# Patient Record
Sex: MALE | Race: WHITE | NOT HISPANIC OR LATINO
[De-identification: names, ages, dates, MRNs, and addresses within clinical notes are randomized per-mention and may not be internally consistent; named-entity substitution may affect disease eponyms.]

---

## 2019-11-19 PROBLEM — Z00.00 ENCOUNTER FOR PREVENTIVE HEALTH EXAMINATION: Status: ACTIVE | Noted: 2019-11-19

## 2019-11-25 ENCOUNTER — APPOINTMENT (OUTPATIENT)
Dept: CT IMAGING | Facility: CLINIC | Age: 69
End: 2019-11-25
Payer: MEDICARE

## 2019-11-25 ENCOUNTER — OUTPATIENT (OUTPATIENT)
Dept: OUTPATIENT SERVICES | Facility: HOSPITAL | Age: 69
LOS: 1 days | End: 2019-11-25
Payer: MEDICARE

## 2019-11-25 DIAGNOSIS — Z00.8 ENCOUNTER FOR OTHER GENERAL EXAMINATION: ICD-10-CM

## 2019-11-25 PROCEDURE — 74177 CT ABD & PELVIS W/CONTRAST: CPT | Mod: 26

## 2019-11-25 PROCEDURE — 71260 CT THORAX DX C+: CPT | Mod: 26

## 2019-11-25 PROCEDURE — 74177 CT ABD & PELVIS W/CONTRAST: CPT

## 2019-11-25 PROCEDURE — 82565 ASSAY OF CREATININE: CPT

## 2019-11-25 PROCEDURE — 71260 CT THORAX DX C+: CPT

## 2019-11-27 ENCOUNTER — APPOINTMENT (OUTPATIENT)
Dept: SURGICAL ONCOLOGY | Facility: CLINIC | Age: 69
End: 2019-11-27
Payer: MEDICARE

## 2019-11-27 VITALS
RESPIRATION RATE: 16 BRPM | HEART RATE: 90 BPM | HEIGHT: 69.5 IN | OXYGEN SATURATION: 93 % | SYSTOLIC BLOOD PRESSURE: 124 MMHG | BODY MASS INDEX: 28.09 KG/M2 | WEIGHT: 194 LBS | DIASTOLIC BLOOD PRESSURE: 73 MMHG

## 2019-11-27 DIAGNOSIS — K31.9 DISEASE OF STOMACH AND DUODENUM, UNSPECIFIED: ICD-10-CM

## 2019-11-27 DIAGNOSIS — Z87.19 PERSONAL HISTORY OF OTHER DISEASES OF THE DIGESTIVE SYSTEM: ICD-10-CM

## 2019-11-27 DIAGNOSIS — Z86.69 PERSONAL HISTORY OF OTHER DISEASES OF THE NERVOUS SYSTEM AND SENSE ORGANS: ICD-10-CM

## 2019-11-27 DIAGNOSIS — Z78.9 OTHER SPECIFIED HEALTH STATUS: ICD-10-CM

## 2019-11-27 DIAGNOSIS — Z86.39 PERSONAL HISTORY OF OTHER ENDOCRINE, NUTRITIONAL AND METABOLIC DISEASE: ICD-10-CM

## 2019-11-27 PROCEDURE — 99205 OFFICE O/P NEW HI 60 MIN: CPT

## 2019-11-27 RX ORDER — ENALAPRIL MALEATE 5 MG/1
5 TABLET ORAL
Refills: 0 | Status: ACTIVE | COMMUNITY

## 2019-11-27 RX ORDER — SEMAGLUTIDE 1.34 MG/ML
2 INJECTION, SOLUTION SUBCUTANEOUS
Refills: 0 | Status: ACTIVE | COMMUNITY

## 2019-11-27 RX ORDER — PANTOPRAZOLE 40 MG/1
TABLET, DELAYED RELEASE ORAL
Refills: 0 | Status: ACTIVE | COMMUNITY

## 2019-11-27 RX ORDER — ROSUVASTATIN CALCIUM 5 MG/1
TABLET, FILM COATED ORAL
Refills: 0 | Status: ACTIVE | COMMUNITY

## 2019-11-27 RX ORDER — EZETIMIBE AND SIMVASTATIN 10; 10 MG/1; MG/1
10-10 TABLET ORAL
Refills: 0 | Status: ACTIVE | COMMUNITY

## 2019-11-27 RX ORDER — METFORMIN HYDROCHLORIDE 1000 MG/1
1000 TABLET, COATED ORAL
Refills: 0 | Status: ACTIVE | COMMUNITY

## 2019-11-27 NOTE — CONSULT LETTER
[Dear  ___] : Dear  [unfilled], [Consult Letter:] : I had the pleasure of evaluating your patient, [unfilled]. [Please see my note below.] : Please see my note below. [Consult Closing:] : Thank you very much for allowing me to participate in the care of this patient.  If you have any questions, please do not hesitate to contact me. [Sincerely,] : Sincerely, [FreeTextEntry1] : I will notify you of the date of the surgery so that you can see him for pre-operative evaluation. [FreeTextEntry3] : Shadi Ferreira MD FACS\par Chief of Surgical Oncology\par \par  [DrArnulfo  ___] : Dr. RUSSO [DrArnulfo ___] : Dr. RSUSO

## 2019-11-27 NOTE — PHYSICAL EXAM
[Normal] : supple, no neck mass and thyroid not enlarged [Normal Supraclavicular Lymph Nodes] : normal supraclavicular lymph nodes [Normal] : oriented to person, place and time, with appropriate affect [Normal Groin Lymph Nodes] : normal groin lymph nodes [de-identified] : Distended,  no tenderness to palpation

## 2019-11-27 NOTE — ASSESSMENT
[FreeTextEntry1] : Impression:\par 33.7 cm cystic abdominal mass extending deep into the perirectal space \par \par \par Plan:\par Exploratory laparotomy\par \par

## 2019-11-27 NOTE — HISTORY OF PRESENT ILLNESS
[de-identified] : Mr. Lujan is a 69 year old male who presents today for an initial consultation.  He  was referred by Dr. Judah Hagan.\par \par Had a 4 months history of bloating and abdominal pain.  Underwent a CT C/A/P on 11/25/19 and was noted with a 33.7cm multiloculated cystic mass involving the abdomen and pelvis likely peritoneal in origin. The tumor extends deep into the perirectal space.  Patient states that he experienced a bad fall in August on his right side and has noted that his abdomen has slowly increased in size since the accident.  Endorses decreased appetite however denies abdominal discomfort or difficulty with BMs.  Denies loss of weight.\par \par PMH otherwise significant for DM (controlled), CVD s/p stent placement (off plavix), GERD and HLD.\par \par Internist: Dr. Judah Hagan\par GI: Dr. Chance\par Cards: Dr. Jama Baron

## 2019-12-05 ENCOUNTER — OUTPATIENT (OUTPATIENT)
Dept: OUTPATIENT SERVICES | Facility: HOSPITAL | Age: 69
LOS: 1 days | End: 2019-12-05

## 2019-12-05 VITALS
SYSTOLIC BLOOD PRESSURE: 100 MMHG | OXYGEN SATURATION: 98 % | TEMPERATURE: 98 F | HEART RATE: 87 BPM | HEIGHT: 70 IN | RESPIRATION RATE: 14 BRPM | DIASTOLIC BLOOD PRESSURE: 60 MMHG | WEIGHT: 201.94 LBS

## 2019-12-05 DIAGNOSIS — Z86.69 PERSONAL HISTORY OF OTHER DISEASES OF THE NERVOUS SYSTEM AND SENSE ORGANS: Chronic | ICD-10-CM

## 2019-12-05 DIAGNOSIS — G47.30 SLEEP APNEA, UNSPECIFIED: ICD-10-CM

## 2019-12-05 DIAGNOSIS — R19.00 INTRA-ABDOMINAL AND PELVIC SWELLING, MASS AND LUMP, UNSPECIFIED SITE: ICD-10-CM

## 2019-12-05 DIAGNOSIS — E11.9 TYPE 2 DIABETES MELLITUS WITHOUT COMPLICATIONS: ICD-10-CM

## 2019-12-05 DIAGNOSIS — Z01.818 ENCOUNTER FOR OTHER PREPROCEDURAL EXAMINATION: ICD-10-CM

## 2019-12-05 DIAGNOSIS — Z98.890 OTHER SPECIFIED POSTPROCEDURAL STATES: Chronic | ICD-10-CM

## 2019-12-05 LAB
ALBUMIN SERPL ELPH-MCNC: 3.6 G/DL — SIGNIFICANT CHANGE UP (ref 3.3–5)
ALP SERPL-CCNC: 81 U/L — SIGNIFICANT CHANGE UP (ref 40–120)
ALT FLD-CCNC: 37 U/L — SIGNIFICANT CHANGE UP (ref 4–41)
ANION GAP SERPL CALC-SCNC: 16 MMO/L — HIGH (ref 7–14)
AST SERPL-CCNC: 41 U/L — HIGH (ref 4–40)
BILIRUB SERPL-MCNC: 0.4 MG/DL — SIGNIFICANT CHANGE UP (ref 0.2–1.2)
BUN SERPL-MCNC: 22 MG/DL — SIGNIFICANT CHANGE UP (ref 7–23)
CALCIUM SERPL-MCNC: 9.3 MG/DL — SIGNIFICANT CHANGE UP (ref 8.4–10.5)
CHLORIDE SERPL-SCNC: 99 MMOL/L — SIGNIFICANT CHANGE UP (ref 98–107)
CO2 SERPL-SCNC: 22 MMOL/L — SIGNIFICANT CHANGE UP (ref 22–31)
CREAT SERPL-MCNC: 1.29 MG/DL — SIGNIFICANT CHANGE UP (ref 0.5–1.3)
GLUCOSE SERPL-MCNC: 116 MG/DL — HIGH (ref 70–99)
HBA1C BLD-MCNC: 6.2 % — HIGH (ref 4–5.6)
HCT VFR BLD CALC: 40.1 % — SIGNIFICANT CHANGE UP (ref 39–50)
HGB BLD-MCNC: 12.6 G/DL — LOW (ref 13–17)
MCHC RBC-ENTMCNC: 26.4 PG — LOW (ref 27–34)
MCHC RBC-ENTMCNC: 31.4 % — LOW (ref 32–36)
MCV RBC AUTO: 84.1 FL — SIGNIFICANT CHANGE UP (ref 80–100)
NRBC # FLD: 0 K/UL — SIGNIFICANT CHANGE UP (ref 0–0)
PLATELET # BLD AUTO: 443 K/UL — HIGH (ref 150–400)
PMV BLD: 9.8 FL — SIGNIFICANT CHANGE UP (ref 7–13)
POTASSIUM SERPL-MCNC: 4.4 MMOL/L — SIGNIFICANT CHANGE UP (ref 3.5–5.3)
POTASSIUM SERPL-SCNC: 4.4 MMOL/L — SIGNIFICANT CHANGE UP (ref 3.5–5.3)
PROT SERPL-MCNC: 6.8 G/DL — SIGNIFICANT CHANGE UP (ref 6–8.3)
RBC # BLD: 4.77 M/UL — SIGNIFICANT CHANGE UP (ref 4.2–5.8)
RBC # FLD: 13.2 % — SIGNIFICANT CHANGE UP (ref 10.3–14.5)
SODIUM SERPL-SCNC: 137 MMOL/L — SIGNIFICANT CHANGE UP (ref 135–145)
WBC # BLD: 7.64 K/UL — SIGNIFICANT CHANGE UP (ref 3.8–10.5)
WBC # FLD AUTO: 7.64 K/UL — SIGNIFICANT CHANGE UP (ref 3.8–10.5)

## 2019-12-05 RX ORDER — NEOMYCIN SULFATE 500 MG/1
500 TABLET ORAL
Qty: 6 | Refills: 0 | Status: ACTIVE | COMMUNITY
Start: 2019-12-05 | End: 1900-01-01

## 2019-12-05 RX ORDER — METRONIDAZOLE 250 MG/1
250 TABLET ORAL
Qty: 3 | Refills: 0 | Status: ACTIVE | COMMUNITY
Start: 2019-12-05 | End: 1900-01-01

## 2019-12-05 RX ORDER — POTASSIUM CHLORIDE 1500 MG/1
20 TABLET, FILM COATED, EXTENDED RELEASE ORAL
Qty: 4 | Refills: 0 | Status: ACTIVE | COMMUNITY
Start: 2019-12-05 | End: 1900-01-01

## 2019-12-05 NOTE — H&P PST ADULT - REASON FOR ADMISSION
" I am going to have abdominal surgery "   Pre op diagnosis: Intra-abdominal and pelvic swelling, mass and lump, unspecified site

## 2019-12-05 NOTE — H&P PST ADULT - ASSESSMENT
Pre op diagnosis: Intra-abdominal and pelvic swelling, mass and lump, unspecified site. Patient is scheduled for Exploratory laparotomy, resection abdominal mass, possible bowel resection scheduled on 12/13/2019.

## 2019-12-05 NOTE — H&P PST ADULT - NEGATIVE NEUROLOGICAL SYMPTOMS
no headache/no vertigo/no loss of sensation/no weakness/no syncope/no tremors/no difficulty walking/no confusion/no transient paralysis/no generalized seizures/no focal seizures/no paresthesias

## 2019-12-05 NOTE — H&P PST ADULT - NSCAFFEINEWITH_GEN_ALL_CORE_SD
Render Post-Care Instructions In Note?: no Post-Care Instructions: I reviewed with the patient in detail post-care instructions. Patient is to wear sunprotection, and avoid picking at any of the treated lesions. Pt may apply Vaseline to crusted or scabbing areas. Detail Level: Detailed Consent: The patient's consent was obtained including but not limited to risks of crusting, scabbing, blistering, scarring, darker or lighter pigmentary change, recurrence, incomplete removal and infection. The patient understands that the procedure is cosmetic in nature and is not covered by insurance. Price (Use Numbers Only, No Special Characters Or $): 0 Denies

## 2019-12-05 NOTE — H&P PST ADULT - HISTORY OF PRESENT ILLNESS
Patient is a 69 year old male with a history of 4 months history of bloating and abdominal pain. Underwent a CT C/A/P on 11/25/19 and was noted with a 33.7cm multiloculated cystic mass involving the abdomen and pelvis likely peritoneal in origin. Patient was referred to Dr. Ferreira for an evaluation.  Pre op diagnosis: Intra-abdominal and pelvic swelling, mass and lump, unspecified site. Patient is scheduled for Exploratory laparotomy, resection abdominal mass, possible bowel resection scheduled on 12/13/2019.

## 2019-12-05 NOTE — H&P PST ADULT - NSICDXPASTMEDICALHX_GEN_ALL_CORE_FT
PAST MEDICAL HISTORY:  CAD (coronary artery disease)     Diabetes mellitus Fasting FS range from - per patient    Hypercholesteremia     Hypertension     Intra-abdominal and pelvic swelling, mass and lump, unspecified site     Sleep apnea

## 2019-12-05 NOTE — H&P PST ADULT - NEGATIVE OPHTHALMOLOGIC SYMPTOMS
no blurred vision L/no blurred vision R/no irritation L/no irritation R/no pain R/no diplopia/no photophobia/no discharge L/no discharge R/no pain L

## 2019-12-05 NOTE — H&P PST ADULT - MUSCULOSKELETAL
details… detailed exam normal strength/ROM intact/no joint erythema/no joint warmth/no calf tenderness/no joint swelling

## 2019-12-05 NOTE — H&P PST ADULT - NEGATIVE GENERAL GENITOURINARY SYMPTOMS
no incontinence/no hematuria/no urinary hesitancy/normal urinary frequency/no flank pain L/no nocturia/no flank pain R/no urine discoloration/no bladder infections/no dysuria

## 2019-12-05 NOTE — H&P PST ADULT - NEGATIVE ENMT SYMPTOMS
no recurrent cold sores/no sinus symptoms/no gum bleeding/no hearing difficulty/no throat pain/no nasal discharge/no nasal congestion/no dysphagia/no nasal obstruction/no post-nasal discharge/no nose bleeds/no ear pain/no tinnitus/no dry mouth/no vertigo

## 2019-12-05 NOTE — H&P PST ADULT - RS GEN PE MLT RESP DETAILS PC
no rhonchi/no wheezes/respirations non-labored/good air movement/no rales/clear to auscultation bilaterally/breath sounds equal/airway patent

## 2019-12-05 NOTE — H&P PST ADULT - NEGATIVE BREAST SYMPTOMS
no breast tenderness L/no nipple discharge L/no breast tenderness R/no nipple discharge R/no breast lump L/no breast lump R

## 2019-12-05 NOTE — H&P PST ADULT - NSICDXPASTSURGICALHX_GEN_ALL_CORE_FT
PAST SURGICAL HISTORY:  H/O sleep apnea Per patient had Sleep Apnea surgery  in 1996- at [facility]- Was not retested for Sleep Apnea post surgery    History of cardiac cath Pt reports 1 cardiac stent placed 1999

## 2019-12-06 LAB
BLD GP AB SCN SERPL QL: NEGATIVE — SIGNIFICANT CHANGE UP
RH IG SCN BLD-IMP: POSITIVE — SIGNIFICANT CHANGE UP

## 2019-12-12 ENCOUNTER — TRANSCRIPTION ENCOUNTER (OUTPATIENT)
Age: 69
End: 2019-12-12

## 2019-12-12 ENCOUNTER — INPATIENT (INPATIENT)
Facility: HOSPITAL | Age: 69
LOS: 7 days | Discharge: HOME | End: 2019-12-20
Attending: SPECIALIST | Admitting: SPECIALIST
Payer: MEDICARE

## 2019-12-12 VITALS
HEIGHT: 70 IN | HEART RATE: 109 BPM | OXYGEN SATURATION: 99 % | SYSTOLIC BLOOD PRESSURE: 106 MMHG | DIASTOLIC BLOOD PRESSURE: 68 MMHG | TEMPERATURE: 98 F | RESPIRATION RATE: 16 BRPM

## 2019-12-12 DIAGNOSIS — Z98.890 OTHER SPECIFIED POSTPROCEDURAL STATES: Chronic | ICD-10-CM

## 2019-12-12 DIAGNOSIS — R19.00 INTRA-ABDOMINAL AND PELVIC SWELLING, MASS AND LUMP, UNSPECIFIED SITE: ICD-10-CM

## 2019-12-12 DIAGNOSIS — Z86.69 PERSONAL HISTORY OF OTHER DISEASES OF THE NERVOUS SYSTEM AND SENSE ORGANS: Chronic | ICD-10-CM

## 2019-12-12 PROBLEM — G47.30 SLEEP APNEA, UNSPECIFIED: Chronic | Status: ACTIVE | Noted: 2019-12-05

## 2019-12-12 LAB
ALBUMIN SERPL ELPH-MCNC: 3.2 G/DL — LOW (ref 3.3–5)
ALP SERPL-CCNC: 79 U/L — SIGNIFICANT CHANGE UP (ref 40–120)
ALT FLD-CCNC: 28 U/L — SIGNIFICANT CHANGE UP (ref 4–41)
ANION GAP SERPL CALC-SCNC: 18 MMO/L — HIGH (ref 7–14)
APPEARANCE UR: SIGNIFICANT CHANGE UP
APTT BLD: 31.5 SEC — SIGNIFICANT CHANGE UP (ref 27.5–36.3)
AST SERPL-CCNC: 32 U/L — SIGNIFICANT CHANGE UP (ref 4–40)
BACTERIA # UR AUTO: NEGATIVE — SIGNIFICANT CHANGE UP
BASOPHILS # BLD AUTO: 0.05 K/UL — SIGNIFICANT CHANGE UP (ref 0–0.2)
BASOPHILS NFR BLD AUTO: 0.6 % — SIGNIFICANT CHANGE UP (ref 0–2)
BILIRUB SERPL-MCNC: 0.5 MG/DL — SIGNIFICANT CHANGE UP (ref 0.2–1.2)
BILIRUB UR-MCNC: SIGNIFICANT CHANGE UP
BLOOD UR QL VISUAL: NEGATIVE — SIGNIFICANT CHANGE UP
BUN SERPL-MCNC: 26 MG/DL — HIGH (ref 7–23)
CALCIUM SERPL-MCNC: 9.4 MG/DL — SIGNIFICANT CHANGE UP (ref 8.4–10.5)
CHLORIDE SERPL-SCNC: 97 MMOL/L — LOW (ref 98–107)
CO2 SERPL-SCNC: 18 MMOL/L — LOW (ref 22–31)
COLOR SPEC: YELLOW — SIGNIFICANT CHANGE UP
CREAT SERPL-MCNC: 1.61 MG/DL — HIGH (ref 0.5–1.3)
EOSINOPHIL # BLD AUTO: 0.07 K/UL — SIGNIFICANT CHANGE UP (ref 0–0.5)
EOSINOPHIL NFR BLD AUTO: 0.9 % — SIGNIFICANT CHANGE UP (ref 0–6)
GLUCOSE SERPL-MCNC: 124 MG/DL — HIGH (ref 70–99)
GLUCOSE UR-MCNC: NEGATIVE — SIGNIFICANT CHANGE UP
GRAN CASTS # UR COMP ASSIST: SIGNIFICANT CHANGE UP
HCT VFR BLD CALC: 41.3 % — SIGNIFICANT CHANGE UP (ref 39–50)
HGB BLD-MCNC: 12.8 G/DL — LOW (ref 13–17)
HYALINE CASTS # UR AUTO: SIGNIFICANT CHANGE UP
IMM GRANULOCYTES NFR BLD AUTO: 0.1 % — SIGNIFICANT CHANGE UP (ref 0–1.5)
INR BLD: 1.09 — SIGNIFICANT CHANGE UP (ref 0.88–1.17)
KETONES UR-MCNC: SIGNIFICANT CHANGE UP
LEUKOCYTE ESTERASE UR-ACNC: NEGATIVE — SIGNIFICANT CHANGE UP
LIDOCAIN IGE QN: 75.4 U/L — HIGH (ref 7–60)
LYMPHOCYTES # BLD AUTO: 1.38 K/UL — SIGNIFICANT CHANGE UP (ref 1–3.3)
LYMPHOCYTES # BLD AUTO: 17.4 % — SIGNIFICANT CHANGE UP (ref 13–44)
MCHC RBC-ENTMCNC: 25.9 PG — LOW (ref 27–34)
MCHC RBC-ENTMCNC: 31 % — LOW (ref 32–36)
MCV RBC AUTO: 83.6 FL — SIGNIFICANT CHANGE UP (ref 80–100)
MONOCYTES # BLD AUTO: 0.4 K/UL — SIGNIFICANT CHANGE UP (ref 0–0.9)
MONOCYTES NFR BLD AUTO: 5.1 % — SIGNIFICANT CHANGE UP (ref 2–14)
NEUTROPHILS # BLD AUTO: 6.01 K/UL — SIGNIFICANT CHANGE UP (ref 1.8–7.4)
NEUTROPHILS NFR BLD AUTO: 75.9 % — SIGNIFICANT CHANGE UP (ref 43–77)
NITRITE UR-MCNC: NEGATIVE — SIGNIFICANT CHANGE UP
NRBC # FLD: 0 K/UL — SIGNIFICANT CHANGE UP (ref 0–0)
PH UR: 5.5 — SIGNIFICANT CHANGE UP (ref 5–8)
PLATELET # BLD AUTO: 490 K/UL — HIGH (ref 150–400)
PMV BLD: 10.4 FL — SIGNIFICANT CHANGE UP (ref 7–13)
POTASSIUM SERPL-MCNC: 4.4 MMOL/L — SIGNIFICANT CHANGE UP (ref 3.5–5.3)
POTASSIUM SERPL-SCNC: 4.4 MMOL/L — SIGNIFICANT CHANGE UP (ref 3.5–5.3)
PROT SERPL-MCNC: 6.8 G/DL — SIGNIFICANT CHANGE UP (ref 6–8.3)
PROT UR-MCNC: 100 — HIGH
PROTHROM AB SERPL-ACNC: 12.5 SEC — SIGNIFICANT CHANGE UP (ref 9.8–13.1)
RBC # BLD: 4.94 M/UL — SIGNIFICANT CHANGE UP (ref 4.2–5.8)
RBC # FLD: 13.6 % — SIGNIFICANT CHANGE UP (ref 10.3–14.5)
RBC CASTS # UR COMP ASSIST: SIGNIFICANT CHANGE UP (ref 0–?)
SODIUM SERPL-SCNC: 133 MMOL/L — LOW (ref 135–145)
SP GR SPEC: 1.02 — SIGNIFICANT CHANGE UP (ref 1–1.04)
SQUAMOUS # UR AUTO: SIGNIFICANT CHANGE UP
UROBILINOGEN FLD QL: SIGNIFICANT CHANGE UP
WBC # BLD: 7.92 K/UL — SIGNIFICANT CHANGE UP (ref 3.8–10.5)
WBC # FLD AUTO: 7.92 K/UL — SIGNIFICANT CHANGE UP (ref 3.8–10.5)
WBC UR QL: HIGH (ref 0–?)

## 2019-12-12 PROCEDURE — 99232 SBSQ HOSP IP/OBS MODERATE 35: CPT | Mod: 24

## 2019-12-12 RX ORDER — DEXTROSE 50 % IN WATER 50 %
12.5 SYRINGE (ML) INTRAVENOUS ONCE
Refills: 0 | Status: DISCONTINUED | OUTPATIENT
Start: 2019-12-12 | End: 2019-12-20

## 2019-12-12 RX ORDER — ASPIRIN/CALCIUM CARB/MAGNESIUM 324 MG
81 TABLET ORAL DAILY
Refills: 0 | Status: DISCONTINUED | OUTPATIENT
Start: 2019-12-12 | End: 2019-12-13

## 2019-12-12 RX ORDER — SODIUM CHLORIDE 9 MG/ML
1000 INJECTION INTRAMUSCULAR; INTRAVENOUS; SUBCUTANEOUS
Refills: 0 | Status: DISCONTINUED | OUTPATIENT
Start: 2019-12-12 | End: 2019-12-13

## 2019-12-12 RX ORDER — GLUCAGON INJECTION, SOLUTION 0.5 MG/.1ML
1 INJECTION, SOLUTION SUBCUTANEOUS ONCE
Refills: 0 | Status: DISCONTINUED | OUTPATIENT
Start: 2019-12-12 | End: 2019-12-20

## 2019-12-12 RX ORDER — ENOXAPARIN SODIUM 100 MG/ML
40 INJECTION SUBCUTANEOUS DAILY
Refills: 0 | Status: DISCONTINUED | OUTPATIENT
Start: 2019-12-12 | End: 2019-12-13

## 2019-12-12 RX ORDER — METRONIDAZOLE 500 MG
250 TABLET ORAL
Refills: 0 | Status: DISCONTINUED | OUTPATIENT
Start: 2019-12-12 | End: 2019-12-12

## 2019-12-12 RX ORDER — SODIUM CHLORIDE 9 MG/ML
1000 INJECTION, SOLUTION INTRAVENOUS
Refills: 0 | Status: DISCONTINUED | OUTPATIENT
Start: 2019-12-12 | End: 2019-12-19

## 2019-12-12 RX ORDER — DEXTROSE 50 % IN WATER 50 %
25 SYRINGE (ML) INTRAVENOUS ONCE
Refills: 0 | Status: DISCONTINUED | OUTPATIENT
Start: 2019-12-12 | End: 2019-12-20

## 2019-12-12 RX ORDER — DEXTROSE 50 % IN WATER 50 %
15 SYRINGE (ML) INTRAVENOUS ONCE
Refills: 0 | Status: DISCONTINUED | OUTPATIENT
Start: 2019-12-12 | End: 2019-12-20

## 2019-12-12 RX ORDER — INSULIN LISPRO 100/ML
VIAL (ML) SUBCUTANEOUS
Refills: 0 | Status: DISCONTINUED | OUTPATIENT
Start: 2019-12-12 | End: 2019-12-13

## 2019-12-12 RX ORDER — SODIUM CHLORIDE 9 MG/ML
1000 INJECTION, SOLUTION INTRAVENOUS ONCE
Refills: 0 | Status: COMPLETED | OUTPATIENT
Start: 2019-12-12 | End: 2019-12-12

## 2019-12-12 RX ORDER — NEOMYCIN SULFATE 500 MG/1
1000 TABLET ORAL
Refills: 0 | Status: COMPLETED | OUTPATIENT
Start: 2019-12-12 | End: 2019-12-12

## 2019-12-12 RX ORDER — POTASSIUM CHLORIDE 20 MEQ
40 PACKET (EA) ORAL
Refills: 0 | Status: COMPLETED | OUTPATIENT
Start: 2019-12-12 | End: 2019-12-13

## 2019-12-12 RX ORDER — METRONIDAZOLE 500 MG
1000 TABLET ORAL
Refills: 0 | Status: COMPLETED | OUTPATIENT
Start: 2019-12-12 | End: 2019-12-12

## 2019-12-12 RX ORDER — FAMOTIDINE 10 MG/ML
20 INJECTION INTRAVENOUS DAILY
Refills: 0 | Status: DISCONTINUED | OUTPATIENT
Start: 2019-12-12 | End: 2019-12-13

## 2019-12-12 RX ORDER — MORPHINE SULFATE 50 MG/1
4 CAPSULE, EXTENDED RELEASE ORAL ONCE
Refills: 0 | Status: DISCONTINUED | OUTPATIENT
Start: 2019-12-12 | End: 2019-12-12

## 2019-12-12 RX ORDER — ONDANSETRON 8 MG/1
4 TABLET, FILM COATED ORAL ONCE
Refills: 0 | Status: COMPLETED | OUTPATIENT
Start: 2019-12-12 | End: 2019-12-12

## 2019-12-12 RX ADMIN — MORPHINE SULFATE 4 MILLIGRAM(S): 50 CAPSULE, EXTENDED RELEASE ORAL at 10:38

## 2019-12-12 RX ADMIN — NEOMYCIN SULFATE 1000 MILLIGRAM(S): 500 TABLET ORAL at 22:55

## 2019-12-12 RX ADMIN — NEOMYCIN SULFATE 1000 MILLIGRAM(S): 500 TABLET ORAL at 15:34

## 2019-12-12 RX ADMIN — Medication 5 MILLIGRAM(S): at 22:54

## 2019-12-12 RX ADMIN — SODIUM CHLORIDE 1000 MILLILITER(S): 9 INJECTION, SOLUTION INTRAVENOUS at 10:38

## 2019-12-12 RX ADMIN — FAMOTIDINE 20 MILLIGRAM(S): 10 INJECTION INTRAVENOUS at 22:59

## 2019-12-12 RX ADMIN — Medication 1000 MILLIGRAM(S): at 15:34

## 2019-12-12 RX ADMIN — ENOXAPARIN SODIUM 40 MILLIGRAM(S): 100 INJECTION SUBCUTANEOUS at 14:29

## 2019-12-12 RX ADMIN — Medication 1000 MILLIGRAM(S): at 14:29

## 2019-12-12 RX ADMIN — Medication 40 MILLIEQUIVALENT(S): at 22:54

## 2019-12-12 RX ADMIN — ONDANSETRON 4 MILLIGRAM(S): 8 TABLET, FILM COATED ORAL at 10:38

## 2019-12-12 RX ADMIN — MORPHINE SULFATE 4 MILLIGRAM(S): 50 CAPSULE, EXTENDED RELEASE ORAL at 11:30

## 2019-12-12 RX ADMIN — Medication 1000 MILLIGRAM(S): at 22:54

## 2019-12-12 RX ADMIN — NEOMYCIN SULFATE 1000 MILLIGRAM(S): 500 TABLET ORAL at 14:42

## 2019-12-12 RX ADMIN — SODIUM CHLORIDE 125 MILLILITER(S): 9 INJECTION INTRAMUSCULAR; INTRAVENOUS; SUBCUTANEOUS at 17:36

## 2019-12-12 NOTE — ED ADULT NURSE NOTE - PSH
H/O sleep apnea  Per patient had Sleep Apnea surgery  in 1996- at [facility]- Was not retested for Sleep Apnea post surgery  History of cardiac cath  Pt reports 1 cardiac stent placed 1999

## 2019-12-12 NOTE — ED PROVIDER NOTE - PSH
H/O sleep apnea  Per patient had Sleep Apnea surgery  in 1996- at [facility]- Was not retested for Sleep Apnea post surgery  History of cardiac cath  Pt reports 1 cardiac stent placed 1999

## 2019-12-12 NOTE — H&P ADULT - NSICDXPASTSURGICALHX_GEN_ALL_CORE_FT
PAST SURGICAL HISTORY:  H/O sleep apnea Per patient had Sleep Apnea surgery  in 1996- at [facility]- Was not retested for Sleep Apnea post surgery    History of cardiac cath Pt reports 1 cardiac stent placed 1999

## 2019-12-12 NOTE — ED PROVIDER NOTE - PHYSICAL EXAMINATION
*GEN:   uncomfortable, in no acute distress, AOx3  *EYES:   pupils equally round and reactive to light, extra-occular movements intact  *HEENT:   airway patent, moist mucosal membranes, full ROM neck  *CV:   regular rate and rhythm  *RESP:   clear to auscultation bilaterally, non-labored  *ABD:   severely distended abdomen, mildly tender lower abdomen  *:   no cva/flank tenderness  *EXTREM:   no MSK tenderness, full ROM throughout, no leg swelling  *SKIN:   dry, intact  *NEURO:   AOx3, no focal weakness or loss of sensation

## 2019-12-12 NOTE — ED ADULT TRIAGE NOTE - CHIEF COMPLAINT QUOTE
A&OX3 sent in by MD Li for admission for surgery tomorrow, pt scheduled for abdominal mass removal surgery tomorrow, abdomen round distended reports nausea, denies fever chills sob PMH CAD, DM

## 2019-12-12 NOTE — H&P ADULT - NSHPLABSRESULTS_GEN_ALL_CORE
EXAM: CT CHEST IC     EXAM: CT ABDOMEN AND PELVIS OC IC     *** ADDENDUM 11/26/2019 ***     Correction for communication. The initial conversation of the patient's   finding was with Dr. Judah Hagan with subsequent conversation with Dr. Chance     *** END OF ADDENDUM 11/26/2019 ***     PROCEDURE DATE: 11/25/2019     INTERPRETATION: CLINICAL INFORMATION: 4 month history of abdominal pain and   bloating     COMPARISON: None.     PROCEDURE:   CT of the Chest, Abdomen and Pelvis was performed with intravenous contrast.   Intravenous contrast: 90 ml Omnipaque 350. 10 ml discarded.   Oral contrast: Positive contrast was administered.   Sagittal and coronal reformats were performed.     FINDINGS:     CHEST:     LUNGS AND LARGE AIRWAYS: Patent central airways. Bilateral linear   atelectasis. Moderate size hiatal hernia   PLEURA: No pleural effusion.   VESSELS: Within normal limits.   HEART: Heart size is normal. No pericardial effusion.   MEDIASTINUM AND GARRY: No lymphadenopathy.   CHEST WALL AND LOWER NECK: Within normal limits.     ABDOMEN AND PELVIS:     LIVER: Within normal limits.   BILE DUCTS: Normal caliber.   GALLBLADDER: Within normal limits.   SPLEEN: Within normal limits.   PANCREAS: Within normal limits.   ADRENALS: Within normal limits.   KIDNEYS/URETERS: Within normal limits.     BLADDER: Within normal limits.   REPRODUCTIVE ORGANS: Unremarkable     BOWEL: No bowel obstruction.   PERITONEUM: There appears be a large multiloculated cystic mass involving   the abdomen and pelvis measuring 33.7 x 20.6 x 28.5 cm in size. This is of   unclear origin and may be primary peritoneal tumor. The tumor extends deep   into the perirectal space   VESSELS: Within normal limits.   RETROPERITONEUM/LYMPH NODES: No lymphadenopathy.   ABDOMINAL WALL: Within normal limits.   BONES: Within normal limits.     IMPRESSION:     33.7 x 20.6 x 20.5 multiloculated cystic mass involving the abdomen pelvis   likely peritoneal in origin such as a peritoneal mesothelioma.     Findings were discussed with Dr. DESHAWN CHANCE 11/25/2019 3:40 PM by Dr. Bundy with read back confirmation.     ***Please see the addendum at the top of this report. It may contain   additional important information or changes.****

## 2019-12-12 NOTE — ED PROVIDER NOTE - ATTENDING CONTRIBUTION TO CARE
Attending Statement: I have personally seen and examined this patient. I have fully participated in the care of this patient. I have reviewed all pertinent clinical information, including history physical exam, plan and the Resident's note and agree except as noted  68yo M hx of HTN, HLD, DM, CAD, sleep apnea comes to be admitted for surgery tomorrow for resection of mass on abdomen. Pt states he has had 4 month hx of progressive abdominal mass, with pain, nausea and NB vomit, lose BM. no fever/chills. no weight loss. Chronic nonproductive cough. no cp. CT (11-24-19) noted to have a large mass/cyst on abdomen.   Vital signs noted.  Ambulatory in ED. Ao3. normal S1-S2 No resp distress. able to speak in full and clear sentences. no wheeze, rales or stridor. large nontender abdomen, no guarding or rebound, tense abdomen. no pedal edema. no calf tenderness. normal pulses bilateral feet.  plan reviewed prior imaging, labs, ekg, IVF and admit.

## 2019-12-12 NOTE — ED PROVIDER NOTE - PMH
CAD (coronary artery disease)    Diabetes mellitus  Fasting FS range from - per patient  Hypercholesteremia    Hypertension    Intra-abdominal and pelvic swelling, mass and lump, unspecified site    Sleep apnea

## 2019-12-12 NOTE — ED PROVIDER NOTE - OBJECTIVE STATEMENT
69M w/ pmh HTN, HLD, DM CAD s/p stent - sent by Dr. Ferreira for surgical admission for exploratory laparotomy. Pt has had abd distension, pain, vomiting worsening x 4 months worse the past 1 month, 11/24 underwent CTAP demonstrating large 33.7cm multiloculated cystic mass involving the abdomen and pelvis. +cough, diarrhea. Last vomited yesterday, unable to eat. No fever, n/v/d/c, chest pain, sob, dizziness, dysuria/hematuria.

## 2019-12-12 NOTE — ED ADULT NURSE REASSESSMENT NOTE - NS ED NURSE REASSESS COMMENT FT1
Received report from day shift NITHIN Angelo. Pt A&Ox3, laying in stretcher comfortably. Pt receiving NS at 125mL/hr. Pt c/o abdominal pain. Surgery team paged, awaiting call back and further plan. Pt denies any other medical complaints. denies chest pain, sob, n & v, diarrhea, fever. Vitals stable as noted. respirations are equal and nonlabored, no respiratory distress noted. Pt stable, awaiting further plan Received report from day shift NITHIN Angelo. Pt A&Ox3, laying in stretcher comfortably. Pt receiving NS at 125mL/hr. Pt c/o abdominal pain. MD Witt made aware. Awaiting medication orders. Pt denies any other medical complaints. denies chest pain, sob, n & v, diarrhea, fever. Vitals stable as noted. respirations are equal and nonlabored, no respiratory distress noted. Pt stable, awaiting further plan

## 2019-12-12 NOTE — H&P ADULT - HISTORY OF PRESENT ILLNESS
70yo M with hx of DM2, CADA s/p stent (1999), HLD, and sleep apnea s/p uvulectomy and tonsillectomy (~1996) presented 12/12 with abdominal discomfort and intolerance of bowel prep for planned resection of abdominal mass planned for 12/13 with Dr. Ferreira. Patient reports that in 11/2019 he noticed abd bloating that increased, causing him to present to GI doc (Dr. Chance) who sent him for CT scan that showed a 33.7x20.6x20.5cm multiloculated cystic mass involving the pelvis. He subsequently presented to his PCP who referred him to Dr. Ferreira and was subsequently scheduled for resection of the mass on 12/13/19. Patient has experienced increasing burping, hiccups, coughing, and anorexia. In the past 2wk patient has had increasing abd girth, diarrhea, constipation, urinary frequency, SOB, as well as N/V (NBNB) and increased abd discomfort with PO intake. He has therefore had reduced PO intake to alleviate symptoms. Denies CP or dysuria.

## 2019-12-12 NOTE — H&P ADULT - ASSESSMENT
70yo M with hx of DM2, CADA s/p stent (1999), HLD, and sleep apnea s/p uvulectomy and tonsillectomy (~1996) presented 12/12 with abdominal discomfort and intolerance of bowel prep for planned resection of abdominal mass (seen on outpt CT on 11/25) planned for 12/13 with Dr. Ferreira.    Plan  - Admit to Dr. Ferreira  - Consistent carb clear liquid diet with IV fluid      - NPO after midnight  - Metronidazole 250mg and Neomycin 1000mg at 13:00, 14:00, and 23:00 on 12/12  - Dulcolax suppositories x2 on 12/12  - DVT ppx: Lovenox  - Discussed with Dr. Ferreira 70yo M with hx of DM2, CADA s/p stent (1999), HLD, and sleep apnea s/p uvulectomy and tonsillectomy (~1996) presented 12/12 with abdominal discomfort and intolerance of bowel prep for planned resection of abdominal mass (seen on outpt CT on 11/25) planned for 12/13 with Dr. Ferreira.    Plan  - Admit to Dr. Ferreira  - Consistent carb clear liquid diet with IV fluid      - NPO after midnight  - Metronidazole 250mg and Neomycin 1000mg at 13:00, 14:00, and 23:00 on 12/12  - Bowel prep      - Dulcolax suppositories x2 on 12/12      - potassium Cl ER 40mg BID  - DVT ppx: Lovenox  - Discussed with Dr. Ferreira

## 2019-12-12 NOTE — ED ADULT NURSE NOTE - OBJECTIVE STATEMENT
pt is a 69 yr old male c/o abd pain 7/10 with grossly distended abd. pt to have surgery tomorrow, advised  by md to come to ed today. + nausea and pain coplaints, has been have loose stool x 2 weeks, last bm this am. pt unable to tolerate large amounts of liquid intake at this time. #20 g pic placed in left ac, ekg done, vss. will ctm

## 2019-12-13 ENCOUNTER — APPOINTMENT (OUTPATIENT)
Dept: SURGICAL ONCOLOGY | Facility: HOSPITAL | Age: 69
End: 2019-12-13

## 2019-12-13 ENCOUNTER — RESULT REVIEW (OUTPATIENT)
Age: 69
End: 2019-12-13

## 2019-12-13 LAB
ANION GAP SERPL CALC-SCNC: 16 MMO/L — HIGH (ref 7–14)
APTT BLD: 30.9 SEC — SIGNIFICANT CHANGE UP (ref 27.5–36.3)
BASE EXCESS BLDA CALC-SCNC: -7.1 MMOL/L — SIGNIFICANT CHANGE UP
BASE EXCESS BLDA CALC-SCNC: -9 MMOL/L — SIGNIFICANT CHANGE UP
BLD GP AB SCN SERPL QL: NEGATIVE — SIGNIFICANT CHANGE UP
BLOOD GAS ARTERIAL - FIO2: 40 — SIGNIFICANT CHANGE UP
BUN SERPL-MCNC: 15 MG/DL — SIGNIFICANT CHANGE UP (ref 7–23)
BUN SERPL-MCNC: 17 MG/DL — SIGNIFICANT CHANGE UP (ref 7–23)
BUN SERPL-MCNC: 22 MG/DL — SIGNIFICANT CHANGE UP (ref 7–23)
CA-I BLDA-SCNC: 1.15 MMOL/L — SIGNIFICANT CHANGE UP (ref 1.15–1.29)
CA-I BLDA-SCNC: 1.19 MMOL/L — SIGNIFICANT CHANGE UP (ref 1.15–1.29)
CALCIUM SERPL-MCNC: 7.7 MG/DL — LOW (ref 8.4–10.5)
CALCIUM SERPL-MCNC: 8.1 MG/DL — LOW (ref 8.4–10.5)
CALCIUM SERPL-MCNC: 9.3 MG/DL — SIGNIFICANT CHANGE UP (ref 8.4–10.5)
CHLORIDE SERPL-SCNC: 100 MMOL/L — SIGNIFICANT CHANGE UP (ref 98–107)
CHLORIDE SERPL-SCNC: 103 MMOL/L — SIGNIFICANT CHANGE UP (ref 98–107)
CHLORIDE SERPL-SCNC: 99 MMOL/L — SIGNIFICANT CHANGE UP (ref 98–107)
CO2 SERPL-SCNC: 15 MMOL/L — LOW (ref 22–31)
CO2 SERPL-SCNC: 19 MMOL/L — LOW (ref 22–31)
CO2 SERPL-SCNC: 19 MMOL/L — LOW (ref 22–31)
CREAT SERPL-MCNC: 1.11 MG/DL — SIGNIFICANT CHANGE UP (ref 0.5–1.3)
CREAT SERPL-MCNC: 1.15 MG/DL — SIGNIFICANT CHANGE UP (ref 0.5–1.3)
CREAT SERPL-MCNC: 1.36 MG/DL — HIGH (ref 0.5–1.3)
GLUCOSE BLDA-MCNC: 121 MG/DL — HIGH (ref 70–99)
GLUCOSE BLDA-MCNC: 154 MG/DL — HIGH (ref 70–99)
GLUCOSE BLDC GLUCOMTR-MCNC: 102 MG/DL — HIGH (ref 70–99)
GLUCOSE BLDC GLUCOMTR-MCNC: 150 MG/DL — HIGH (ref 70–99)
GLUCOSE BLDC GLUCOMTR-MCNC: 152 MG/DL — HIGH (ref 70–99)
GLUCOSE BLDC GLUCOMTR-MCNC: 79 MG/DL — SIGNIFICANT CHANGE UP (ref 70–99)
GLUCOSE BLDC GLUCOMTR-MCNC: 92 MG/DL — SIGNIFICANT CHANGE UP (ref 70–99)
GLUCOSE SERPL-MCNC: 156 MG/DL — HIGH (ref 70–99)
GLUCOSE SERPL-MCNC: 174 MG/DL — HIGH (ref 70–99)
GLUCOSE SERPL-MCNC: 97 MG/DL — SIGNIFICANT CHANGE UP (ref 70–99)
HCO3 BLDA-SCNC: 17 MMOL/L — LOW (ref 22–26)
HCO3 BLDA-SCNC: 19 MMOL/L — LOW (ref 22–26)
HCT VFR BLD CALC: 28.7 % — LOW (ref 39–50)
HCT VFR BLD CALC: 31.2 % — LOW (ref 39–50)
HCT VFR BLD CALC: 42.4 % — SIGNIFICANT CHANGE UP (ref 39–50)
HCT VFR BLDA CALC: 27.8 % — LOW (ref 39–51)
HCT VFR BLDA CALC: 35.5 % — LOW (ref 39–51)
HCV AB S/CO SERPL IA: 0.11 S/CO — SIGNIFICANT CHANGE UP (ref 0–0.99)
HCV AB SERPL-IMP: SIGNIFICANT CHANGE UP
HGB BLD-MCNC: 13 G/DL — SIGNIFICANT CHANGE UP (ref 13–17)
HGB BLD-MCNC: 9 G/DL — LOW (ref 13–17)
HGB BLD-MCNC: 9.8 G/DL — LOW (ref 13–17)
HGB BLDA-MCNC: 11.5 G/DL — LOW (ref 13–17)
HGB BLDA-MCNC: 9 G/DL — LOW (ref 13–17)
INR BLD: 1.05 — SIGNIFICANT CHANGE UP (ref 0.88–1.17)
LACTATE BLDA-SCNC: 0.9 MMOL/L — SIGNIFICANT CHANGE UP (ref 0.5–2)
MAGNESIUM SERPL-MCNC: 0.9 MG/DL — CRITICAL LOW (ref 1.6–2.6)
MAGNESIUM SERPL-MCNC: 1.2 MG/DL — LOW (ref 1.6–2.6)
MAGNESIUM SERPL-MCNC: 1.7 MG/DL — SIGNIFICANT CHANGE UP (ref 1.6–2.6)
MCHC RBC-ENTMCNC: 26.1 PG — LOW (ref 27–34)
MCHC RBC-ENTMCNC: 26.3 PG — LOW (ref 27–34)
MCHC RBC-ENTMCNC: 26.8 PG — LOW (ref 27–34)
MCHC RBC-ENTMCNC: 30.7 % — LOW (ref 32–36)
MCHC RBC-ENTMCNC: 31.4 % — LOW (ref 32–36)
MCHC RBC-ENTMCNC: 31.4 % — LOW (ref 32–36)
MCV RBC AUTO: 83.9 FL — SIGNIFICANT CHANGE UP (ref 80–100)
MCV RBC AUTO: 85 FL — SIGNIFICANT CHANGE UP (ref 80–100)
MCV RBC AUTO: 85.4 FL — SIGNIFICANT CHANGE UP (ref 80–100)
NRBC # FLD: 0 K/UL — SIGNIFICANT CHANGE UP (ref 0–0)
PCO2 BLDA: 31 MMHG — LOW (ref 35–48)
PCO2 BLDA: 35 MMHG — SIGNIFICANT CHANGE UP (ref 35–48)
PH BLDA: 7.33 PH — LOW (ref 7.35–7.45)
PH BLDA: 7.33 PH — LOW (ref 7.35–7.45)
PHOSPHATE SERPL-MCNC: 2.4 MG/DL — LOW (ref 2.5–4.5)
PHOSPHATE SERPL-MCNC: 3 MG/DL — SIGNIFICANT CHANGE UP (ref 2.5–4.5)
PHOSPHATE SERPL-MCNC: 3.9 MG/DL — SIGNIFICANT CHANGE UP (ref 2.5–4.5)
PLATELET # BLD AUTO: 337 K/UL — SIGNIFICANT CHANGE UP (ref 150–400)
PLATELET # BLD AUTO: 354 K/UL — SIGNIFICANT CHANGE UP (ref 150–400)
PLATELET # BLD AUTO: 435 K/UL — HIGH (ref 150–400)
PMV BLD: 10.3 FL — SIGNIFICANT CHANGE UP (ref 7–13)
PMV BLD: 9.9 FL — SIGNIFICANT CHANGE UP (ref 7–13)
PMV BLD: 9.9 FL — SIGNIFICANT CHANGE UP (ref 7–13)
PO2 BLDA: 156 MMHG — HIGH (ref 83–108)
PO2 BLDA: 403 MMHG — HIGH (ref 83–108)
POTASSIUM BLDA-SCNC: 4.1 MMOL/L — SIGNIFICANT CHANGE UP (ref 3.4–4.5)
POTASSIUM BLDA-SCNC: 4.6 MMOL/L — HIGH (ref 3.4–4.5)
POTASSIUM SERPL-MCNC: 4.6 MMOL/L — SIGNIFICANT CHANGE UP (ref 3.5–5.3)
POTASSIUM SERPL-MCNC: 4.7 MMOL/L — SIGNIFICANT CHANGE UP (ref 3.5–5.3)
POTASSIUM SERPL-MCNC: 4.8 MMOL/L — SIGNIFICANT CHANGE UP (ref 3.5–5.3)
POTASSIUM SERPL-SCNC: 4.6 MMOL/L — SIGNIFICANT CHANGE UP (ref 3.5–5.3)
POTASSIUM SERPL-SCNC: 4.7 MMOL/L — SIGNIFICANT CHANGE UP (ref 3.5–5.3)
POTASSIUM SERPL-SCNC: 4.8 MMOL/L — SIGNIFICANT CHANGE UP (ref 3.5–5.3)
PROTHROM AB SERPL-ACNC: 12 SEC — SIGNIFICANT CHANGE UP (ref 9.8–13.1)
RBC # BLD: 3.36 M/UL — LOW (ref 4.2–5.8)
RBC # BLD: 3.72 M/UL — LOW (ref 4.2–5.8)
RBC # BLD: 4.99 M/UL — SIGNIFICANT CHANGE UP (ref 4.2–5.8)
RBC # FLD: 13.4 % — SIGNIFICANT CHANGE UP (ref 10.3–14.5)
RBC # FLD: 13.5 % — SIGNIFICANT CHANGE UP (ref 10.3–14.5)
RBC # FLD: 13.7 % — SIGNIFICANT CHANGE UP (ref 10.3–14.5)
RH IG SCN BLD-IMP: POSITIVE — SIGNIFICANT CHANGE UP
SAO2 % BLDA: 97.6 % — SIGNIFICANT CHANGE UP (ref 95–99)
SAO2 % BLDA: 98.6 % — SIGNIFICANT CHANGE UP (ref 95–99)
SODIUM BLDA-SCNC: 131 MMOL/L — LOW (ref 136–146)
SODIUM BLDA-SCNC: 132 MMOL/L — LOW (ref 136–146)
SODIUM SERPL-SCNC: 134 MMOL/L — LOW (ref 135–145)
SODIUM SERPL-SCNC: 134 MMOL/L — LOW (ref 135–145)
SODIUM SERPL-SCNC: 135 MMOL/L — SIGNIFICANT CHANGE UP (ref 135–145)
WBC # BLD: 12.38 K/UL — HIGH (ref 3.8–10.5)
WBC # BLD: 6.04 K/UL — SIGNIFICANT CHANGE UP (ref 3.8–10.5)
WBC # BLD: 9.13 K/UL — SIGNIFICANT CHANGE UP (ref 3.8–10.5)
WBC # FLD AUTO: 12.38 K/UL — HIGH (ref 3.8–10.5)
WBC # FLD AUTO: 6.04 K/UL — SIGNIFICANT CHANGE UP (ref 3.8–10.5)
WBC # FLD AUTO: 9.13 K/UL — SIGNIFICANT CHANGE UP (ref 3.8–10.5)

## 2019-12-13 PROCEDURE — 88342 IMHCHEM/IMCYTCHM 1ST ANTB: CPT | Mod: 26,59

## 2019-12-13 PROCEDURE — 99232 SBSQ HOSP IP/OBS MODERATE 35: CPT

## 2019-12-13 PROCEDURE — 88360 TUMOR IMMUNOHISTOCHEM/MANUAL: CPT | Mod: 26

## 2019-12-13 PROCEDURE — 88341 IMHCHEM/IMCYTCHM EA ADD ANTB: CPT | Mod: 26,59

## 2019-12-13 PROCEDURE — 88331 PATH CONSLTJ SURG 1 BLK 1SPC: CPT | Mod: 26

## 2019-12-13 PROCEDURE — 88305 TISSUE EXAM BY PATHOLOGIST: CPT | Mod: 26

## 2019-12-13 RX ORDER — HYDROMORPHONE HYDROCHLORIDE 2 MG/ML
0.5 INJECTION INTRAMUSCULAR; INTRAVENOUS; SUBCUTANEOUS
Refills: 0 | Status: DISCONTINUED | OUTPATIENT
Start: 2019-12-13 | End: 2019-12-13

## 2019-12-13 RX ORDER — NALOXONE HYDROCHLORIDE 4 MG/.1ML
0.1 SPRAY NASAL
Refills: 0 | Status: DISCONTINUED | OUTPATIENT
Start: 2019-12-13 | End: 2019-12-16

## 2019-12-13 RX ORDER — ONDANSETRON 8 MG/1
4 TABLET, FILM COATED ORAL EVERY 6 HOURS
Refills: 0 | Status: DISCONTINUED | OUTPATIENT
Start: 2019-12-13 | End: 2019-12-16

## 2019-12-13 RX ORDER — HEPARIN SODIUM 5000 [USP'U]/ML
5000 INJECTION INTRAVENOUS; SUBCUTANEOUS EVERY 8 HOURS
Refills: 0 | Status: DISCONTINUED | OUTPATIENT
Start: 2019-12-13 | End: 2019-12-16

## 2019-12-13 RX ORDER — MAGNESIUM SULFATE 500 MG/ML
4 VIAL (ML) INJECTION ONCE
Refills: 0 | Status: COMPLETED | OUTPATIENT
Start: 2019-12-13 | End: 2019-12-13

## 2019-12-13 RX ORDER — MAGNESIUM SULFATE 500 MG/ML
2 VIAL (ML) INJECTION ONCE
Refills: 0 | Status: COMPLETED | OUTPATIENT
Start: 2019-12-13 | End: 2019-12-13

## 2019-12-13 RX ORDER — INSULIN LISPRO 100/ML
VIAL (ML) SUBCUTANEOUS EVERY 6 HOURS
Refills: 0 | Status: DISCONTINUED | OUTPATIENT
Start: 2019-12-13 | End: 2019-12-15

## 2019-12-13 RX ORDER — HYDROMORPHONE HYDROCHLORIDE 2 MG/ML
0.5 INJECTION INTRAMUSCULAR; INTRAVENOUS; SUBCUTANEOUS
Refills: 0 | Status: DISCONTINUED | OUTPATIENT
Start: 2019-12-13 | End: 2019-12-16

## 2019-12-13 RX ORDER — SODIUM CHLORIDE 9 MG/ML
1000 INJECTION, SOLUTION INTRAVENOUS
Refills: 0 | Status: DISCONTINUED | OUTPATIENT
Start: 2019-12-13 | End: 2019-12-14

## 2019-12-13 RX ADMIN — HYDROMORPHONE HYDROCHLORIDE 0.5 MILLIGRAM(S): 2 INJECTION INTRAMUSCULAR; INTRAVENOUS; SUBCUTANEOUS at 12:45

## 2019-12-13 RX ADMIN — HYDROMORPHONE HYDROCHLORIDE 0.5 MILLIGRAM(S): 2 INJECTION INTRAMUSCULAR; INTRAVENOUS; SUBCUTANEOUS at 11:50

## 2019-12-13 RX ADMIN — Medication 40 MILLIEQUIVALENT(S): at 05:59

## 2019-12-13 RX ADMIN — HEPARIN SODIUM 5000 UNIT(S): 5000 INJECTION INTRAVENOUS; SUBCUTANEOUS at 17:06

## 2019-12-13 RX ADMIN — HYDROMORPHONE HYDROCHLORIDE 0.5 MILLIGRAM(S): 2 INJECTION INTRAMUSCULAR; INTRAVENOUS; SUBCUTANEOUS at 11:05

## 2019-12-13 RX ADMIN — HYDROMORPHONE HYDROCHLORIDE 0.5 MILLIGRAM(S): 2 INJECTION INTRAMUSCULAR; INTRAVENOUS; SUBCUTANEOUS at 11:35

## 2019-12-13 RX ADMIN — Medication 100 GRAM(S): at 12:46

## 2019-12-13 RX ADMIN — Medication 50 GRAM(S): at 20:59

## 2019-12-13 RX ADMIN — Medication 1: at 13:25

## 2019-12-13 RX ADMIN — HYDROMORPHONE HYDROCHLORIDE 0.5 MILLIGRAM(S): 2 INJECTION INTRAMUSCULAR; INTRAVENOUS; SUBCUTANEOUS at 11:20

## 2019-12-13 RX ADMIN — HYDROMORPHONE HYDROCHLORIDE 0.5 MILLIGRAM(S): 2 INJECTION INTRAMUSCULAR; INTRAVENOUS; SUBCUTANEOUS at 12:30

## 2019-12-13 RX ADMIN — Medication 5 MILLIGRAM(S): at 05:59

## 2019-12-13 RX ADMIN — FAMOTIDINE 20 MILLIGRAM(S): 10 INJECTION INTRAVENOUS at 05:59

## 2019-12-13 RX ADMIN — Medication 63.75 MILLIMOLE(S): at 13:06

## 2019-12-13 NOTE — BRIEF OPERATIVE NOTE - OPERATION/FINDINGS
Midline laparotomy. Large loculated cystic mass dissected out laterally and from the rectum. Some spillage of clear cystic fluid from smaller loculations. Mass completely excised. Hemostasis achieved. Abdomen closed.

## 2019-12-13 NOTE — PROGRESS NOTE ADULT - SUBJECTIVE AND OBJECTIVE BOX
vss  ncat  s1s2  cat  soft nt nd  pt seen earlire  case d/w dr. khan  pt known to dr. nicole  appreicate surgical eval

## 2019-12-13 NOTE — PROGRESS NOTE ADULT - SUBJECTIVE AND OBJECTIVE BOX
D TEAM SURGERY PROGRESS NOTE    SUBJECTIVE: Patient seen and examined at bedside on AM rounds, completed bowel prep yesterday. Had continued hydration yesterday and overnight with IV fluids. Plan for OR this morning.     Vital Signs Last 24 Hrs  T(C): 36.6 (13 Dec 2019 05:25), Max: 36.6 (12 Dec 2019 22:23)  T(F): 97.8 (13 Dec 2019 05:25), Max: 97.9 (12 Dec 2019 22:23)  HR: 69 (13 Dec 2019 05:25) (69 - 88)  BP: 116/64 (13 Dec 2019 05:25) (101/61 - 116/64)  BP(mean): 70 (12 Dec 2019 22:23) (70 - 70)  RR: 18 (13 Dec 2019 05:25) (16 - 18)  SpO2: 95% (13 Dec 2019 05:25) (95% - 100%)  I&O's Detail    12 Dec 2019 07:01  -  13 Dec 2019 07:00  --------------------------------------------------------  IN:    sodium chloride 0.9%.: 1250 mL  Total IN: 1250 mL    OUT:  Total OUT: 0 mL    Total NET: 1250 mL        MEDICATIONS  (STANDING):  aspirin enteric coated 81 milliGRAM(s) Oral daily  dextrose 5%. 1000 milliLiter(s) (50 mL/Hr) IV Continuous <Continuous>  dextrose 50% Injectable 12.5 Gram(s) IV Push once  dextrose 50% Injectable 25 Gram(s) IV Push once  dextrose 50% Injectable 25 Gram(s) IV Push once  enoxaparin Injectable 40 milliGRAM(s) SubCutaneous daily  insulin lispro (HumaLOG) corrective regimen sliding scale   SubCutaneous every 6 hours  sodium chloride 0.9%. 1000 milliLiter(s) (125 mL/Hr) IV Continuous <Continuous>    MEDICATIONS  (PRN):  dextrose 40% Gel 15 Gram(s) Oral once PRN Blood Glucose LESS THAN 70 milliGRAM(s)/deciliter  famotidine    Tablet 20 milliGRAM(s) Oral daily PRN abdominal discomfort  glucagon  Injectable 1 milliGRAM(s) IntraMuscular once PRN Glucose LESS THAN 70 milligrams/deciliter      Physical Exam  General: A&Ox3, NAD  Respiratory: Clear bilaterally, equal bilateral expansion  Cardiovascular: Regular rate & rhythm  Abdominal: Soft, NT/ND    LABS:                        13.0   6.04  )-----------( 435      ( 13 Dec 2019 06:00 )             42.4         134<L>  |  99  |  22  ----------------------------<  97  4.8   |  19<L>  |  1.36<H>    Ca    9.3      13 Dec 2019 06:00  Phos  3.0       Mg     1.2         TPro  6.8  /  Alb  3.2<L>  /  TBili  0.5  /  DBili  x   /  AST  32  /  ALT  28  /  AlkPhos  79  12-12    PT/INR - ( 13 Dec 2019 06:00 )   PT: 12.0 SEC;   INR: 1.05          PTT - ( 13 Dec 2019 06:00 )  PTT:30.9 SEC  Urinalysis Basic - ( 12 Dec 2019 10:15 )    Color: YELLOW / Appearance: Lt TURBID / S.025 / pH: 5.5  Gluc: NEGATIVE / Ketone: SMALL  / Bili: TRACE / Urobili: TRACE   Blood: NEGATIVE / Protein: 100 / Nitrite: NEGATIVE   Leuk Esterase: NEGATIVE / RBC: 3-5 / WBC 11-25   Sq Epi: MANY / Non Sq Epi: x / Bacteria: NEGATIVE      ABO Interpretation: B (19 @ 06:00)

## 2019-12-13 NOTE — PRE-OP CHECKLIST - SELECT TESTS ORDERED
BMP/Type and Screen/CBC/INR/PT/PTT/Hepatic Function INR/POCT Blood Glucose/Type and Screen/BMP/CBC/PT/PTT 79/INR/BMP/CBC/PT/PTT/Type and Screen/POCT Blood Glucose

## 2019-12-13 NOTE — PROGRESS NOTE ADULT - ASSESSMENT
70yo M with hx of DM2, CAD s/p PCI (1999), HLD, and sleep apnea s/p uvulectomy and tonsillectomy (~1996) presented 12/12 with abdominal discomfort and intolerance of bowel prep for planned resection of abdominal mass (seen on outpt CT on 11/25)  - NPO for OR today  - NS @ 125ml/hr  - Metronidazole 250mg and Neomycin 1000mg at 13:00, 14:00, and 23:00 completed  - DVT ppx: Lovenox    D Team Surgery #[id number]

## 2019-12-13 NOTE — CHART NOTE - NSCHARTNOTEFT_GEN_A_CORE
Surgery Post-Op Note    Pre-Op Dx: Abdominal mass  Procedure: Exploratory laparotomy, abdominal mass excision  Surgeon: Dr. Ferreira    SUBJECTIVE:  Pt seen and examined at the bedside. Patient c/o intermittent, 7/10 abdominal pain at midline incision. Patient has PCEA in place which patient used once and had pain relief. Denies fever, chills, CP, SOB, nausea or vomiting. NGT in place.     OBJECTIVE:  Vital Signs Last 24 Hrs  T(C): 36.4 (13 Dec 2019 10:50), Max: 36.6 (12 Dec 2019 22:23)  T(F): 97.5 (13 Dec 2019 10:50), Max: 97.9 (12 Dec 2019 22:23)  HR: 77 (13 Dec 2019 14:00) (68 - 79)  BP: 106/60 (13 Dec 2019 14:00) (98/51 - 118/56)  BP(mean): 74 (13 Dec 2019 14:00) (55 - 77)  RR: 16 (13 Dec 2019 14:00) (12 - 19)  SpO2: 98% (13 Dec 2019 14:00) (92% - 100%)    Physical Exam:  General: NAD, resting comfortably in bed  Neuro: A/O x 3, no focal deficits  EENT: NGT in place with bilious output  Pulmonary: Nonlabored breathing, no respiratory distress  Cardiovascular: RRR, +S1, S2, no murmurs, rubs or gallops  Abdominal: soft, nondistended, TTP at midline, no rebound or guarding. Midline incision C/D/I with minimal SS strikethrough  Extremities: WWP     LABS:                        9.0    9.13  )-----------( 337      ( 13 Dec 2019 11:30 )             28.7     12-13    134<L>  |  103  |  17  ----------------------------<  174<H>  4.7   |  15<L>  |  1.11    Ca    7.7<L>      13 Dec 2019 11:30  Phos  2.4     12-13  Mg     0.9     12-13    TPro  6.8  /  Alb  3.2<L>  /  TBili  0.5  /  DBili  x   /  AST  32  /  ALT  28  /  AlkPhos  79  12-12    PT/INR - ( 13 Dec 2019 06:00 )   PT: 12.0 SEC;   INR: 1.05          PTT - ( 13 Dec 2019 06:00 )  PTT:30.9 SEC  CAPILLARY BLOOD GLUCOSE      POCT Blood Glucose.: 152 mg/dL (13 Dec 2019 13:00)  POCT Blood Glucose.: 79 mg/dL (13 Dec 2019 05:30)  POCT Blood Glucose.: 92 mg/dL (13 Dec 2019 01:12)  POCT Blood Glucose.: 107 mg/dL (12 Dec 2019 17:31)    Urinalysis Basic - ( 12 Dec 2019 10:15 )    Color: YELLOW / Appearance: Lt TURBID / S.025 / pH: 5.5  Gluc: NEGATIVE / Ketone: SMALL  / Bili: TRACE / Urobili: TRACE   Blood: NEGATIVE / Protein: 100 / Nitrite: NEGATIVE   Leuk Esterase: NEGATIVE / RBC: 3-5 / WBC -   Sq Epi: MANY / Non Sq Epi: x / Bacteria: NEGATIVE      LIVER FUNCTIONS - ( 12 Dec 2019 09:30 )  Alb: 3.2 g/dL / Pro: 6.8 g/dL / ALK PHOS: 79 u/L / ALT: 28 u/L / AST: 32 u/L / GGT: x           ABO Interpretation: B ( @ 06:00)    ASSESSMENT:  70yo M with hx of DM2, CAD s/p PCI (), HLD, and sleep apnea s/p uvulectomy and tonsillectomy (~) presented  with abdominal discomfort and intolerance of bowel prep for planned resection of abdominal mass (seen on outpt CT on ), now 4h s/p ex lap, abdominal mass resection. Recovering well.    PLAN:  - Pain control via PCEA  - Encourage IS, OOB  - Nausea control PRN  - Monitor vitals  - Diet: NPO, IVF  - NGT to LCWS  - Monitor I+Os via Clark   - Hypomagnesemia, repleted, f/u AM labs  - DM on ISS, FS q6h  - DVT ppx: SQH    D Team Surgery  [id number] Surgery Post-Op Note    Pre-Op Dx: Abdominal mass  Procedure: Exploratory laparotomy, abdominal mass excision  Surgeon: Dr. Ferreira    SUBJECTIVE:  Pt seen and examined at the bedside. Patient c/o intermittent, 7/10 abdominal pain at midline incision. Patient has PCEA in place which patient used once and had pain relief. Denies fever, chills, CP, SOB, nausea or vomiting. NGT in place.     OBJECTIVE:  Vital Signs Last 24 Hrs  T(C): 36.4 (13 Dec 2019 10:50), Max: 36.6 (12 Dec 2019 22:23)  T(F): 97.5 (13 Dec 2019 10:50), Max: 97.9 (12 Dec 2019 22:23)  HR: 77 (13 Dec 2019 14:00) (68 - 79)  BP: 106/60 (13 Dec 2019 14:00) (98/51 - 118/56)  BP(mean): 74 (13 Dec 2019 14:00) (55 - 77)  RR: 16 (13 Dec 2019 14:00) (12 - 19)  SpO2: 98% (13 Dec 2019 14:00) (92% - 100%)    Physical Exam:  General: NAD, resting comfortably in bed  Neuro: A/O x 3, no focal deficits  EENT: NGT in place with bilious output  Pulmonary: Nonlabored breathing, no respiratory distress  Cardiovascular: RRR, +S1, S2, no murmurs, rubs or gallops  Abdominal: soft, nondistended, TTP at midline, no rebound or guarding. Midline incision C/D/I with minimal SS strikethrough  Extremities: WWP     LABS:                        9.0    9.13  )-----------( 337      ( 13 Dec 2019 11:30 )             28.7     12-13    134<L>  |  103  |  17  ----------------------------<  174<H>  4.7   |  15<L>  |  1.11    Ca    7.7<L>      13 Dec 2019 11:30  Phos  2.4     12-13  Mg     0.9     12-13    TPro  6.8  /  Alb  3.2<L>  /  TBili  0.5  /  DBili  x   /  AST  32  /  ALT  28  /  AlkPhos  79  12-12    PT/INR - ( 13 Dec 2019 06:00 )   PT: 12.0 SEC;   INR: 1.05          PTT - ( 13 Dec 2019 06:00 )  PTT:30.9 SEC  CAPILLARY BLOOD GLUCOSE      POCT Blood Glucose.: 152 mg/dL (13 Dec 2019 13:00)  POCT Blood Glucose.: 79 mg/dL (13 Dec 2019 05:30)  POCT Blood Glucose.: 92 mg/dL (13 Dec 2019 01:12)  POCT Blood Glucose.: 107 mg/dL (12 Dec 2019 17:31)    Urinalysis Basic - ( 12 Dec 2019 10:15 )    Color: YELLOW / Appearance: Lt TURBID / S.025 / pH: 5.5  Gluc: NEGATIVE / Ketone: SMALL  / Bili: TRACE / Urobili: TRACE   Blood: NEGATIVE / Protein: 100 / Nitrite: NEGATIVE   Leuk Esterase: NEGATIVE / RBC: 3-5 / WBC -   Sq Epi: MANY / Non Sq Epi: x / Bacteria: NEGATIVE      LIVER FUNCTIONS - ( 12 Dec 2019 09:30 )  Alb: 3.2 g/dL / Pro: 6.8 g/dL / ALK PHOS: 79 u/L / ALT: 28 u/L / AST: 32 u/L / GGT: x           ABO Interpretation: B ( @ 06:00)    ASSESSMENT:  70yo M with hx of DM2, CAD s/p PCI (), HLD, and sleep apnea s/p uvulectomy and tonsillectomy (~) presented  with abdominal discomfort and intolerance of bowel prep for planned resection of abdominal mass (seen on outpt CT on ), now 4h s/p ex lap, abdominal mass resection. Recovering well.    PLAN:  - Pain control via PCEA  - Encourage IS, OOB  - Nausea control PRN  - Monitor vitals  - Diet: NPO, IVF  - NGT to LCWS  - Monitor I+Os via Clark   - Hypomagnesemia, repleted, f/u repeat labs  - DM on ISS, FS q6h  - DVT ppx: SQH    D Team Surgery  [id number]

## 2019-12-14 LAB
ANION GAP SERPL CALC-SCNC: 16 MMO/L — HIGH (ref 7–14)
APTT BLD: 31 SEC — SIGNIFICANT CHANGE UP (ref 27.5–36.3)
BUN SERPL-MCNC: 9 MG/DL — SIGNIFICANT CHANGE UP (ref 7–23)
CALCIUM SERPL-MCNC: 7.6 MG/DL — LOW (ref 8.4–10.5)
CHLORIDE SERPL-SCNC: 102 MMOL/L — SIGNIFICANT CHANGE UP (ref 98–107)
CO2 SERPL-SCNC: 18 MMOL/L — LOW (ref 22–31)
CREAT SERPL-MCNC: 1.08 MG/DL — SIGNIFICANT CHANGE UP (ref 0.5–1.3)
GLUCOSE BLDC GLUCOMTR-MCNC: 117 MG/DL — HIGH (ref 70–99)
GLUCOSE BLDC GLUCOMTR-MCNC: 146 MG/DL — HIGH (ref 70–99)
GLUCOSE BLDC GLUCOMTR-MCNC: 154 MG/DL — HIGH (ref 70–99)
GLUCOSE BLDC GLUCOMTR-MCNC: 159 MG/DL — HIGH (ref 70–99)
GLUCOSE SERPL-MCNC: 116 MG/DL — HIGH (ref 70–99)
HCT VFR BLD CALC: 30.7 % — LOW (ref 39–50)
HGB BLD-MCNC: 9.6 G/DL — LOW (ref 13–17)
INR BLD: 1.3 — HIGH (ref 0.88–1.17)
MAGNESIUM SERPL-MCNC: 1.7 MG/DL — SIGNIFICANT CHANGE UP (ref 1.6–2.6)
MCHC RBC-ENTMCNC: 26.6 PG — LOW (ref 27–34)
MCHC RBC-ENTMCNC: 31.3 % — LOW (ref 32–36)
MCV RBC AUTO: 85 FL — SIGNIFICANT CHANGE UP (ref 80–100)
NRBC # FLD: 0 K/UL — SIGNIFICANT CHANGE UP (ref 0–0)
PHOSPHATE SERPL-MCNC: 3 MG/DL — SIGNIFICANT CHANGE UP (ref 2.5–4.5)
PLATELET # BLD AUTO: 344 K/UL — SIGNIFICANT CHANGE UP (ref 150–400)
PMV BLD: 10.5 FL — SIGNIFICANT CHANGE UP (ref 7–13)
POTASSIUM SERPL-MCNC: 4 MMOL/L — SIGNIFICANT CHANGE UP (ref 3.5–5.3)
POTASSIUM SERPL-SCNC: 4 MMOL/L — SIGNIFICANT CHANGE UP (ref 3.5–5.3)
PROTHROM AB SERPL-ACNC: 14.6 SEC — HIGH (ref 9.8–13.1)
RBC # BLD: 3.61 M/UL — LOW (ref 4.2–5.8)
RBC # FLD: 13.5 % — SIGNIFICANT CHANGE UP (ref 10.3–14.5)
SODIUM SERPL-SCNC: 136 MMOL/L — SIGNIFICANT CHANGE UP (ref 135–145)
WBC # BLD: 7.76 K/UL — SIGNIFICANT CHANGE UP (ref 3.8–10.5)
WBC # FLD AUTO: 7.76 K/UL — SIGNIFICANT CHANGE UP (ref 3.8–10.5)

## 2019-12-14 RX ORDER — ACETAMINOPHEN 500 MG
1000 TABLET ORAL EVERY 8 HOURS
Refills: 0 | Status: COMPLETED | OUTPATIENT
Start: 2019-12-14 | End: 2019-12-14

## 2019-12-14 RX ORDER — MAGNESIUM SULFATE 500 MG/ML
2 VIAL (ML) INJECTION ONCE
Refills: 0 | Status: COMPLETED | OUTPATIENT
Start: 2019-12-14 | End: 2019-12-14

## 2019-12-14 RX ORDER — PANTOPRAZOLE SODIUM 20 MG/1
40 TABLET, DELAYED RELEASE ORAL DAILY
Refills: 0 | Status: DISCONTINUED | OUTPATIENT
Start: 2019-12-14 | End: 2019-12-15

## 2019-12-14 RX ORDER — SODIUM CHLORIDE 9 MG/ML
1000 INJECTION, SOLUTION INTRAVENOUS ONCE
Refills: 0 | Status: COMPLETED | OUTPATIENT
Start: 2019-12-14 | End: 2019-12-14

## 2019-12-14 RX ORDER — DEXTROSE MONOHYDRATE, SODIUM CHLORIDE, AND POTASSIUM CHLORIDE 50; .745; 4.5 G/1000ML; G/1000ML; G/1000ML
1000 INJECTION, SOLUTION INTRAVENOUS
Refills: 0 | Status: DISCONTINUED | OUTPATIENT
Start: 2019-12-14 | End: 2019-12-16

## 2019-12-14 RX ADMIN — DEXTROSE MONOHYDRATE, SODIUM CHLORIDE, AND POTASSIUM CHLORIDE 125 MILLILITER(S): 50; .745; 4.5 INJECTION, SOLUTION INTRAVENOUS at 09:21

## 2019-12-14 RX ADMIN — HEPARIN SODIUM 5000 UNIT(S): 5000 INJECTION INTRAVENOUS; SUBCUTANEOUS at 09:22

## 2019-12-14 RX ADMIN — SODIUM CHLORIDE 1000 MILLILITER(S): 9 INJECTION, SOLUTION INTRAVENOUS at 16:48

## 2019-12-14 RX ADMIN — Medication 400 MILLIGRAM(S): at 12:15

## 2019-12-14 RX ADMIN — Medication 1000 MILLIGRAM(S): at 12:45

## 2019-12-14 RX ADMIN — HEPARIN SODIUM 5000 UNIT(S): 5000 INJECTION INTRAVENOUS; SUBCUTANEOUS at 00:20

## 2019-12-14 RX ADMIN — HEPARIN SODIUM 5000 UNIT(S): 5000 INJECTION INTRAVENOUS; SUBCUTANEOUS at 17:44

## 2019-12-14 RX ADMIN — PANTOPRAZOLE SODIUM 40 MILLIGRAM(S): 20 TABLET, DELAYED RELEASE ORAL at 17:40

## 2019-12-14 RX ADMIN — Medication 1: at 13:06

## 2019-12-14 RX ADMIN — Medication 50 GRAM(S): at 12:36

## 2019-12-14 RX ADMIN — Medication 1: at 17:43

## 2019-12-14 NOTE — PROGRESS NOTE ADULT - ASSESSMENT
70yo M with hx of DM2, CAD s/p PCI (1999), HLD, and sleep apnea s/p uvulectomy and tonsillectomy (~1996) presented 12/12 with abdominal discomfort and intolerance of bowel prep for planned resection of abdominal mass (seen on outpt CT on 11/25), now 4h s/p ex lap, abdominal mass resection. Recovering well.    PLAN:  - Pain control via PCEA  - Encourage IS, OOB  - Nausea control PRN  - Monitor vitals  - Diet: NPO, IVF  - NGT to LCWS  - Monitor I+Os via Clark   - Hypomagnesemia, repleted, f/u repeat labs  - DM on ISS, FS q6h  - DVT ppx: SQH  - clamp trial today    D Team Surgery  [id number].

## 2019-12-14 NOTE — PROGRESS NOTE ADULT - SUBJECTIVE AND OBJECTIVE BOX
Interval Events:    Tachycardic and hypotensive to 100's and 90's systolic in PACU, with scant bloody output from NGT, now normalized    S: Patient doing well, denies fevers, chills, nausea, emesis, chest pain, SOB.  Pain is well controlled. -/- F/BM.    O: Vital Signs Last 24 Hrs  T(C): 36.2 (14 Dec 2019 07:47), Max: 37.2 (13 Dec 2019 23:34)  T(F): 97.2 (14 Dec 2019 07:47), Max: 98.9 (13 Dec 2019 23:34)  HR: 85 (14 Dec 2019 07:47) (68 - 108)  BP: 99/46 (14 Dec 2019 07:47) (99/46 - 118/56)  BP(mean): 74 (13 Dec 2019 14:00) (71 - 77)  RR: 16 (14 Dec 2019 07:47) (12 - 18)  SpO2: 97% (14 Dec 2019 07:47) (93% - 100%)      13 Dec 2019 07:01  -  14 Dec 2019 07:00  --------------------------------------------------------  IN:    IV PiggyBack: 400 mL    lactated ringers.: 2000 mL  Total IN: 2400 mL    OUT:    Indwelling Catheter - Urethral: 2085 mL    Nasoenteral Tube: 250 mL  Total OUT: 2335 mL    Total NET: 65 mL          Physical Exam:    Gen: Well-developed, well-nourished in no acute distress  Resp: No additional work of breathing   GI:appropriately tender, midline incision with scant drainage on overlying dressing  MSK: Moves all extremities equally  Skin: No rashes    Labs:                        9.6    7.76  )-----------( 344      ( 14 Dec 2019 06:45 )             30.7     14 Dec 2019 06:45    136    |  102    |  9      ----------------------------<  116    4.0     |  18     |  1.08     Ca    7.6        14 Dec 2019 06:45  Phos  3.0       14 Dec 2019 06:45  Mg     1.7       14 Dec 2019 06:45      PT/INR - ( 14 Dec 2019 06:45 )   PT: 14.6 SEC;   INR: 1.30          PTT - ( 14 Dec 2019 06:45 )  PTT:31.0 SEC  CAPILLARY BLOOD GLUCOSE      POCT Blood Glucose.: 117 mg/dL (14 Dec 2019 05:16)  POCT Blood Glucose.: 102 mg/dL (13 Dec 2019 23:42)  POCT Blood Glucose.: 150 mg/dL (13 Dec 2019 18:39)  POCT Blood Glucose.: 152 mg/dL (13 Dec 2019 13:00)                MEDICATIONS  (STANDING):  acetaminophen  IVPB .. 1000 milliGRAM(s) IV Intermittent every 8 hours  dextrose 5% + sodium chloride 0.45% with potassium chloride 20 mEq/L 1000 milliLiter(s) (125 mL/Hr) IV Continuous <Continuous>  dextrose 5%. 1000 milliLiter(s) (50 mL/Hr) IV Continuous <Continuous>  dextrose 50% Injectable 12.5 Gram(s) IV Push once  dextrose 50% Injectable 25 Gram(s) IV Push once  dextrose 50% Injectable 25 Gram(s) IV Push once  heparin  Injectable 5000 Unit(s) SubCutaneous every 8 hours  hydromorphone (10 MICROgram(s)/mL) + bupivacaine 0.0625% in 0.9% Sodium Chloride PCEA 250 milliLiter(s) Epidural PCA Continuous  insulin lispro (HumaLOG) corrective regimen sliding scale   SubCutaneous every 6 hours    MEDICATIONS  (PRN):  dextrose 40% Gel 15 Gram(s) Oral once PRN Blood Glucose LESS THAN 70 milliGRAM(s)/deciliter  glucagon  Injectable 1 milliGRAM(s) IntraMuscular once PRN Glucose LESS THAN 70 milligrams/deciliter  HYDROmorphone  Injectable 0.5 milliGRAM(s) IV Push every 3 hours PRN Severe breakthrough Pain (7 - 10)  hydromorphone (10 MICROgram(s)/mL) + bupivacaine 0.0625% in 0.9% Sodium Chloride PCEA Rescue Clinician  Bolus 5 milliLiter(s) Epidural every 15 minutes PRN for Pain Score greater than 6  naloxone Injectable 0.1 milliGRAM(s) IV Push every 3 minutes PRN For ANY of the following changes in patient status:  A. RR LESS THAN 10 breaths per minute, B. Oxygen saturation LESS THAN 90%, C. Sedation score of 6  ondansetron Injectable 4 milliGRAM(s) IV Push every 6 hours PRN Nausea

## 2019-12-14 NOTE — PROGRESS NOTE ADULT - SUBJECTIVE AND OBJECTIVE BOX
SUBJECTIVE / OVERNIGHT EVENTS: pt c/o abd pain , - flatus       MEDICATIONS  (STANDING):  dextrose 5% + sodium chloride 0.45% with potassium chloride 20 mEq/L 1000 milliLiter(s) (125 mL/Hr) IV Continuous <Continuous>  dextrose 5%. 1000 milliLiter(s) (50 mL/Hr) IV Continuous <Continuous>  dextrose 50% Injectable 12.5 Gram(s) IV Push once  dextrose 50% Injectable 25 Gram(s) IV Push once  dextrose 50% Injectable 25 Gram(s) IV Push once  heparin  Injectable 5000 Unit(s) SubCutaneous every 8 hours  hydromorphone (10 MICROgram(s)/mL) + bupivacaine 0.0625% in 0.9% Sodium Chloride PCEA 250 milliLiter(s) Epidural PCA Continuous  insulin lispro (HumaLOG) corrective regimen sliding scale   SubCutaneous every 6 hours  pantoprazole  Injectable 40 milliGRAM(s) IV Push daily    MEDICATIONS  (PRN):  dextrose 40% Gel 15 Gram(s) Oral once PRN Blood Glucose LESS THAN 70 milliGRAM(s)/deciliter  glucagon  Injectable 1 milliGRAM(s) IntraMuscular once PRN Glucose LESS THAN 70 milligrams/deciliter  HYDROmorphone  Injectable 0.5 milliGRAM(s) IV Push every 3 hours PRN Severe breakthrough Pain (7 - 10)  hydromorphone (10 MICROgram(s)/mL) + bupivacaine 0.0625% in 0.9% Sodium Chloride PCEA Rescue Clinician  Bolus 5 milliLiter(s) Epidural every 15 minutes PRN for Pain Score greater than 6  naloxone Injectable 0.1 milliGRAM(s) IV Push every 3 minutes PRN For ANY of the following changes in patient status:  A. RR LESS THAN 10 breaths per minute, B. Oxygen saturation LESS THAN 90%, C. Sedation score of 6  ondansetron Injectable 4 milliGRAM(s) IV Push every 6 hours PRN Nausea    Vital Signs Last 24 Hrs  T(C): 36.9 (14 Dec 2019 19:27), Max: 37.2 (13 Dec 2019 23:34)  T(F): 98.5 (14 Dec 2019 19:27), Max: 98.9 (13 Dec 2019 23:34)  HR: 86 (14 Dec 2019 19:27) (74 - 86)  BP: 107/50 (14 Dec 2019 19:27) (92/48 - 107/50)  BP(mean): --  RR: 16 (14 Dec 2019 19:27) (16 - 18)  SpO2: 97% (14 Dec 2019 19:27) (97% - 100%)    CAPILLARY BLOOD GLUCOSE      POCT Blood Glucose.: 154 mg/dL (14 Dec 2019 17:40)  POCT Blood Glucose.: 159 mg/dL (14 Dec 2019 12:45)  POCT Blood Glucose.: 117 mg/dL (14 Dec 2019 05:16)  POCT Blood Glucose.: 102 mg/dL (13 Dec 2019 23:42)    I&O's Summary    13 Dec 2019 07:01  -  14 Dec 2019 07:00  --------------------------------------------------------  IN: 2400 mL / OUT: 2335 mL / NET: 65 mL    14 Dec 2019 07:01  -  14 Dec 2019 22:03  --------------------------------------------------------  IN: 375 mL / OUT: 425 mL / NET: -50 mL        Constitutional: No fever, fatigue  Skin: No rash.  Eyes: No recent vision problems or eye pain.  ENT: No congestion, ear pain, or sore throat.  Cardiovascular: No chest pain or palpation.  Respiratory: No cough, shortness of breath, congestion, or wheezing.  Gastrointestinal: No abdominal pain, nausea, vomiting, or diarrhea.  Genitourinary: No dysuria.  Musculoskeletal: No joint swelling.  Neurologic: No headache.    PHYSICAL EXAM:  GENERAL: NAD  EYES: EOMI, PERRLA  NECK: Supple, No JVD  CHEST/LUNG: dec breath sounds rt base   HEART:  S1 , S2 +  ABDOMEN: soft , bs+, mild distension +  EXTREMITIES:  no edema   NEUROLOGY:alert awake oriented   ceballos +      LABS:                        9.6    7.76  )-----------( 344      ( 14 Dec 2019 06:45 )             30.7     12-14    136  |  102  |  9   ----------------------------<  116<H>  4.0   |  18<L>  |  1.08    Ca    7.6<L>      14 Dec 2019 06:45  Phos  3.0     12-14  Mg     1.7     12-14      PT/INR - ( 14 Dec 2019 06:45 )   PT: 14.6 SEC;   INR: 1.30          PTT - ( 14 Dec 2019 06:45 )  PTT:31.0 SEC          RADIOLOGY & ADDITIONAL TESTS:    Imaging Personally Reviewed:    Consultant(s) Notes Reviewed:      Care Discussed with Consultants/Other Providers:

## 2019-12-14 NOTE — PROGRESS NOTE ADULT - SUBJECTIVE AND OBJECTIVE BOX
ANESTHESIA POSTOP CHECK    69y Male POSTOP DAY 1 S/P     [ X] NO APPARENT ANESTHESIA COMPLICATIONS      Comments:

## 2019-12-14 NOTE — PROGRESS NOTE ADULT - SUBJECTIVE AND OBJECTIVE BOX
ADRIAN FNUES:[id number],   69yMale followed for:  No Known Allergies    PAST MEDICAL & SURGICAL HISTORY:  Sleep apnea  CAD (coronary artery disease)  Hypercholesteremia  Diabetes mellitus: Fasting FS range from - per patient  Hypertension  Intra-abdominal and pelvic swelling, mass and lump, unspecified site  H/O sleep apnea: Per patient had Sleep Apnea surgery  in 1996- at [facility]- Was not retested for Sleep Apnea post surgery  History of cardiac cath: Pt reports 1 cardiac stent placed 1999    FAMILY HISTORY:  FH: heart disease: Mother    MEDICATIONS  (STANDING):  acetaminophen  IVPB .. 1000 milliGRAM(s) IV Intermittent every 8 hours  dextrose 5% + sodium chloride 0.45% with potassium chloride 20 mEq/L 1000 milliLiter(s) (125 mL/Hr) IV Continuous <Continuous>  dextrose 5%. 1000 milliLiter(s) (50 mL/Hr) IV Continuous <Continuous>  dextrose 50% Injectable 12.5 Gram(s) IV Push once  dextrose 50% Injectable 25 Gram(s) IV Push once  dextrose 50% Injectable 25 Gram(s) IV Push once  heparin  Injectable 5000 Unit(s) SubCutaneous every 8 hours  hydromorphone (10 MICROgram(s)/mL) + bupivacaine 0.0625% in 0.9% Sodium Chloride PCEA 250 milliLiter(s) Epidural PCA Continuous  insulin lispro (HumaLOG) corrective regimen sliding scale   SubCutaneous every 6 hours    MEDICATIONS  (PRN):  dextrose 40% Gel 15 Gram(s) Oral once PRN Blood Glucose LESS THAN 70 milliGRAM(s)/deciliter  glucagon  Injectable 1 milliGRAM(s) IntraMuscular once PRN Glucose LESS THAN 70 milligrams/deciliter  HYDROmorphone  Injectable 0.5 milliGRAM(s) IV Push every 3 hours PRN Severe breakthrough Pain (7 - 10)  hydromorphone (10 MICROgram(s)/mL) + bupivacaine 0.0625% in 0.9% Sodium Chloride PCEA Rescue Clinician  Bolus 5 milliLiter(s) Epidural every 15 minutes PRN for Pain Score greater than 6  naloxone Injectable 0.1 milliGRAM(s) IV Push every 3 minutes PRN For ANY of the following changes in patient status:  A. RR LESS THAN 10 breaths per minute, B. Oxygen saturation LESS THAN 90%, C. Sedation score of 6  ondansetron Injectable 4 milliGRAM(s) IV Push every 6 hours PRN Nausea      Vital Signs Last 24 Hrs  T(C): 36.2 (14 Dec 2019 07:47), Max: 37.2 (13 Dec 2019 23:34)  T(F): 97.2 (14 Dec 2019 07:47), Max: 98.9 (13 Dec 2019 23:34)  HR: 85 (14 Dec 2019 07:47) (68 - 108)  BP: 99/46 (14 Dec 2019 07:47) (99/46 - 118/56)  BP(mean): 74 (13 Dec 2019 14:00) (68 - 77)  RR: 16 (14 Dec 2019 07:47) (12 - 19)  SpO2: 97% (14 Dec 2019 07:47) (92% - 100%)  nc/at  s1s2  cta  soft, nt, nd no guarding or rebound  no c/c/e    CBC Full  -  ( 14 Dec 2019 06:45 )  WBC Count : 7.76 K/uL  RBC Count : 3.61 M/uL  Hemoglobin : 9.6 g/dL  Hematocrit : 30.7 %  Platelet Count - Automated : 344 K/uL  Mean Cell Volume : 85.0 fL  Mean Cell Hemoglobin : 26.6 pg  Mean Cell Hemoglobin Concentration : 31.3 %  Auto Neutrophil # : x  Auto Lymphocyte # : x  Auto Monocyte # : x  Auto Eosinophil # : x  Auto Basophil # : x  Auto Neutrophil % : x  Auto Lymphocyte % : x  Auto Monocyte % : x  Auto Eosinophil % : x  Auto Basophil % : x    12-14    136  |  102  |  9   ----------------------------<  116<H>  4.0   |  18<L>  |  1.08    Ca    7.6<L>      14 Dec 2019 06:45  Phos  3.0     12-14  Mg     1.7     12-14      PT/INR - ( 14 Dec 2019 06:45 )   PT: 14.6 SEC;   INR: 1.30          PTT - ( 14 Dec 2019 06:45 )  PTT:31.0 SEC

## 2019-12-14 NOTE — PROGRESS NOTE ADULT - SUBJECTIVE AND OBJECTIVE BOX
Anesthesia Pain Management Service: Day 1__ of Epidural    SUBJECTIVE: Patient doing well with PCEA and no problems.  Pain Scale Score:   Refer to charted pain scores    THERAPY:  [x ] Epidural Bupivacaine 0.0625% and Hydromorphone  		[ ] 10 micrograms/mL	[ ] 5 micrograms/mL  [ ] Epidural Bupivacaine 0.0625% and Fentanyl - 2 micrograms/mL  [ ] Epidural Ropivacaine 0.1% plain – 1 mg/mL  [ ] Patient Controlled Regional Anesthesia (PCRA) Ropivacaine  		[ ] 0.2%			[ ] 0.1%    Demand dose __3_ lockout __15_ (minutes) Continuous Rate __6_ Total: __125cc_ Daily      MEDICATIONS  (STANDING):  dextrose 5% + sodium chloride 0.45% with potassium chloride 20 mEq/L 1000 milliLiter(s) (125 mL/Hr) IV Continuous <Continuous>  dextrose 5%. 1000 milliLiter(s) (50 mL/Hr) IV Continuous <Continuous>  dextrose 50% Injectable 12.5 Gram(s) IV Push once  dextrose 50% Injectable 25 Gram(s) IV Push once  dextrose 50% Injectable 25 Gram(s) IV Push once  heparin  Injectable 5000 Unit(s) SubCutaneous every 8 hours  hydromorphone (10 MICROgram(s)/mL) + bupivacaine 0.0625% in 0.9% Sodium Chloride PCEA 250 milliLiter(s) Epidural PCA Continuous  insulin lispro (HumaLOG) corrective regimen sliding scale   SubCutaneous every 6 hours    MEDICATIONS  (PRN):  dextrose 40% Gel 15 Gram(s) Oral once PRN Blood Glucose LESS THAN 70 milliGRAM(s)/deciliter  glucagon  Injectable 1 milliGRAM(s) IntraMuscular once PRN Glucose LESS THAN 70 milligrams/deciliter  HYDROmorphone  Injectable 0.5 milliGRAM(s) IV Push every 3 hours PRN Severe breakthrough Pain (7 - 10)  hydromorphone (10 MICROgram(s)/mL) + bupivacaine 0.0625% in 0.9% Sodium Chloride PCEA Rescue Clinician  Bolus 5 milliLiter(s) Epidural every 15 minutes PRN for Pain Score greater than 6  naloxone Injectable 0.1 milliGRAM(s) IV Push every 3 minutes PRN For ANY of the following changes in patient status:  A. RR LESS THAN 10 breaths per minute, B. Oxygen saturation LESS THAN 90%, C. Sedation score of 6  ondansetron Injectable 4 milliGRAM(s) IV Push every 6 hours PRN Nausea      OBJECTIVE:    Assessment of Catheter Site:	[ ] Left	[ ] Right  [x ] Epidural 	[ ] Femoral	      [ ] Saphenous   [ ] Supraclavicular   [ ] Other:    [x ] Dressing intact	[x ] Site non-tender	[ x] Site without erythema, discharge, edema  [x ] Epidural tubing and connection checked	[x] Gross neurological exam within normal limits  [ ] Catheter removed – tip intact		[x ] Afebrile	[ ] Febrile: ___    PT/INR - ( 14 Dec 2019 06:45 )   PT: 14.6 SEC;   INR: 1.30          PTT - ( 14 Dec 2019 06:45 )  PTT:31.0 SEC                      9.6    7.76  )-----------( 344      ( 14 Dec 2019 06:45 )             30.7     Vital Signs Last 24 Hrs  T(C): 36.2 (12-14-19 @ 07:47), Max: 37.2 (12-13-19 @ 23:34)  T(F): 97.2 (12-14-19 @ 07:47), Max: 98.9 (12-13-19 @ 23:34)  HR: 85 (12-14-19 @ 07:47) (68 - 108)  BP: 99/46 (12-14-19 @ 07:47) (98/51 - 118/56)  BP(mean): 74 (12-13-19 @ 14:00) (55 - 77)  RR: 16 (12-14-19 @ 07:47) (12 - 19)  SpO2: 97% (12-14-19 @ 07:47) (92% - 100%)      Sedation Score:	[x ] Alert	[ ] Drowsy	[ ] Arousable	[ ] Asleep	[ ] Unresponsive    Side Effects:	[x ] None	[ ] Nausea	[ ] Vomiting	[ ] Pruritus  		[ ] Weakness		[ ] Numbness	[ ] Other:    ASSESSMENT/ PLAN:    Therapy to  be:	[x ] Continue   [ ] Discontinued   [ ] Change to prn Analgesics    Documentation and Verification of current medications:  [ X ] Done	[ ] Not done, not eligible, reason:    Comments: Doing OK with epidural and may continue.

## 2019-12-14 NOTE — PROGRESS NOTE ADULT - ASSESSMENT
70yo M with hx of DM2, CAD s/p stent (1999), HLD, and sleep apnea s/p uvulectomy and tonsillectomy (~1996) presented 12/12 with abd discomfort found to have abd mass + s/p surgery      - pain control, monitor gi fx closely     -    DM2 -iss    Cad - pt chest pain free , restart aspirin after surgery clearance

## 2019-12-15 LAB
ANION GAP SERPL CALC-SCNC: 8 MMO/L — SIGNIFICANT CHANGE UP (ref 7–14)
APTT BLD: 34.1 SEC — SIGNIFICANT CHANGE UP (ref 27.5–36.3)
BUN SERPL-MCNC: 6 MG/DL — LOW (ref 7–23)
CALCIUM SERPL-MCNC: 8 MG/DL — LOW (ref 8.4–10.5)
CHLORIDE SERPL-SCNC: 100 MMOL/L — SIGNIFICANT CHANGE UP (ref 98–107)
CO2 SERPL-SCNC: 24 MMOL/L — SIGNIFICANT CHANGE UP (ref 22–31)
CREAT SERPL-MCNC: 0.95 MG/DL — SIGNIFICANT CHANGE UP (ref 0.5–1.3)
GLUCOSE BLDC GLUCOMTR-MCNC: 120 MG/DL — HIGH (ref 70–99)
GLUCOSE BLDC GLUCOMTR-MCNC: 137 MG/DL — HIGH (ref 70–99)
GLUCOSE BLDC GLUCOMTR-MCNC: 148 MG/DL — HIGH (ref 70–99)
GLUCOSE BLDC GLUCOMTR-MCNC: 157 MG/DL — HIGH (ref 70–99)
GLUCOSE SERPL-MCNC: 148 MG/DL — HIGH (ref 70–99)
HCT VFR BLD CALC: 29 % — LOW (ref 39–50)
HGB BLD-MCNC: 9.3 G/DL — LOW (ref 13–17)
INR BLD: 1.35 — HIGH (ref 0.88–1.17)
MAGNESIUM SERPL-MCNC: 1.3 MG/DL — LOW (ref 1.6–2.6)
MCHC RBC-ENTMCNC: 26.5 PG — LOW (ref 27–34)
MCHC RBC-ENTMCNC: 32.1 % — SIGNIFICANT CHANGE UP (ref 32–36)
MCV RBC AUTO: 82.6 FL — SIGNIFICANT CHANGE UP (ref 80–100)
NRBC # FLD: 0 K/UL — SIGNIFICANT CHANGE UP (ref 0–0)
PHOSPHATE SERPL-MCNC: 1.9 MG/DL — LOW (ref 2.5–4.5)
PLATELET # BLD AUTO: 368 K/UL — SIGNIFICANT CHANGE UP (ref 150–400)
PMV BLD: SIGNIFICANT CHANGE UP FL (ref 7–13)
POTASSIUM SERPL-MCNC: 3.9 MMOL/L — SIGNIFICANT CHANGE UP (ref 3.5–5.3)
POTASSIUM SERPL-SCNC: 3.9 MMOL/L — SIGNIFICANT CHANGE UP (ref 3.5–5.3)
PROTHROM AB SERPL-ACNC: 15.5 SEC — HIGH (ref 9.8–13.1)
RBC # BLD: 3.51 M/UL — LOW (ref 4.2–5.8)
RBC # FLD: 13.8 % — SIGNIFICANT CHANGE UP (ref 10.3–14.5)
SODIUM SERPL-SCNC: 132 MMOL/L — LOW (ref 135–145)
WBC # BLD: 7.55 K/UL — SIGNIFICANT CHANGE UP (ref 3.8–10.5)
WBC # FLD AUTO: 7.55 K/UL — SIGNIFICANT CHANGE UP (ref 3.8–10.5)

## 2019-12-15 RX ORDER — ACETAMINOPHEN 500 MG
1000 TABLET ORAL ONCE
Refills: 0 | Status: COMPLETED | OUTPATIENT
Start: 2019-12-15 | End: 2019-12-15

## 2019-12-15 RX ORDER — MAGNESIUM SULFATE 500 MG/ML
2 VIAL (ML) INJECTION ONCE
Refills: 0 | Status: COMPLETED | OUTPATIENT
Start: 2019-12-15 | End: 2019-12-15

## 2019-12-15 RX ORDER — SODIUM CHLORIDE 9 MG/ML
500 INJECTION, SOLUTION INTRAVENOUS ONCE
Refills: 0 | Status: COMPLETED | OUTPATIENT
Start: 2019-12-15 | End: 2019-12-15

## 2019-12-15 RX ORDER — ACETAMINOPHEN 500 MG
1000 TABLET ORAL EVERY 6 HOURS
Refills: 0 | Status: COMPLETED | OUTPATIENT
Start: 2019-12-15 | End: 2019-12-17

## 2019-12-15 RX ORDER — INSULIN LISPRO 100/ML
VIAL (ML) SUBCUTANEOUS
Refills: 0 | Status: DISCONTINUED | OUTPATIENT
Start: 2019-12-15 | End: 2019-12-20

## 2019-12-15 RX ADMIN — HEPARIN SODIUM 5000 UNIT(S): 5000 INJECTION INTRAVENOUS; SUBCUTANEOUS at 21:05

## 2019-12-15 RX ADMIN — Medication 400 MILLIGRAM(S): at 11:15

## 2019-12-15 RX ADMIN — Medication 1: at 18:51

## 2019-12-15 RX ADMIN — Medication 50 GRAM(S): at 14:55

## 2019-12-15 RX ADMIN — DEXTROSE MONOHYDRATE, SODIUM CHLORIDE, AND POTASSIUM CHLORIDE 125 MILLILITER(S): 50; .745; 4.5 INJECTION, SOLUTION INTRAVENOUS at 11:15

## 2019-12-15 RX ADMIN — Medication 1000 MILLIGRAM(S): at 11:35

## 2019-12-15 RX ADMIN — Medication 63.75 MILLIMOLE(S): at 14:55

## 2019-12-15 RX ADMIN — DEXTROSE MONOHYDRATE, SODIUM CHLORIDE, AND POTASSIUM CHLORIDE 125 MILLILITER(S): 50; .745; 4.5 INJECTION, SOLUTION INTRAVENOUS at 00:17

## 2019-12-15 RX ADMIN — HEPARIN SODIUM 5000 UNIT(S): 5000 INJECTION INTRAVENOUS; SUBCUTANEOUS at 14:15

## 2019-12-15 RX ADMIN — HEPARIN SODIUM 5000 UNIT(S): 5000 INJECTION INTRAVENOUS; SUBCUTANEOUS at 00:17

## 2019-12-15 RX ADMIN — SODIUM CHLORIDE 500 MILLILITER(S): 9 INJECTION, SOLUTION INTRAVENOUS at 04:56

## 2019-12-15 RX ADMIN — DEXTROSE MONOHYDRATE, SODIUM CHLORIDE, AND POTASSIUM CHLORIDE 75 MILLILITER(S): 50; .745; 4.5 INJECTION, SOLUTION INTRAVENOUS at 19:13

## 2019-12-15 NOTE — PROGRESS NOTE ADULT - SUBJECTIVE AND OBJECTIVE BOX
SUBJECTIVE / OVERNIGHT EVENTS: pt c/o abd pain ,     MEDICATIONS  (STANDING):  acetaminophen  IVPB .. 1000 milliGRAM(s) IV Intermittent every 6 hours  dextrose 5% + sodium chloride 0.45% with potassium chloride 20 mEq/L 1000 milliLiter(s) (75 mL/Hr) IV Continuous <Continuous>  dextrose 5%. 1000 milliLiter(s) (50 mL/Hr) IV Continuous <Continuous>  dextrose 50% Injectable 12.5 Gram(s) IV Push once  dextrose 50% Injectable 25 Gram(s) IV Push once  dextrose 50% Injectable 25 Gram(s) IV Push once  heparin  Injectable 5000 Unit(s) SubCutaneous every 8 hours  hydromorphone (10 MICROgram(s)/mL) + bupivacaine 0.0625% in 0.9% Sodium Chloride PCEA 250 milliLiter(s) Epidural PCA Continuous  insulin lispro (HumaLOG) corrective regimen sliding scale   SubCutaneous Before meals and at bedtime    MEDICATIONS  (PRN):  dextrose 40% Gel 15 Gram(s) Oral once PRN Blood Glucose LESS THAN 70 milliGRAM(s)/deciliter  glucagon  Injectable 1 milliGRAM(s) IntraMuscular once PRN Glucose LESS THAN 70 milligrams/deciliter  HYDROmorphone  Injectable 0.5 milliGRAM(s) IV Push every 3 hours PRN Severe breakthrough Pain (7 - 10)  hydromorphone (10 MICROgram(s)/mL) + bupivacaine 0.0625% in 0.9% Sodium Chloride PCEA Rescue Clinician  Bolus 5 milliLiter(s) Epidural every 15 minutes PRN for Pain Score greater than 6  naloxone Injectable 0.1 milliGRAM(s) IV Push every 3 minutes PRN For ANY of the following changes in patient status:  A. RR LESS THAN 10 breaths per minute, B. Oxygen saturation LESS THAN 90%, C. Sedation score of 6  ondansetron Injectable 4 milliGRAM(s) IV Push every 6 hours PRN Nausea    Vital Signs Last 24 Hrs  T(C): 36.8 (15 Dec 2019 20:02), Max: 37.5 (15 Dec 2019 04:40)  T(F): 98.2 (15 Dec 2019 20:02), Max: 99.5 (15 Dec 2019 04:40)  HR: 79 (15 Dec 2019 20:02) (79 - 89)  BP: 108/61 (15 Dec 2019 20:02) (100/49 - 120/69)  BP(mean): --  RR: 18 (15 Dec 2019 20:02) (16 - 18)  SpO2: 99% (15 Dec 2019 20:02) (94% - 100%)      Constitutional: No fever, fatigue  Skin: No rash.  Eyes: No recent vision problems or eye pain.  ENT: No congestion, ear pain, or sore throat.  Cardiovascular: No chest pain or palpation.  Respiratory: No cough, shortness of breath, congestion, or wheezing.  Gastrointestinal: No abdominal pain, nausea, vomiting, or diarrhea.  Genitourinary: No dysuria.  Musculoskeletal: No joint swelling.  Neurologic: No headache.    PHYSICAL EXAM:  GENERAL: NAD  EYES: EOMI, PERRLA  NECK: Supple, No JVD  CHEST/LUNG: dec breath sounds rt base   HEART:  S1 , S2 +  ABDOMEN: soft , bs+, mild distension +  EXTREMITIES:  no edema   NEUROLOGY:alert awake oriented   ceballos +      LABS:  12-15    132<L>  |  100  |  6<L>  ----------------------------<  148<H>  3.9   |  24  |  0.95    Ca    8.0<L>      15 Dec 2019 12:37  Phos  1.9     12-15  Mg     1.3     12-15      Creatinine Trend: 0.95 <--, 1.08 <--, 1.15 <--, 1.11 <--, 1.36 <--, 1.61 <--                        9.3    7.55  )-----------( 368      ( 15 Dec 2019 12:37 )             29.0     Urine Studies:  Urinalysis Basic - ( 12 Dec 2019 10:15 )    Color: YELLOW / Appearance: Lt TURBID / S.025 / pH: 5.5  Gluc: NEGATIVE / Ketone: SMALL  / Bili: TRACE / Urobili: TRACE   Blood: NEGATIVE / Protein: 100 / Nitrite: NEGATIVE   Leuk Esterase: NEGATIVE / RBC: 3-5 / WBC 11-25   Sq Epi: MANY / Non Sq Epi:  / Bacteria: NEGATIVE                PT/INR - ( 15 Dec 2019 04:54 )   PT: 15.5 SEC;   INR: 1.35          PTT - ( 15 Dec 2019 04:54 )  PTT:34.1 SEC    RADIOLOGY & ADDITIONAL TESTS:    Imaging Personally Reviewed:    Consultant(s) Notes Reviewed:      Care Discussed with Consultants/Other Providers:

## 2019-12-15 NOTE — PROGRESS NOTE ADULT - SUBJECTIVE AND OBJECTIVE BOX
Interval Events:    No acute events overnight    S: Patient doing well, denies fevers, chills, nausea, emesis, chest pain, SOB.  Pain is well controlled.   -/- F/BM.    O: Vital Signs Last 24 Hrs  T(C): 36.9 (15 Dec 2019 08:22), Max: 37.5 (15 Dec 2019 04:40)  T(F): 98.4 (15 Dec 2019 08:22), Max: 99.5 (15 Dec 2019 04:40)  HR: 86 (15 Dec 2019 08:22) (74 - 89)  BP: 120/61 (15 Dec 2019 08:22) (92/48 - 120/61)  BP(mean): --  RR: 18 (15 Dec 2019 08:22) (16 - 18)  SpO2: 96% (15 Dec 2019 08:22) (94% - 97%)      14 Dec 2019 07:01  -  15 Dec 2019 07:00  --------------------------------------------------------  IN:    dextrose 5% + sodium chloride 0.45% with potassium chloride 20 mEq/L: 1375 mL  Total IN: 1375 mL    OUT:    Indwelling Catheter - Urethral: 1175 mL    Nasoenteral Tube: 50 mL  Total OUT: 1225 mL    Total NET: 150 mL      15 Dec 2019 07:01  -  15 Dec 2019 10:28  --------------------------------------------------------  IN:    dextrose 5% + sodium chloride 0.45% with potassium chloride 20 mEq/L: 250 mL  Total IN: 250 mL    OUT:    Indwelling Catheter - Urethral: 425 mL  Total OUT: 425 mL    Total NET: -175 mL          Physical Exam:    Gen: Well-developed, well-nourished in no acute distress  Resp: No additional work of breathing   GI:appropriately tender, midline incision with scant drainage on overlying dressing  MSK: Moves all extremities equally  Skin: No rashes    Labs:                        9.6    7.76  )-----------( 344      ( 14 Dec 2019 06:45 )             30.7     14 Dec 2019 06:45    136    |  102    |  9      ----------------------------<  116    4.0     |  18     |  1.08     Ca    7.6        14 Dec 2019 06:45  Phos  3.0       14 Dec 2019 06:45  Mg     1.7       14 Dec 2019 06:45      PT/INR - ( 15 Dec 2019 04:54 )   PT: 15.5 SEC;   INR: 1.35          PTT - ( 15 Dec 2019 04:54 )  PTT:34.1 SEC  CAPILLARY BLOOD GLUCOSE      POCT Blood Glucose.: 148 mg/dL (15 Dec 2019 04:52)  POCT Blood Glucose.: 146 mg/dL (14 Dec 2019 23:30)  POCT Blood Glucose.: 154 mg/dL (14 Dec 2019 17:40)  POCT Blood Glucose.: 159 mg/dL (14 Dec 2019 12:45)                MEDICATIONS  (STANDING):  acetaminophen  IVPB .. 1000 milliGRAM(s) IV Intermittent once  dextrose 5% + sodium chloride 0.45% with potassium chloride 20 mEq/L 1000 milliLiter(s) (125 mL/Hr) IV Continuous <Continuous>  dextrose 5%. 1000 milliLiter(s) (50 mL/Hr) IV Continuous <Continuous>  dextrose 50% Injectable 12.5 Gram(s) IV Push once  dextrose 50% Injectable 25 Gram(s) IV Push once  dextrose 50% Injectable 25 Gram(s) IV Push once  heparin  Injectable 5000 Unit(s) SubCutaneous every 8 hours  hydromorphone (10 MICROgram(s)/mL) + bupivacaine 0.0625% in 0.9% Sodium Chloride PCEA 250 milliLiter(s) Epidural PCA Continuous  insulin lispro (HumaLOG) corrective regimen sliding scale   SubCutaneous every 6 hours    MEDICATIONS  (PRN):  dextrose 40% Gel 15 Gram(s) Oral once PRN Blood Glucose LESS THAN 70 milliGRAM(s)/deciliter  glucagon  Injectable 1 milliGRAM(s) IntraMuscular once PRN Glucose LESS THAN 70 milligrams/deciliter  HYDROmorphone  Injectable 0.5 milliGRAM(s) IV Push every 3 hours PRN Severe breakthrough Pain (7 - 10)  hydromorphone (10 MICROgram(s)/mL) + bupivacaine 0.0625% in 0.9% Sodium Chloride PCEA Rescue Clinician  Bolus 5 milliLiter(s) Epidural every 15 minutes PRN for Pain Score greater than 6  naloxone Injectable 0.1 milliGRAM(s) IV Push every 3 minutes PRN For ANY of the following changes in patient status:  A. RR LESS THAN 10 breaths per minute, B. Oxygen saturation LESS THAN 90%, C. Sedation score of 6  ondansetron Injectable 4 milliGRAM(s) IV Push every 6 hours PRN Nausea

## 2019-12-15 NOTE — PROGRESS NOTE ADULT - ASSESSMENT
68yo M with hx of DM2, CAD s/p PCI (1999), HLD, and sleep apnea s/p uvulectomy and tonsillectomy (~1996) presented 12/12 with abdominal discomfort and intolerance of bowel prep for planned resection of abdominal mass (seen on outpt CT on 11/25), now 4h s/p ex lap, abdominal mass resection. Recovering well.    PLAN:  - Pain control via PCEA  - Encourage IS, OOB  - Nausea control PRN  - Monitor vitals  - Diet: NPO w/ sips  - NGT out  - d/c ceballos  - Hypomagnesemia, repleted, f/u repeat labs  - DM on ISS, FS q6h  - DVT ppx: SQH      D Team Surgery  [id number].

## 2019-12-15 NOTE — PROGRESS NOTE ADULT - SUBJECTIVE AND OBJECTIVE BOX
Anesthesia Pain Management Service: Day __ of Epidural    SUBJECTIVE: Patient doing well with PCEA and no problems.  Pain Scale Score:   Refer to charted pain scores    THERAPY:  [x ] Epidural Bupivacaine 0.0625% and Hydromorphone  		[ ] 10 micrograms/mL	[ ] 5 micrograms/mL  [ ] Epidural Bupivacaine 0.0625% and Fentanyl - 2 micrograms/mL  [ ] Epidural Ropivacaine 0.1% plain – 1 mg/mL  [ ] Patient Controlled Regional Anesthesia (PCRA) Ropivacaine  		[ ] 0.2%			[ ] 0.1%    Demand dose __3_ lockout __15_ (minutes) Continuous Rate ___ Total:180 ___ Daily      MEDICATIONS  (STANDING):  acetaminophen  IVPB .. 1000 milliGRAM(s) IV Intermittent once  dextrose 5% + sodium chloride 0.45% with potassium chloride 20 mEq/L 1000 milliLiter(s) (125 mL/Hr) IV Continuous <Continuous>  dextrose 5%. 1000 milliLiter(s) (50 mL/Hr) IV Continuous <Continuous>  dextrose 50% Injectable 12.5 Gram(s) IV Push once  dextrose 50% Injectable 25 Gram(s) IV Push once  dextrose 50% Injectable 25 Gram(s) IV Push once  heparin  Injectable 5000 Unit(s) SubCutaneous every 8 hours  hydromorphone (10 MICROgram(s)/mL) + bupivacaine 0.0625% in 0.9% Sodium Chloride PCEA 250 milliLiter(s) Epidural PCA Continuous  insulin lispro (HumaLOG) corrective regimen sliding scale   SubCutaneous every 6 hours    MEDICATIONS  (PRN):  dextrose 40% Gel 15 Gram(s) Oral once PRN Blood Glucose LESS THAN 70 milliGRAM(s)/deciliter  glucagon  Injectable 1 milliGRAM(s) IntraMuscular once PRN Glucose LESS THAN 70 milligrams/deciliter  HYDROmorphone  Injectable 0.5 milliGRAM(s) IV Push every 3 hours PRN Severe breakthrough Pain (7 - 10)  hydromorphone (10 MICROgram(s)/mL) + bupivacaine 0.0625% in 0.9% Sodium Chloride PCEA Rescue Clinician  Bolus 5 milliLiter(s) Epidural every 15 minutes PRN for Pain Score greater than 6  naloxone Injectable 0.1 milliGRAM(s) IV Push every 3 minutes PRN For ANY of the following changes in patient status:  A. RR LESS THAN 10 breaths per minute, B. Oxygen saturation LESS THAN 90%, C. Sedation score of 6  ondansetron Injectable 4 milliGRAM(s) IV Push every 6 hours PRN Nausea      OBJECTIVE:    Assessment of Catheter Site:	[ ] Left	[ ] Right  [x ] Epidural 	[ ] Femoral	      [ ] Saphenous   [ ] Supraclavicular   [ ] Other:    [x ] Dressing intact	[x ] Site non-tender	[ x] Site without erythema, discharge, edema  [x ] Epidural tubing and connection checked	[x] Gross neurological exam within normal limits  [ ] Catheter removed – tip intact		[x ] Afebrile	[ ] Febrile: ___    PT/INR - ( 15 Dec 2019 04:54 )   PT: 15.5 SEC;   INR: 1.35          PTT - ( 15 Dec 2019 04:54 )  PTT:34.1 SEC                      9.6    7.76  )-----------( 344      ( 14 Dec 2019 06:45 )             30.7     Vital Signs Last 24 Hrs  T(C): 36.9 (12-15-19 @ 08:22), Max: 37.5 (12-15-19 @ 04:40)  T(F): 98.4 (12-15-19 @ 08:22), Max: 99.5 (12-15-19 @ 04:40)  HR: 86 (12-15-19 @ 08:22) (74 - 89)  BP: 120/61 (12-15-19 @ 08:22) (92/48 - 120/61)  BP(mean): --  RR: 18 (12-15-19 @ 08:22) (16 - 18)  SpO2: 96% (12-15-19 @ 08:22) (94% - 97%)      Sedation Score:	[x ] Alert	[ ] Drowsy	[ ] Arousable	[ ] Asleep	[ ] Unresponsive    Side Effects:	[x ] None	[ ] Nausea	[ ] Vomiting	[ ] Pruritus  		[ ] Weakness		[ ] Numbness	[ ] Other:    ASSESSMENT/ PLAN:    Therapy to  be:	[x ] Continue   [ ] Discontinued   [ ] Change to prn Analgesics    Documentation and Verification of current medications:  [ X ] Done	[ ] Not done, not eligible, reason:    Comments: Doing OK with epidural and may continue.

## 2019-12-15 NOTE — PROGRESS NOTE ADULT - SUBJECTIVE AND OBJECTIVE BOX
ADRIAN FUNES:[id number],   69yMale followed for:  No Known Allergies    PAST MEDICAL & SURGICAL HISTORY:  Sleep apnea  CAD (coronary artery disease)  Hypercholesteremia  Diabetes mellitus: Fasting FS range from - per patient  Hypertension  Intra-abdominal and pelvic swelling, mass and lump, unspecified site  H/O sleep apnea: Per patient had Sleep Apnea surgery  in 1996- at [facility]- Was not retested for Sleep Apnea post surgery  History of cardiac cath: Pt reports 1 cardiac stent placed 1999    FAMILY HISTORY:  FH: heart disease: Mother    MEDICATIONS  (STANDING):  acetaminophen  IVPB .. 1000 milliGRAM(s) IV Intermittent once  dextrose 5% + sodium chloride 0.45% with potassium chloride 20 mEq/L 1000 milliLiter(s) (125 mL/Hr) IV Continuous <Continuous>  dextrose 5%. 1000 milliLiter(s) (50 mL/Hr) IV Continuous <Continuous>  dextrose 50% Injectable 12.5 Gram(s) IV Push once  dextrose 50% Injectable 25 Gram(s) IV Push once  dextrose 50% Injectable 25 Gram(s) IV Push once  heparin  Injectable 5000 Unit(s) SubCutaneous every 8 hours  hydromorphone (10 MICROgram(s)/mL) + bupivacaine 0.0625% in 0.9% Sodium Chloride PCEA 250 milliLiter(s) Epidural PCA Continuous  insulin lispro (HumaLOG) corrective regimen sliding scale   SubCutaneous every 6 hours    MEDICATIONS  (PRN):  dextrose 40% Gel 15 Gram(s) Oral once PRN Blood Glucose LESS THAN 70 milliGRAM(s)/deciliter  glucagon  Injectable 1 milliGRAM(s) IntraMuscular once PRN Glucose LESS THAN 70 milligrams/deciliter  HYDROmorphone  Injectable 0.5 milliGRAM(s) IV Push every 3 hours PRN Severe breakthrough Pain (7 - 10)  hydromorphone (10 MICROgram(s)/mL) + bupivacaine 0.0625% in 0.9% Sodium Chloride PCEA Rescue Clinician  Bolus 5 milliLiter(s) Epidural every 15 minutes PRN for Pain Score greater than 6  naloxone Injectable 0.1 milliGRAM(s) IV Push every 3 minutes PRN For ANY of the following changes in patient status:  A. RR LESS THAN 10 breaths per minute, B. Oxygen saturation LESS THAN 90%, C. Sedation score of 6  ondansetron Injectable 4 milliGRAM(s) IV Push every 6 hours PRN Nausea      Vital Signs Last 24 Hrs  T(C): 36.9 (15 Dec 2019 08:22), Max: 37.5 (15 Dec 2019 04:40)  T(F): 98.4 (15 Dec 2019 08:22), Max: 99.5 (15 Dec 2019 04:40)  HR: 86 (15 Dec 2019 08:22) (74 - 89)  BP: 120/61 (15 Dec 2019 08:22) (92/48 - 120/61)  BP(mean): --  RR: 18 (15 Dec 2019 08:22) (16 - 18)  SpO2: 96% (15 Dec 2019 08:22) (94% - 97%)  nc/at  s1s2  cta  soft, nt, nd no guarding or rebound  no c/c/e    CBC Full  -  ( 14 Dec 2019 06:45 )  WBC Count : 7.76 K/uL  RBC Count : 3.61 M/uL  Hemoglobin : 9.6 g/dL  Hematocrit : 30.7 %  Platelet Count - Automated : 344 K/uL  Mean Cell Volume : 85.0 fL  Mean Cell Hemoglobin : 26.6 pg  Mean Cell Hemoglobin Concentration : 31.3 %  Auto Neutrophil # : x  Auto Lymphocyte # : x  Auto Monocyte # : x  Auto Eosinophil # : x  Auto Basophil # : x  Auto Neutrophil % : x  Auto Lymphocyte % : x  Auto Monocyte % : x  Auto Eosinophil % : x  Auto Basophil % : x    12-14    136  |  102  |  9   ----------------------------<  116<H>  4.0   |  18<L>  |  1.08    Ca    7.6<L>      14 Dec 2019 06:45  Phos  3.0     12-14  Mg     1.7     12-14      PT/INR - ( 15 Dec 2019 04:54 )   PT: 15.5 SEC;   INR: 1.35          PTT - ( 15 Dec 2019 04:54 )  PTT:34.1 SEC

## 2019-12-16 LAB
ANION GAP SERPL CALC-SCNC: 10 MMO/L — SIGNIFICANT CHANGE UP (ref 7–14)
APTT BLD: 31.5 SEC — SIGNIFICANT CHANGE UP (ref 27.5–36.3)
BUN SERPL-MCNC: 5 MG/DL — LOW (ref 7–23)
CALCIUM SERPL-MCNC: 7.9 MG/DL — LOW (ref 8.4–10.5)
CHLORIDE SERPL-SCNC: 101 MMOL/L — SIGNIFICANT CHANGE UP (ref 98–107)
CO2 SERPL-SCNC: 23 MMOL/L — SIGNIFICANT CHANGE UP (ref 22–31)
CREAT SERPL-MCNC: 0.9 MG/DL — SIGNIFICANT CHANGE UP (ref 0.5–1.3)
GLUCOSE BLDC GLUCOMTR-MCNC: 121 MG/DL — HIGH (ref 70–99)
GLUCOSE BLDC GLUCOMTR-MCNC: 127 MG/DL — HIGH (ref 70–99)
GLUCOSE BLDC GLUCOMTR-MCNC: 128 MG/DL — HIGH (ref 70–99)
GLUCOSE BLDC GLUCOMTR-MCNC: 133 MG/DL — HIGH (ref 70–99)
GLUCOSE SERPL-MCNC: 123 MG/DL — HIGH (ref 70–99)
HCT VFR BLD CALC: 27.5 % — LOW (ref 39–50)
HGB BLD-MCNC: 8.7 G/DL — LOW (ref 13–17)
INR BLD: 1.18 — HIGH (ref 0.88–1.17)
MAGNESIUM SERPL-MCNC: 1.5 MG/DL — LOW (ref 1.6–2.6)
MCHC RBC-ENTMCNC: 25.8 PG — LOW (ref 27–34)
MCHC RBC-ENTMCNC: 31.6 % — LOW (ref 32–36)
MCV RBC AUTO: 81.6 FL — SIGNIFICANT CHANGE UP (ref 80–100)
NRBC # FLD: 0 K/UL — SIGNIFICANT CHANGE UP (ref 0–0)
PHOSPHATE SERPL-MCNC: 2.7 MG/DL — SIGNIFICANT CHANGE UP (ref 2.5–4.5)
PLATELET # BLD AUTO: 343 K/UL — SIGNIFICANT CHANGE UP (ref 150–400)
PMV BLD: 10.7 FL — SIGNIFICANT CHANGE UP (ref 7–13)
POTASSIUM SERPL-MCNC: 3.7 MMOL/L — SIGNIFICANT CHANGE UP (ref 3.5–5.3)
POTASSIUM SERPL-SCNC: 3.7 MMOL/L — SIGNIFICANT CHANGE UP (ref 3.5–5.3)
PROTHROM AB SERPL-ACNC: 13.2 SEC — HIGH (ref 9.8–13.1)
RBC # BLD: 3.37 M/UL — LOW (ref 4.2–5.8)
RBC # FLD: 13.7 % — SIGNIFICANT CHANGE UP (ref 10.3–14.5)
SODIUM SERPL-SCNC: 134 MMOL/L — LOW (ref 135–145)
WBC # BLD: 7.42 K/UL — SIGNIFICANT CHANGE UP (ref 3.8–10.5)
WBC # FLD AUTO: 7.42 K/UL — SIGNIFICANT CHANGE UP (ref 3.8–10.5)

## 2019-12-16 PROCEDURE — 99024 POSTOP FOLLOW-UP VISIT: CPT

## 2019-12-16 RX ORDER — POTASSIUM CHLORIDE 20 MEQ
10 PACKET (EA) ORAL ONCE
Refills: 0 | Status: COMPLETED | OUTPATIENT
Start: 2019-12-16 | End: 2019-12-16

## 2019-12-16 RX ORDER — SODIUM,POTASSIUM PHOSPHATES 278-250MG
1 POWDER IN PACKET (EA) ORAL
Refills: 0 | Status: DISCONTINUED | OUTPATIENT
Start: 2019-12-16 | End: 2019-12-16

## 2019-12-16 RX ORDER — ENOXAPARIN SODIUM 100 MG/ML
40 INJECTION SUBCUTANEOUS DAILY
Refills: 0 | Status: DISCONTINUED | OUTPATIENT
Start: 2019-12-16 | End: 2019-12-20

## 2019-12-16 RX ORDER — MAGNESIUM SULFATE 500 MG/ML
2 VIAL (ML) INJECTION
Refills: 0 | Status: COMPLETED | OUTPATIENT
Start: 2019-12-16 | End: 2019-12-16

## 2019-12-16 RX ORDER — SODIUM CHLORIDE 9 MG/ML
1000 INJECTION, SOLUTION INTRAVENOUS
Refills: 0 | Status: DISCONTINUED | OUTPATIENT
Start: 2019-12-16 | End: 2019-12-19

## 2019-12-16 RX ORDER — OXYCODONE HYDROCHLORIDE 5 MG/1
5 TABLET ORAL
Refills: 0 | Status: DISCONTINUED | OUTPATIENT
Start: 2019-12-16 | End: 2019-12-19

## 2019-12-16 RX ORDER — MAGNESIUM SULFATE 500 MG/ML
2 VIAL (ML) INJECTION ONCE
Refills: 0 | Status: DISCONTINUED | OUTPATIENT
Start: 2019-12-16 | End: 2019-12-16

## 2019-12-16 RX ORDER — OXYCODONE HYDROCHLORIDE 5 MG/1
10 TABLET ORAL
Refills: 0 | Status: DISCONTINUED | OUTPATIENT
Start: 2019-12-16 | End: 2019-12-19

## 2019-12-16 RX ORDER — POTASSIUM PHOSPHATE, MONOBASIC POTASSIUM PHOSPHATE, DIBASIC 236; 224 MG/ML; MG/ML
15 INJECTION, SOLUTION INTRAVENOUS ONCE
Refills: 0 | Status: COMPLETED | OUTPATIENT
Start: 2019-12-16 | End: 2019-12-16

## 2019-12-16 RX ADMIN — HEPARIN SODIUM 5000 UNIT(S): 5000 INJECTION INTRAVENOUS; SUBCUTANEOUS at 04:45

## 2019-12-16 RX ADMIN — Medication 1000 MILLIGRAM(S): at 18:23

## 2019-12-16 RX ADMIN — Medication 1000 MILLIGRAM(S): at 12:15

## 2019-12-16 RX ADMIN — HEPARIN SODIUM 5000 UNIT(S): 5000 INJECTION INTRAVENOUS; SUBCUTANEOUS at 15:02

## 2019-12-16 RX ADMIN — Medication 400 MILLIGRAM(S): at 17:48

## 2019-12-16 RX ADMIN — SODIUM CHLORIDE 75 MILLILITER(S): 9 INJECTION, SOLUTION INTRAVENOUS at 11:29

## 2019-12-16 RX ADMIN — Medication 400 MILLIGRAM(S): at 11:41

## 2019-12-16 RX ADMIN — Medication 50 GRAM(S): at 09:01

## 2019-12-16 RX ADMIN — Medication 50 GRAM(S): at 10:26

## 2019-12-16 RX ADMIN — Medication 400 MILLIGRAM(S): at 04:46

## 2019-12-16 RX ADMIN — Medication 100 MILLIEQUIVALENT(S): at 09:01

## 2019-12-16 RX ADMIN — POTASSIUM PHOSPHATE, MONOBASIC POTASSIUM PHOSPHATE, DIBASIC 62.5 MILLIMOLE(S): 236; 224 INJECTION, SOLUTION INTRAVENOUS at 10:26

## 2019-12-16 NOTE — PROGRESS NOTE ADULT - SUBJECTIVE AND OBJECTIVE BOX
ADRIAN FUNES:[id number],   69yMale followed for:  No Known Allergies    PAST MEDICAL & SURGICAL HISTORY:  Sleep apnea  CAD (coronary artery disease)  Hypercholesteremia  Diabetes mellitus: Fasting FS range from - per patient  Hypertension  Intra-abdominal and pelvic swelling, mass and lump, unspecified site  H/O sleep apnea: Per patient had Sleep Apnea surgery  in 1996- at [facility]- Was not retested for Sleep Apnea post surgery  History of cardiac cath: Pt reports 1 cardiac stent placed 1999    FAMILY HISTORY:  FH: heart disease: Mother    MEDICATIONS  (STANDING):  acetaminophen  IVPB .. 1000 milliGRAM(s) IV Intermittent every 6 hours  dextrose 5% + sodium chloride 0.9% 1000 milliLiter(s) (75 mL/Hr) IV Continuous <Continuous>  dextrose 5%. 1000 milliLiter(s) (50 mL/Hr) IV Continuous <Continuous>  dextrose 50% Injectable 12.5 Gram(s) IV Push once  dextrose 50% Injectable 25 Gram(s) IV Push once  dextrose 50% Injectable 25 Gram(s) IV Push once  heparin  Injectable 5000 Unit(s) SubCutaneous every 8 hours  hydromorphone (10 MICROgram(s)/mL) + bupivacaine 0.0625% in 0.9% Sodium Chloride PCEA 250 milliLiter(s) Epidural PCA Continuous  insulin lispro (HumaLOG) corrective regimen sliding scale   SubCutaneous Before meals and at bedtime  magnesium sulfate  IVPB 2 Gram(s) IV Intermittent every 1 hour  potassium chloride  10 mEq/100 mL IVPB 10 milliEquivalent(s) IV Intermittent once  potassium phosphate IVPB 15 milliMole(s) IV Intermittent once    MEDICATIONS  (PRN):  dextrose 40% Gel 15 Gram(s) Oral once PRN Blood Glucose LESS THAN 70 milliGRAM(s)/deciliter  glucagon  Injectable 1 milliGRAM(s) IntraMuscular once PRN Glucose LESS THAN 70 milligrams/deciliter  HYDROmorphone  Injectable 0.5 milliGRAM(s) IV Push every 3 hours PRN Severe breakthrough Pain (7 - 10)  hydromorphone (10 MICROgram(s)/mL) + bupivacaine 0.0625% in 0.9% Sodium Chloride PCEA Rescue Clinician  Bolus 5 milliLiter(s) Epidural every 15 minutes PRN for Pain Score greater than 6  naloxone Injectable 0.1 milliGRAM(s) IV Push every 3 minutes PRN For ANY of the following changes in patient status:  A. RR LESS THAN 10 breaths per minute, B. Oxygen saturation LESS THAN 90%, C. Sedation score of 6  ondansetron Injectable 4 milliGRAM(s) IV Push every 6 hours PRN Nausea      Vital Signs Last 24 Hrs  T(C): 36.8 (16 Dec 2019 03:54), Max: 36.9 (15 Dec 2019 12:39)  T(F): 98.2 (16 Dec 2019 03:54), Max: 98.5 (15 Dec 2019 12:39)  HR: 69 (16 Dec 2019 03:54) (69 - 82)  BP: 106/59 (16 Dec 2019 03:54) (103/57 - 120/69)  BP(mean): --  RR: 18 (16 Dec 2019 03:54) (17 - 18)  SpO2: 97% (16 Dec 2019 03:54) (97% - 100%)  nc/at  s1s2  cta  soft, nt, nd no guarding or rebound  no c/c/e    CBC Full  -  ( 16 Dec 2019 05:00 )  WBC Count : 7.42 K/uL  RBC Count : 3.37 M/uL  Hemoglobin : 8.7 g/dL  Hematocrit : 27.5 %  Platelet Count - Automated : 343 K/uL  Mean Cell Volume : 81.6 fL  Mean Cell Hemoglobin : 25.8 pg  Mean Cell Hemoglobin Concentration : 31.6 %  Auto Neutrophil # : x  Auto Lymphocyte # : x  Auto Monocyte # : x  Auto Eosinophil # : x  Auto Basophil # : x  Auto Neutrophil % : x  Auto Lymphocyte % : x  Auto Monocyte % : x  Auto Eosinophil % : x  Auto Basophil % : x    12-16    134<L>  |  101  |  5<L>  ----------------------------<  123<H>  3.7   |  23  |  0.90    Ca    7.9<L>      16 Dec 2019 05:00  Phos  2.7     12-16  Mg     1.5     12-16      PT/INR - ( 16 Dec 2019 05:00 )   PT: 13.2 SEC;   INR: 1.18          PTT - ( 16 Dec 2019 05:00 )  PTT:31.5 SEC

## 2019-12-16 NOTE — PROGRESS NOTE ADULT - ASSESSMENT
70yo M with hx of DM2, CAD s/p PCI (1999), HLD, and sleep apnea s/p uvulectomy and tonsillectomy (~1996) presented 12/12 with abdominal discomfort and intolerance of bowel prep for planned resection of abdominal mass (seen on outpt CT on 11/25), now 4h s/p ex lap, abdominal mass resection. Recovering well.    PLAN:  - Pain control via PCEA  - Encourage IS, OOB  - Nausea control PRN  - Monitor vitals  - Diet: clears, will advance as tolerated  - Hypomagnesemia, repleted, f/u repeat labs  - DM on ISS, FS q6h  - DVT ppx: SQH      D Team Surgery  [id number].

## 2019-12-16 NOTE — PROGRESS NOTE ADULT - SUBJECTIVE AND OBJECTIVE BOX
Anesthesia Pain Management Service: Day _4_ of Epidural    SUBJECTIVE: Patient doing well with PCEA and no problems.  Pain Scale Score:   Refer to charted pain scores    THERAPY:  [x ] Epidural Bupivacaine 0.0625% and Hydromorphone  		[X ] 10 micrograms/mL	[ ] 5 micrograms/mL  [ ] Epidural Bupivacaine 0.0625% and Fentanyl - 2 micrograms/mL  [ ] Epidural Ropivacaine 0.1% plain – 1 mg/mL  [ ] Patient Controlled Regional Anesthesia (PCRA) Ropivacaine  		[ ] 0.2%			[ ] 0.1%    Demand dose __3_ lockout __15_ (minutes) Continuous Rate _6__ Total: _149.5ml__ Daily      MEDICATIONS  (STANDING):  acetaminophen  IVPB .. 1000 milliGRAM(s) IV Intermittent every 6 hours  dextrose 5% + sodium chloride 0.9% 1000 milliLiter(s) (75 mL/Hr) IV Continuous <Continuous>  dextrose 5%. 1000 milliLiter(s) (50 mL/Hr) IV Continuous <Continuous>  dextrose 50% Injectable 12.5 Gram(s) IV Push once  dextrose 50% Injectable 25 Gram(s) IV Push once  dextrose 50% Injectable 25 Gram(s) IV Push once  heparin  Injectable 5000 Unit(s) SubCutaneous every 8 hours  insulin lispro (HumaLOG) corrective regimen sliding scale   SubCutaneous Before meals and at bedtime  magnesium sulfate  IVPB 2 Gram(s) IV Intermittent every 1 hour  potassium phosphate IVPB 15 milliMole(s) IV Intermittent once    MEDICATIONS  (PRN):  dextrose 40% Gel 15 Gram(s) Oral once PRN Blood Glucose LESS THAN 70 milliGRAM(s)/deciliter  glucagon  Injectable 1 milliGRAM(s) IntraMuscular once PRN Glucose LESS THAN 70 milligrams/deciliter  oxyCODONE    IR 5 milliGRAM(s) Oral every 3 hours PRN Moderate Pain (4 - 6)  oxyCODONE    IR 10 milliGRAM(s) Oral every 3 hours PRN Severe Pain (7 - 10)      OBJECTIVE: laying in bed     Assessment of Catheter Site:	[ ] Left	[ ] Right  [x ] Epidural 	[ ] Femoral	      [ ] Saphenous   [ ] Supraclavicular   [ ] Other:    [x ] Dressing intact	[x ] Site non-tender	[ x] Site without erythema, discharge, edema  [x ] Epidural tubing and connection checked	[x] Gross neurological exam within normal limits  [X ] Catheter removed – tip intact		[ ] Afebrile	  [ ] Febrile: ___       [ X] see Temp under VS below)    PT/INR - ( 16 Dec 2019 05:00 )   PT: 13.2 SEC;   INR: 1.18          PTT - ( 16 Dec 2019 05:00 )  PTT:31.5 SEC                      8.7    7.42  )-----------( 343      ( 16 Dec 2019 05:00 )             27.5     Vital Signs Last 24 Hrs  T(C): 36.6 (12-16-19 @ 08:58), Max: 36.9 (12-15-19 @ 12:39)  T(F): 97.8 (12-16-19 @ 08:58), Max: 98.5 (12-15-19 @ 12:39)  HR: 71 (12-16-19 @ 08:58) (69 - 82)  BP: 106/67 (12-16-19 @ 08:58) (103/57 - 120/69)  BP(mean): --  RR: 17 (12-16-19 @ 08:58) (17 - 18)  SpO2: 97% (12-16-19 @ 08:58) (97% - 100%)      Sedation Score:	[x ] Alert	[ ] Drowsy	[ ] Arousable	[ ] Asleep	[ ] Unresponsive    Side Effects:	[x ] None	[ ] Nausea	[ ] Vomiting	[ ] Pruritus  		[ ] Weakness		[ ] Numbness	[ ] Other:    ASSESSMENT/ PLAN:    Therapy to  be:	[ ] Continue   [ X] Discontinued   [ X] Change to prn Analgesics    Documentation and Verification of current medications:  [ X ] Done	[ ] Not done, not eligible, reason:    Comments: Changed to IV or oral opioid medication at this point.    Progress Note written now but Patient was seen earlier.

## 2019-12-16 NOTE — PROGRESS NOTE ADULT - SUBJECTIVE AND OBJECTIVE BOX
Interval Events:    No acute events overnight    S: Patient doing well, denies fevers, chills, nausea, emesis, chest pain, SOB.  Pain is well controlled. Tolerating PO w/o N/V.  +/- F/BM.    O: Vital Signs Last 24 Hrs  T(C): 36.8 (16 Dec 2019 03:54), Max: 36.9 (15 Dec 2019 08:22)  T(F): 98.2 (16 Dec 2019 03:54), Max: 98.5 (15 Dec 2019 12:39)  HR: 69 (16 Dec 2019 03:54) (69 - 86)  BP: 106/59 (16 Dec 2019 03:54) (103/57 - 120/69)  BP(mean): --  RR: 18 (16 Dec 2019 03:54) (17 - 18)  SpO2: 97% (16 Dec 2019 03:54) (96% - 100%)      14 Dec 2019 07:01  -  15 Dec 2019 07:00  --------------------------------------------------------  IN:    dextrose 5% + sodium chloride 0.45% with potassium chloride 20 mEq/L: 1375 mL  Total IN: 1375 mL    OUT:    Indwelling Catheter - Urethral: 1175 mL    Nasoenteral Tube: 50 mL  Total OUT: 1225 mL    Total NET: 150 mL      15 Dec 2019 07:01  -  16 Dec 2019 04:57  --------------------------------------------------------  IN:    dextrose 5% + sodium chloride 0.45% with potassium chloride 20 mEq/L: 1300 mL  Total IN: 1300 mL    OUT:    Indwelling Catheter - Urethral: 425 mL    Voided: 1700 mL  Total OUT: 2125 mL    Total NET: -825 mL          Physical Exam:    en: Well-developed, well-nourished in no acute distress  Resp: No additional work of breathing   GI:appropriately tender, midline incision with scant drainage on overlying dressing  MSK: Moves all extremities equally  Skin: No rashes    Labs:                        9.3    7.55  )-----------( 368      ( 15 Dec 2019 12:37 )             29.0     15 Dec 2019 12:37    132    |  100    |  6      ----------------------------<  148    3.9     |  24     |  0.95     Ca    8.0        15 Dec 2019 12:37  Phos  1.9       15 Dec 2019 12:37  Mg     1.3       15 Dec 2019 12:37      PT/INR - ( 15 Dec 2019 04:54 )   PT: 15.5 SEC;   INR: 1.35          PTT - ( 15 Dec 2019 04:54 )  PTT:34.1 SEC  CAPILLARY BLOOD GLUCOSE      POCT Blood Glucose.: 120 mg/dL (15 Dec 2019 22:12)  POCT Blood Glucose.: 157 mg/dL (15 Dec 2019 18:08)  POCT Blood Glucose.: 137 mg/dL (15 Dec 2019 12:36)                MEDICATIONS  (STANDING):  acetaminophen  IVPB .. 1000 milliGRAM(s) IV Intermittent every 6 hours  dextrose 5% + sodium chloride 0.45% with potassium chloride 20 mEq/L 1000 milliLiter(s) (75 mL/Hr) IV Continuous <Continuous>  dextrose 5%. 1000 milliLiter(s) (50 mL/Hr) IV Continuous <Continuous>  dextrose 50% Injectable 12.5 Gram(s) IV Push once  dextrose 50% Injectable 25 Gram(s) IV Push once  dextrose 50% Injectable 25 Gram(s) IV Push once  heparin  Injectable 5000 Unit(s) SubCutaneous every 8 hours  hydromorphone (10 MICROgram(s)/mL) + bupivacaine 0.0625% in 0.9% Sodium Chloride PCEA 250 milliLiter(s) Epidural PCA Continuous  insulin lispro (HumaLOG) corrective regimen sliding scale   SubCutaneous Before meals and at bedtime    MEDICATIONS  (PRN):  dextrose 40% Gel 15 Gram(s) Oral once PRN Blood Glucose LESS THAN 70 milliGRAM(s)/deciliter  glucagon  Injectable 1 milliGRAM(s) IntraMuscular once PRN Glucose LESS THAN 70 milligrams/deciliter  HYDROmorphone  Injectable 0.5 milliGRAM(s) IV Push every 3 hours PRN Severe breakthrough Pain (7 - 10)  hydromorphone (10 MICROgram(s)/mL) + bupivacaine 0.0625% in 0.9% Sodium Chloride PCEA Rescue Clinician  Bolus 5 milliLiter(s) Epidural every 15 minutes PRN for Pain Score greater than 6  naloxone Injectable 0.1 milliGRAM(s) IV Push every 3 minutes PRN For ANY of the following changes in patient status:  A. RR LESS THAN 10 breaths per minute, B. Oxygen saturation LESS THAN 90%, C. Sedation score of 6  ondansetron Injectable 4 milliGRAM(s) IV Push every 6 hours PRN Nausea

## 2019-12-16 NOTE — PROGRESS NOTE ADULT - SUBJECTIVE AND OBJECTIVE BOX
Vital Signs Last 24 Hrs  T(C): 36.6 (16 Dec 2019 08:58), Max: 36.9 (15 Dec 2019 12:39)  T(F): 97.8 (16 Dec 2019 08:58), Max: 98.5 (15 Dec 2019 12:39)  HR: 71 (16 Dec 2019 08:58) (69 - 82)  BP: 106/67 (16 Dec 2019 08:58) (103/57 - 120/69)  BP(mean): --  RR: 17 (16 Dec 2019 08:58) (17 - 18)  SpO2: 97% (16 Dec 2019 08:58) (97% - 100%)    I&O's Detail    15 Dec 2019 07:01  -  16 Dec 2019 07:00  --------------------------------------------------------  IN:    dextrose 5% + sodium chloride 0.45% with potassium chloride 20 mEq/L: 1525 mL  Total IN: 1525 mL    OUT:    Indwelling Catheter - Urethral: 425 mL    Voided: 2000 mL  Total OUT: 2425 mL    Total NET: -900 mL      16 Dec 2019 07:01  -  16 Dec 2019 11:17  --------------------------------------------------------  IN:    IV PiggyBack: 450 mL  Total IN: 450 mL    OUT:    Voided: 450 mL  Total OUT: 450 mL    Total NET: 0 mL                                8.7    7.42  )-----------( 343      ( 16 Dec 2019 05:00 )             27.5       12-16    134<L>  |  101  |  5<L>  ----------------------------<  123<H>  3.7   |  23  |  0.90    Ca    7.9<L>      16 Dec 2019 05:00  Phos  2.7     12-16  Mg     1.5     12-16        PT/INR - ( 16 Dec 2019 05:00 )   PT: 13.2 SEC;   INR: 1.18          PTT - ( 16 Dec 2019 05:00 )  PTT:31.5 SEC    Incision clear  Tolerating clear liquids but no flatus yet    PLAN:  Ambulate  Full liquid diet

## 2019-12-16 NOTE — PROGRESS NOTE ADULT - SUBJECTIVE AND OBJECTIVE BOX
SUBJECTIVE / OVERNIGHT EVENTS: pt c/o abd pain , - flatus     MEDICATIONS  (STANDING):  acetaminophen  IVPB .. 1000 milliGRAM(s) IV Intermittent every 6 hours  dextrose 5% + sodium chloride 0.9% 1000 milliLiter(s) (75 mL/Hr) IV Continuous <Continuous>  dextrose 5%. 1000 milliLiter(s) (50 mL/Hr) IV Continuous <Continuous>  dextrose 50% Injectable 12.5 Gram(s) IV Push once  dextrose 50% Injectable 25 Gram(s) IV Push once  dextrose 50% Injectable 25 Gram(s) IV Push once  enoxaparin Injectable 40 milliGRAM(s) SubCutaneous daily  insulin lispro (HumaLOG) corrective regimen sliding scale   SubCutaneous Before meals and at bedtime    MEDICATIONS  (PRN):  dextrose 40% Gel 15 Gram(s) Oral once PRN Blood Glucose LESS THAN 70 milliGRAM(s)/deciliter  glucagon  Injectable 1 milliGRAM(s) IntraMuscular once PRN Glucose LESS THAN 70 milligrams/deciliter  oxyCODONE    IR 5 milliGRAM(s) Oral every 3 hours PRN Moderate Pain (4 - 6)  oxyCODONE    IR 10 milliGRAM(s) Oral every 3 hours PRN Severe Pain (7 - 10)    Vital Signs Last 24 Hrs  T(C): 36.6 (16 Dec 2019 21:08), Max: 36.8 (16 Dec 2019 03:54)  T(F): 97.8 (16 Dec 2019 21:08), Max: 98.2 (16 Dec 2019 03:54)  HR: 69 (16 Dec 2019 21:08) (69 - 79)  BP: 115/65 (16 Dec 2019 21:08) (105/62 - 118/69)  BP(mean): --  RR: 18 (16 Dec 2019 21:08) (16 - 18)  SpO2: 97% (16 Dec 2019 21:08) (97% - 100%)  Constitutional: No fever, fatigue  Skin: No rash.  Eyes: No recent vision problems or eye pain.  ENT: No congestion, ear pain, or sore throat.  Cardiovascular: No chest pain or palpation.  Respiratory: No cough, shortness of breath, congestion, or wheezing.  Gastrointestinal: No abdominal pain, nausea, vomiting, or diarrhea.  Genitourinary: No dysuria.  Musculoskeletal: No joint swelling.  Neurologic: No headache.    PHYSICAL EXAM:  GENERAL: NAD  EYES: EOMI, PERRLA  NECK: Supple, No JVD  CHEST/LUNG: dec breath sounds rt base   HEART:  S1 , S2 +  ABDOMEN: soft , bs+, mild distension +  EXTREMITIES:  no edema   NEUROLOGY:alert awake oriented     LABS:      134<L>  |  101  |  5<L>  ----------------------------<  123<H>  3.7   |  23  |  0.90    Ca    7.9<L>      16 Dec 2019 05:00  Phos  2.7       Mg     1.5           Creatinine Trend: 0.90 <--, 0.95 <--, 1.08 <--, 1.15 <--, 1.11 <--, 1.36 <--, 1.61 <--                        8.7    7.42  )-----------( 343      ( 16 Dec 2019 05:00 )             27.5     Urine Studies:  Urinalysis Basic - ( 12 Dec 2019 10:15 )    Color: YELLOW / Appearance: Lt TURBID / S.025 / pH: 5.5  Gluc: NEGATIVE / Ketone: SMALL  / Bili: TRACE / Urobili: TRACE   Blood: NEGATIVE / Protein: 100 / Nitrite: NEGATIVE   Leuk Esterase: NEGATIVE / RBC: 3-5 / WBC 11-25   Sq Epi: MANY / Non Sq Epi:  / Bacteria: NEGATIVE                PT/INR - ( 16 Dec 2019 05:00 )   PT: 13.2 SEC;   INR: 1.18          PTT - ( 16 Dec 2019 05:00 )  PTT:31.5 SEC            PT/INR - ( 15 Dec 2019 04:54 )   PT: 15.5 SEC;   INR: 1.35          PTT - ( 15 Dec 2019 04:54 )  PTT:34.1 SEC    RADIOLOGY & ADDITIONAL TESTS:    Imaging Personally Reviewed:    Consultant(s) Notes Reviewed:      Care Discussed with Consultants/Other Providers:

## 2019-12-16 NOTE — PROGRESS NOTE ADULT - SUBJECTIVE AND OBJECTIVE BOX
Day _4__ of Anesthesia Pain Management Service    SUBJECTIVE:    Therapy:	  [ ] IV PCA	   [x ] Epidural           [ ] s/p Spinal Opoid              [ ] Postpartum infusion	  [ ] Patient controlled regional anesthesia (PCRA)    [ ] prn Analgesics    OBJECTIVE:   [ x] No new signs     [ ] Other:    Side Effects:  [x ] None			[ ] Other:    Assessment of Catheter Site:		[x ] Intact		[ ] Other:    ASSESSMENT/PLAN  [ ] Continue current therapy    [ x] Therapy changed to:    [ ] IV PCA       [ ] Epidural     [ x] prn Analgesics     Comments:

## 2019-12-17 LAB
ANION GAP SERPL CALC-SCNC: 10 MMO/L — SIGNIFICANT CHANGE UP (ref 7–14)
BUN SERPL-MCNC: 5 MG/DL — LOW (ref 7–23)
CALCIUM SERPL-MCNC: 7.9 MG/DL — LOW (ref 8.4–10.5)
CHLORIDE SERPL-SCNC: 104 MMOL/L — SIGNIFICANT CHANGE UP (ref 98–107)
CO2 SERPL-SCNC: 23 MMOL/L — SIGNIFICANT CHANGE UP (ref 22–31)
CREAT SERPL-MCNC: 0.93 MG/DL — SIGNIFICANT CHANGE UP (ref 0.5–1.3)
GLUCOSE BLDC GLUCOMTR-MCNC: 123 MG/DL — HIGH (ref 70–99)
GLUCOSE BLDC GLUCOMTR-MCNC: 125 MG/DL — HIGH (ref 70–99)
GLUCOSE BLDC GLUCOMTR-MCNC: 137 MG/DL — HIGH (ref 70–99)
GLUCOSE BLDC GLUCOMTR-MCNC: 141 MG/DL — HIGH (ref 70–99)
GLUCOSE SERPL-MCNC: 124 MG/DL — HIGH (ref 70–99)
HCT VFR BLD CALC: 26.4 % — LOW (ref 39–50)
HGB BLD-MCNC: 8.4 G/DL — LOW (ref 13–17)
MAGNESIUM SERPL-MCNC: 1.6 MG/DL — SIGNIFICANT CHANGE UP (ref 1.6–2.6)
MCHC RBC-ENTMCNC: 26.2 PG — LOW (ref 27–34)
MCHC RBC-ENTMCNC: 31.8 % — LOW (ref 32–36)
MCV RBC AUTO: 82.2 FL — SIGNIFICANT CHANGE UP (ref 80–100)
NRBC # FLD: 0 K/UL — SIGNIFICANT CHANGE UP (ref 0–0)
PHOSPHATE SERPL-MCNC: 3.2 MG/DL — SIGNIFICANT CHANGE UP (ref 2.5–4.5)
PLATELET # BLD AUTO: 385 K/UL — SIGNIFICANT CHANGE UP (ref 150–400)
PMV BLD: 10.3 FL — SIGNIFICANT CHANGE UP (ref 7–13)
POTASSIUM SERPL-MCNC: 4.1 MMOL/L — SIGNIFICANT CHANGE UP (ref 3.5–5.3)
POTASSIUM SERPL-SCNC: 4.1 MMOL/L — SIGNIFICANT CHANGE UP (ref 3.5–5.3)
RBC # BLD: 3.21 M/UL — LOW (ref 4.2–5.8)
RBC # FLD: 13.9 % — SIGNIFICANT CHANGE UP (ref 10.3–14.5)
SODIUM SERPL-SCNC: 137 MMOL/L — SIGNIFICANT CHANGE UP (ref 135–145)
WBC # BLD: 5.15 K/UL — SIGNIFICANT CHANGE UP (ref 3.8–10.5)
WBC # FLD AUTO: 5.15 K/UL — SIGNIFICANT CHANGE UP (ref 3.8–10.5)

## 2019-12-17 RX ORDER — CALCIUM CARBONATE 500(1250)
2 TABLET ORAL
Refills: 0 | Status: DISCONTINUED | OUTPATIENT
Start: 2019-12-17 | End: 2019-12-20

## 2019-12-17 RX ORDER — MAGNESIUM SULFATE 500 MG/ML
2 VIAL (ML) INJECTION ONCE
Refills: 0 | Status: COMPLETED | OUTPATIENT
Start: 2019-12-17 | End: 2019-12-17

## 2019-12-17 RX ORDER — PANTOPRAZOLE SODIUM 20 MG/1
40 TABLET, DELAYED RELEASE ORAL ONCE
Refills: 0 | Status: COMPLETED | OUTPATIENT
Start: 2019-12-17 | End: 2019-12-17

## 2019-12-17 RX ADMIN — OXYCODONE HYDROCHLORIDE 10 MILLIGRAM(S): 5 TABLET ORAL at 03:30

## 2019-12-17 RX ADMIN — OXYCODONE HYDROCHLORIDE 5 MILLIGRAM(S): 5 TABLET ORAL at 23:05

## 2019-12-17 RX ADMIN — Medication 50 GRAM(S): at 11:33

## 2019-12-17 RX ADMIN — ENOXAPARIN SODIUM 40 MILLIGRAM(S): 100 INJECTION SUBCUTANEOUS at 12:11

## 2019-12-17 RX ADMIN — Medication 2 TABLET(S): at 20:30

## 2019-12-17 RX ADMIN — OXYCODONE HYDROCHLORIDE 10 MILLIGRAM(S): 5 TABLET ORAL at 18:42

## 2019-12-17 RX ADMIN — OXYCODONE HYDROCHLORIDE 10 MILLIGRAM(S): 5 TABLET ORAL at 10:12

## 2019-12-17 RX ADMIN — OXYCODONE HYDROCHLORIDE 10 MILLIGRAM(S): 5 TABLET ORAL at 02:51

## 2019-12-17 RX ADMIN — OXYCODONE HYDROCHLORIDE 5 MILLIGRAM(S): 5 TABLET ORAL at 23:35

## 2019-12-17 RX ADMIN — PANTOPRAZOLE SODIUM 40 MILLIGRAM(S): 20 TABLET, DELAYED RELEASE ORAL at 18:50

## 2019-12-17 NOTE — PROGRESS NOTE ADULT - ASSESSMENT
70yo M with hx of DM2, CAD s/p stent (1999), HLD, and sleep apnea s/p uvulectomy and tonsillectomy (~1996) presented 12/12 with abd discomfort found to have abd mass + s/p surgery      - pain control, monitor gi fx closely     -    DM2 -iss    Cad - pt chest pain free ,

## 2019-12-17 NOTE — PROGRESS NOTE ADULT - SUBJECTIVE AND OBJECTIVE BOX
Vital Signs Last 24 Hrs  T(C): 36.7 (17 Dec 2019 05:18), Max: 36.7 (17 Dec 2019 00:17)  T(F): 98 (17 Dec 2019 05:18), Max: 98 (17 Dec 2019 00:17)  HR: 70 (17 Dec 2019 05:18) (69 - 79)  BP: 106/60 (17 Dec 2019 05:18) (105/62 - 118/69)  BP(mean): --  RR: 18 (17 Dec 2019 05:18) (16 - 18)  SpO2: 95% (17 Dec 2019 05:18) (95% - 100%)    I&O's Detail    16 Dec 2019 07:01  -  17 Dec 2019 07:00  --------------------------------------------------------  IN:    dextrose 5% + sodium chloride 0.9%: 675 mL    IV PiggyBack: 650 mL  Total IN: 1325 mL    OUT:    Voided: 1400 mL  Total OUT: 1400 mL    Total NET: -75 mL                                8.4    5.15  )-----------( 385      ( 17 Dec 2019 06:15 )             26.4       12-17    137  |  104  |  5<L>  ----------------------------<  124<H>  4.1   |  23  |  0.93    Ca    7.9<L>      17 Dec 2019 06:15  Phos  3.2     12-17  Mg     1.6     12-17        PT/INR - ( 16 Dec 2019 05:00 )   PT: 13.2 SEC;   INR: 1.18          PTT - ( 16 Dec 2019 05:00 )  PTT:31.5 SEC    Incision clear  Abdomen distended  No flatus yet    PLAN:  Ambulate  continue full liquid diet until pt. passes gas

## 2019-12-17 NOTE — PROGRESS NOTE ADULT - SUBJECTIVE AND OBJECTIVE BOX
SUBJECTIVE / OVERNIGHT EVENTS: pt c/o abd pain , - flatus     MEDICATIONS  (STANDING):  dextrose 5% + sodium chloride 0.9% 1000 milliLiter(s) (75 mL/Hr) IV Continuous <Continuous>  dextrose 5%. 1000 milliLiter(s) (50 mL/Hr) IV Continuous <Continuous>  dextrose 50% Injectable 12.5 Gram(s) IV Push once  dextrose 50% Injectable 25 Gram(s) IV Push once  dextrose 50% Injectable 25 Gram(s) IV Push once  enoxaparin Injectable 40 milliGRAM(s) SubCutaneous daily  insulin lispro (HumaLOG) corrective regimen sliding scale   SubCutaneous Before meals and at bedtime    MEDICATIONS  (PRN):  dextrose 40% Gel 15 Gram(s) Oral once PRN Blood Glucose LESS THAN 70 milliGRAM(s)/deciliter  glucagon  Injectable 1 milliGRAM(s) IntraMuscular once PRN Glucose LESS THAN 70 milligrams/deciliter  oxyCODONE    IR 5 milliGRAM(s) Oral every 3 hours PRN Moderate Pain (4 - 6)  oxyCODONE    IR 10 milliGRAM(s) Oral every 3 hours PRN Severe Pain (7 - 10)    Vital Signs Last 24 Hrs  T(C): 36.6 (17 Dec 2019 19:13), Max: 37.2 (17 Dec 2019 08:54)  T(F): 97.9 (17 Dec 2019 19:13), Max: 99 (17 Dec 2019 08:54)  HR: 68 (17 Dec 2019 19:13) (68 - 91)  BP: 116/66 (17 Dec 2019 19:13) (106/60 - 123/72)  BP(mean): --  RR: 18 (17 Dec 2019 19:13) (17 - 18)  SpO2: 100% (17 Dec 2019 19:13) (95% - 100%)    Constitutional: No fever, fatigue  Skin: No rash.  Eyes: No recent vision problems or eye pain.  ENT: No congestion, ear pain, or sore throat.  Cardiovascular: No chest pain or palpation.  Respiratory: No cough, shortness of breath, congestion, or wheezing.  Gastrointestinal: No abdominal pain, nausea, vomiting, or diarrhea.  Genitourinary: No dysuria.  Musculoskeletal: No joint swelling.  Neurologic: No headache.    PHYSICAL EXAM:  GENERAL: NAD  EYES: EOMI, PERRLA  NECK: Supple, No JVD  CHEST/LUNG: dec breath sounds rt base   HEART:  S1 , S2 +  ABDOMEN: soft , bs+, mild distension +  EXTREMITIES:  no edema   NEUROLOGY:alert awake oriented     LABS:      137  |  104  |  5<L>  ----------------------------<  124<H>  4.1   |  23  |  0.93    Ca    7.9<L>      17 Dec 2019 06:15  Phos  3.2       Mg     1.6           Creatinine Trend: 0.93 <--, 0.90 <--, 0.95 <--, 1.08 <--, 1.15 <--, 1.11 <--, 1.36 <--, 1.61 <--                        8.4    5.15  )-----------( 385      ( 17 Dec 2019 06:15 )             26.4     Urine Studies:  Urinalysis Basic - ( 12 Dec 2019 10:15 )    Color: YELLOW / Appearance: Lt TURBID / S.025 / pH: 5.5  Gluc: NEGATIVE / Ketone: SMALL  / Bili: TRACE / Urobili: TRACE   Blood: NEGATIVE / Protein: 100 / Nitrite: NEGATIVE   Leuk Esterase: NEGATIVE / RBC: 3-5 / WBC 11-25   Sq Epi: MANY / Non Sq Epi:  / Bacteria: NEGATIVE                PT/INR - ( 16 Dec 2019 05:00 )   PT: 13.2 SEC;   INR: 1.18          PTT - ( 16 Dec 2019 05:00 )  PTT:31.5 SEC  Sq Epi: MANY / Non Sq Epi:  / Bacteria: NEGATIVE                PT/INR - ( 16 Dec 2019 05:00 )   PT: 13.2 SEC;   INR: 1.18          PTT - ( 16 Dec 2019 05:00 )  PTT:31.5 SEC            PT/INR - ( 15 Dec 2019 04:54 )   PT: 15.5 SEC;   INR: 1.35          PTT - ( 15 Dec 2019 04:54 )  PTT:34.1 SEC    RADIOLOGY & ADDITIONAL TESTS:    Imaging Personally Reviewed:    Consultant(s) Notes Reviewed:      Care Discussed with Consultants/Other Providers:

## 2019-12-18 LAB
ANION GAP SERPL CALC-SCNC: 12 MMO/L — SIGNIFICANT CHANGE UP (ref 7–14)
BUN SERPL-MCNC: 6 MG/DL — LOW (ref 7–23)
CALCIUM SERPL-MCNC: 8.1 MG/DL — LOW (ref 8.4–10.5)
CHLORIDE SERPL-SCNC: 105 MMOL/L — SIGNIFICANT CHANGE UP (ref 98–107)
CO2 SERPL-SCNC: 21 MMOL/L — LOW (ref 22–31)
CREAT SERPL-MCNC: 0.9 MG/DL — SIGNIFICANT CHANGE UP (ref 0.5–1.3)
GLUCOSE BLDC GLUCOMTR-MCNC: 115 MG/DL — HIGH (ref 70–99)
GLUCOSE BLDC GLUCOMTR-MCNC: 121 MG/DL — HIGH (ref 70–99)
GLUCOSE BLDC GLUCOMTR-MCNC: 136 MG/DL — HIGH (ref 70–99)
GLUCOSE BLDC GLUCOMTR-MCNC: 141 MG/DL — HIGH (ref 70–99)
GLUCOSE SERPL-MCNC: 146 MG/DL — HIGH (ref 70–99)
HCT VFR BLD CALC: 29.2 % — LOW (ref 39–50)
HGB BLD-MCNC: 9.3 G/DL — LOW (ref 13–17)
MAGNESIUM SERPL-MCNC: 1.6 MG/DL — SIGNIFICANT CHANGE UP (ref 1.6–2.6)
MCHC RBC-ENTMCNC: 26.6 PG — LOW (ref 27–34)
MCHC RBC-ENTMCNC: 31.8 % — LOW (ref 32–36)
MCV RBC AUTO: 83.7 FL — SIGNIFICANT CHANGE UP (ref 80–100)
NRBC # FLD: 0 K/UL — SIGNIFICANT CHANGE UP (ref 0–0)
PHOSPHATE SERPL-MCNC: 3.3 MG/DL — SIGNIFICANT CHANGE UP (ref 2.5–4.5)
PLATELET # BLD AUTO: 482 K/UL — HIGH (ref 150–400)
PMV BLD: 10.1 FL — SIGNIFICANT CHANGE UP (ref 7–13)
POTASSIUM SERPL-MCNC: 4.3 MMOL/L — SIGNIFICANT CHANGE UP (ref 3.5–5.3)
POTASSIUM SERPL-SCNC: 4.3 MMOL/L — SIGNIFICANT CHANGE UP (ref 3.5–5.3)
RBC # BLD: 3.49 M/UL — LOW (ref 4.2–5.8)
RBC # FLD: 14.3 % — SIGNIFICANT CHANGE UP (ref 10.3–14.5)
SODIUM SERPL-SCNC: 138 MMOL/L — SIGNIFICANT CHANGE UP (ref 135–145)
WBC # BLD: 6.13 K/UL — SIGNIFICANT CHANGE UP (ref 3.8–10.5)
WBC # FLD AUTO: 6.13 K/UL — SIGNIFICANT CHANGE UP (ref 3.8–10.5)

## 2019-12-18 PROCEDURE — 74018 RADEX ABDOMEN 1 VIEW: CPT | Mod: 26

## 2019-12-18 RX ORDER — ACETAMINOPHEN 500 MG
1000 TABLET ORAL EVERY 6 HOURS
Refills: 0 | Status: DISCONTINUED | OUTPATIENT
Start: 2019-12-18 | End: 2019-12-19

## 2019-12-18 RX ORDER — CALCIUM CARBONATE 500(1250)
2 TABLET ORAL ONCE
Refills: 0 | Status: COMPLETED | OUTPATIENT
Start: 2019-12-18 | End: 2019-12-18

## 2019-12-18 RX ORDER — MAGNESIUM SULFATE 500 MG/ML
2 VIAL (ML) INJECTION ONCE
Refills: 0 | Status: COMPLETED | OUTPATIENT
Start: 2019-12-18 | End: 2019-12-18

## 2019-12-18 RX ORDER — ONDANSETRON 8 MG/1
4 TABLET, FILM COATED ORAL ONCE
Refills: 0 | Status: COMPLETED | OUTPATIENT
Start: 2019-12-18 | End: 2019-12-18

## 2019-12-18 RX ADMIN — Medication 1000 MILLIGRAM(S): at 18:03

## 2019-12-18 RX ADMIN — SODIUM CHLORIDE 75 MILLILITER(S): 9 INJECTION, SOLUTION INTRAVENOUS at 20:40

## 2019-12-18 RX ADMIN — Medication 2 TABLET(S): at 17:34

## 2019-12-18 RX ADMIN — ONDANSETRON 4 MILLIGRAM(S): 8 TABLET, FILM COATED ORAL at 01:25

## 2019-12-18 RX ADMIN — Medication 400 MILLIGRAM(S): at 17:33

## 2019-12-18 RX ADMIN — Medication 400 MILLIGRAM(S): at 23:42

## 2019-12-18 RX ADMIN — ENOXAPARIN SODIUM 40 MILLIGRAM(S): 100 INJECTION SUBCUTANEOUS at 12:50

## 2019-12-18 RX ADMIN — Medication 50 GRAM(S): at 12:49

## 2019-12-18 NOTE — PROVIDER CONTACT NOTE (OTHER) - ASSESSMENT
pt called and stated that he passed gas and then felt a "wave of pain on the right side" and also stated " I think im going to throw up" - pt vomited and reports feeling better after.

## 2019-12-18 NOTE — PROGRESS NOTE ADULT - SUBJECTIVE AND OBJECTIVE BOX
ADRIAN FUNES:[id number],   69yMale followed for:  No Known Allergies    PAST MEDICAL & SURGICAL HISTORY:  Sleep apnea  CAD (coronary artery disease)  Hypercholesteremia  Diabetes mellitus: Fasting FS range from - per patient  Hypertension  Intra-abdominal and pelvic swelling, mass and lump, unspecified site  H/O sleep apnea: Per patient had Sleep Apnea surgery  in 1996- at [facility]- Was not retested for Sleep Apnea post surgery  History of cardiac cath: Pt reports 1 cardiac stent placed 1999    FAMILY HISTORY:  FH: heart disease: Mother    MEDICATIONS  (STANDING):  calcium carbonate    500 mG (Tums) Chewable 2 Tablet(s) Chew two times a day  calcium carbonate    500 mG (Tums) Chewable 2 Tablet(s) Chew once  dextrose 5% + sodium chloride 0.9% 1000 milliLiter(s) (75 mL/Hr) IV Continuous <Continuous>  dextrose 5%. 1000 milliLiter(s) (50 mL/Hr) IV Continuous <Continuous>  dextrose 50% Injectable 12.5 Gram(s) IV Push once  dextrose 50% Injectable 25 Gram(s) IV Push once  dextrose 50% Injectable 25 Gram(s) IV Push once  enoxaparin Injectable 40 milliGRAM(s) SubCutaneous daily  insulin lispro (HumaLOG) corrective regimen sliding scale   SubCutaneous Before meals and at bedtime  magnesium sulfate  IVPB 2 Gram(s) IV Intermittent once    MEDICATIONS  (PRN):  dextrose 40% Gel 15 Gram(s) Oral once PRN Blood Glucose LESS THAN 70 milliGRAM(s)/deciliter  glucagon  Injectable 1 milliGRAM(s) IntraMuscular once PRN Glucose LESS THAN 70 milligrams/deciliter  oxyCODONE    IR 5 milliGRAM(s) Oral every 3 hours PRN Moderate Pain (4 - 6)  oxyCODONE    IR 10 milliGRAM(s) Oral every 3 hours PRN Severe Pain (7 - 10)      Vital Signs Last 24 Hrs  T(C): 36.3 (18 Dec 2019 05:02), Max: 36.6 (17 Dec 2019 19:13)  T(F): 97.4 (18 Dec 2019 05:02), Max: 97.9 (17 Dec 2019 19:13)  HR: 84 (18 Dec 2019 05:02) (68 - 91)  BP: 122/72 (18 Dec 2019 05:02) (107/66 - 122/72)  BP(mean): --  RR: 17 (18 Dec 2019 05:02) (17 - 18)  SpO2: 96% (18 Dec 2019 05:02) (95% - 100%)  nc/at  s1s2  cta  soft, nt, nd no guarding or rebound  no c/c/e    CBC Full  -  ( 18 Dec 2019 05:45 )  WBC Count : 6.13 K/uL  RBC Count : 3.49 M/uL  Hemoglobin : 9.3 g/dL  Hematocrit : 29.2 %  Platelet Count - Automated : 482 K/uL  Mean Cell Volume : 83.7 fL  Mean Cell Hemoglobin : 26.6 pg  Mean Cell Hemoglobin Concentration : 31.8 %  Auto Neutrophil # : x  Auto Lymphocyte # : x  Auto Monocyte # : x  Auto Eosinophil # : x  Auto Basophil # : x  Auto Neutrophil % : x  Auto Lymphocyte % : x  Auto Monocyte % : x  Auto Eosinophil % : x  Auto Basophil % : x    12-18    138  |  105  |  6<L>  ----------------------------<  146<H>  4.3   |  21<L>  |  0.90    Ca    8.1<L>      18 Dec 2019 05:45  Phos  3.3     12-18  Mg     1.6     12-18

## 2019-12-18 NOTE — PROGRESS NOTE ADULT - ASSESSMENT
68yo M with hx of DM2, CAD s/p PCI (1999), HLD, and sleep apnea s/p uvulectomy and tonsillectomy (~1996) presented 12/12 with abdominal discomfort and intolerance of bowel prep for planned resection of abdominal mass (seen on outpt CT on 11/25), now 4h s/p ex lap, abdominal mass resection. Recovering well.    PLAN:  - Advance diet tomorrow if patient continues to tolerate fulls   - IS  - OOB  - DM on ISS   - DVT ppx: SQH

## 2019-12-18 NOTE — PROVIDER CONTACT NOTE (OTHER) - SITUATION
pt called me in room and stated that he passed gas and then felt a "wave of pain on the right side" and also stated " I think im going to throw up" - pt vomited and reports feeling better after.

## 2019-12-18 NOTE — CHART NOTE - NSCHARTNOTEFT_GEN_A_CORE
Patient with medium volume bilious vomiting. Patient feeling GERD like symptoms all evening, unrelieved by Tums and protonix. He felt some R sided abdominal pain and nausea immediately preceding emesis. Currently he feels much better but is burping during the examination. He denies nausea at this time. of note, patient did begin to pass gas for the first time just before emesis. Abdominal exam is significant for distention, tympanicity, and mild tenderness with distention. Likely this is nausea induced by GERD like symptoms coupled with a full stomach from lack of bowel function. We will continue to monitor at this time and give tums and zofran. If nausea persists or patient vomits again, will get xray to evaluate for ileius vs obstruction. Patient vitals within normal limits all day, no concern for instability at this time.

## 2019-12-18 NOTE — PROVIDER CONTACT NOTE (OTHER) - ACTION/TREATMENT ORDERED:
MD will come to speak w/ pt.
continue to monitor
continue to monitor
administered zofran, will continue to monitor

## 2019-12-18 NOTE — PROGRESS NOTE ADULT - SUBJECTIVE AND OBJECTIVE BOX
Surgery Progress Note     Subjective/24hour Events:   Patient seen and examined.   Had GERD followed by emesis last night   Pain controlled.     Vital Signs:  Vital Signs Last 24 Hrs  T(C): 36.3 (18 Dec 2019 05:02), Max: 36.6 (17 Dec 2019 19:13)  T(F): 97.4 (18 Dec 2019 05:02), Max: 97.9 (17 Dec 2019 19:13)  HR: 84 (18 Dec 2019 05:02) (68 - 91)  BP: 122/72 (18 Dec 2019 05:02) (107/66 - 122/72)  BP(mean): --  RR: 17 (18 Dec 2019 05:02) (17 - 18)  SpO2: 96% (18 Dec 2019 05:02) (95% - 100%)    CAPILLARY BLOOD GLUCOSE      POCT Blood Glucose.: 136 mg/dL (18 Dec 2019 09:58)  POCT Blood Glucose.: 141 mg/dL (17 Dec 2019 21:54)  POCT Blood Glucose.: 125 mg/dL (17 Dec 2019 17:38)  POCT Blood Glucose.: 137 mg/dL (17 Dec 2019 12:19)      I&O's Detail    17 Dec 2019 07:01  -  18 Dec 2019 07:00  --------------------------------------------------------  IN:  Total IN: 0 mL    OUT:    Emesis: 300 mL    Voided: 1700 mL  Total OUT: 2000 mL    Total NET: -2000 mL          MEDICATIONS  (STANDING):  calcium carbonate    500 mG (Tums) Chewable 2 Tablet(s) Chew two times a day  calcium carbonate    500 mG (Tums) Chewable 2 Tablet(s) Chew once  dextrose 5% + sodium chloride 0.9% 1000 milliLiter(s) (75 mL/Hr) IV Continuous <Continuous>  dextrose 5%. 1000 milliLiter(s) (50 mL/Hr) IV Continuous <Continuous>  dextrose 50% Injectable 12.5 Gram(s) IV Push once  dextrose 50% Injectable 25 Gram(s) IV Push once  dextrose 50% Injectable 25 Gram(s) IV Push once  enoxaparin Injectable 40 milliGRAM(s) SubCutaneous daily  insulin lispro (HumaLOG) corrective regimen sliding scale   SubCutaneous Before meals and at bedtime  magnesium sulfate  IVPB 2 Gram(s) IV Intermittent once    MEDICATIONS  (PRN):  dextrose 40% Gel 15 Gram(s) Oral once PRN Blood Glucose LESS THAN 70 milliGRAM(s)/deciliter  glucagon  Injectable 1 milliGRAM(s) IntraMuscular once PRN Glucose LESS THAN 70 milligrams/deciliter  oxyCODONE    IR 5 milliGRAM(s) Oral every 3 hours PRN Moderate Pain (4 - 6)  oxyCODONE    IR 10 milliGRAM(s) Oral every 3 hours PRN Severe Pain (7 - 10)      Physical Exam:  Gen: NAD.  Lungs: Non labored breathing.   Ab: Soft, mildly distended   Ext: Moves all 4 spontaneously.     Labs:    12-18    138  |  105  |  6<L>  ----------------------------<  146<H>  4.3   |  21<L>  |  0.90    Ca    8.1<L>      18 Dec 2019 05:45  Phos  3.3     12-18  Mg     1.6     12-18                              9.3    6.13  )-----------( 482      ( 18 Dec 2019 05:45 )             29.2         Imaging:

## 2019-12-18 NOTE — PROGRESS NOTE ADULT - SUBJECTIVE AND OBJECTIVE BOX
SUBJECTIVE / OVERNIGHT EVENTS: pt c/o abd pain , - flatus     MEDICATIONS  (STANDING):  acetaminophen  IVPB .. 1000 milliGRAM(s) IV Intermittent every 6 hours  calcium carbonate    500 mG (Tums) Chewable 2 Tablet(s) Chew two times a day  dextrose 5% + sodium chloride 0.9% 1000 milliLiter(s) (75 mL/Hr) IV Continuous <Continuous>  dextrose 5%. 1000 milliLiter(s) (50 mL/Hr) IV Continuous <Continuous>  dextrose 50% Injectable 12.5 Gram(s) IV Push once  dextrose 50% Injectable 25 Gram(s) IV Push once  dextrose 50% Injectable 25 Gram(s) IV Push once  enoxaparin Injectable 40 milliGRAM(s) SubCutaneous daily  insulin lispro (HumaLOG) corrective regimen sliding scale   SubCutaneous Before meals and at bedtime    MEDICATIONS  (PRN):  dextrose 40% Gel 15 Gram(s) Oral once PRN Blood Glucose LESS THAN 70 milliGRAM(s)/deciliter  glucagon  Injectable 1 milliGRAM(s) IntraMuscular once PRN Glucose LESS THAN 70 milligrams/deciliter  oxyCODONE    IR 5 milliGRAM(s) Oral every 3 hours PRN Moderate Pain (4 - 6)  oxyCODONE    IR 10 milliGRAM(s) Oral every 3 hours PRN Severe Pain (7 - 10)    Vital Signs Last 24 Hrs  T(C): 36.5 (18 Dec 2019 19:34), Max: 36.8 (18 Dec 2019 08:20)  T(F): 97.7 (18 Dec 2019 19:34), Max: 98.3 (18 Dec 2019 08:20)  HR: 79 (18 Dec 2019 19:34) (68 - 89)  BP: 113/59 (18 Dec 2019 19:34) (107/66 - 122/72)  BP(mean): --  RR: 17 (18 Dec 2019 19:34) (16 - 18)  SpO2: 98% (18 Dec 2019 19:34) (95% - 98%)    Constitutional: No fever, fatigue  Skin: No rash.  Eyes: No recent vision problems or eye pain.  ENT: No congestion, ear pain, or sore throat.  Cardiovascular: No chest pain or palpation.  Respiratory: No cough, shortness of breath, congestion, or wheezing.  Gastrointestinal: No abdominal pain, nausea, vomiting, or diarrhea.  Genitourinary: No dysuria.  Musculoskeletal: No joint swelling.  Neurologic: No headache.    PHYSICAL EXAM:  GENERAL: NAD  EYES: EOMI, PERRLA  NECK: Supple, No JVD  CHEST/LUNG: dec breath sounds rt base   HEART:  S1 , S2 +  ABDOMEN: soft , bs+, mild distension +  EXTREMITIES:  no edema   NEUROLOGY:alert awake oriented     LLABS:      138  |  105  |  6<L>  ----------------------------<  146<H>  4.3   |  21<L>  |  0.90    Ca    8.1<L>      18 Dec 2019 05:45  Phos  3.3       Mg     1.6           Creatinine Trend: 0.90 <--, 0.93 <--, 0.90 <--, 0.95 <--, 1.08 <--, 1.15 <--, 1.11 <--, 1.36 <--, 1.61 <--                        9.3    6.13  )-----------( 482      ( 18 Dec 2019 05:45 )             29.2     Urine Studies:  Urinalysis Basic - ( 12 Dec 2019 10:15 )    Color: YELLOW / Appearance: Lt TURBID / S.025 / pH: 5.5  Gluc: NEGATIVE / Ketone: SMALL  / Bili: TRACE / Urobili: TRACE   Blood: NEGATIVE / Protein: 100 / Nitrite: NEGATIVE   Leuk Esterase: NEGATIVE / RBC: 3-5 / WBC 11-25   Sq Epi: MANY / Non Sq Epi:  / Bacteria: NEGATIVE                          PT/INR - ( 16 Dec 2019 05:00 )   PT: 13.2 SEC;   INR: 1.18          PTT - ( 16 Dec 2019 05:00 )  PTT:31.5 SEC  Sq Epi: MANY / Non Sq Epi:  / Bacteria: NEGATIVE                PT/INR - ( 16 Dec 2019 05:00 )   PT: 13.2 SEC;   INR: 1.18          PTT - ( 16 Dec 2019 05:00 )  PTT:31.5 SEC            PT/INR - ( 15 Dec 2019 04:54 )   PT: 15.5 SEC;   INR: 1.35          PTT - ( 15 Dec 2019 04:54 )  PTT:34.1 SEC    RADIOLOGY & ADDITIONAL TESTS:    Imaging Personally Reviewed:    Consultant(s) Notes Reviewed:      Care Discussed with Consultants/Other Providers:

## 2019-12-19 ENCOUNTER — TRANSCRIPTION ENCOUNTER (OUTPATIENT)
Age: 69
End: 2019-12-19

## 2019-12-19 LAB
ANION GAP SERPL CALC-SCNC: 9 MMO/L — SIGNIFICANT CHANGE UP (ref 7–14)
BUN SERPL-MCNC: 7 MG/DL — SIGNIFICANT CHANGE UP (ref 7–23)
CALCIUM SERPL-MCNC: 8.5 MG/DL — SIGNIFICANT CHANGE UP (ref 8.4–10.5)
CHLORIDE SERPL-SCNC: 104 MMOL/L — SIGNIFICANT CHANGE UP (ref 98–107)
CO2 SERPL-SCNC: 24 MMOL/L — SIGNIFICANT CHANGE UP (ref 22–31)
CREAT SERPL-MCNC: 0.96 MG/DL — SIGNIFICANT CHANGE UP (ref 0.5–1.3)
GLUCOSE BLDC GLUCOMTR-MCNC: 113 MG/DL — HIGH (ref 70–99)
GLUCOSE BLDC GLUCOMTR-MCNC: 121 MG/DL — HIGH (ref 70–99)
GLUCOSE BLDC GLUCOMTR-MCNC: 125 MG/DL — HIGH (ref 70–99)
GLUCOSE BLDC GLUCOMTR-MCNC: 133 MG/DL — HIGH (ref 70–99)
GLUCOSE SERPL-MCNC: 122 MG/DL — HIGH (ref 70–99)
HCT VFR BLD CALC: 30.9 % — LOW (ref 39–50)
HGB BLD-MCNC: 9.4 G/DL — LOW (ref 13–17)
MAGNESIUM SERPL-MCNC: 1.6 MG/DL — SIGNIFICANT CHANGE UP (ref 1.6–2.6)
MCHC RBC-ENTMCNC: 25.7 PG — LOW (ref 27–34)
MCHC RBC-ENTMCNC: 30.4 % — LOW (ref 32–36)
MCV RBC AUTO: 84.4 FL — SIGNIFICANT CHANGE UP (ref 80–100)
NRBC # FLD: 0 K/UL — SIGNIFICANT CHANGE UP (ref 0–0)
PHOSPHATE SERPL-MCNC: 2.7 MG/DL — SIGNIFICANT CHANGE UP (ref 2.5–4.5)
PLATELET # BLD AUTO: 529 K/UL — HIGH (ref 150–400)
PMV BLD: 10 FL — SIGNIFICANT CHANGE UP (ref 7–13)
POTASSIUM SERPL-MCNC: 4.3 MMOL/L — SIGNIFICANT CHANGE UP (ref 3.5–5.3)
POTASSIUM SERPL-SCNC: 4.3 MMOL/L — SIGNIFICANT CHANGE UP (ref 3.5–5.3)
RBC # BLD: 3.66 M/UL — LOW (ref 4.2–5.8)
RBC # FLD: 14.2 % — SIGNIFICANT CHANGE UP (ref 10.3–14.5)
SODIUM SERPL-SCNC: 137 MMOL/L — SIGNIFICANT CHANGE UP (ref 135–145)
WBC # BLD: 5.51 K/UL — SIGNIFICANT CHANGE UP (ref 3.8–10.5)
WBC # FLD AUTO: 5.51 K/UL — SIGNIFICANT CHANGE UP (ref 3.8–10.5)

## 2019-12-19 RX ORDER — ACETAMINOPHEN 500 MG
3 TABLET ORAL
Qty: 0 | Refills: 0 | DISCHARGE
Start: 2019-12-19

## 2019-12-19 RX ORDER — ASPIRIN/CALCIUM CARB/MAGNESIUM 324 MG
81 TABLET ORAL DAILY
Refills: 0 | Status: DISCONTINUED | OUTPATIENT
Start: 2019-12-19 | End: 2019-12-20

## 2019-12-19 RX ORDER — CALCIUM CARBONATE 500(1250)
2 TABLET ORAL
Qty: 0 | Refills: 0 | DISCHARGE
Start: 2019-12-19

## 2019-12-19 RX ORDER — ATORVASTATIN CALCIUM 80 MG/1
80 TABLET, FILM COATED ORAL AT BEDTIME
Refills: 0 | Status: DISCONTINUED | OUTPATIENT
Start: 2019-12-19 | End: 2019-12-20

## 2019-12-19 RX ORDER — ACETAMINOPHEN 500 MG
975 TABLET ORAL EVERY 6 HOURS
Refills: 0 | Status: DISCONTINUED | OUTPATIENT
Start: 2019-12-19 | End: 2019-12-20

## 2019-12-19 RX ADMIN — Medication 2 TABLET(S): at 05:19

## 2019-12-19 RX ADMIN — ATORVASTATIN CALCIUM 80 MILLIGRAM(S): 80 TABLET, FILM COATED ORAL at 21:02

## 2019-12-19 RX ADMIN — Medication 975 MILLIGRAM(S): at 11:37

## 2019-12-19 RX ADMIN — Medication 81 MILLIGRAM(S): at 11:37

## 2019-12-19 RX ADMIN — Medication 975 MILLIGRAM(S): at 20:36

## 2019-12-19 RX ADMIN — Medication 975 MILLIGRAM(S): at 11:55

## 2019-12-19 RX ADMIN — Medication 400 MILLIGRAM(S): at 05:19

## 2019-12-19 RX ADMIN — Medication 975 MILLIGRAM(S): at 21:06

## 2019-12-19 RX ADMIN — ENOXAPARIN SODIUM 40 MILLIGRAM(S): 100 INJECTION SUBCUTANEOUS at 11:37

## 2019-12-19 NOTE — PROGRESS NOTE ADULT - ASSESSMENT
70yo M with hx of DM2, CAD s/p stent (1999), HLD, and sleep apnea s/p uvulectomy and tonsillectomy (~1996) presented 12/12 with abd discomfort found to have abd mass + s/p surgery      - pain control, monitor gi fx closely - clear for dc by surgery and gi    -    DM2 -iss    Cad - pt chest pain free ,

## 2019-12-19 NOTE — DISCHARGE NOTE PROVIDER - NSDCCPTREATMENT_GEN_ALL_CORE_FT
PRINCIPAL PROCEDURE  Procedure: Exploratory laparotomy  Findings and Treatment: PRINCIPAL PROCEDURE  Procedure: Exploratory laparotomy  Findings and Treatment: WOUND CARE: You may leave wound open to air, keep clean and dry.  BATHING: Please do not submerge wound underwater. You may shower and/or sponge bathe.  ACTIVITY: No heavy lifting or straining. Resume activities of daily living. Do not lift heavy weights (greater than 15 pounds) or engage in strenuous exercise until you see your doctor in the office in 1-2 weeks. You may shower today, just let the water run over the wound, no scrubbing. No immersion baths or swimming in pools or ocean until you see us in the office again. Please leave the steri-strips (small white bandaids) on. These will remain on and fall off by themselves in the next few weeks. If you are taking narcotic pain medication (such as Percocet), do NOT drive a car, operate machinery or make important decisions.  DIET: Return to your usual diet.  NOTIFY YOUR SURGEON IF: You have any bleeding that does not stop, any pus draining from your wound, any fever (over 100.4 F) or chills, persistent nausea/vomiting, persistent diarrhea, or if your pain is not controlled on your discharge pain medications.  FOLLOW-UP:  1. Please call to make a follow-up appointment within one week of discharge with Dr. Ferreira  2. Please follow up with your primary care physician in one week regarding your hospitalization.

## 2019-12-19 NOTE — DISCHARGE NOTE PROVIDER - CARE PROVIDER_API CALL
Shadi Ferreira)  Surgery  [address] [place] [address]  Phone: [phone]  Fax: [phone]  Follow Up Time: 1 week    Corona Chance (DO)  Gastroenterology; Internal Medicine  [address] [place] [address]  Phone: [phone]  Fax: [phone]  Follow Up Time: 1 week

## 2019-12-19 NOTE — DISCHARGE NOTE PROVIDER - HOSPITAL COURSE
70yo M with hx of DM2, CADA s/p stent (1999), HLD, and sleep apnea s/p uvulectomy and tonsillectomy (~1996) presented 12/12 with abdominal discomfort and intolerance of bowel prep for planned resection of abdominal mass planned for 12/13 with Dr. Ferreira. Patient reports that in 11/2019 he noticed abd bloating that increased, causing him to present to GI doc (Dr. Chance) who sent him for CT scan that showed a 33.7x20.6x20.5cm multiloculated cystic mass involving the pelvis. He subsequently presented to his PCP who referred him to Dr. Ferreira and was subsequently scheduled for resection of the mass on 12/13/19. Patient has experienced increasing burping, hiccups, coughing, and anorexia. In the past 2wk patient has had increasing abd girth, diarrhea, constipation, urinary frequency, SOB, as well as N/V (NBNB) and increased abd discomfort with PO intake. He has therefore had reduced PO intake to alleviate symptoms. Denies CP or dysuria.        Patient was admitted to the surgical oncology team and brought to the OR on 12/13 by Dr. Ferreira where he underwent midline laparotomy. Large loculated cystic mass dissected out laterally and from the rectum. Patient tolerated procedure well without complication.  Patient was transferred from the PACU to the surgical floor. Diet advanced as tolerated and PCEA transitioned to PO pain medications. Currently patient is ambulating well, voiding, GI function at baseline, pain well controlled with oral medication and tolerating a regular diet.        Per team and attending patient stable for discharge home to follow up with Dr. Ferreira in 1 week. 70yo M with hx of DM2, CADA s/p stent (1999), HLD, and sleep apnea s/p uvulectomy and tonsillectomy (~1996) presented 12/12 with abdominal discomfort and intolerance of bowel prep for planned resection of abdominal mass planned for 12/13 with Dr. Ferreira. Patient reports that in 11/2019 he noticed abd bloating that increased, causing him to present to GI doc (Dr. Chance) who sent him for CT scan that showed a 33.7x20.6x20.5cm multiloculated cystic mass involving the pelvis. He subsequently presented to his PCP who referred him to Dr. Ferreira and was subsequently scheduled for resection of the mass on 12/13/19. Patient has experienced increasing burping, hiccups, coughing, and anorexia. In the past 2wk patient has had increasing abd girth, diarrhea, constipation, urinary frequency, SOB, as well as N/V (NBNB) and increased abd discomfort with PO intake. He has therefore had reduced PO intake to alleviate symptoms. Denies CP or dysuria.        Patient was admitted to the surgical oncology team and brought to the OR on 12/13 by Dr. Ferreira where he underwent midline laparotomy. Large loculated cystic mass dissected out laterally and from the rectum. Patient tolerated procedure well without complication.  Patient was transferred from the PACU to the surgical floor. Patient had an episode of emesis after advancement of diet, abdominal xray significant for ileus. Diet advanced as tolerated and PCEA transitioned to PO pain medications. Currently patient is ambulating well, voiding, GI function at baseline, pain well controlled with oral medication and tolerating a regular diet.        Per team and attending patient stable for discharge home to follow up with Dr. Ferreira in 1 week.

## 2019-12-19 NOTE — DISCHARGE NOTE PROVIDER - PROVIDER TOKENS
PROVIDER:[TOKEN:[[id number]],FOLLOWUP:[1 week]],PROVIDER:[TOKEN:[[id number]],FOLLOWUP:[1 week]]

## 2019-12-19 NOTE — PROGRESS NOTE ADULT - SUBJECTIVE AND OBJECTIVE BOX
D TEAM SURGERY PROGRESS NOTE    POST OPERATIVE DAY #: 6 s/p ex-lap, abdominal mass resection    SUBJECTIVE: Patient seen and examined at bedside on AM rounds, patient without complaints. Tolerated diet well overnight, pain controlled off oxycodone. Feels the oxycodone contributed to his emesis yesterday. Passing increased flatus overnight with two loose bowel movements. Has been OOB and ambulating. Denies chest pain/SOB. Denies nausea/vomiting.     Vital Signs Last 24 Hrs  T(C): 36.4 (19 Dec 2019 05:16), Max: 36.6 (18 Dec 2019 11:57)  T(F): 97.5 (19 Dec 2019 05:16), Max: 97.8 (18 Dec 2019 11:57)  HR: 65 (19 Dec 2019 05:16) (65 - 79)  BP: 108/60 (19 Dec 2019 05:16) (108/59 - 118/66)  BP(mean): --  RR: 19 (19 Dec 2019 05:16) (16 - 19)  SpO2: 93% (19 Dec 2019 05:16) (93% - 98%)  I&O's Detail    18 Dec 2019 07:01  -  19 Dec 2019 07:00  --------------------------------------------------------  IN:    dextrose 5% + sodium chloride 0.9%: 1725 mL  Total IN: 1725 mL    OUT:    Voided: 950 mL  Total OUT: 950 mL    Total NET: 775 mL      MEDICATIONS  (STANDING):  acetaminophen   Tablet .. 975 milliGRAM(s) Oral every 6 hours  aspirin enteric coated 81 milliGRAM(s) Oral daily  calcium carbonate    500 mG (Tums) Chewable 2 Tablet(s) Chew two times a day  dextrose 50% Injectable 12.5 Gram(s) IV Push once  dextrose 50% Injectable 25 Gram(s) IV Push once  dextrose 50% Injectable 25 Gram(s) IV Push once  enoxaparin Injectable 40 milliGRAM(s) SubCutaneous daily  insulin lispro (HumaLOG) corrective regimen sliding scale   SubCutaneous Before meals and at bedtime    MEDICATIONS  (PRN):  dextrose 40% Gel 15 Gram(s) Oral once PRN Blood Glucose LESS THAN 70 milliGRAM(s)/deciliter  glucagon  Injectable 1 milliGRAM(s) IntraMuscular once PRN Glucose LESS THAN 70 milligrams/deciliter      Physical Exam  General: A&Ox3, NAD  Respiratory: Clear bilaterally, equal bilateral expansion  Cardiovascular: Regular rate & rhythm  Abdominal: Soft, NT/ND    LABS:                        9.4    5.51  )-----------( 529      ( 19 Dec 2019 06:25 )             30.9     12-19    137  |  104  |  7   ----------------------------<  122<H>  4.3   |  24  |  0.96    Ca    8.5      19 Dec 2019 06:25  Phos  2.7     12-19  Mg     1.6     12-19

## 2019-12-19 NOTE — PROGRESS NOTE ADULT - ASSESSMENT
70yo M with hx of DM2, CAD s/p PCI (1999), HLD, and sleep apnea s/p uvulectomy and tonsillectomy (~1996) presented 12/12 with abdominal discomfort and intolerance of bowel prep for planned resection of abdominal mass (seen on outpt CT on 11/25), now 4h s/p ex lap, abdominal mass resection. Recovering well.  - Regular diet  - Pain control with tylenol  - Restart home ASA/enalapril  - OOB/IS  - DM on ISS, to resume home medications on discharge  - DVT ppx: Lovenox  - Discharge home today if tolerating diet    D Team Surgery #[id number]

## 2019-12-19 NOTE — DISCHARGE NOTE PROVIDER - CARE PROVIDERS DIRECT ADDRESSES
[email or web address]

## 2019-12-19 NOTE — DISCHARGE NOTE PROVIDER - NSDCCPCAREPLAN_GEN_ALL_CORE_FT
PRINCIPAL DISCHARGE DIAGNOSIS  Diagnosis: Abdominal mass  Assessment and Plan of Treatment: PRINCIPAL DISCHARGE DIAGNOSIS  Diagnosis: Abdominal mass  Assessment and Plan of Treatment: You had removal of your abdominal mass. Recover from surgery.

## 2019-12-19 NOTE — PROGRESS NOTE ADULT - SUBJECTIVE AND OBJECTIVE BOX
SUBJECTIVE / OVERNIGHT EVENTS: pt says abd pain is better than before  , + flatus     MEDICATIONS  (STANDING):  acetaminophen   Tablet .. 975 milliGRAM(s) Oral every 6 hours  aspirin enteric coated 81 milliGRAM(s) Oral daily  atorvastatin 80 milliGRAM(s) Oral at bedtime  calcium carbonate    500 mG (Tums) Chewable 2 Tablet(s) Chew two times a day  dextrose 50% Injectable 12.5 Gram(s) IV Push once  dextrose 50% Injectable 25 Gram(s) IV Push once  dextrose 50% Injectable 25 Gram(s) IV Push once  enalapril 5 milliGRAM(s) Oral daily  enoxaparin Injectable 40 milliGRAM(s) SubCutaneous daily  insulin lispro (HumaLOG) corrective regimen sliding scale   SubCutaneous Before meals and at bedtime    MEDICATIONS  (PRN):  dextrose 40% Gel 15 Gram(s) Oral once PRN Blood Glucose LESS THAN 70 milliGRAM(s)/deciliter  glucagon  Injectable 1 milliGRAM(s) IntraMuscular once PRN Glucose LESS THAN 70 milligrams/deciliter    Vital Signs Last 24 Hrs  T(C): 36.8 (19 Dec 2019 20:35), Max: 36.8 (19 Dec 2019 20:35)  T(F): 98.2 (19 Dec 2019 20:35), Max: 98.2 (19 Dec 2019 20:35)  HR: 80 (19 Dec 2019 20:35) (65 - 94)  BP: 122/64 (19 Dec 2019 20:35) (108/60 - 122/64)  BP(mean): --  RR: 17 (19 Dec 2019 20:35) (16 - 19)  SpO2: 100% (19 Dec 2019 20:35) (93% - 100%)    Constitutional: No fever, fatigue  Skin: No rash.  Eyes: No recent vision problems or eye pain.  ENT: No congestion, ear pain, or sore throat.  Cardiovascular: No chest pain or palpation.  Respiratory: No cough, shortness of breath, congestion, or wheezing.  Gastrointestinal: No abdominal pain, nausea, vomiting, or diarrhea.  Genitourinary: No dysuria.  Musculoskeletal: No joint swelling.  Neurologic: No headache.    PHYSICAL EXAM:  GENERAL: NAD  EYES: EOMI, PERRLA  NECK: Supple, No JVD  CHEST/LUNG: dec breath sounds rt base   HEART:  S1 , S2 +  ABDOMEN: soft , bs+, mild distension +  EXTREMITIES:  no edema   NEUROLOGY:alert awake oriented     LABS:      137  |  104  |  7   ----------------------------<  122<H>  4.3   |  24  |  0.96    Ca    8.5      19 Dec 2019 06:25  Phos  2.7       Mg     1.6           Creatinine Trend: 0.96 <--, 0.90 <--, 0.93 <--, 0.90 <--, 0.95 <--, 1.08 <--, 1.15 <--, 1.11 <--, 1.36 <--                        9.4    5.51  )-----------( 529      ( 19 Dec 2019 06:25 )             30.9     Urine Studies:                Urine Studies:  Urinalysis Basic - ( 12 Dec 2019 10:15 )    Color: YELLOW / Appearance: Lt TURBID / S.025 / pH: 5.5  Gluc: NEGATIVE / Ketone: SMALL  / Bili: TRACE / Urobili: TRACE   Blood: NEGATIVE / Protein: 100 / Nitrite: NEGATIVE   Leuk Esterase: NEGATIVE / RBC: 3-5 / WBC 11-25   Sq Epi: MANY / Non Sq Epi:  / Bacteria: NEGATIVE                          PT/INR - ( 16 Dec 2019 05:00 )   PT: 13.2 SEC;   INR: 1.18          PTT - ( 16 Dec 2019 05:00 )  PTT:31.5 SEC  Sq Epi: MANY / Non Sq Epi:  / Bacteria: NEGATIVE                PT/INR - ( 16 Dec 2019 05:00 )   PT: 13.2 SEC;   INR: 1.18          PTT - ( 16 Dec 2019 05:00 )  PTT:31.5 SEC            PT/INR - ( 15 Dec 2019 04:54 )   PT: 15.5 SEC;   INR: 1.35          PTT - ( 15 Dec 2019 04:54 )  PTT:34.1 SEC    RADIOLOGY & ADDITIONAL TESTS:    Imaging Personally Reviewed:    Consultant(s) Notes Reviewed:      Care Discussed with Consultants/Other Providers:

## 2019-12-19 NOTE — PROGRESS NOTE ADULT - SUBJECTIVE AND OBJECTIVE BOX
ADRIAN FUNES:[id number],   69yMale followed for:  No Known Allergies    PAST MEDICAL & SURGICAL HISTORY:  Sleep apnea  CAD (coronary artery disease)  Hypercholesteremia  Diabetes mellitus: Fasting FS range from - per patient  Hypertension  Intra-abdominal and pelvic swelling, mass and lump, unspecified site  H/O sleep apnea: Per patient had Sleep Apnea surgery  in 1996- at [facility]- Was not retested for Sleep Apnea post surgery  History of cardiac cath: Pt reports 1 cardiac stent placed 1999    FAMILY HISTORY:  FH: heart disease: Mother    MEDICATIONS  (STANDING):  acetaminophen   Tablet .. 975 milliGRAM(s) Oral every 6 hours  aspirin enteric coated 81 milliGRAM(s) Oral daily  atorvastatin 80 milliGRAM(s) Oral at bedtime  calcium carbonate    500 mG (Tums) Chewable 2 Tablet(s) Chew two times a day  dextrose 50% Injectable 12.5 Gram(s) IV Push once  dextrose 50% Injectable 25 Gram(s) IV Push once  dextrose 50% Injectable 25 Gram(s) IV Push once  enalapril 5 milliGRAM(s) Oral daily  enoxaparin Injectable 40 milliGRAM(s) SubCutaneous daily  insulin lispro (HumaLOG) corrective regimen sliding scale   SubCutaneous Before meals and at bedtime    MEDICATIONS  (PRN):  dextrose 40% Gel 15 Gram(s) Oral once PRN Blood Glucose LESS THAN 70 milliGRAM(s)/deciliter  glucagon  Injectable 1 milliGRAM(s) IntraMuscular once PRN Glucose LESS THAN 70 milligrams/deciliter      Vital Signs Last 24 Hrs  T(C): 36.4 (19 Dec 2019 05:16), Max: 36.6 (18 Dec 2019 11:57)  T(F): 97.5 (19 Dec 2019 05:16), Max: 97.8 (18 Dec 2019 11:57)  HR: 65 (19 Dec 2019 05:16) (65 - 79)  BP: 108/60 (19 Dec 2019 05:16) (108/59 - 118/66)  BP(mean): --  RR: 19 (19 Dec 2019 05:16) (16 - 19)  SpO2: 93% (19 Dec 2019 05:16) (93% - 98%)  nc/at  s1s2  cta  soft, nt, nd no guarding or rebound  no c/c/e    CBC Full  -  ( 19 Dec 2019 06:25 )  WBC Count : 5.51 K/uL  RBC Count : 3.66 M/uL  Hemoglobin : 9.4 g/dL  Hematocrit : 30.9 %  Platelet Count - Automated : 529 K/uL  Mean Cell Volume : 84.4 fL  Mean Cell Hemoglobin : 25.7 pg  Mean Cell Hemoglobin Concentration : 30.4 %  Auto Neutrophil # : x  Auto Lymphocyte # : x  Auto Monocyte # : x  Auto Eosinophil # : x  Auto Basophil # : x  Auto Neutrophil % : x  Auto Lymphocyte % : x  Auto Monocyte % : x  Auto Eosinophil % : x  Auto Basophil % : x    12-19    137  |  104  |  7   ----------------------------<  122<H>  4.3   |  24  |  0.96    Ca    8.5      19 Dec 2019 06:25  Phos  2.7     12-19  Mg     1.6     12-19

## 2019-12-19 NOTE — DISCHARGE NOTE PROVIDER - NSDCMRMEDTOKEN_GEN_ALL_CORE_FT
acetaminophen 325 mg oral tablet: 3 tab(s) orally every 6 hours  aspirin 81 mg oral tablet: 1 tab(s) orally once a day  calcium carbonate 500 mg (200 mg elemental calcium) oral tablet, chewable: 2 tab(s) orally 2 times a day  enalapril 5 mg oral tablet: 1 tab(s) orally once a day  ezetimibe 10 mg oral tablet: 1 tab(s) orally once a day  metFORMIN 1000 mg oral tablet: 1 tab(s) orally 2 times a day  Ozempic (1 mg dose) 2 mg/1.5 mL subcutaneous solution: 1 milligram(s) subcutaneous once a week-friday  pantoprazole 40 mg oral delayed release tablet: 1 tab(s) orally 2 times a day  rosuvastatin 20 mg oral tablet: 1 tab(s) orally once a day  Vitamin D3 2000 intl units oral tablet: 1 tab(s) orally once a day

## 2019-12-20 ENCOUNTER — TRANSCRIPTION ENCOUNTER (OUTPATIENT)
Age: 69
End: 2019-12-20

## 2019-12-20 VITALS
DIASTOLIC BLOOD PRESSURE: 54 MMHG | OXYGEN SATURATION: 96 % | RESPIRATION RATE: 18 BRPM | HEART RATE: 72 BPM | SYSTOLIC BLOOD PRESSURE: 109 MMHG | TEMPERATURE: 98 F

## 2019-12-20 DIAGNOSIS — I25.10 ATHEROSCLEROTIC HEART DISEASE OF NATIVE CORONARY ARTERY WITHOUT ANGINA PECTORIS: ICD-10-CM

## 2019-12-20 DIAGNOSIS — E11.9 TYPE 2 DIABETES MELLITUS WITHOUT COMPLICATIONS: ICD-10-CM

## 2019-12-20 DIAGNOSIS — I10 ESSENTIAL (PRIMARY) HYPERTENSION: ICD-10-CM

## 2019-12-20 LAB
GLUCOSE BLDC GLUCOMTR-MCNC: 108 MG/DL — HIGH (ref 70–99)
GLUCOSE BLDC GLUCOMTR-MCNC: 98 MG/DL — SIGNIFICANT CHANGE UP (ref 70–99)

## 2019-12-20 PROCEDURE — 99024 POSTOP FOLLOW-UP VISIT: CPT

## 2019-12-20 RX ADMIN — ENOXAPARIN SODIUM 40 MILLIGRAM(S): 100 INJECTION SUBCUTANEOUS at 12:06

## 2019-12-20 RX ADMIN — Medication 975 MILLIGRAM(S): at 13:06

## 2019-12-20 RX ADMIN — Medication 81 MILLIGRAM(S): at 12:06

## 2019-12-20 RX ADMIN — Medication 975 MILLIGRAM(S): at 12:05

## 2019-12-20 NOTE — PROGRESS NOTE ADULT - SUBJECTIVE AND OBJECTIVE BOX
ADRIAN FUNES:[id number],   69yMale followed for:  No Known Allergies    PAST MEDICAL & SURGICAL HISTORY:  Sleep apnea  CAD (coronary artery disease)  Hypercholesteremia  Diabetes mellitus: Fasting FS range from - per patient  Hypertension  Intra-abdominal and pelvic swelling, mass and lump, unspecified site  H/O sleep apnea: Per patient had Sleep Apnea surgery  in 1996- at [facility]- Was not retested for Sleep Apnea post surgery  History of cardiac cath: Pt reports 1 cardiac stent placed 1999    FAMILY HISTORY:  FH: heart disease: Mother    MEDICATIONS  (STANDING):  acetaminophen   Tablet .. 975 milliGRAM(s) Oral every 6 hours  aspirin enteric coated 81 milliGRAM(s) Oral daily  atorvastatin 80 milliGRAM(s) Oral at bedtime  calcium carbonate    500 mG (Tums) Chewable 2 Tablet(s) Chew two times a day  dextrose 50% Injectable 12.5 Gram(s) IV Push once  dextrose 50% Injectable 25 Gram(s) IV Push once  dextrose 50% Injectable 25 Gram(s) IV Push once  enalapril 5 milliGRAM(s) Oral daily  enoxaparin Injectable 40 milliGRAM(s) SubCutaneous daily  insulin lispro (HumaLOG) corrective regimen sliding scale   SubCutaneous Before meals and at bedtime    MEDICATIONS  (PRN):  dextrose 40% Gel 15 Gram(s) Oral once PRN Blood Glucose LESS THAN 70 milliGRAM(s)/deciliter  glucagon  Injectable 1 milliGRAM(s) IntraMuscular once PRN Glucose LESS THAN 70 milligrams/deciliter      Vital Signs Last 24 Hrs  T(C): 36.3 (20 Dec 2019 05:33), Max: 36.8 (19 Dec 2019 20:35)  T(F): 97.4 (20 Dec 2019 05:33), Max: 98.2 (19 Dec 2019 20:35)  HR: 68 (20 Dec 2019 05:33) (68 - 94)  BP: 115/62 (20 Dec 2019 05:33) (104/51 - 122/64)  BP(mean): --  RR: 17 (20 Dec 2019 05:33) (16 - 18)  SpO2: 98% (20 Dec 2019 05:33) (98% - 100%)  nc/at  s1s2  cta  soft, nt, nd no guarding or rebound  no c/c/e    CBC Full  -  ( 19 Dec 2019 06:25 )  WBC Count : 5.51 K/uL  RBC Count : 3.66 M/uL  Hemoglobin : 9.4 g/dL  Hematocrit : 30.9 %  Platelet Count - Automated : 529 K/uL  Mean Cell Volume : 84.4 fL  Mean Cell Hemoglobin : 25.7 pg  Mean Cell Hemoglobin Concentration : 30.4 %  Auto Neutrophil # : x  Auto Lymphocyte # : x  Auto Monocyte # : x  Auto Eosinophil # : x  Auto Basophil # : x  Auto Neutrophil % : x  Auto Lymphocyte % : x  Auto Monocyte % : x  Auto Eosinophil % : x  Auto Basophil % : x    12-19    137  |  104  |  7   ----------------------------<  122<H>  4.3   |  24  |  0.96    Ca    8.5      19 Dec 2019 06:25  Phos  2.7     12-19  Mg     1.6     12-19

## 2019-12-20 NOTE — PROGRESS NOTE ADULT - ASSESSMENT
68yo M with hx of DM2, CAD s/p PCI (1999), HLD, and sleep apnea s/p uvulectomy and tonsillectomy (~1996) presented 12/12 with abdominal discomfort and intolerance of bowel prep for planned resection of abdominal mass (seen on outpt CT on 11/25), now 7d s/p ex lap, abdominal mass resection. Recovering well.    - Regular diet  - Pain control with tylenol  - Continue home ASA/enalapril  - OOB/IS  - DM on ISS, to resume home medications on discharge  - DVT ppx: Lovenox  - Discharge home today    D Team Surgery #[id number]

## 2019-12-20 NOTE — PROGRESS NOTE ADULT - ASSESSMENT
68yo M with hx of DM2, CAD s/p stent (1999), HLD, and sleep apnea s/p uvulectomy and tonsillectomy (~1996) presented 12/12 with abd discomfort found to have abd mass + s/p surgery      - pain control, monitor gi fx closely - clear for dc by surgery and gi    -    DM2 -iss    Cad - pt chest pain free ,

## 2019-12-20 NOTE — PROGRESS NOTE ADULT - REASON FOR ADMISSION
Resection of Abdominal Mass

## 2019-12-20 NOTE — DIETITIAN INITIAL EVALUATION ADULT. - OTHER INFO
Pt states his appetite has been good and he has been eating well. He has been consuming about 75% of his trays. His usual weight is 193 lbs and his admission weight was the same. PTA Pt was bloated and caused him to decrease his po intake. He also experienced nausea, vomiting, diarrhea constipation and increased abdominal discomfort. Since the surgery, he no longer is experiencing them. Reviewed consistent carb diet  with Pt who verbalized good understanding. Pt has no difficulty chewing or swallowing.

## 2019-12-20 NOTE — PROGRESS NOTE ADULT - SUBJECTIVE AND OBJECTIVE BOX
POD #:  7 s/p ex-lap, abdominal mass resection    No acute events overnight    SUBJECTIVE:  Reports no pain  Denies nausea, vomiting, diarrhea  Is passing gas and having bowel movements  Eating and drinking without issue  Ambulating independently    OBJECTIVE:  Vital Signs Last 24 Hrs  T(C): 36.3 (20 Dec 2019 05:33), Max: 36.8 (19 Dec 2019 20:35)  T(F): 97.4 (20 Dec 2019 05:33), Max: 98.2 (19 Dec 2019 20:35)  HR: 68 (20 Dec 2019 05:33) (68 - 94)  BP: 115/62 (20 Dec 2019 05:33) (104/51 - 122/64)  BP(mean): --  RR: 17 (20 Dec 2019 05:33) (16 - 18)  SpO2: 98% (20 Dec 2019 05:33) (98% - 100%)      Physical Exam  General: A&Ox3, NAD  Respiratory: Clear bilaterally, equal bilateral expansion  Cardiovascular: Regular rate & rhythm  Abdominal: Soft, NT/ND    LABS:                        9.4    5.51  )-----------( 529      ( 19 Dec 2019 06:25 )             30.9       12-19    137  |  104  |  7   ----------------------------<  122<H>  4.3   |  24  |  0.96    Ca    8.5      19 Dec 2019 06:25  Phos  2.7     12-19  Mg     1.6     12-19

## 2019-12-20 NOTE — PROGRESS NOTE ADULT - SUBJECTIVE AND OBJECTIVE BOX
SUBJECTIVE / OVERNIGHT EVENTS: pt says abd pain is better than before  , + flatus     MEDICATIONS  (STANDING):  acetaminophen   Tablet .. 975 milliGRAM(s) Oral every 6 hours  aspirin enteric coated 81 milliGRAM(s) Oral daily  atorvastatin 80 milliGRAM(s) Oral at bedtime  calcium carbonate    500 mG (Tums) Chewable 2 Tablet(s) Chew two times a day  dextrose 50% Injectable 12.5 Gram(s) IV Push once  dextrose 50% Injectable 25 Gram(s) IV Push once  dextrose 50% Injectable 25 Gram(s) IV Push once  enalapril 5 milliGRAM(s) Oral daily  enoxaparin Injectable 40 milliGRAM(s) SubCutaneous daily  insulin lispro (HumaLOG) corrective regimen sliding scale   SubCutaneous Before meals and at bedtime    MEDICATIONS  (PRN):  dextrose 40% Gel 15 Gram(s) Oral once PRN Blood Glucose LESS THAN 70 milliGRAM(s)/deciliter  glucagon  Injectable 1 milliGRAM(s) IntraMuscular once PRN Glucose LESS THAN 70 milligrams/deciliter    Vital Signs Last 24 Hrs  T(C): 36.7 (20 Dec 2019 11:43), Max: 36.7 (20 Dec 2019 00:19)  T(F): 98.1 (20 Dec 2019 11:43), Max: 98.1 (20 Dec 2019 00:19)  HR: 72 (20 Dec 2019 11:43) (68 - 72)  BP: 109/54 (20 Dec 2019 11:43) (104/51 - 115/62)  BP(mean): --  RR: 18 (20 Dec 2019 11:43) (17 - 18)  SpO2: 96% (20 Dec 2019 11:43) (96% - 98%)    Constitutional: No fever, fatigue  Skin: No rash.  Eyes: No recent vision problems or eye pain.  ENT: No congestion, ear pain, or sore throat.  Cardiovascular: No chest pain or palpation.  Respiratory: No cough, shortness of breath, congestion, or wheezing.  Gastrointestinal: No abdominal pain, nausea, vomiting, or diarrhea.  Genitourinary: No dysuria.  Musculoskeletal: No joint swelling.  Neurologic: No headache.    PHYSICAL EXAM:  GENERAL: NAD  EYES: EOMI, PERRLA  NECK: Supple, No JVD  CHEST/LUNG: dec breath sounds rt base   HEART:  S1 , S2 +  ABDOMEN: soft , bs+, mild distension +  EXTREMITIES:  no edema   NEUROLOGY:alert awake oriented   LABS:      137  |  104  |  7   ----------------------------<  122<H>  4.3   |  24  |  0.96    Ca    8.5      19 Dec 2019 06:25  Phos  2.7       Mg     1.6           Creatinine Trend: 0.96 <--, 0.90 <--, 0.93 <--, 0.90 <--, 0.95 <--, 1.08 <--                        9.4    5.51  )-----------( 529      ( 19 Dec 2019 06:25 )             30.9     Urine Studies:                    Urine Studies:  Urinalysis Basic - ( 12 Dec 2019 10:15 )    Color: YELLOW / Appearance: Lt TURBID / S.025 / pH: 5.5  Gluc: NEGATIVE / Ketone: SMALL  / Bili: TRACE / Urobili: TRACE   Blood: NEGATIVE / Protein: 100 / Nitrite: NEGATIVE   Leuk Esterase: NEGATIVE / RBC: 3-5 / WBC 11-25   Sq Epi: MANY / Non Sq Epi:  / Bacteria: NEGATIVE                          PT/INR - ( 16 Dec 2019 05:00 )   PT: 13.2 SEC;   INR: 1.18          PTT - ( 16 Dec 2019 05:00 )  PTT:31.5 SEC  Sq Epi: MANY / Non Sq Epi:  / Bacteria: NEGATIVE                PT/INR - ( 16 Dec 2019 05:00 )   PT: 13.2 SEC;   INR: 1.18          PTT - ( 16 Dec 2019 05:00 )  PTT:31.5 SEC            PT/INR - ( 15 Dec 2019 04:54 )   PT: 15.5 SEC;   INR: 1.35          PTT - ( 15 Dec 2019 04:54 )  PTT:34.1 SEC    RADIOLOGY & ADDITIONAL TESTS:    Imaging Personally Reviewed:    Consultant(s) Notes Reviewed:      Care Discussed with Consultants/Other Providers:

## 2019-12-20 NOTE — DISCHARGE NOTE NURSING/CASE MANAGEMENT/SOCIAL WORK - PATIENT PORTAL LINK FT
You can access the FollowMyHealth Patient Portal offered by [facility] by registering at the following website: [email or web address]. By joining [facility]’s FollowMyHealth portal, you will also be able to view your health information using other applications (apps) compatible with our system.

## 2019-12-23 ENCOUNTER — APPOINTMENT (OUTPATIENT)
Dept: PULMONOLOGY | Facility: CLINIC | Age: 69
End: 2019-12-23

## 2019-12-26 PROBLEM — R19.00 ABDOMINAL MASS: Status: ACTIVE | Noted: 2019-11-27

## 2019-12-30 ENCOUNTER — APPOINTMENT (OUTPATIENT)
Dept: SURGICAL ONCOLOGY | Facility: CLINIC | Age: 69
End: 2019-12-30
Payer: MEDICARE

## 2019-12-30 VITALS
BODY MASS INDEX: 22.76 KG/M2 | HEIGHT: 70 IN | WEIGHT: 159 LBS | RESPIRATION RATE: 16 BRPM | OXYGEN SATURATION: 99 % | DIASTOLIC BLOOD PRESSURE: 63 MMHG | HEART RATE: 88 BPM | SYSTOLIC BLOOD PRESSURE: 99 MMHG

## 2019-12-30 DIAGNOSIS — R19.00 INTRA-ABDOMINAL AND PELVIC SWELLING, MASS AND LUMP, UNSPECIFIED SITE: ICD-10-CM

## 2019-12-30 PROCEDURE — 99214 OFFICE O/P EST MOD 30 MIN: CPT | Mod: 24

## 2019-12-30 NOTE — REASON FOR VISIT
[Post-Op] : a post-op for [FreeTextEntry2] : status post resections of abdominal and retroperitoneal tumors on 12/13/19

## 2019-12-30 NOTE — ASSESSMENT
[FreeTextEntry1] : IMP:\par Status post resections of abdominal and retroperitoneal sarcoma on 12/13/19\par Final pathology is pending\par \par \par PLAN:\par RTO 3 months

## 2019-12-30 NOTE — PHYSICAL EXAM
[Normal] : well developed, well nourished, in no acute distress [de-identified] : healing midline incision ; staples removed;  blanchable erythema around suture sites

## 2019-12-30 NOTE — HISTORY OF PRESENT ILLNESS
[de-identified] : Mr. Lujan is a 69 year old male who presents today for an initial postop visit, status post resections of abdominal and retroperitoneal tumors on 12/13/19.  Final pathology is pending.  Reports incisional discomfort which is relieved with Tylenol or Aleve.   Tolerating PO intake without nausea or vomiting. Having daily BM's and passing flatus. Denies fever or chills. \par \par Previous pertinent history is as follows:\par \par He was initially see in consultation on 11/27/19.  Had a 4 month history of bloating and abdominal pain.  Underwent a CT C/A/P on 11/25/19 and was noted with a 33.7cm multiloculated cystic mass involving the abdomen and pelvis likely peritoneal in origin. The tumor extends deep into the perirectal space.  Patient states that he experienced a bad fall in August on his right side and has noted that his abdomen has slowly increased in size since the accident.  Endorses decreased appetite however denies abdominal discomfort or difficulty with BMs.  Denies loss of weight.\par \par PMH otherwise significant for DM (controlled), CVD s/p stent placement (off plavix), GERD and HLD.\par \par Internist: Dr. Judah Hagan\par GI: Dr. Chance\par Cards: Dr. Jama Baron

## 2020-01-03 LAB — SURGICAL PATHOLOGY STUDY: SIGNIFICANT CHANGE UP

## 2020-01-06 ENCOUNTER — CHART COPY (OUTPATIENT)
Age: 70
End: 2020-01-06

## 2020-02-11 ENCOUNTER — FORM ENCOUNTER (OUTPATIENT)
Age: 70
End: 2020-02-11

## 2020-02-12 ENCOUNTER — OUTPATIENT (OUTPATIENT)
Dept: OUTPATIENT SERVICES | Facility: HOSPITAL | Age: 70
LOS: 1 days | End: 2020-02-12
Payer: MEDICARE

## 2020-02-12 ENCOUNTER — APPOINTMENT (OUTPATIENT)
Dept: CT IMAGING | Facility: CLINIC | Age: 70
End: 2020-02-12
Payer: MEDICARE

## 2020-02-12 DIAGNOSIS — Z98.890 OTHER SPECIFIED POSTPROCEDURAL STATES: Chronic | ICD-10-CM

## 2020-02-12 DIAGNOSIS — Z00.8 ENCOUNTER FOR OTHER GENERAL EXAMINATION: ICD-10-CM

## 2020-02-12 DIAGNOSIS — Z86.69 PERSONAL HISTORY OF OTHER DISEASES OF THE NERVOUS SYSTEM AND SENSE ORGANS: Chronic | ICD-10-CM

## 2020-02-12 PROCEDURE — 82565 ASSAY OF CREATININE: CPT

## 2020-02-12 PROCEDURE — 74177 CT ABD & PELVIS W/CONTRAST: CPT

## 2020-02-12 PROCEDURE — 74177 CT ABD & PELVIS W/CONTRAST: CPT | Mod: 26

## 2020-03-25 ENCOUNTER — APPOINTMENT (OUTPATIENT)
Dept: SURGICAL ONCOLOGY | Facility: CLINIC | Age: 70
End: 2020-03-25

## 2020-07-06 ENCOUNTER — APPOINTMENT (OUTPATIENT)
Dept: SURGICAL ONCOLOGY | Facility: CLINIC | Age: 70
End: 2020-07-06
Payer: MEDICARE

## 2020-07-06 VITALS
HEART RATE: 80 BPM | OXYGEN SATURATION: 97 % | DIASTOLIC BLOOD PRESSURE: 70 MMHG | HEIGHT: 70 IN | BODY MASS INDEX: 25.34 KG/M2 | SYSTOLIC BLOOD PRESSURE: 107 MMHG | WEIGHT: 177 LBS

## 2020-07-06 VITALS — TEMPERATURE: 97.9 F

## 2020-07-06 PROCEDURE — 99214 OFFICE O/P EST MOD 30 MIN: CPT

## 2020-07-06 NOTE — PHYSICAL EXAM
[Normal] : supple, no neck mass and thyroid not enlarged [Normal Supraclavicular Lymph Nodes] : normal supraclavicular lymph nodes [Normal] : oriented to person, place and time, with appropriate affect [Normal Groin Lymph Nodes] : normal groin lymph nodes [de-identified] : Incision well healed.  Soft, ND, NT.

## 2020-07-06 NOTE — HISTORY OF PRESENT ILLNESS
[de-identified] : Mr. Lujan is a 69 year old male who presents status post resections of abdominal tumor (40cm) and retroperitoneal tumor (13cm) on 12/13/19.  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  Today he is feeling generally well without complaint.\par \par Previous pertinent history is as follows:\par \par He was initially see in consultation on 11/27/19.  Had a 4 month history of bloating and abdominal pain.  Underwent a CT C/A/P on 11/25/19 and was noted with a 33.7cm multiloculated cystic mass involving the abdomen and pelvis likely peritoneal in origin. The tumor extends deep into the perirectal space.  Patient states that he experienced a bad fall in August on his right side and has noted that his abdomen has slowly increased in size since the accident.  Endorses decreased appetite however denies abdominal discomfort or difficulty with BMs.  Denies loss of weight.\par \par PMH otherwise significant for DM (controlled), CVD s/p stent placement (off plavix), GERD and HLD.\par \par CT scan last week shows 2 areas of recurrence in the abdomen. Pt. has to decide on surgical debulking with subsequent chemotherapy vs. chemotherapy alone.\par \par Internist: Dr. Judah Hagan\par GI: Dr. Chance\par Cards: Dr. Jama Baron

## 2020-07-06 NOTE — ASSESSMENT
[FreeTextEntry1] : IMP:\par s/p resections of abdominal tumor (40cm) and retroperitoneal tumor (13cm) on 12/13/19.  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  \par \par Abdominal recurrence\par \par PLAN:\par Pt. to decide on surgery vs. chemotherpay alone\par \par RTO  3 months

## 2020-08-13 ENCOUNTER — APPOINTMENT (OUTPATIENT)
Dept: SURGICAL ONCOLOGY | Facility: CLINIC | Age: 70
End: 2020-08-13
Payer: MEDICARE

## 2020-08-13 VITALS
DIASTOLIC BLOOD PRESSURE: 77 MMHG | BODY MASS INDEX: 28.06 KG/M2 | WEIGHT: 196 LBS | HEART RATE: 68 BPM | OXYGEN SATURATION: 93 % | HEIGHT: 70 IN | SYSTOLIC BLOOD PRESSURE: 131 MMHG

## 2020-08-13 PROCEDURE — 99214 OFFICE O/P EST MOD 30 MIN: CPT

## 2020-08-13 NOTE — HISTORY OF PRESENT ILLNESS
[de-identified] : 71 YO male S/P resection of a huge abdominal liposarcoma The tumor recurred locally in June, 2020. [place] [facility] has had tumor progression.\par \par He now presents to discus surgery.\par \par CT shows 3 lesions in the abdomen which have all increased in size during the treatment. No obstruction; Chest CT is negative.

## 2020-08-13 NOTE — ASSESSMENT
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

## 2020-08-19 RX ORDER — NEOMYCIN SULFATE 500 MG/1
500 TABLET ORAL
Qty: 6 | Refills: 0 | Status: ACTIVE | COMMUNITY
Start: 2020-08-19 | End: 1900-01-01

## 2020-08-19 RX ORDER — POTASSIUM CHLORIDE 1500 MG/1
20 TABLET, FILM COATED, EXTENDED RELEASE ORAL
Qty: 4 | Refills: 0 | Status: ACTIVE | COMMUNITY
Start: 2020-08-19 | End: 1900-01-01

## 2020-08-19 RX ORDER — METRONIDAZOLE 250 MG/1
250 TABLET ORAL
Qty: 3 | Refills: 0 | Status: ACTIVE | COMMUNITY
Start: 2020-08-19 | End: 1900-01-01

## 2020-08-24 ENCOUNTER — OUTPATIENT (OUTPATIENT)
Dept: OUTPATIENT SERVICES | Facility: HOSPITAL | Age: 70
LOS: 1 days | End: 2020-08-24

## 2020-08-24 VITALS
OXYGEN SATURATION: 100 % | HEIGHT: 68 IN | RESPIRATION RATE: 18 BRPM | DIASTOLIC BLOOD PRESSURE: 73 MMHG | TEMPERATURE: 99 F | HEART RATE: 97 BPM | WEIGHT: 173.94 LBS | SYSTOLIC BLOOD PRESSURE: 114 MMHG

## 2020-08-24 DIAGNOSIS — Z01.818 ENCOUNTER FOR OTHER PREPROCEDURAL EXAMINATION: ICD-10-CM

## 2020-08-24 DIAGNOSIS — Z98.890 OTHER SPECIFIED POSTPROCEDURAL STATES: Chronic | ICD-10-CM

## 2020-08-24 DIAGNOSIS — C49.9 MALIGNANT NEOPLASM OF CONNECTIVE AND SOFT TISSUE, UNSPECIFIED: ICD-10-CM

## 2020-08-24 DIAGNOSIS — E11.9 TYPE 2 DIABETES MELLITUS WITHOUT COMPLICATIONS: ICD-10-CM

## 2020-08-24 DIAGNOSIS — Z90.49 ACQUIRED ABSENCE OF OTHER SPECIFIED PARTS OF DIGESTIVE TRACT: Chronic | ICD-10-CM

## 2020-08-24 DIAGNOSIS — Z86.69 PERSONAL HISTORY OF OTHER DISEASES OF THE NERVOUS SYSTEM AND SENSE ORGANS: Chronic | ICD-10-CM

## 2020-08-24 DIAGNOSIS — C76.2 MALIGNANT NEOPLASM OF ABDOMEN: ICD-10-CM

## 2020-08-24 DIAGNOSIS — G47.33 OBSTRUCTIVE SLEEP APNEA (ADULT) (PEDIATRIC): ICD-10-CM

## 2020-08-24 DIAGNOSIS — Z29.9 ENCOUNTER FOR PROPHYLACTIC MEASURES, UNSPECIFIED: ICD-10-CM

## 2020-08-24 LAB
A1C WITH ESTIMATED AVERAGE GLUCOSE RESULT: 6.9 % — HIGH (ref 4–5.6)
ANION GAP SERPL CALC-SCNC: 13 MMOL/L — SIGNIFICANT CHANGE UP (ref 5–17)
BLD GP AB SCN SERPL QL: NEGATIVE — SIGNIFICANT CHANGE UP
BUN SERPL-MCNC: 20 MG/DL — SIGNIFICANT CHANGE UP (ref 7–23)
CALCIUM SERPL-MCNC: 10.3 MG/DL — SIGNIFICANT CHANGE UP (ref 8.4–10.5)
CEA SERPL-MCNC: 2.1 NG/ML — SIGNIFICANT CHANGE UP (ref 0–3.8)
CHLORIDE SERPL-SCNC: 99 MMOL/L — SIGNIFICANT CHANGE UP (ref 96–108)
CO2 SERPL-SCNC: 25 MMOL/L — SIGNIFICANT CHANGE UP (ref 22–31)
CREAT SERPL-MCNC: 1.11 MG/DL — SIGNIFICANT CHANGE UP (ref 0.5–1.3)
ESTIMATED AVERAGE GLUCOSE: 151 MG/DL — HIGH (ref 68–114)
GLUCOSE SERPL-MCNC: 162 MG/DL — HIGH (ref 70–99)
HCT VFR BLD CALC: 35.8 % — LOW (ref 39–50)
HGB BLD-MCNC: 11.4 G/DL — LOW (ref 13–17)
MCHC RBC-ENTMCNC: 28.6 PG — SIGNIFICANT CHANGE UP (ref 27–34)
MCHC RBC-ENTMCNC: 31.8 GM/DL — LOW (ref 32–36)
MCV RBC AUTO: 89.7 FL — SIGNIFICANT CHANGE UP (ref 80–100)
NRBC # BLD: 0 /100 WBCS — SIGNIFICANT CHANGE UP (ref 0–0)
PLATELET # BLD AUTO: 600 K/UL — HIGH (ref 150–400)
POTASSIUM SERPL-MCNC: 4.3 MMOL/L — SIGNIFICANT CHANGE UP (ref 3.5–5.3)
POTASSIUM SERPL-SCNC: 4.3 MMOL/L — SIGNIFICANT CHANGE UP (ref 3.5–5.3)
RBC # BLD: 3.99 M/UL — LOW (ref 4.2–5.8)
RBC # FLD: 15.4 % — HIGH (ref 10.3–14.5)
RH IG SCN BLD-IMP: POSITIVE — SIGNIFICANT CHANGE UP
SARS-COV-2 RNA SPEC QL NAA+PROBE: SIGNIFICANT CHANGE UP
SODIUM SERPL-SCNC: 137 MMOL/L — SIGNIFICANT CHANGE UP (ref 135–145)
WBC # BLD: 4.71 K/UL — SIGNIFICANT CHANGE UP (ref 3.8–10.5)
WBC # FLD AUTO: 4.71 K/UL — SIGNIFICANT CHANGE UP (ref 3.8–10.5)

## 2020-08-24 RX ORDER — PANTOPRAZOLE SODIUM 20 MG/1
1 TABLET, DELAYED RELEASE ORAL
Qty: 0 | Refills: 0 | DISCHARGE

## 2020-08-24 RX ORDER — SEMAGLUTIDE 0.68 MG/ML
1 INJECTION, SOLUTION SUBCUTANEOUS
Qty: 0 | Refills: 0 | DISCHARGE

## 2020-08-24 NOTE — H&P PST ADULT - OTHER CARE PROVIDERS
Dr. Jama Baron  Cards, Dr. Corona Chance , Dr. Liang Lawrence [facility]  Dr. Jama Baron  [facility], Dr. Corona Chance , Dr. Liang Lawrence [facility] , Dr. Lisa Chavez ([facility] [place], [place])

## 2020-08-24 NOTE — H&P PST ADULT - NSICDXPASTSURGICALHX_GEN_ALL_CORE_FT
PAST SURGICAL HISTORY:  H/O sleep apnea Per patient had Sleep Apnea surgery  in 1996- at [facility]- Was not retested for Sleep Apnea post surgery    History of cardiac cath Pt reports 1 cardiac stent placed 1999 PAST SURGICAL HISTORY:  H/O sleep apnea Per patient had Sleep Apnea surgery  in 1996- at [facility]- Was not retested for Sleep Apnea post surgery    History of cardiac cath Pt reports 1 cardiac stent placed 1999    History of colon resection Dec 2019

## 2020-08-24 NOTE — H&P PST ADULT - NSICDXPROBLEM_GEN_ALL_CORE_FT
PROBLEM DIAGNOSES  Problem: FADI (obstructive sleep apnea)  Assessment and Plan: Pt stated he no longer uses CPAP s/p surgery 1996 for sleep apnea. Will still notify OR booking of history    Problem: Malignant tumor of an abdominal organ  Assessment and Plan: Resection recurent adbominal sarcoma, small bowel resection on 8/26  Preop instructions provided, Soap given  Labs CBC BMP A1C type and screen, CEA and COvid test perfromed    Problem: DM (diabetes mellitus)  Assessment and Plan: Pt instructed to take last dose of Metformin on 8/25/20 (AM dose)    Problem: Need for prophylactic measure  Assessment and Plan: The Caprini score indicates this patient is at risk for a VTE event (score 3-5).  Most surgical patients in this group would benefit from pharmacologic prophylaxis.  The surgical team will determine the balance between VTE risk and bleeding risk PROBLEM DIAGNOSES  Problem: FADI (obstructive sleep apnea)  Assessment and Plan: Pt stated he no longer uses CPAP s/p surgery 1996 for sleep apnea.   Now resolved      Problem: Malignant tumor of an abdominal organ  Assessment and Plan: Resection recurrent abdominal sarcoma, small bowel resection on 8/26  Preop instructions provided, Soap given  Labs CBC BMP A1C type and screen, CEA and COvid test performed    Problem: DM (diabetes mellitus)  Assessment and Plan: Pt instructed to take last dose of Metformin on 8/25/20 (AM dose)    Problem: Need for prophylactic measure  Assessment and Plan: The Caprini score indicates this patient is at risk for a VTE event (score 3-5).  Most surgical patients in this group would benefit from pharmacologic prophylaxis.  The surgical team will determine the balance between VTE risk and bleeding risk

## 2020-08-24 NOTE — H&P PST ADULT - HISTORY OF PRESENT ILLNESS
This  is a 74 year old male HTN, HLD, DMT2, CAD s/p stent (20 years)  on Aspirin  FADI (no longer on CPAP0 This  is a 74 year old male HTN, HLD, DMT2, CAD s/p stent (20 years)  on Aspirin (which was discontinued by Dr. Ferreira, last dose taken 8/22)  FADI (no longer on CPAP)      ** Follow up Covid testing This is a 74 year old male HTN, HLD, DMT2, CAD s/p stent (20 years)  on Aspirin (which was discontinued by Dr. Ferreira, last dose taken 8/22)  FADI (no longer on CPAP).       ** Follow up Covid testing This is a 74 year old male HTN, HLD, DMT2, CAD s/p stent (20 years) on Aspirin (which was discontinued by Dr. Ferreira, last dose taken 8/21), FADI (no longer on CPAP s/p surgery at [facility]). s/P resection of abdominal liposarcoma Dec 2019. Tumor recurred in June 2020, underwent  chemo- immunotherapy trial at [facility] [place]. Pt now present to PST for resection recurrent abdominal Sarcoma small bowel resection on 8/26/20      ** Follow up Covid testing

## 2020-08-25 ENCOUNTER — TRANSCRIPTION ENCOUNTER (OUTPATIENT)
Age: 70
End: 2020-08-25

## 2020-08-26 ENCOUNTER — APPOINTMENT (OUTPATIENT)
Dept: SURGICAL ONCOLOGY | Facility: HOSPITAL | Age: 70
End: 2020-08-26

## 2020-08-26 ENCOUNTER — RESULT REVIEW (OUTPATIENT)
Age: 70
End: 2020-08-26

## 2020-08-26 ENCOUNTER — INPATIENT (INPATIENT)
Facility: HOSPITAL | Age: 70
LOS: 3 days | Discharge: HOME | DRG: 827 | End: 2020-08-30
Attending: SPECIALIST | Admitting: SPECIALIST
Payer: MEDICARE

## 2020-08-26 VITALS
RESPIRATION RATE: 18 BRPM | WEIGHT: 173.94 LBS | OXYGEN SATURATION: 99 % | DIASTOLIC BLOOD PRESSURE: 66 MMHG | TEMPERATURE: 99 F | SYSTOLIC BLOOD PRESSURE: 96 MMHG | HEART RATE: 104 BPM | HEIGHT: 68 IN

## 2020-08-26 DIAGNOSIS — Z90.49 ACQUIRED ABSENCE OF OTHER SPECIFIED PARTS OF DIGESTIVE TRACT: Chronic | ICD-10-CM

## 2020-08-26 DIAGNOSIS — C49.9 MALIGNANT NEOPLASM OF CONNECTIVE AND SOFT TISSUE, UNSPECIFIED: ICD-10-CM

## 2020-08-26 DIAGNOSIS — Z01.818 ENCOUNTER FOR OTHER PREPROCEDURAL EXAMINATION: ICD-10-CM

## 2020-08-26 DIAGNOSIS — Z98.890 OTHER SPECIFIED POSTPROCEDURAL STATES: Chronic | ICD-10-CM

## 2020-08-26 DIAGNOSIS — Z86.69 PERSONAL HISTORY OF OTHER DISEASES OF THE NERVOUS SYSTEM AND SENSE ORGANS: Chronic | ICD-10-CM

## 2020-08-26 LAB
ANION GAP SERPL CALC-SCNC: 16 MMOL/L — SIGNIFICANT CHANGE UP (ref 5–17)
BUN SERPL-MCNC: 13 MG/DL — SIGNIFICANT CHANGE UP (ref 7–23)
CALCIUM SERPL-MCNC: 8.3 MG/DL — LOW (ref 8.4–10.5)
CHLORIDE SERPL-SCNC: 102 MMOL/L — SIGNIFICANT CHANGE UP (ref 96–108)
CO2 SERPL-SCNC: 19 MMOL/L — LOW (ref 22–31)
CREAT SERPL-MCNC: 0.98 MG/DL — SIGNIFICANT CHANGE UP (ref 0.5–1.3)
GAS PNL BLDA: SIGNIFICANT CHANGE UP
GLUCOSE BLDC GLUCOMTR-MCNC: 128 MG/DL — HIGH (ref 70–99)
GLUCOSE BLDC GLUCOMTR-MCNC: 158 MG/DL — HIGH (ref 70–99)
GLUCOSE BLDC GLUCOMTR-MCNC: 165 MG/DL — HIGH (ref 70–99)
GLUCOSE SERPL-MCNC: 205 MG/DL — HIGH (ref 70–99)
HCT VFR BLD CALC: 30.5 % — LOW (ref 39–50)
HCT VFR BLD CALC: 32.5 % — LOW (ref 39–50)
HGB BLD-MCNC: 10.5 G/DL — LOW (ref 13–17)
HGB BLD-MCNC: 9.8 G/DL — LOW (ref 13–17)
MAGNESIUM SERPL-MCNC: 1.6 MG/DL — SIGNIFICANT CHANGE UP (ref 1.6–2.6)
MCHC RBC-ENTMCNC: 28.7 PG — SIGNIFICANT CHANGE UP (ref 27–34)
MCHC RBC-ENTMCNC: 29.2 PG — SIGNIFICANT CHANGE UP (ref 27–34)
MCHC RBC-ENTMCNC: 32.1 GM/DL — SIGNIFICANT CHANGE UP (ref 32–36)
MCHC RBC-ENTMCNC: 32.3 GM/DL — SIGNIFICANT CHANGE UP (ref 32–36)
MCV RBC AUTO: 89.4 FL — SIGNIFICANT CHANGE UP (ref 80–100)
MCV RBC AUTO: 90.3 FL — SIGNIFICANT CHANGE UP (ref 80–100)
NRBC # BLD: 0 /100 WBCS — SIGNIFICANT CHANGE UP (ref 0–0)
NRBC # BLD: 0 /100 WBCS — SIGNIFICANT CHANGE UP (ref 0–0)
PHOSPHATE SERPL-MCNC: 3.4 MG/DL — SIGNIFICANT CHANGE UP (ref 2.5–4.5)
PLATELET # BLD AUTO: 411 K/UL — HIGH (ref 150–400)
PLATELET # BLD AUTO: 412 K/UL — HIGH (ref 150–400)
POTASSIUM SERPL-MCNC: 4.4 MMOL/L — SIGNIFICANT CHANGE UP (ref 3.5–5.3)
POTASSIUM SERPL-SCNC: 4.4 MMOL/L — SIGNIFICANT CHANGE UP (ref 3.5–5.3)
RBC # BLD: 3.41 M/UL — LOW (ref 4.2–5.8)
RBC # BLD: 3.6 M/UL — LOW (ref 4.2–5.8)
RBC # FLD: 15 % — HIGH (ref 10.3–14.5)
RBC # FLD: 15.2 % — HIGH (ref 10.3–14.5)
RH IG SCN BLD-IMP: POSITIVE — SIGNIFICANT CHANGE UP
SODIUM SERPL-SCNC: 137 MMOL/L — SIGNIFICANT CHANGE UP (ref 135–145)
WBC # BLD: 9.02 K/UL — SIGNIFICANT CHANGE UP (ref 3.8–10.5)
WBC # BLD: 9.31 K/UL — SIGNIFICANT CHANGE UP (ref 3.8–10.5)
WBC # FLD AUTO: 9.02 K/UL — SIGNIFICANT CHANGE UP (ref 3.8–10.5)
WBC # FLD AUTO: 9.31 K/UL — SIGNIFICANT CHANGE UP (ref 3.8–10.5)

## 2020-08-26 PROCEDURE — 88342 IMHCHEM/IMCYTCHM 1ST ANTB: CPT | Mod: 26

## 2020-08-26 PROCEDURE — 88341 IMHCHEM/IMCYTCHM EA ADD ANTB: CPT | Mod: 26,59

## 2020-08-26 PROCEDURE — 49205: CPT | Mod: 82

## 2020-08-26 PROCEDURE — 88309 TISSUE EXAM BY PATHOLOGIST: CPT | Mod: 26

## 2020-08-26 PROCEDURE — 88307 TISSUE EXAM BY PATHOLOGIST: CPT | Mod: 26

## 2020-08-26 PROCEDURE — 49205: CPT

## 2020-08-26 PROCEDURE — 47120 PARTIAL REMOVAL OF LIVER: CPT

## 2020-08-26 PROCEDURE — 47120 PARTIAL REMOVAL OF LIVER: CPT | Mod: 82

## 2020-08-26 PROCEDURE — 88305 TISSUE EXAM BY PATHOLOGIST: CPT | Mod: 26

## 2020-08-26 PROCEDURE — 88331 PATH CONSLTJ SURG 1 BLK 1SPC: CPT | Mod: 26

## 2020-08-26 RX ORDER — CEFOTETAN DISODIUM 1 G
2 VIAL (EA) INJECTION ONCE
Refills: 0 | Status: DISCONTINUED | OUTPATIENT
Start: 2020-08-26 | End: 2020-08-26

## 2020-08-26 RX ORDER — INSULIN LISPRO 100/ML
VIAL (ML) SUBCUTANEOUS EVERY 6 HOURS
Refills: 0 | Status: DISCONTINUED | OUTPATIENT
Start: 2020-08-26 | End: 2020-08-28

## 2020-08-26 RX ORDER — LIDOCAINE HCL 20 MG/ML
0.2 VIAL (ML) INJECTION ONCE
Refills: 0 | Status: DISCONTINUED | OUTPATIENT
Start: 2020-08-26 | End: 2020-08-26

## 2020-08-26 RX ORDER — SODIUM CHLORIDE 9 MG/ML
3 INJECTION INTRAMUSCULAR; INTRAVENOUS; SUBCUTANEOUS EVERY 8 HOURS
Refills: 0 | Status: DISCONTINUED | OUTPATIENT
Start: 2020-08-26 | End: 2020-08-26

## 2020-08-26 RX ORDER — CHLORHEXIDINE GLUCONATE 213 G/1000ML
1 SOLUTION TOPICAL ONCE
Refills: 0 | Status: DISCONTINUED | OUTPATIENT
Start: 2020-08-26 | End: 2020-08-26

## 2020-08-26 RX ORDER — DEXTROSE 50 % IN WATER 50 %
25 SYRINGE (ML) INTRAVENOUS ONCE
Refills: 0 | Status: DISCONTINUED | OUTPATIENT
Start: 2020-08-26 | End: 2020-08-30

## 2020-08-26 RX ORDER — DEXTROSE 50 % IN WATER 50 %
15 SYRINGE (ML) INTRAVENOUS ONCE
Refills: 0 | Status: DISCONTINUED | OUTPATIENT
Start: 2020-08-26 | End: 2020-08-30

## 2020-08-26 RX ORDER — NALOXONE HYDROCHLORIDE 4 MG/.1ML
0.1 SPRAY NASAL
Refills: 0 | Status: DISCONTINUED | OUTPATIENT
Start: 2020-08-26 | End: 2020-08-28

## 2020-08-26 RX ORDER — SODIUM CHLORIDE 9 MG/ML
1000 INJECTION, SOLUTION INTRAVENOUS
Refills: 0 | Status: DISCONTINUED | OUTPATIENT
Start: 2020-08-26 | End: 2020-08-30

## 2020-08-26 RX ORDER — ONDANSETRON 8 MG/1
4 TABLET, FILM COATED ORAL EVERY 6 HOURS
Refills: 0 | Status: DISCONTINUED | OUTPATIENT
Start: 2020-08-26 | End: 2020-08-30

## 2020-08-26 RX ORDER — ONDANSETRON 8 MG/1
4 TABLET, FILM COATED ORAL ONCE
Refills: 0 | Status: DISCONTINUED | OUTPATIENT
Start: 2020-08-26 | End: 2020-08-26

## 2020-08-26 RX ORDER — HEPARIN SODIUM 5000 [USP'U]/ML
5000 INJECTION INTRAVENOUS; SUBCUTANEOUS EVERY 12 HOURS
Refills: 0 | Status: DISCONTINUED | OUTPATIENT
Start: 2020-08-26 | End: 2020-08-29

## 2020-08-26 RX ORDER — ACETAMINOPHEN 500 MG
1000 TABLET ORAL EVERY 6 HOURS
Refills: 0 | Status: COMPLETED | OUTPATIENT
Start: 2020-08-26 | End: 2020-08-27

## 2020-08-26 RX ORDER — DEXTROSE 50 % IN WATER 50 %
12.5 SYRINGE (ML) INTRAVENOUS ONCE
Refills: 0 | Status: DISCONTINUED | OUTPATIENT
Start: 2020-08-26 | End: 2020-08-30

## 2020-08-26 RX ORDER — PANTOPRAZOLE SODIUM 20 MG/1
40 TABLET, DELAYED RELEASE ORAL DAILY
Refills: 0 | Status: DISCONTINUED | OUTPATIENT
Start: 2020-08-26 | End: 2020-08-28

## 2020-08-26 RX ORDER — GLUCAGON INJECTION, SOLUTION 0.5 MG/.1ML
1 INJECTION, SOLUTION SUBCUTANEOUS ONCE
Refills: 0 | Status: DISCONTINUED | OUTPATIENT
Start: 2020-08-26 | End: 2020-08-30

## 2020-08-26 RX ORDER — SODIUM CHLORIDE 9 MG/ML
1000 INJECTION, SOLUTION INTRAVENOUS
Refills: 0 | Status: DISCONTINUED | OUTPATIENT
Start: 2020-08-26 | End: 2020-08-27

## 2020-08-26 RX ORDER — HYDROMORPHONE HYDROCHLORIDE 2 MG/ML
0.5 INJECTION INTRAMUSCULAR; INTRAVENOUS; SUBCUTANEOUS
Refills: 0 | Status: DISCONTINUED | OUTPATIENT
Start: 2020-08-26 | End: 2020-08-26

## 2020-08-26 RX ADMIN — HYDROMORPHONE HYDROCHLORIDE 0.5 MILLIGRAM(S): 2 INJECTION INTRAMUSCULAR; INTRAVENOUS; SUBCUTANEOUS at 11:05

## 2020-08-26 RX ADMIN — Medication 1000 MILLIGRAM(S): at 22:45

## 2020-08-26 RX ADMIN — Medication 400 MILLIGRAM(S): at 22:14

## 2020-08-26 RX ADMIN — ONDANSETRON 4 MILLIGRAM(S): 8 TABLET, FILM COATED ORAL at 18:43

## 2020-08-26 RX ADMIN — SODIUM CHLORIDE 125 MILLILITER(S): 9 INJECTION, SOLUTION INTRAVENOUS at 12:01

## 2020-08-26 RX ADMIN — Medication 1000 MILLIGRAM(S): at 17:49

## 2020-08-26 RX ADMIN — HEPARIN SODIUM 5000 UNIT(S): 5000 INJECTION INTRAVENOUS; SUBCUTANEOUS at 17:23

## 2020-08-26 RX ADMIN — Medication 2: at 17:38

## 2020-08-26 RX ADMIN — HYDROMORPHONE HYDROCHLORIDE 0.5 MILLIGRAM(S): 2 INJECTION INTRAMUSCULAR; INTRAVENOUS; SUBCUTANEOUS at 11:22

## 2020-08-26 RX ADMIN — Medication 400 MILLIGRAM(S): at 17:19

## 2020-08-26 NOTE — PROGRESS NOTE ADULT - SUBJECTIVE AND OBJECTIVE BOX
POST OP NOTE     Surgery: s/p Exploratory Laparotomy, Omentectomy    SUBJECTIVE/ROS: Patient transferred to . With PCEA pump + ceballos present.  Patient reports overall well, endorses mild appropriate abd tenderness (however is not pressing PCEA pump button)  Denies nausea, vomiting, chest pain, shortness of breath.          MEDICATIONS  (STANDING):  acetaminophen  IVPB .. 1000 milliGRAM(s) IV Intermittent every 6 hours  dextrose 5%. 1000 milliLiter(s) (50 mL/Hr) IV Continuous <Continuous>  dextrose 50% Injectable 12.5 Gram(s) IV Push once  dextrose 50% Injectable 25 Gram(s) IV Push once  dextrose 50% Injectable 25 Gram(s) IV Push once  heparin   Injectable 5000 Unit(s) SubCutaneous every 12 hours  hydromorphone (10 MICROgram(s)/mL) + bupivacaine 0.0625% in 0.9% Sodium Chloride PCEA 250 milliLiter(s) Epidural PCA Continuous  lactated ringers. 1000 milliLiter(s) (125 mL/Hr) IV Continuous <Continuous>    MEDICATIONS  (PRN):  dextrose 40% Gel 15 Gram(s) Oral once PRN Blood Glucose LESS THAN 70 milliGRAM(s)/deciliter  glucagon  Injectable 1 milliGRAM(s) IntraMuscular once PRN Glucose LESS THAN 70 milligrams/deciliter  hydromorphone (10 MICROgram(s)/mL) + bupivacaine 0.0625% in 0.9% Sodium Chloride PCEA Rescue Clinician  Bolus 5 milliLiter(s) Epidural every 15 minutes PRN for Pain Score greater than 6  naloxone Injectable 0.1 milliGRAM(s) IV Push every 3 minutes PRN For ANY of the following changes in patient status:  A. RR LESS THAN 10 breaths per minute, B. Oxygen saturation LESS THAN 90%, C. Sedation score of 6  ondansetron Injectable 4 milliGRAM(s) IV Push every 6 hours PRN Nausea      OBJECTIVE:    Vital Signs Last 24 Hrs  T(C): 36.9 (26 Aug 2020 14:15), Max: 37.1 (26 Aug 2020 06:00)  T(F): 98.4 (26 Aug 2020 14:15), Max: 98.8 (26 Aug 2020 06:00)  HR: 70 (26 Aug 2020 14:15) (64 - 104)  BP: 85/45 (26 Aug 2020 14:15) (85/45 - 96/66)  BP(mean): 68 (26 Aug 2020 12:30) (63 - 71)  RR: 18 (26 Aug 2020 14:15) (14 - 21)  SpO2: 95% (26 Aug 2020 14:15) (95% - 100%)        I&O's Detail    26 Aug 2020 07:01  -  26 Aug 2020 15:19  --------------------------------------------------------  IN:    lactated ringers.: 250 mL  Total IN: 250 mL    OUT:    Indwelling Catheter - Urethral: 250 mL  Total OUT: 250 mL    Total NET: 0 mL          Daily Height in cm: 172.72 (26 Aug 2020 07:25)    Daily     LABS:                        9.8    9.31  )-----------( 411      ( 26 Aug 2020 11:22 )             30.5     08-26    137  |  102  |  13  ----------------------------<  205<H>  4.4   |  19<L>  |  0.98    Ca    8.3<L>      26 Aug 2020 11:22  Phos  3.4     08-26  Mg     1.6     08-26          PHYSICAL EXAM:  Constitutional: well developed, well nourished, NAD  Eyes: anicteric  ENMT: normal facies, symmetric  Neck: supple  Respiratory: Normal chest expansion, no labored breathing noted  Gastrointestinal: abdomen soft, appropriately TTP, dressing midline c/d/i   Psychiatric: oriented x 3; appropriate

## 2020-08-26 NOTE — PRE-ANESTHESIA EVALUATION ADULT - NSANTHPMHFT_GEN_ALL_CORE
chart and notes reviewed. cad s/p remote stents. stable cv status. tte and ekg noted, unremarkable. good et no cp/sob. dm a1c ~7, fs < 150. chepe s/p ?uppp? - does not currently use cpap

## 2020-08-27 ENCOUNTER — TRANSCRIPTION ENCOUNTER (OUTPATIENT)
Age: 70
End: 2020-08-27

## 2020-08-27 LAB
ANION GAP SERPL CALC-SCNC: 10 MMOL/L — SIGNIFICANT CHANGE UP (ref 5–17)
APTT BLD: 28.6 SEC — SIGNIFICANT CHANGE UP (ref 27.5–35.5)
BUN SERPL-MCNC: 11 MG/DL — SIGNIFICANT CHANGE UP (ref 7–23)
CALCIUM SERPL-MCNC: 9.1 MG/DL — SIGNIFICANT CHANGE UP (ref 8.4–10.5)
CHLORIDE SERPL-SCNC: 101 MMOL/L — SIGNIFICANT CHANGE UP (ref 96–108)
CO2 SERPL-SCNC: 25 MMOL/L — SIGNIFICANT CHANGE UP (ref 22–31)
CREAT SERPL-MCNC: 0.87 MG/DL — SIGNIFICANT CHANGE UP (ref 0.5–1.3)
GLUCOSE BLDC GLUCOMTR-MCNC: 133 MG/DL — HIGH (ref 70–99)
GLUCOSE BLDC GLUCOMTR-MCNC: 141 MG/DL — HIGH (ref 70–99)
GLUCOSE BLDC GLUCOMTR-MCNC: 151 MG/DL — HIGH (ref 70–99)
GLUCOSE BLDC GLUCOMTR-MCNC: 195 MG/DL — HIGH (ref 70–99)
GLUCOSE SERPL-MCNC: 131 MG/DL — HIGH (ref 70–99)
HCT VFR BLD CALC: 32 % — LOW (ref 39–50)
HCT VFR BLD CALC: 32.5 % — LOW (ref 39–50)
HGB BLD-MCNC: 10.1 G/DL — LOW (ref 13–17)
HGB BLD-MCNC: 10.4 G/DL — LOW (ref 13–17)
INR BLD: 1.13 RATIO — SIGNIFICANT CHANGE UP (ref 0.88–1.16)
MAGNESIUM SERPL-MCNC: 1.7 MG/DL — SIGNIFICANT CHANGE UP (ref 1.6–2.6)
MCHC RBC-ENTMCNC: 28.5 PG — SIGNIFICANT CHANGE UP (ref 27–34)
MCHC RBC-ENTMCNC: 29 PG — SIGNIFICANT CHANGE UP (ref 27–34)
MCHC RBC-ENTMCNC: 31.6 GM/DL — LOW (ref 32–36)
MCHC RBC-ENTMCNC: 32 GM/DL — SIGNIFICANT CHANGE UP (ref 32–36)
MCV RBC AUTO: 90.4 FL — SIGNIFICANT CHANGE UP (ref 80–100)
MCV RBC AUTO: 90.5 FL — SIGNIFICANT CHANGE UP (ref 80–100)
NRBC # BLD: 0 /100 WBCS — SIGNIFICANT CHANGE UP (ref 0–0)
NRBC # BLD: 0 /100 WBCS — SIGNIFICANT CHANGE UP (ref 0–0)
PHOSPHATE SERPL-MCNC: 3 MG/DL — SIGNIFICANT CHANGE UP (ref 2.5–4.5)
PLATELET # BLD AUTO: 416 K/UL — HIGH (ref 150–400)
PLATELET # BLD AUTO: 428 K/UL — HIGH (ref 150–400)
POTASSIUM SERPL-MCNC: 5.2 MMOL/L — SIGNIFICANT CHANGE UP (ref 3.5–5.3)
POTASSIUM SERPL-SCNC: 5.2 MMOL/L — SIGNIFICANT CHANGE UP (ref 3.5–5.3)
PROTHROM AB SERPL-ACNC: 13.3 SEC — SIGNIFICANT CHANGE UP (ref 10.6–13.6)
RBC # BLD: 3.54 M/UL — LOW (ref 4.2–5.8)
RBC # BLD: 3.59 M/UL — LOW (ref 4.2–5.8)
RBC # FLD: 15.1 % — HIGH (ref 10.3–14.5)
RBC # FLD: 15.4 % — HIGH (ref 10.3–14.5)
SODIUM SERPL-SCNC: 136 MMOL/L — SIGNIFICANT CHANGE UP (ref 135–145)
WBC # BLD: 8.91 K/UL — SIGNIFICANT CHANGE UP (ref 3.8–10.5)
WBC # BLD: 9.13 K/UL — SIGNIFICANT CHANGE UP (ref 3.8–10.5)
WBC # FLD AUTO: 8.91 K/UL — SIGNIFICANT CHANGE UP (ref 3.8–10.5)
WBC # FLD AUTO: 9.13 K/UL — SIGNIFICANT CHANGE UP (ref 3.8–10.5)

## 2020-08-27 RX ORDER — DEXTROSE MONOHYDRATE, SODIUM CHLORIDE, AND POTASSIUM CHLORIDE 50; .745; 4.5 G/1000ML; G/1000ML; G/1000ML
1000 INJECTION, SOLUTION INTRAVENOUS
Refills: 0 | Status: DISCONTINUED | OUTPATIENT
Start: 2020-08-27 | End: 2020-08-27

## 2020-08-27 RX ORDER — MAGNESIUM SULFATE 500 MG/ML
2 VIAL (ML) INJECTION ONCE
Refills: 0 | Status: COMPLETED | OUTPATIENT
Start: 2020-08-27 | End: 2020-08-27

## 2020-08-27 RX ORDER — SODIUM CHLORIDE 9 MG/ML
1000 INJECTION, SOLUTION INTRAVENOUS
Refills: 0 | Status: DISCONTINUED | OUTPATIENT
Start: 2020-08-27 | End: 2020-08-28

## 2020-08-27 RX ORDER — SIMVASTATIN 20 MG/1
10 TABLET, FILM COATED ORAL AT BEDTIME
Refills: 0 | Status: DISCONTINUED | OUTPATIENT
Start: 2020-08-27 | End: 2020-08-27

## 2020-08-27 RX ORDER — SODIUM CHLORIDE 9 MG/ML
1000 INJECTION, SOLUTION INTRAVENOUS
Refills: 0 | Status: DISCONTINUED | OUTPATIENT
Start: 2020-08-27 | End: 2020-08-27

## 2020-08-27 RX ORDER — ATORVASTATIN CALCIUM 80 MG/1
20 TABLET, FILM COATED ORAL AT BEDTIME
Refills: 0 | Status: DISCONTINUED | OUTPATIENT
Start: 2020-08-27 | End: 2020-08-30

## 2020-08-27 RX ADMIN — ATORVASTATIN CALCIUM 20 MILLIGRAM(S): 80 TABLET, FILM COATED ORAL at 21:26

## 2020-08-27 RX ADMIN — SODIUM CHLORIDE 125 MILLILITER(S): 9 INJECTION, SOLUTION INTRAVENOUS at 07:21

## 2020-08-27 RX ADMIN — Medication 1000 MILLIGRAM(S): at 06:10

## 2020-08-27 RX ADMIN — HEPARIN SODIUM 5000 UNIT(S): 5000 INJECTION INTRAVENOUS; SUBCUTANEOUS at 17:10

## 2020-08-27 RX ADMIN — HEPARIN SODIUM 5000 UNIT(S): 5000 INJECTION INTRAVENOUS; SUBCUTANEOUS at 05:40

## 2020-08-27 RX ADMIN — SODIUM CHLORIDE 75 MILLILITER(S): 9 INJECTION, SOLUTION INTRAVENOUS at 18:21

## 2020-08-27 RX ADMIN — Medication 400 MILLIGRAM(S): at 10:02

## 2020-08-27 RX ADMIN — PANTOPRAZOLE SODIUM 40 MILLIGRAM(S): 20 TABLET, DELAYED RELEASE ORAL at 10:02

## 2020-08-27 RX ADMIN — Medication 400 MILLIGRAM(S): at 05:40

## 2020-08-27 RX ADMIN — Medication 50 GRAM(S): at 10:02

## 2020-08-27 RX ADMIN — Medication 2: at 18:18

## 2020-08-27 NOTE — DISCHARGE NOTE PROVIDER - NSDCMRMEDTOKEN_GEN_ALL_CORE_FT
aspirin 81 mg oral tablet: 1 tab(s) orally once a day  enalapril 5 mg oral tablet: 0.5 tab(s) orally once a day  ezetimibe 10 mg oral tablet: 1 tab(s) orally once a day  metFORMIN 1000 mg oral tablet: 1 tab(s) orally 2 times a day  pantoprazole 40 mg oral delayed release tablet: 1 tab(s) orally once a day  rosuvastatin 20 mg oral tablet: 1 tab(s) orally once a day  Trulicity Pen 1.5 mg/0.5 mL subcutaneous solution: subcutaneous once a week  Vitamin D3 2000 intl units oral tablet: 1 tab(s) orally once a day aspirin 81 mg oral tablet: 1 tab(s) orally once a day  enalapril 5 mg oral tablet: 0.5 tab(s) orally once a day  ezetimibe 10 mg oral tablet: 1 tab(s) orally once a day  metFORMIN 1000 mg oral tablet: 1 tab(s) orally 2 times a day  pantoprazole 40 mg oral delayed release tablet: 1 tab(s) orally once a day  Rolling walker: Rolling walker   rosuvastatin 20 mg oral tablet: 1 tab(s) orally once a day  Trulicity Pen 1.5 mg/0.5 mL subcutaneous solution: subcutaneous once a week  Vitamin D3 2000 intl units oral tablet: 1 tab(s) orally once a day aspirin 81 mg oral tablet: 1 tab(s) orally once a day  enalapril 5 mg oral tablet: 0.5 tab(s) orally once a day  ezetimibe 10 mg oral tablet: 1 tab(s) orally once a day  ibuprofen 600 mg oral tablet: 1 tab(s) orally every 6 hours, prn knee swelling/ pain  metFORMIN 1000 mg oral tablet: 1 tab(s) orally 2 times a day  pantoprazole 40 mg oral delayed release tablet: 1 tab(s) orally once a day  Rolling walker: Rolling walker   rosuvastatin 20 mg oral tablet: 1 tab(s) orally once a day  Trulicity Pen 1.5 mg/0.5 mL subcutaneous solution: subcutaneous once a week  Vitamin D3 2000 intl units oral tablet: 1 tab(s) orally once a day

## 2020-08-27 NOTE — DISCHARGE NOTE PROVIDER - NSDCFUADDINST_GEN_ALL_CORE_FT
WOUND CARE: Staples will be removed at follow up office visit. Gauze dressing to be changed daily.  BATHING: Please do not submerge wound underwater. You may shower and/or sponge bathe.  ACTIVITY: No heavy lifting anything more than 10-15lbs or straining. Otherwise, you may return to your usual level of physical activity. If you are taking narcotic pain medication (such as Percocet), do NOT drive a car, operate machinery or make important decisions.  DIET: Low fiber diet  NOTIFY YOUR SURGEON IF: You have any bleeding that does not stop, any pus draining from your wound, any fever (over 100.4 F) or chills, persistent nausea/vomiting with inability to tolerate food or liquids, persistent diarrhea, or if your pain is not controlled on your discharge pain medications.  FOLLOW-UP:  1. Please call to make a follow-up appointment within one week of discharge   2. Please follow up with your primary care physician in one week regarding your hospitalization.

## 2020-08-27 NOTE — DISCHARGE NOTE PROVIDER - NSDCFUADDAPPT_GEN_ALL_CORE_FT
Please follow up with Orthopedics- Yared Núñez M.D. this week for exacerbation of right knee arthritis. Call [phone] for appt.

## 2020-08-27 NOTE — DISCHARGE NOTE PROVIDER - HOSPITAL COURSE
73y/o M HTN, HLD, DMT2, CAD s/p stent (20 years) on Aspirin (which was discontinued by Dr. Ferreira, last dose taken 8/21), FADI (no longer on CPAP s/p surgery at [facility]). 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 Tumor recurred in June 2020, underwent  chemo- immunotherapy trial at [facility]. 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 Now underwent exploratory laparotomy, omentectomy, liposarcoma implant removal 8/26. In the PACU, the patients' pain was controlled on PCEA pump, mildly hypotensive (enalapril held), and voiding appropriately.  The patient was transferred to the surgical floor in stable condition.  Patient remained stable and no acute events overnight to POD1. POD1, patient was given sips of water, and ceballos discontinued. He was placed on maintenance IVF and awaited GI function. PT evaluated patient and patient OOB.         On day of discharge, the patient was tolerating diet, ambulating well and pain controlled. 73y/o M HTN, HLD, DMT2, CAD s/p stent (20 years) on Aspirin (which was discontinued by Dr. Ferreira, last dose taken 8/21), FADI (no longer on CPAP s/p surgery at [facility]). 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 Tumor recurred in June 2020, underwent  chemo- immunotherapy trial at [facility]. 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 Now underwent exploratory laparotomy, omentectomy, liposarcoma implant removal 8/26. In the PACU, the patients' pain was controlled on PCEA pump, mildly hypotensive (enalapril held), and voiding appropriately.  The patient was transferred to the surgical floor in stable condition.  Patient remained stable and no acute events overnight to POD1. POD1, patient was given sips of water, and ceballos discontinued. He was placed on maintenance IVF and awaited GI function.  POD2 advanced to liquid diet, tolerated appropriately. PT evaluated patient and patient OOB. POD3, diet advanced to regular, pt tolerating, passing flatus and BM.  Reports mild right knee pain, difficulty ambulating, PT re-evaluated patient.        At the time of discharge, the patient was hemodynamically stable, was tolerating PO diet, was voiding urine and passing stool, was ambulating, and was comfortable with adequate pain control. The patient was instructed to follow up with Dr. Ferreira within 1-2 weeks after discharge from the hospital. The patient felt comfortable with discharge. The patient was discharged to home. The patient had no other issues. 73y/o M HTN, HLD, DMT2, CAD s/p stent (20 years) on Aspirin (which was discontinued by Dr. Ferreira, last dose taken 8/21), FADI (no longer on CPAP s/p surgery at [facility]). 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 Tumor recurred in June 2020, underwent  chemo- immunotherapy trial at [facility]. 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 Now underwent exploratory laparotomy, omentectomy, liposarcoma implant removal 8/26. In the PACU, the patients' pain was controlled on PCEA pump, mildly hypotensive (enalapril held), and voiding appropriately.  The patient was transferred to the surgical floor in stable condition.  Patient remained stable and no acute events overnight to POD1. POD1, patient was given sips of water, and ceballos discontinued. He was placed on maintenance IVF and awaited GI function.  POD2 advanced to liquid diet, tolerated appropriately. PT evaluated patient and patient OOB. POD3, diet advanced to regular, pt tolerating, passing flatus and BM.  Reports mild right knee pain, difficulty ambulating, PT re-evaluated patient, rec home PT with rolling walker.        At the time of discharge, the patient was hemodynamically stable, was tolerating PO diet, was voiding urine and passing stool, was ambulating, and was comfortable with adequate pain control. The patient was instructed to follow up with Dr. Ferreira within 1-2 weeks after discharge from the hospital. The patient felt comfortable with discharge. The patient was discharged to home. The patient had no other issues. 73y/o M HTN, HLD, DMT2, CAD s/p stent (20 years) on Aspirin (which was discontinued by Dr. Ferreira, last dose taken 8/21), FADI (no longer on CPAP s/p surgery at [facility]). 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 Tumor recurred in June 2020, underwent  chemo- immunotherapy trial at [facility]. 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 Now underwent exploratory laparotomy, omentectomy, liposarcoma implant removal 8/26. In the PACU, the patients' pain was controlled on PCEA pump, mildly hypotensive (enalapril held), and voiding appropriately.  The patient was transferred to the surgical floor in stable condition.  Patient remained stable and no acute events overnight to POD1. POD1, patient was given sips of water, and ceballos discontinued. He was placed on maintenance IVF and awaited GI function.  POD2 advanced to liquid diet, tolerated appropriately. PT evaluated patient and patient OOB. POD3, diet advanced to regular, pt tolerating, passing flatus and BM.  Reports mild right knee pain, difficulty ambulating.  R knee xray obtained, demonstrating moderate to severe arthrosis of the knee, with a large knee joint effusion, and large loose intra-articular body within the suprapatellar joint recess measuring approximately 2.2 x 1 cm.  No evidence of acute fracture or dislocation,     Ortho consulted, confirmed osteoarthritic effusion, recommended antiinflamatories prn, rolling walker, ICE, and f/u with orthopedist as outpatient. Pt stable for discharge.     PT re-evaluated patient, rec home PT with rolling walker.        At the time of discharge, the patient was hemodynamically stable, was tolerating PO diet, was voiding urine and passing stool, was ambulating, and was comfortable with adequate pain control. The patient was instructed to follow up with Dr. Ferreira within 1-2 weeks after discharge from the hospital. The patient felt comfortable with discharge. The patient was discharged to home. The patient had no other issues.

## 2020-08-27 NOTE — DISCHARGE NOTE PROVIDER - NSDCCPCAREPLAN_GEN_ALL_CORE_FT
PRINCIPAL DISCHARGE DIAGNOSIS  Diagnosis: Malignant tumor of an abdominal organ  Assessment and Plan of Treatment: ACTIVITY: No heavy lifting anything more than 10-15lbs or straining. Otherwise, you may return to your usual level of physical activity. If you are taking narcotic pain medication (such as Percocet), do NOT drive a car, operate machinery or make important decisions.  DIET: Low fiber diet  NOTIFY YOUR SURGEON IF: You have any bleeding that does not stop, any pus draining from your midline incision site any fever (over 100.4 F) or chills, persistent nausea/vomiting with inability to tolerate food or liquids, persistent diarrhea, or if your pain is not controlled on your discharge pain medications.  FOLLOW-UP:  1. Please call to make a follow-up appointment within one week of discharge with    2. Please follow up with your primary care physician regarding your hospitalization.

## 2020-08-27 NOTE — DISCHARGE NOTE PROVIDER - CARE PROVIDER_API CALL
Shadi Ferreira  SURGERY  [address] [place] [address]  Phone: [phone]  Fax: [phone]  Established Patient  Follow Up Time: 1 week

## 2020-08-27 NOTE — PROGRESS NOTE ADULT - SUBJECTIVE AND OBJECTIVE BOX
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 Tumor recurred June 2020 s/p immunotherapy trial at [facility] [place]. This is POD 1 s/p exploratory laparotomy, omentectomy, multiple liposarcoma implant resections.   Overnight: no acute events. H/H trend 9.8/30.5 post op, 10.5/32.5 6 hours postop, 10.1/32.0 16 hours postop.    MEDICATIONS  (STANDING):  acetaminophen  IVPB .. 1000 milliGRAM(s) IV Intermittent every 6 hours  dextrose 5%. 1000 milliLiter(s) (50 mL/Hr) IV Continuous <Continuous>  dextrose 50% Injectable 12.5 Gram(s) IV Push once  dextrose 50% Injectable 25 Gram(s) IV Push once  dextrose 50% Injectable 25 Gram(s) IV Push once  heparin   Injectable 5000 Unit(s) SubCutaneous every 12 hours  hydromorphone (10 MICROgram(s)/mL) + bupivacaine 0.0625% in 0.9% Sodium Chloride PCEA 250 milliLiter(s) Epidural PCA Continuous  lactated ringers. 1000 milliLiter(s) (125 mL/Hr) IV Continuous <Continuous>    MEDICATIONS  (PRN):  dextrose 40% Gel 15 Gram(s) Oral once PRN Blood Glucose LESS THAN 70 milliGRAM(s)/deciliter  glucagon  Injectable 1 milliGRAM(s) IntraMuscular once PRN Glucose LESS THAN 70 milligrams/deciliter  hydromorphone (10 MICROgram(s)/mL) + bupivacaine 0.0625% in 0.9% Sodium Chloride PCEA Rescue Clinician  Bolus 5 milliLiter(s) Epidural every 15 minutes PRN for Pain Score greater than 6  naloxone Injectable 0.1 milliGRAM(s) IV Push every 3 minutes PRN For ANY of the following changes in patient status:  A. RR LESS THAN 10 breaths per minute, B. Oxygen saturation LESS THAN 90%, C. Sedation score of 6  ondansetron Injectable 4 milliGRAM(s) IV Push every 6 hours PRN Nausea      OBJECTIVE:    T(C): 36.7 (08-27-20 @ 00:38), Max: 37.1 (08-26-20 @ 06:00)  HR: 60 (08-27-20 @ 00:38) (60 - 104)  BP: 98/50 (08-27-20 @ 00:38) (85/45 - 99/62)  RR: 18 (08-27-20 @ 00:38) (14 - 21)  SpO2: 97% (08-27-20 @ 00:38) (95% - 100%)  CAPILLARY BLOOD GLUCOSE      POCT Blood Glucose.: 141 mg/dL (27 Aug 2020 00:35)  POCT Blood Glucose.: 165 mg/dL (26 Aug 2020 17:37)  POCT Blood Glucose.: 128 mg/dL (26 Aug 2020 11:14)  POCT Blood Glucose.: 158 mg/dL (26 Aug 2020 06:18)    PHYSICAL EXAM:  Constitutional: well developed, well nourished, NAD  Respiratory: Normal chest expansion, no labored breathing noted  Gastrointestinal: abdomen soft, appropriately TTP, dressing midline c/d/i   Extremities: warm and well perfused, no cyanosis, edema, erythema     08-26 @ 07:01  -  08-27 @ 04:11  --------------------------------------------------------  IN:    IV PiggyBack: 100 mL    lactated ringers.: 1000 mL  Total IN: 1100 mL    OUT:    Indwelling Catheter - Urethral: 1200 mL  Total OUT: 1200 mL    Total NET: -100 mL        CBC (08-27 @ 02:56)                              10.1<L>                         8.91    )----------------(  416<H>     --    % Neutrophils, --    % Lymphocytes, ANC: --                                  32.0<L>  CBC (08-26 @ 17:41)                              10.5<L>                         9.02    )----------------(  412<H>     --    % Neutrophils, --    % Lymphocytes, ANC: --                                  32.5<L>    BMP (08-26 @ 11:22)             137     |  102     |  13    		Ca++ --      Ca 8.3<L>             ---------------------------------( 205<H>		Mg 1.6                4.4     |  19<L>   |  0.98  			Ph 3.4             ABG (08-26 @ 08:10)     7.36 / 37 / 283<H> / 20<L> / -4.4<L> / 100<H>%     Lactate:

## 2020-08-27 NOTE — DISCHARGE NOTE PROVIDER - NSDCCPTREATMENT_GEN_ALL_CORE_FT
PRINCIPAL PROCEDURE  Procedure: Exploratory laparotomy  Findings and Treatment: Exploratory laparotomy, omentectomy, mutiple liposarcoma implants found and removed.      SECONDARY PROCEDURE  Procedure: Laparoscopic omentectomy  Findings and Treatment:

## 2020-08-27 NOTE — PROGRESS NOTE ADULT - SUBJECTIVE AND OBJECTIVE BOX
Day __ of Anesthesia Pain Management Service    SUBJECTIVE: Patient doing well with PCEA    Pain Scale Score:   Refer to charted pain scores    THERAPY:  [X] Epidural Bupivacaine 0.0625% and Hydromorphone         [X] 10 micrograms/mL 	[ ] 5 micrograms/mL  [ ] Epidural Bupivacaine 0.0625% and Fentanyl 2 micrograms/mL  [ ] Epidural Ropivacaine 0.1% plain – 1 mg/mL    Demand dose: 3 mL  Lockout: 15 minutes  Continuous Rate: 6 mL/hr    MEDICATIONS  (STANDING):  dextrose 5%. 1000 milliLiter(s) (50 mL/Hr) IV Continuous <Continuous>  dextrose 50% Injectable 12.5 Gram(s) IV Push once  dextrose 50% Injectable 25 Gram(s) IV Push once  dextrose 50% Injectable 25 Gram(s) IV Push once  heparin   Injectable 5000 Unit(s) SubCutaneous every 12 hours  hydromorphone (10 MICROgram(s)/mL) + bupivacaine 0.0625% in 0.9% Sodium Chloride PCEA 250 milliLiter(s) Epidural PCA Continuous  insulin lispro (HumaLOG) corrective regimen sliding scale   SubCutaneous every 6 hours  lactated ringers. 1000 milliLiter(s) (125 mL/Hr) IV Continuous <Continuous>  pantoprazole  Injectable 40 milliGRAM(s) IV Push daily    MEDICATIONS  (PRN):  dextrose 40% Gel 15 Gram(s) Oral once PRN Blood Glucose LESS THAN 70 milliGRAM(s)/deciliter  glucagon  Injectable 1 milliGRAM(s) IntraMuscular once PRN Glucose LESS THAN 70 milligrams/deciliter  hydromorphone (10 MICROgram(s)/mL) + bupivacaine 0.0625% in 0.9% Sodium Chloride PCEA Rescue Clinician  Bolus 5 milliLiter(s) Epidural every 15 minutes PRN for Pain Score greater than 6  naloxone Injectable 0.1 milliGRAM(s) IV Push every 3 minutes PRN For ANY of the following changes in patient status:  A. RR LESS THAN 10 breaths per minute, B. Oxygen saturation LESS THAN 90%, C. Sedation score of 6  ondansetron Injectable 4 milliGRAM(s) IV Push every 6 hours PRN Nausea      OBJECTIVE:    Assessment of Catheter Site:    [X] Epidural 	  [X] Dressing intact	[X] Site non-tender	[X] Site without erythema, discharge, edema  [X] Epidural tubing and connection checked	[X] Gross neurological exam within normal limits  [ ] Catheter removed – tip intact		[X] Afebrile	            [ ] Febrile: ___    PT/INR - ( 27 Aug 2020 08:38 )   PT: 13.3 sec;   INR: 1.13 ratio         PTT - ( 27 Aug 2020 08:38 )  PTT:28.6 sec                      10.4   9.13  )-----------( 428      ( 27 Aug 2020 07:10 )             32.5     Vital Signs Last 24 Hrs  T(C): 36.7 (08-27-20 @ 10:00), Max: 37.1 (08-26-20 @ 16:15)  T(F): 98 (08-27-20 @ 10:00), Max: 98.7 (08-26-20 @ 16:15)  HR: 63 (08-27-20 @ 10:00) (60 - 74)  BP: 107/60 (08-27-20 @ 10:00) (85/45 - 107/60)  BP(mean): 68 (08-26-20 @ 12:30) (67 - 71)  RR: 20 (08-27-20 @ 10:00) (16 - 21)  SpO2: 94% (08-27-20 @ 10:00) (94% - 100%)      Sedation Score:	[X] Alert	[ ] Drowsy	[ ] Arousable  [ ] Asleep     [ ] Unresponsive    Side Effects:	[X] None	[ ] Nausea	[ ] Vomiting   [ ] Pruritus  		[ ] Weakness     [ ] Numbness	[ ] Other:    ASSESSMENT/ PLAN:    Therapy:	[X] Continue   [ ] Discontinue   [ ] Change to PRN Analgesics   [ ] Change to PCA    Documentation and Verification of current medications:  [X] Done	[ ] Not done, not eligible, reason:

## 2020-08-28 LAB
ALBUMIN SERPL ELPH-MCNC: 3.2 G/DL — LOW (ref 3.3–5)
ALP SERPL-CCNC: 79 U/L — SIGNIFICANT CHANGE UP (ref 40–120)
ALT FLD-CCNC: 32 U/L — SIGNIFICANT CHANGE UP (ref 10–45)
ANION GAP SERPL CALC-SCNC: 9 MMOL/L — SIGNIFICANT CHANGE UP (ref 5–17)
APTT BLD: 31.7 SEC — SIGNIFICANT CHANGE UP (ref 27.5–35.5)
AST SERPL-CCNC: 27 U/L — SIGNIFICANT CHANGE UP (ref 10–40)
BILIRUB SERPL-MCNC: 0.3 MG/DL — SIGNIFICANT CHANGE UP (ref 0.2–1.2)
BUN SERPL-MCNC: 6 MG/DL — LOW (ref 7–23)
CALCIUM SERPL-MCNC: 8.8 MG/DL — SIGNIFICANT CHANGE UP (ref 8.4–10.5)
CHLORIDE SERPL-SCNC: 99 MMOL/L — SIGNIFICANT CHANGE UP (ref 96–108)
CO2 SERPL-SCNC: 26 MMOL/L — SIGNIFICANT CHANGE UP (ref 22–31)
CREAT SERPL-MCNC: 0.79 MG/DL — SIGNIFICANT CHANGE UP (ref 0.5–1.3)
GLUCOSE BLDC GLUCOMTR-MCNC: 136 MG/DL — HIGH (ref 70–99)
GLUCOSE BLDC GLUCOMTR-MCNC: 156 MG/DL — HIGH (ref 70–99)
GLUCOSE BLDC GLUCOMTR-MCNC: 157 MG/DL — HIGH (ref 70–99)
GLUCOSE BLDC GLUCOMTR-MCNC: 158 MG/DL — HIGH (ref 70–99)
GLUCOSE BLDC GLUCOMTR-MCNC: 162 MG/DL — HIGH (ref 70–99)
GLUCOSE SERPL-MCNC: 149 MG/DL — HIGH (ref 70–99)
HCT VFR BLD CALC: 30 % — LOW (ref 39–50)
HGB BLD-MCNC: 9.5 G/DL — LOW (ref 13–17)
INR BLD: 1.19 RATIO — HIGH (ref 0.88–1.16)
MAGNESIUM SERPL-MCNC: 1.7 MG/DL — SIGNIFICANT CHANGE UP (ref 1.6–2.6)
MCHC RBC-ENTMCNC: 28.4 PG — SIGNIFICANT CHANGE UP (ref 27–34)
MCHC RBC-ENTMCNC: 31.7 GM/DL — LOW (ref 32–36)
MCV RBC AUTO: 89.8 FL — SIGNIFICANT CHANGE UP (ref 80–100)
NRBC # BLD: 0 /100 WBCS — SIGNIFICANT CHANGE UP (ref 0–0)
PHOSPHATE SERPL-MCNC: 2 MG/DL — LOW (ref 2.5–4.5)
PLATELET # BLD AUTO: 379 K/UL — SIGNIFICANT CHANGE UP (ref 150–400)
POTASSIUM SERPL-MCNC: 3.9 MMOL/L — SIGNIFICANT CHANGE UP (ref 3.5–5.3)
POTASSIUM SERPL-SCNC: 3.9 MMOL/L — SIGNIFICANT CHANGE UP (ref 3.5–5.3)
PROT SERPL-MCNC: 6.2 G/DL — SIGNIFICANT CHANGE UP (ref 6–8.3)
PROTHROM AB SERPL-ACNC: 13.9 SEC — HIGH (ref 10.6–13.6)
RBC # BLD: 3.34 M/UL — LOW (ref 4.2–5.8)
RBC # FLD: 14.9 % — HIGH (ref 10.3–14.5)
SODIUM SERPL-SCNC: 134 MMOL/L — LOW (ref 135–145)
WBC # BLD: 6.63 K/UL — SIGNIFICANT CHANGE UP (ref 3.8–10.5)
WBC # FLD AUTO: 6.63 K/UL — SIGNIFICANT CHANGE UP (ref 3.8–10.5)

## 2020-08-28 RX ORDER — PANTOPRAZOLE SODIUM 20 MG/1
40 TABLET, DELAYED RELEASE ORAL
Refills: 0 | Status: DISCONTINUED | OUTPATIENT
Start: 2020-08-28 | End: 2020-08-30

## 2020-08-28 RX ORDER — DEXTROSE 50 % IN WATER 50 %
15 SYRINGE (ML) INTRAVENOUS ONCE
Refills: 0 | Status: DISCONTINUED | OUTPATIENT
Start: 2020-08-28 | End: 2020-08-30

## 2020-08-28 RX ORDER — GLUCAGON INJECTION, SOLUTION 0.5 MG/.1ML
1 INJECTION, SOLUTION SUBCUTANEOUS ONCE
Refills: 0 | Status: DISCONTINUED | OUTPATIENT
Start: 2020-08-28 | End: 2020-08-30

## 2020-08-28 RX ORDER — DEXTROSE 50 % IN WATER 50 %
12.5 SYRINGE (ML) INTRAVENOUS ONCE
Refills: 0 | Status: DISCONTINUED | OUTPATIENT
Start: 2020-08-28 | End: 2020-08-30

## 2020-08-28 RX ORDER — DEXTROSE 50 % IN WATER 50 %
25 SYRINGE (ML) INTRAVENOUS ONCE
Refills: 0 | Status: DISCONTINUED | OUTPATIENT
Start: 2020-08-28 | End: 2020-08-30

## 2020-08-28 RX ORDER — ACETAMINOPHEN 500 MG
650 TABLET ORAL EVERY 6 HOURS
Refills: 0 | Status: COMPLETED | OUTPATIENT
Start: 2020-08-28 | End: 2021-01-01

## 2020-08-28 RX ORDER — MAGNESIUM SULFATE 500 MG/ML
1 VIAL (ML) INJECTION ONCE
Refills: 0 | Status: DISCONTINUED | OUTPATIENT
Start: 2020-08-28 | End: 2020-08-28

## 2020-08-28 RX ORDER — SODIUM,POTASSIUM PHOSPHATES 278-250MG
1 POWDER IN PACKET (EA) ORAL ONCE
Refills: 0 | Status: DISCONTINUED | OUTPATIENT
Start: 2020-08-28 | End: 2020-08-28

## 2020-08-28 RX ORDER — SODIUM CHLORIDE 9 MG/ML
1000 INJECTION, SOLUTION INTRAVENOUS
Refills: 0 | Status: DISCONTINUED | OUTPATIENT
Start: 2020-08-28 | End: 2020-08-30

## 2020-08-28 RX ORDER — SODIUM CHLORIDE 9 MG/ML
1000 INJECTION, SOLUTION INTRAVENOUS
Refills: 0 | Status: DISCONTINUED | OUTPATIENT
Start: 2020-08-28 | End: 2020-08-29

## 2020-08-28 RX ORDER — INSULIN LISPRO 100/ML
VIAL (ML) SUBCUTANEOUS
Refills: 0 | Status: DISCONTINUED | OUTPATIENT
Start: 2020-08-28 | End: 2020-08-28

## 2020-08-28 RX ORDER — INSULIN LISPRO 100/ML
VIAL (ML) SUBCUTANEOUS
Refills: 0 | Status: DISCONTINUED | OUTPATIENT
Start: 2020-08-28 | End: 2020-08-30

## 2020-08-28 RX ORDER — INSULIN LISPRO 100/ML
VIAL (ML) SUBCUTANEOUS AT BEDTIME
Refills: 0 | Status: DISCONTINUED | OUTPATIENT
Start: 2020-08-28 | End: 2020-08-30

## 2020-08-28 RX ORDER — MAGNESIUM SULFATE 500 MG/ML
2 VIAL (ML) INJECTION ONCE
Refills: 0 | Status: COMPLETED | OUTPATIENT
Start: 2020-08-28 | End: 2020-08-28

## 2020-08-28 RX ADMIN — Medication 50 GRAM(S): at 10:08

## 2020-08-28 RX ADMIN — Medication 2: at 06:30

## 2020-08-28 RX ADMIN — PANTOPRAZOLE SODIUM 40 MILLIGRAM(S): 20 TABLET, DELAYED RELEASE ORAL at 11:36

## 2020-08-28 RX ADMIN — Medication 2: at 01:17

## 2020-08-28 RX ADMIN — PANTOPRAZOLE SODIUM 40 MILLIGRAM(S): 20 TABLET, DELAYED RELEASE ORAL at 16:57

## 2020-08-28 RX ADMIN — ATORVASTATIN CALCIUM 20 MILLIGRAM(S): 80 TABLET, FILM COATED ORAL at 21:34

## 2020-08-28 RX ADMIN — HEPARIN SODIUM 5000 UNIT(S): 5000 INJECTION INTRAVENOUS; SUBCUTANEOUS at 17:01

## 2020-08-28 RX ADMIN — Medication 85 MILLIMOLE(S): at 12:24

## 2020-08-28 RX ADMIN — Medication 5 MILLIGRAM(S): at 16:56

## 2020-08-28 RX ADMIN — Medication 2: at 11:33

## 2020-08-28 RX ADMIN — HEPARIN SODIUM 5000 UNIT(S): 5000 INJECTION INTRAVENOUS; SUBCUTANEOUS at 05:34

## 2020-08-28 NOTE — PHYSICAL THERAPY INITIAL EVALUATION ADULT - DISCHARGE DISPOSITION, PT EVAL
home with home PT to increase strength, balance and safety. Wife to assist pt as needed/home w/ home PT

## 2020-08-28 NOTE — PHYSICAL THERAPY INITIAL EVALUATION ADULT - PERTINENT HX OF CURRENT PROBLEM, REHAB EVAL
69 yo M with malignant tumor of rectum, underwent scheduled ex lap, omentectomy, multiple liposarcoma implants found and removed 8/26.

## 2020-08-28 NOTE — PROGRESS NOTE ADULT - SUBJECTIVE AND OBJECTIVE BOX
Vital Signs Last 24 Hrs  T(C): 36.9 (28 Aug 2020 06:06), Max: 37.6 (28 Aug 2020 01:17)  T(F): 98.5 (28 Aug 2020 06:06), Max: 99.7 (28 Aug 2020 01:17)  HR: 79 (28 Aug 2020 06:06) (63 - 79)  BP: 107/64 (28 Aug 2020 06:06) (106/63 - 126/70)  BP(mean): --  RR: 18 (28 Aug 2020 06:06) (18 - 20)  SpO2: 97% (28 Aug 2020 06:06) (94% - 98%)    I&O's Detail    27 Aug 2020 07:01  -  28 Aug 2020 07:00  --------------------------------------------------------  IN:    dextrose 5% + sodium chloride 0.45%.: 900 mL  Total IN: 900 mL    OUT:    Indwelling Catheter - Urethral: 225 mL    Intermittent Catheterization - Urethral: 450 mL    Voided: 920 mL  Total OUT: 1595 mL    Total NET: -695 mL                                9.5    6.63  )-----------( 379      ( 28 Aug 2020 07:07 )             30.0       08-28    134<L>  |  99  |  6<L>  ----------------------------<  149<H>  3.9   |  26  |  0.79    Ca    8.8      28 Aug 2020 07:07  Phos  2.0     08-28  Mg     1.7     08-28    TPro  6.2  /  Alb  3.2<L>  /  TBili  0.3  /  DBili  x   /  AST  27  /  ALT  32  /  AlkPhos  79  08-28      PT/INR - ( 27 Aug 2020 08:38 )   PT: 13.3 sec;   INR: 1.13 ratio         PTT - ( 27 Aug 2020 08:38 )  PTT:28.6 sec    pt. now voiding  tolerating sips of liquids    PLAN:  ambulate  clear liquids

## 2020-08-28 NOTE — PROGRESS NOTE ADULT - SUBJECTIVE AND OBJECTIVE BOX
POST OPERATIVE DAY #: 2    SUBJECTIVE: Pt seen and examined at bedside. Reports feeling well with no acute events overnight. Ha not had BM or flatulence, no nausea N/V.        Vital Signs Last 24 Hrs  T(C): 36.9 (28 Aug 2020 06:06), Max: 37.6 (28 Aug 2020 01:17)  T(F): 98.5 (28 Aug 2020 06:06), Max: 99.7 (28 Aug 2020 01:17)  HR: 79 (28 Aug 2020 06:06) (63 - 79)  BP: 107/64 (28 Aug 2020 06:06) (106/63 - 126/70)  BP(mean): --  RR: 18 (28 Aug 2020 06:06) (18 - 20)  SpO2: 97% (28 Aug 2020 06:06) (94% - 98%)  I&O's Summary    26 Aug 2020 07:01  -  27 Aug 2020 07:00  --------------------------------------------------------  IN: 2800 mL / OUT: 1500 mL / NET: 1300 mL    27 Aug 2020 07:01  -  28 Aug 2020 06:58  --------------------------------------------------------  IN: 900 mL / OUT: 1595 mL / NET: -695 mL      I&O's Detail    26 Aug 2020 07:01  -  27 Aug 2020 07:00  --------------------------------------------------------  IN:    IV PiggyBack: 300 mL    lactated ringers.: 2500 mL  Total IN: 2800 mL    OUT:    Indwelling Catheter - Urethral: 1500 mL  Total OUT: 1500 mL    Total NET: 1300 mL      27 Aug 2020 07:01  -  28 Aug 2020 06:58  --------------------------------------------------------  IN:    dextrose 5% + sodium chloride 0.45%.: 900 mL  Total IN: 900 mL    OUT:    Indwelling Catheter - Urethral: 225 mL    Intermittent Catheterization - Urethral: 450 mL    Voided: 920 mL  Total OUT: 1595 mL    Total NET: -695 mL          Labs:                         10.4   9.13  )-----------( 428      ( 27 Aug 2020 07:10 )             32.5     08-27    136  |  101  |  11  ----------------------------<  131<H>  5.2   |  25  |  0.87    Ca    9.1      27 Aug 2020 07:07  Phos  3.0     08-27  Mg     1.7     08-27      PT/INR - ( 27 Aug 2020 08:38 )   PT: 13.3 sec;   INR: 1.13 ratio         PTT - ( 27 Aug 2020 08:38 )  PTT:28.6 sec      Physical Exam:  General Appearance: NAD, A&O x3  Respiratory: Normal chest expansion, no labored breathing noted  Gastrointestinal: abdomen soft, nontender, non distended, dressing midline c/d/i   Extremities: warm and well perfused, no cyanosis, edema, erythema

## 2020-08-28 NOTE — PHYSICAL THERAPY INITIAL EVALUATION ADULT - ADDITIONAL COMMENTS
Pt lives in private home, 4 steps to enter +HR, 1st floor set up. Prior to admission, pt was I with all functional mobility and ADLs without AD. +

## 2020-08-28 NOTE — PROGRESS NOTE ADULT - SUBJECTIVE AND OBJECTIVE BOX
Day __ of Anesthesia Pain Management Service    SUBJECTIVE: Patient doing well with PCEA    Pain Scale Score:   Refer to charted pain scores    THERAPY:  [X] Epidural Bupivacaine 0.0625% and Hydromorphone         [X] 10 micrograms/mL 	[ ] 5 micrograms/mL  [ ] Epidural Bupivacaine 0.0625% and Fentanyl 2 micrograms/mL  [ ] Epidural Ropivacaine 0.1% plain – 1 mg/mL    Demand dose: 3 mL  Lockout: 15 minutes  Continuous Rate: 6 mL/hr    MEDICATIONS  (STANDING):  atorvastatin 20 milliGRAM(s) Oral at bedtime  dextrose 5% + sodium chloride 0.45%. 1000 milliLiter(s) (40 mL/Hr) IV Continuous <Continuous>  dextrose 5%. 1000 milliLiter(s) (50 mL/Hr) IV Continuous <Continuous>  dextrose 50% Injectable 12.5 Gram(s) IV Push once  dextrose 50% Injectable 25 Gram(s) IV Push once  dextrose 50% Injectable 25 Gram(s) IV Push once  heparin   Injectable 5000 Unit(s) SubCutaneous every 12 hours  hydromorphone (10 MICROgram(s)/mL) + bupivacaine 0.0625% in 0.9% Sodium Chloride PCEA 250 milliLiter(s) Epidural PCA Continuous  insulin lispro (HumaLOG) corrective regimen sliding scale   SubCutaneous every 6 hours  magnesium sulfate  IVPB 2 Gram(s) IV Intermittent once  pantoprazole  Injectable 40 milliGRAM(s) IV Push daily  sodium phosphate IVPB 30 milliMole(s) IV Intermittent once    MEDICATIONS  (PRN):  dextrose 40% Gel 15 Gram(s) Oral once PRN Blood Glucose LESS THAN 70 milliGRAM(s)/deciliter  glucagon  Injectable 1 milliGRAM(s) IntraMuscular once PRN Glucose LESS THAN 70 milligrams/deciliter  hydromorphone (10 MICROgram(s)/mL) + bupivacaine 0.0625% in 0.9% Sodium Chloride PCEA Rescue Clinician  Bolus 5 milliLiter(s) Epidural every 15 minutes PRN for Pain Score greater than 6  naloxone Injectable 0.1 milliGRAM(s) IV Push every 3 minutes PRN For ANY of the following changes in patient status:  A. RR LESS THAN 10 breaths per minute, B. Oxygen saturation LESS THAN 90%, C. Sedation score of 6  ondansetron Injectable 4 milliGRAM(s) IV Push every 6 hours PRN Nausea      OBJECTIVE:    Assessment of Catheter Site:    [X] Epidural 	  [X] Dressing intact	[X] Site non-tender	[X] Site without erythema, discharge, edema  [X] Epidural tubing and connection checked	[X] Gross neurological exam within normal limits  [ ] Catheter removed – tip intact		[X] Afebrile	            [ ] Febrile: ___    PT/INR - ( 27 Aug 2020 08:38 )   PT: 13.3 sec;   INR: 1.13 ratio         PTT - ( 27 Aug 2020 08:38 )  PTT:28.6 sec                      9.5    6.63  )-----------( 379      ( 28 Aug 2020 07:07 )             30.0     Vital Signs Last 24 Hrs  T(C): 36.8 (08-28-20 @ 08:52), Max: 37.6 (08-28-20 @ 01:17)  T(F): 98.3 (08-28-20 @ 08:52), Max: 99.7 (08-28-20 @ 01:17)  HR: 72 (08-28-20 @ 09:03) (63 - 79)  BP: 115/66 (08-28-20 @ 09:03) (106/63 - 126/70)  BP(mean): --  RR: 18 (08-28-20 @ 08:52) (18 - 20)  SpO2: 94% (08-28-20 @ 09:03) (94% - 98%)      Sedation Score:	[X] Alert	[ ] Drowsy	[ ] Arousable  [ ] Asleep     [ ] Unresponsive    Side Effects:	[X] None	[ ] Nausea	[ ] Vomiting   [ ] Pruritus  		[ ] Weakness     [ ] Numbness	[ ] Other:    ASSESSMENT/ PLAN:    Therapy:	[X] Continue   [ ] Discontinue   [ ] Change to PRN Analgesics   [ ] Change to PCA    Documentation and Verification of current medications:  [X] Done	[ ] Not done, not eligible, reason:

## 2020-08-28 NOTE — PHYSICAL THERAPY INITIAL EVALUATION ADULT - GAIT DEVIATIONS NOTED, PT EVAL
decreased velocity of limb motion/decreased stride length/decreased weight-shifting ability/increased time in double stance/decreased caroline

## 2020-08-29 LAB
ANION GAP SERPL CALC-SCNC: 11 MMOL/L — SIGNIFICANT CHANGE UP (ref 5–17)
BUN SERPL-MCNC: 5 MG/DL — LOW (ref 7–23)
CALCIUM SERPL-MCNC: 9.5 MG/DL — SIGNIFICANT CHANGE UP (ref 8.4–10.5)
CHLORIDE SERPL-SCNC: 100 MMOL/L — SIGNIFICANT CHANGE UP (ref 96–108)
CO2 SERPL-SCNC: 26 MMOL/L — SIGNIFICANT CHANGE UP (ref 22–31)
CREAT SERPL-MCNC: 0.96 MG/DL — SIGNIFICANT CHANGE UP (ref 0.5–1.3)
GLUCOSE BLDC GLUCOMTR-MCNC: 147 MG/DL — HIGH (ref 70–99)
GLUCOSE BLDC GLUCOMTR-MCNC: 154 MG/DL — HIGH (ref 70–99)
GLUCOSE BLDC GLUCOMTR-MCNC: 161 MG/DL — HIGH (ref 70–99)
GLUCOSE BLDC GLUCOMTR-MCNC: 174 MG/DL — HIGH (ref 70–99)
GLUCOSE SERPL-MCNC: 153 MG/DL — HIGH (ref 70–99)
HCT VFR BLD CALC: 30.7 % — LOW (ref 39–50)
HGB BLD-MCNC: 9.7 G/DL — LOW (ref 13–17)
MAGNESIUM SERPL-MCNC: 1.6 MG/DL — SIGNIFICANT CHANGE UP (ref 1.6–2.6)
MCHC RBC-ENTMCNC: 28.4 PG — SIGNIFICANT CHANGE UP (ref 27–34)
MCHC RBC-ENTMCNC: 31.6 GM/DL — LOW (ref 32–36)
MCV RBC AUTO: 90 FL — SIGNIFICANT CHANGE UP (ref 80–100)
NRBC # BLD: 0 /100 WBCS — SIGNIFICANT CHANGE UP (ref 0–0)
PHOSPHATE SERPL-MCNC: 2.9 MG/DL — SIGNIFICANT CHANGE UP (ref 2.5–4.5)
PLATELET # BLD AUTO: 353 K/UL — SIGNIFICANT CHANGE UP (ref 150–400)
POTASSIUM SERPL-MCNC: 4.3 MMOL/L — SIGNIFICANT CHANGE UP (ref 3.5–5.3)
POTASSIUM SERPL-SCNC: 4.3 MMOL/L — SIGNIFICANT CHANGE UP (ref 3.5–5.3)
RBC # BLD: 3.41 M/UL — LOW (ref 4.2–5.8)
RBC # FLD: 15 % — HIGH (ref 10.3–14.5)
SODIUM SERPL-SCNC: 137 MMOL/L — SIGNIFICANT CHANGE UP (ref 135–145)
WBC # BLD: 6.74 K/UL — SIGNIFICANT CHANGE UP (ref 3.8–10.5)
WBC # FLD AUTO: 6.74 K/UL — SIGNIFICANT CHANGE UP (ref 3.8–10.5)

## 2020-08-29 RX ORDER — MAGNESIUM SULFATE 500 MG/ML
1 VIAL (ML) INJECTION ONCE
Refills: 0 | Status: COMPLETED | OUTPATIENT
Start: 2020-08-29 | End: 2020-08-29

## 2020-08-29 RX ORDER — ACETAMINOPHEN 500 MG
650 TABLET ORAL EVERY 6 HOURS
Refills: 0 | Status: DISCONTINUED | OUTPATIENT
Start: 2020-08-29 | End: 2020-08-30

## 2020-08-29 RX ORDER — ENOXAPARIN SODIUM 100 MG/ML
40 INJECTION SUBCUTANEOUS DAILY
Refills: 0 | Status: DISCONTINUED | OUTPATIENT
Start: 2020-08-29 | End: 2020-08-30

## 2020-08-29 RX ADMIN — ENOXAPARIN SODIUM 40 MILLIGRAM(S): 100 INJECTION SUBCUTANEOUS at 13:33

## 2020-08-29 RX ADMIN — ATORVASTATIN CALCIUM 20 MILLIGRAM(S): 80 TABLET, FILM COATED ORAL at 21:31

## 2020-08-29 RX ADMIN — PANTOPRAZOLE SODIUM 40 MILLIGRAM(S): 20 TABLET, DELAYED RELEASE ORAL at 05:21

## 2020-08-29 RX ADMIN — Medication 650 MILLIGRAM(S): at 16:06

## 2020-08-29 RX ADMIN — Medication 650 MILLIGRAM(S): at 22:53

## 2020-08-29 RX ADMIN — HEPARIN SODIUM 5000 UNIT(S): 5000 INJECTION INTRAVENOUS; SUBCUTANEOUS at 05:21

## 2020-08-29 RX ADMIN — Medication 1: at 17:59

## 2020-08-29 RX ADMIN — Medication 1: at 13:29

## 2020-08-29 RX ADMIN — Medication 650 MILLIGRAM(S): at 21:31

## 2020-08-29 RX ADMIN — Medication 650 MILLIGRAM(S): at 15:28

## 2020-08-29 RX ADMIN — Medication 100 GRAM(S): at 10:09

## 2020-08-29 NOTE — PROVIDER CONTACT NOTE (OTHER) - ACTION/TREATMENT ORDERED:
MD will be coming to see pt and will be ordering Tylenol for pt.   Pt was educated on importance of using walker at home

## 2020-08-29 NOTE — PROGRESS NOTE ADULT - SUBJECTIVE AND OBJECTIVE BOX
SURGERY PROGRESS NOTE    SUBJECTIVE / 24H EVENTS:  Patient seen and examined on morning rounds. No acute events overnight.  CLD, Gi fx (-,-), denies n/v, SOB, chest pain.      OBJECTIVE:  VITAL SIGNS:  T(C): 37 (08-29-20 @ 05:31), Max: 37.7 (08-29-20 @ 01:09)  HR: 78 (08-29-20 @ 05:31) (72 - 84)  BP: 116/70 (08-29-20 @ 05:31) (102/65 - 121/71)  RR: 18 (08-29-20 @ 05:31) (18 - 18)  SpO2: 98% (08-29-20 @ 05:31) (94% - 98%)  Daily     Daily   POCT Blood Glucose.: 158 mg/dL (08-28-20 @ 21:28)  POCT Blood Glucose.: 136 mg/dL (08-28-20 @ 16:44)  POCT Blood Glucose.: 157 mg/dL (08-28-20 @ 11:30)    Physical Exam:  General Appearance: NAD, A&O x3  Respiratory: Normal chest expansion, no labored breathing noted  Gastrointestinal: abdomen soft, nontender, non distended, dressing midline c/d/i   Extremities: warm and well perfused, no cyanosis, edema, erythema       08-28-20 @ 07:01  -  08-29-20 @ 07:00  --------------------------------------------------------  IN:    dextrose 5% + sodium chloride 0.45%.: 960 mL    IV PiggyBack: 300 mL    Oral Fluid: 1460 mL  Total IN: 2720 mL    OUT:    Voided: 3700 mL  Total OUT: 3700 mL    Total NET: -980 mL          LAB VALUES:  08-28    134<L>  |  99  |  6<L>  ----------------------------<  149<H>  3.9   |  26  |  0.79    Ca    8.8      28 Aug 2020 07:07  Phos  2.0     08-28  Mg     1.7     08-28    TPro  6.2  /  Alb  3.2<L>  /  TBili  0.3  /  DBili  x   /  AST  27  /  ALT  32  /  AlkPhos  79  08-28                               9.5    6.63  )-----------( 379      ( 28 Aug 2020 07:07 )             30.0     LIVER FUNCTIONS - ( 28 Aug 2020 07:07 )  Alb: 3.2 g/dL / Pro: 6.2 g/dL / ALK PHOS: 79 U/L / ALT: 32 U/L / AST: 27 U/L / GGT: x           PT/INR - ( 28 Aug 2020 08:55 )   PT: 13.9 sec;   INR: 1.19 ratio         PTT - ( 28 Aug 2020 08:55 )  PTT:31.7 sec            MICROBIOLOGY:      RADIOLOGY:        MEDICATIONS  (STANDING):  acetaminophen   Tablet .. 650 milliGRAM(s) Oral every 6 hours  atorvastatin 20 milliGRAM(s) Oral at bedtime  dextrose 5% + sodium chloride 0.45%. 1000 milliLiter(s) (40 mL/Hr) IV Continuous <Continuous>  dextrose 5%. 1000 milliLiter(s) (50 mL/Hr) IV Continuous <Continuous>  dextrose 5%. 1000 milliLiter(s) (50 mL/Hr) IV Continuous <Continuous>  dextrose 50% Injectable 12.5 Gram(s) IV Push once  dextrose 50% Injectable 25 Gram(s) IV Push once  dextrose 50% Injectable 25 Gram(s) IV Push once  dextrose 50% Injectable 12.5 Gram(s) IV Push once  dextrose 50% Injectable 25 Gram(s) IV Push once  dextrose 50% Injectable 25 Gram(s) IV Push once  heparin   Injectable 5000 Unit(s) SubCutaneous every 12 hours  insulin lispro (HumaLOG) corrective regimen sliding scale   SubCutaneous three times a day before meals  insulin lispro (HumaLOG) corrective regimen sliding scale   SubCutaneous at bedtime  pantoprazole    Tablet 40 milliGRAM(s) Oral before breakfast    MEDICATIONS  (PRN):  dextrose 40% Gel 15 Gram(s) Oral once PRN Blood Glucose LESS THAN 70 milliGRAM(s)/deciliter  dextrose 40% Gel 15 Gram(s) Oral once PRN Blood Glucose LESS THAN 70 milliGRAM(s)/deciliter  glucagon  Injectable 1 milliGRAM(s) IntraMuscular once PRN Glucose LESS THAN 70 milligrams/deciliter  glucagon  Injectable 1 milliGRAM(s) IntraMuscular once PRN Glucose LESS THAN 70 milligrams/deciliter  ondansetron Injectable 4 milliGRAM(s) IV Push every 6 hours PRN Nausea

## 2020-08-30 ENCOUNTER — TRANSCRIPTION ENCOUNTER (OUTPATIENT)
Age: 70
End: 2020-08-30

## 2020-08-30 VITALS — TEMPERATURE: 98 F

## 2020-08-30 LAB
ANION GAP SERPL CALC-SCNC: 13 MMOL/L — SIGNIFICANT CHANGE UP (ref 5–17)
BUN SERPL-MCNC: 7 MG/DL — SIGNIFICANT CHANGE UP (ref 7–23)
CALCIUM SERPL-MCNC: 9.3 MG/DL — SIGNIFICANT CHANGE UP (ref 8.4–10.5)
CHLORIDE SERPL-SCNC: 99 MMOL/L — SIGNIFICANT CHANGE UP (ref 96–108)
CO2 SERPL-SCNC: 23 MMOL/L — SIGNIFICANT CHANGE UP (ref 22–31)
CREAT SERPL-MCNC: 0.79 MG/DL — SIGNIFICANT CHANGE UP (ref 0.5–1.3)
GLUCOSE BLDC GLUCOMTR-MCNC: 174 MG/DL — HIGH (ref 70–99)
GLUCOSE BLDC GLUCOMTR-MCNC: 178 MG/DL — HIGH (ref 70–99)
GLUCOSE SERPL-MCNC: 179 MG/DL — HIGH (ref 70–99)
HCT VFR BLD CALC: 29.8 % — LOW (ref 39–50)
HGB BLD-MCNC: 9.7 G/DL — LOW (ref 13–17)
MAGNESIUM SERPL-MCNC: 1.5 MG/DL — LOW (ref 1.6–2.6)
MCHC RBC-ENTMCNC: 28.4 PG — SIGNIFICANT CHANGE UP (ref 27–34)
MCHC RBC-ENTMCNC: 32.6 GM/DL — SIGNIFICANT CHANGE UP (ref 32–36)
MCV RBC AUTO: 87.4 FL — SIGNIFICANT CHANGE UP (ref 80–100)
NRBC # BLD: 0 /100 WBCS — SIGNIFICANT CHANGE UP (ref 0–0)
PHOSPHATE SERPL-MCNC: 2.8 MG/DL — SIGNIFICANT CHANGE UP (ref 2.5–4.5)
PLATELET # BLD AUTO: 373 K/UL — SIGNIFICANT CHANGE UP (ref 150–400)
POTASSIUM SERPL-MCNC: 3.6 MMOL/L — SIGNIFICANT CHANGE UP (ref 3.5–5.3)
POTASSIUM SERPL-SCNC: 3.6 MMOL/L — SIGNIFICANT CHANGE UP (ref 3.5–5.3)
RBC # BLD: 3.41 M/UL — LOW (ref 4.2–5.8)
RBC # FLD: 14.8 % — HIGH (ref 10.3–14.5)
SODIUM SERPL-SCNC: 135 MMOL/L — SIGNIFICANT CHANGE UP (ref 135–145)
WBC # BLD: 10.61 K/UL — HIGH (ref 3.8–10.5)
WBC # FLD AUTO: 10.61 K/UL — HIGH (ref 3.8–10.5)

## 2020-08-30 PROCEDURE — 83735 ASSAY OF MAGNESIUM: CPT

## 2020-08-30 PROCEDURE — 97116 GAIT TRAINING THERAPY: CPT

## 2020-08-30 PROCEDURE — U0003: CPT

## 2020-08-30 PROCEDURE — 88342 IMHCHEM/IMCYTCHM 1ST ANTB: CPT

## 2020-08-30 PROCEDURE — 82962 GLUCOSE BLOOD TEST: CPT

## 2020-08-30 PROCEDURE — 85610 PROTHROMBIN TIME: CPT

## 2020-08-30 PROCEDURE — 97161 PT EVAL LOW COMPLEX 20 MIN: CPT

## 2020-08-30 PROCEDURE — 82435 ASSAY OF BLOOD CHLORIDE: CPT

## 2020-08-30 PROCEDURE — 80053 COMPREHEN METABOLIC PANEL: CPT

## 2020-08-30 PROCEDURE — 88341 IMHCHEM/IMCYTCHM EA ADD ANTB: CPT

## 2020-08-30 PROCEDURE — 84132 ASSAY OF SERUM POTASSIUM: CPT

## 2020-08-30 PROCEDURE — 85018 HEMOGLOBIN: CPT

## 2020-08-30 PROCEDURE — 83036 HEMOGLOBIN GLYCOSYLATED A1C: CPT

## 2020-08-30 PROCEDURE — 83605 ASSAY OF LACTIC ACID: CPT

## 2020-08-30 PROCEDURE — 85014 HEMATOCRIT: CPT

## 2020-08-30 PROCEDURE — 86901 BLOOD TYPING SEROLOGIC RH(D): CPT

## 2020-08-30 PROCEDURE — 97530 THERAPEUTIC ACTIVITIES: CPT

## 2020-08-30 PROCEDURE — 86900 BLOOD TYPING SEROLOGIC ABO: CPT

## 2020-08-30 PROCEDURE — C1889: CPT

## 2020-08-30 PROCEDURE — 88309 TISSUE EXAM BY PATHOLOGIST: CPT

## 2020-08-30 PROCEDURE — 82330 ASSAY OF CALCIUM: CPT

## 2020-08-30 PROCEDURE — C9399: CPT

## 2020-08-30 PROCEDURE — 85730 THROMBOPLASTIN TIME PARTIAL: CPT

## 2020-08-30 PROCEDURE — 82947 ASSAY GLUCOSE BLOOD QUANT: CPT

## 2020-08-30 PROCEDURE — 82803 BLOOD GASES ANY COMBINATION: CPT

## 2020-08-30 PROCEDURE — 88307 TISSUE EXAM BY PATHOLOGIST: CPT

## 2020-08-30 PROCEDURE — 73630 X-RAY EXAM OF FOOT: CPT | Mod: 26,LT

## 2020-08-30 PROCEDURE — 85027 COMPLETE CBC AUTOMATED: CPT

## 2020-08-30 PROCEDURE — 86850 RBC ANTIBODY SCREEN: CPT

## 2020-08-30 PROCEDURE — 84100 ASSAY OF PHOSPHORUS: CPT

## 2020-08-30 PROCEDURE — 80048 BASIC METABOLIC PNL TOTAL CA: CPT

## 2020-08-30 PROCEDURE — 97110 THERAPEUTIC EXERCISES: CPT

## 2020-08-30 PROCEDURE — 73630 X-RAY EXAM OF FOOT: CPT

## 2020-08-30 PROCEDURE — 73564 X-RAY EXAM KNEE 4 OR MORE: CPT | Mod: 26,RT

## 2020-08-30 PROCEDURE — 88305 TISSUE EXAM BY PATHOLOGIST: CPT

## 2020-08-30 PROCEDURE — 82565 ASSAY OF CREATININE: CPT

## 2020-08-30 PROCEDURE — 73564 X-RAY EXAM KNEE 4 OR MORE: CPT

## 2020-08-30 PROCEDURE — 82378 CARCINOEMBRYONIC ANTIGEN: CPT

## 2020-08-30 PROCEDURE — G0463: CPT

## 2020-08-30 PROCEDURE — 88331 PATH CONSLTJ SURG 1 BLK 1SPC: CPT

## 2020-08-30 PROCEDURE — 84295 ASSAY OF SERUM SODIUM: CPT

## 2020-08-30 RX ORDER — MAGNESIUM SULFATE 500 MG/ML
2 VIAL (ML) INJECTION ONCE
Refills: 0 | Status: COMPLETED | OUTPATIENT
Start: 2020-08-30 | End: 2020-08-30

## 2020-08-30 RX ORDER — IBUPROFEN 200 MG
600 TABLET ORAL EVERY 6 HOURS
Refills: 0 | Status: DISCONTINUED | OUTPATIENT
Start: 2020-08-30 | End: 2020-08-30

## 2020-08-30 RX ADMIN — ENOXAPARIN SODIUM 40 MILLIGRAM(S): 100 INJECTION SUBCUTANEOUS at 12:46

## 2020-08-30 RX ADMIN — Medication 50 GRAM(S): at 12:46

## 2020-08-30 RX ADMIN — Medication 650 MILLIGRAM(S): at 05:22

## 2020-08-30 RX ADMIN — Medication 600 MILLIGRAM(S): at 09:59

## 2020-08-30 RX ADMIN — Medication 1: at 08:58

## 2020-08-30 RX ADMIN — Medication 1: at 12:46

## 2020-08-30 RX ADMIN — Medication 600 MILLIGRAM(S): at 10:30

## 2020-08-30 RX ADMIN — PANTOPRAZOLE SODIUM 40 MILLIGRAM(S): 20 TABLET, DELAYED RELEASE ORAL at 05:59

## 2020-08-30 RX ADMIN — Medication 650 MILLIGRAM(S): at 04:30

## 2020-08-30 NOTE — PROGRESS NOTE ADULT - ASSESSMENT
74 year old male HTN, HLD, DMT2, CAD s/p stent (20 years) on Aspirin (which was discontinued by Dr. Ferreira, last dose taken 8/21), FADI (no longer on CPAP s/p surgery at [facility]). 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 Tumor recurred in June 2020, [place]  [place] [facility] 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 Pt now POD3, S/P exploratory laparotomy, omentectomy, liposarcoma implant removal. Recovering well post operatively on the floor.    Plan:   -Monitor GI function  -advance to FLD,   -DVT ppx  -Monitor labs  -Home PT w/ rolling walker once d/c      [facility] [id number]
74 year old male HTN, HLD, DMT2, CAD s/p stent (20 years) on Aspirin (which was discontinued by Dr. Ferreira, last dose taken 8/21), FADI (no longer on CPAP s/p surgery at [facility]). 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 Tumor recurred in June 2020, [place] [facility] 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 Pt now POD2, S/P exploratory laparotomy, omentectomy, liposarcoma implant removal. Recovering well post operatively on the floor.    Plan:   -PCEA pump in place, PRN acetaminophen IV  - Monitor GI function  - CLD  - DVT ppx  -Monitor labs      [facility] [id number]
A: This is a 74 year old male HTN, HLD, DMT2, CAD s/p stent (20 years) on Aspirin (which was discontinued by Dr. Ferreira, last dose taken 8/21), FADI (no longer on CPAP s/p surgery at [facility]). 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 Tumor recurred in June 2020, underwent  chemo- immunotherapy [place] [facility] [place]. 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 Pt now POD1, S/P exploratory laparotomy, omentectomy, liposarcoma implant removal. Recovering well post operatively on the floor.      Plan:   - PCEA pump in place, PRN acetaminophen IV  - Clark removal in AM   - Monitor GI function  - NPO, IVF  - DVT ppx heparin subq   - monitoring BPs: patient hypotensive during case + post op. Will hold enalapril, BPs in the 90s/60s  - monitoring H/H:  trend 9.8/30.5 post op, 10.5/32.5 6 hours postop, 10.1/32.0 16 hours postop. Stable, routine labs.      [facility] [id number]
A: This is a 74 year old male HTN, HLD, DMT2, CAD s/p stent (20 years) on Aspirin (which was discontinued by Dr. Ferreira, last dose taken 8/21), FADI (no longer on CPAP s/p surgery at [facility]). 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 Tumor recurred in June 2020, underwent  chemo- immunotherapy trial at [facility] [place]. 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 Pt now POD0, S/P exploratory laparotomy, omentectomy, liposarcoma implant removal. Recovering well post operatively on the floor.      Pain: PCEA pump in place, encouraged patient to press button for pain control   : Clark removal tomorrow AM   GI: Monitor GI function  Diet: NPO  DVT: Heparin subq ordered  HTN: Patient hypotensive during case + post op. Will hold enalapril and close vitals monitoring    Yanni Motta PA-C  [facility] [id number]
74 year old male HTN, HLD, DMT2, CAD s/p stent (20 years) on Aspirin (which was discontinued by Dr. Ferreira, last dose taken 8/21), FADI (no longer on CPAP s/p surgery at [facility]). 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 Tumor recurred in June 2020, underwent  chemo- immunotherapy trial [place] [facility] [place]. 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 Pt now POD4, S/P exploratory laparotomy, omentectomy, liposarcoma implant removal. Recovering well post operatively on the floor.    Plan:   -c/w reg diet  - reconsult PT regarding difficult ambulation 2/2 chronic right knee arthritis   -DVT ppx  -Monitor labs  -likely d/c today if no additional PT needs

## 2020-08-30 NOTE — CONSULT NOTE ADULT - SUBJECTIVE AND OBJECTIVE BOX
70y Male community ambulator presents c/o right knee pain for the last 2 days. PDenies numbness/tingling. Denies fever/chills. Denies pain/injury elsewhere. Patient has history of R knee pain and is being manage by an outpatient orthopedist at [facility]. Patient denies any recent trauma or falls to knee. Pt admitted to hospital for abdominal surgery. Pt noted new R knee swelling    HEALTH ISSUES - PROBLEM Dx:  FADI (obstructive sleep apnea)  Malignant tumor of an abdominal organ  DM (diabetes mellitus)  Need for prophylactic measure        PAST MEDICAL & SURGICAL HISTORY:  Sleep apnea  CAD (coronary artery disease)  Hypercholesteremia  Diabetes mellitus: Fasting FS range from - per patient  Hypertension  Intra-abdominal and pelvic swelling, mass and lump, unspecified site  History of colon resection: Dec 2019  H/O sleep apnea: Per patient had Sleep Apnea surgery  in 1996- at [facility]- Was not retested for Sleep Apnea post surgery  History of cardiac cath: Pt reports 1 cardiac stent placed 1999    MEDICATIONS  (STANDING):  atorvastatin 20 milliGRAM(s) Oral at bedtime  dextrose 5%. 1000 milliLiter(s) IV Continuous <Continuous>  dextrose 5%. 1000 milliLiter(s) IV Continuous <Continuous>  dextrose 50% Injectable 12.5 Gram(s) IV Push once  dextrose 50% Injectable 25 Gram(s) IV Push once  dextrose 50% Injectable 25 Gram(s) IV Push once  dextrose 50% Injectable 12.5 Gram(s) IV Push once  dextrose 50% Injectable 25 Gram(s) IV Push once  dextrose 50% Injectable 25 Gram(s) IV Push once  enoxaparin Injectable 40 milliGRAM(s) SubCutaneous daily  insulin lispro (HumaLOG) corrective regimen sliding scale   SubCutaneous three times a day before meals  insulin lispro (HumaLOG) corrective regimen sliding scale   SubCutaneous at bedtime  pantoprazole    Tablet 40 milliGRAM(s) Oral before breakfast    Allergies    oxycodone (Vomiting)    Intolerances                            9.7    10.61 )-----------( 373      ( 30 Aug 2020 07:17 )             29.8     30 Aug 2020 07:17    135    |  99     |  7      ----------------------------<  179    3.6     |  23     |  0.79     Ca    9.3        30 Aug 2020 07:17  Phos  2.8       30 Aug 2020 07:17  Mg     1.5       30 Aug 2020 07:17        Vital Signs Last 24 Hrs  T(C): 36.7 (08-30-20 @ 13:12), Max: 37.6 (08-29-20 @ 21:21)  T(F): 98 (08-30-20 @ 13:12), Max: 99.6 (08-29-20 @ 21:21)  HR: 80 (08-30-20 @ 13:12) (80 - 105)  BP: 119/68 (08-30-20 @ 13:12) (106/67 - 124/76)  BP(mean): --  RR: 18 (08-30-20 @ 13:12) (18 - 18)  SpO2: 97% (08-30-20 @ 13:12) (93% - 98%)    Imaging: XR demonstrates Right OA    Physical Exam  Gen: NAD  Right LE: skin intact, No erythema. AROM limited to:  Swelling. Positive Effusion. Able to SLR, Neg log roll, Negative Heel strike, no ttp elsewhere, +EHL/FHL/TA/GS function, DP/PT pulses palpable, compartments soft. Calf soft, nontender. Ligamentous exam benign: Varus/Valgus/Lockman Negative.      A/P: 70y Male with Right knee Pain due to osteoarthritic effusion     Analgesia  Antiinflammatories as tolerated  WBAT, rolling walker, PT  ICE/Cryotherapy  DVT PE ppx  FU with Orthopedist as outpt  Orthopedically stable for DC  Discussed with Dr. Duran who agrees with above

## 2020-08-30 NOTE — DISCHARGE NOTE NURSING/CASE MANAGEMENT/SOCIAL WORK - PATIENT PORTAL LINK FT
You can access the FollowMyHealth Patient Portal offered by [facility] by registering at the following website: [email or web address]. By joining [facility]’s FollowMyHealth portal, you will also be able to view your health information using other applications (apps) compatible with our system.

## 2020-08-30 NOTE — DISCHARGE NOTE NURSING/CASE MANAGEMENT/SOCIAL WORK - NSDCFUADDAPPT_GEN_ALL_CORE_FT
Please follow up with Orthopedics- Yared Núñez M.D. this week for exacerbation of right knee arthritis. Call [phone] for appt.

## 2020-08-30 NOTE — PROGRESS NOTE ADULT - SUBJECTIVE AND OBJECTIVE BOX
SURGERY PROGRESS NOTE    SUBJECTIVE / 24H EVENTS:  Patient seen and examined on morning rounds. No acute events overnight.  Tolerating Reg diet, Gi fx (-,-), denies n/v, SOB, chest pain. C/o right knee pain,(chronic ~several years)      OBJECTIVE:  VITAL SIGNS:  T(C): 37.1 (08-30-20 @ 01:29), Max: 37.6 (08-29-20 @ 21:21)  HR: 96 (08-30-20 @ 01:29) (81 - 96)  BP: 124/76 (08-30-20 @ 01:29) (113/74 - 124/76)  RR: 18 (08-30-20 @ 01:29) (18 - 18)  SpO2: 95% (08-30-20 @ 01:29) (93% - 96%)  Daily     Daily   POCT Blood Glucose.: 174 mg/dL (08-29-20 @ 21:18)  POCT Blood Glucose.: 154 mg/dL (08-29-20 @ 17:56)  POCT Blood Glucose.: 161 mg/dL (08-29-20 @ 13:11)      Physical Exam:  General Appearance: NAD, A&O x3  Respiratory: Normal chest expansion, no labored breathing noted  Gastrointestinal: abdomen soft, nontender, non distended, dressing midline c/d/i, changed  Extremities: warm and well perfused, no cyanosis, right knee mild edema, tender, (-) warmth, (-) erythema    08-28-20 @ 07:01  -  08-29-20 @ 07:00  --------------------------------------------------------  IN:    dextrose 5% + sodium chloride 0.45%.: 960 mL    IV PiggyBack: 300 mL    Oral Fluid: 1460 mL  Total IN: 2720 mL    OUT:    Voided: 3700 mL  Total OUT: 3700 mL    Total NET: -980 mL      08-29-20 @ 07:01  -  08-30-20 @ 06:25  --------------------------------------------------------  IN:    IV PiggyBack: 100 mL    Oral Fluid: 1260 mL  Total IN: 1360 mL    OUT:    Voided: 2025 mL  Total OUT: 2025 mL    Total NET: -665 mL          LAB VALUES:  08-29    137  |  100  |  5<L>  ----------------------------<  153<H>  4.3   |  26  |  0.96    Ca    9.5      29 Aug 2020 07:12  Phos  2.9     08-29  Mg     1.6     08-29    TPro  6.2  /  Alb  3.2<L>  /  TBili  0.3  /  DBili  x   /  AST  27  /  ALT  32  /  AlkPhos  79  08-28                               9.7    6.74  )-----------( 353      ( 29 Aug 2020 07:12 )             30.7     LIVER FUNCTIONS - ( 28 Aug 2020 07:07 )  Alb: 3.2 g/dL / Pro: 6.2 g/dL / ALK PHOS: 79 U/L / ALT: 32 U/L / AST: 27 U/L / GGT: x           PT/INR - ( 28 Aug 2020 08:55 )   PT: 13.9 sec;   INR: 1.19 ratio         PTT - ( 28 Aug 2020 08:55 )  PTT:31.7 sec            MICROBIOLOGY:      RADIOLOGY:        MEDICATIONS  (STANDING):  atorvastatin 20 milliGRAM(s) Oral at bedtime  dextrose 5%. 1000 milliLiter(s) (50 mL/Hr) IV Continuous <Continuous>  dextrose 5%. 1000 milliLiter(s) (50 mL/Hr) IV Continuous <Continuous>  dextrose 50% Injectable 12.5 Gram(s) IV Push once  dextrose 50% Injectable 25 Gram(s) IV Push once  dextrose 50% Injectable 25 Gram(s) IV Push once  dextrose 50% Injectable 12.5 Gram(s) IV Push once  dextrose 50% Injectable 25 Gram(s) IV Push once  dextrose 50% Injectable 25 Gram(s) IV Push once  enoxaparin Injectable 40 milliGRAM(s) SubCutaneous daily  insulin lispro (HumaLOG) corrective regimen sliding scale   SubCutaneous three times a day before meals  insulin lispro (HumaLOG) corrective regimen sliding scale   SubCutaneous at bedtime  pantoprazole    Tablet 40 milliGRAM(s) Oral before breakfast    MEDICATIONS  (PRN):  acetaminophen   Tablet .. 650 milliGRAM(s) Oral every 6 hours PRN Mild Pain (1 - 3)  dextrose 40% Gel 15 Gram(s) Oral once PRN Blood Glucose LESS THAN 70 milliGRAM(s)/deciliter  dextrose 40% Gel 15 Gram(s) Oral once PRN Blood Glucose LESS THAN 70 milliGRAM(s)/deciliter  glucagon  Injectable 1 milliGRAM(s) IntraMuscular once PRN Glucose LESS THAN 70 milligrams/deciliter  glucagon  Injectable 1 milliGRAM(s) IntraMuscular once PRN Glucose LESS THAN 70 milligrams/deciliter  ondansetron Injectable 4 milliGRAM(s) IV Push every 6 hours PRN Nausea SURGERY PROGRESS NOTE    SUBJECTIVE / 24H EVENTS:  Patient seen and examined on morning rounds. No acute events overnight.  Tolerating Reg diet, Gi fx (-,-), denies n/v, SOB, chest pain. C/o right knee pain,(chronic ~several years).      OBJECTIVE:  VITAL SIGNS:  T(C): 37.1 (08-30-20 @ 01:29), Max: 37.6 (08-29-20 @ 21:21)  HR: 96 (08-30-20 @ 01:29) (81 - 96)  BP: 124/76 (08-30-20 @ 01:29) (113/74 - 124/76)  RR: 18 (08-30-20 @ 01:29) (18 - 18)  SpO2: 95% (08-30-20 @ 01:29) (93% - 96%)  Daily     Daily   POCT Blood Glucose.: 174 mg/dL (08-29-20 @ 21:18)  POCT Blood Glucose.: 154 mg/dL (08-29-20 @ 17:56)  POCT Blood Glucose.: 161 mg/dL (08-29-20 @ 13:11)      Physical Exam:  General Appearance: NAD, A&O x3  Respiratory: Normal chest expansion, no labored breathing noted  Gastrointestinal: abdomen soft, nontender, non distended, dressing midline c/d/i, changed  Extremities: warm and well perfused, no cyanosis, right knee mild edema, tender, (-) warmth, (-) erythema    08-28-20 @ 07:01  -  08-29-20 @ 07:00  --------------------------------------------------------  IN:    dextrose 5% + sodium chloride 0.45%.: 960 mL    IV PiggyBack: 300 mL    Oral Fluid: 1460 mL  Total IN: 2720 mL    OUT:    Voided: 3700 mL  Total OUT: 3700 mL    Total NET: -980 mL      08-29-20 @ 07:01  -  08-30-20 @ 06:25  --------------------------------------------------------  IN:    IV PiggyBack: 100 mL    Oral Fluid: 1260 mL  Total IN: 1360 mL    OUT:    Voided: 2025 mL  Total OUT: 2025 mL    Total NET: -665 mL          LAB VALUES:  08-29    137  |  100  |  5<L>  ----------------------------<  153<H>  4.3   |  26  |  0.96    Ca    9.5      29 Aug 2020 07:12  Phos  2.9     08-29  Mg     1.6     08-29    TPro  6.2  /  Alb  3.2<L>  /  TBili  0.3  /  DBili  x   /  AST  27  /  ALT  32  /  AlkPhos  79  08-28                               9.7    6.74  )-----------( 353      ( 29 Aug 2020 07:12 )             30.7     LIVER FUNCTIONS - ( 28 Aug 2020 07:07 )  Alb: 3.2 g/dL / Pro: 6.2 g/dL / ALK PHOS: 79 U/L / ALT: 32 U/L / AST: 27 U/L / GGT: x           PT/INR - ( 28 Aug 2020 08:55 )   PT: 13.9 sec;   INR: 1.19 ratio         PTT - ( 28 Aug 2020 08:55 )  PTT:31.7 sec            MICROBIOLOGY:      RADIOLOGY:        MEDICATIONS  (STANDING):  atorvastatin 20 milliGRAM(s) Oral at bedtime  dextrose 5%. 1000 milliLiter(s) (50 mL/Hr) IV Continuous <Continuous>  dextrose 5%. 1000 milliLiter(s) (50 mL/Hr) IV Continuous <Continuous>  dextrose 50% Injectable 12.5 Gram(s) IV Push once  dextrose 50% Injectable 25 Gram(s) IV Push once  dextrose 50% Injectable 25 Gram(s) IV Push once  dextrose 50% Injectable 12.5 Gram(s) IV Push once  dextrose 50% Injectable 25 Gram(s) IV Push once  dextrose 50% Injectable 25 Gram(s) IV Push once  enoxaparin Injectable 40 milliGRAM(s) SubCutaneous daily  insulin lispro (HumaLOG) corrective regimen sliding scale   SubCutaneous three times a day before meals  insulin lispro (HumaLOG) corrective regimen sliding scale   SubCutaneous at bedtime  pantoprazole    Tablet 40 milliGRAM(s) Oral before breakfast    MEDICATIONS  (PRN):  acetaminophen   Tablet .. 650 milliGRAM(s) Oral every 6 hours PRN Mild Pain (1 - 3)  dextrose 40% Gel 15 Gram(s) Oral once PRN Blood Glucose LESS THAN 70 milliGRAM(s)/deciliter  dextrose 40% Gel 15 Gram(s) Oral once PRN Blood Glucose LESS THAN 70 milliGRAM(s)/deciliter  glucagon  Injectable 1 milliGRAM(s) IntraMuscular once PRN Glucose LESS THAN 70 milligrams/deciliter  glucagon  Injectable 1 milliGRAM(s) IntraMuscular once PRN Glucose LESS THAN 70 milligrams/deciliter  ondansetron Injectable 4 milliGRAM(s) IV Push every 6 hours PRN Nausea SURGERY PROGRESS NOTE    SUBJECTIVE / 24H EVENTS:  Patient seen and examined on morning rounds. No acute events overnight.  Tolerating Reg diet, GI fx (+,+), liquid BM. denies n/v, SOB, chest pain. C/o right knee pain,(chronic ~several years).      OBJECTIVE:  VITAL SIGNS:  T(C): 37.1 (08-30-20 @ 01:29), Max: 37.6 (08-29-20 @ 21:21)  HR: 96 (08-30-20 @ 01:29) (81 - 96)  BP: 124/76 (08-30-20 @ 01:29) (113/74 - 124/76)  RR: 18 (08-30-20 @ 01:29) (18 - 18)  SpO2: 95% (08-30-20 @ 01:29) (93% - 96%)  Daily     Daily   POCT Blood Glucose.: 174 mg/dL (08-29-20 @ 21:18)  POCT Blood Glucose.: 154 mg/dL (08-29-20 @ 17:56)  POCT Blood Glucose.: 161 mg/dL (08-29-20 @ 13:11)      Physical Exam:  General Appearance: NAD, A&O x3  Respiratory: Normal chest expansion, no labored breathing noted  Gastrointestinal: abdomen soft, nontender, non distended, dressing midline c/d/i, changed  Extremities: warm and well perfused, no cyanosis, right knee mild edema, tender, (-) warmth, (-) erythema    08-28-20 @ 07:01  -  08-29-20 @ 07:00  --------------------------------------------------------  IN:    dextrose 5% + sodium chloride 0.45%.: 960 mL    IV PiggyBack: 300 mL    Oral Fluid: 1460 mL  Total IN: 2720 mL    OUT:    Voided: 3700 mL  Total OUT: 3700 mL    Total NET: -980 mL      08-29-20 @ 07:01  -  08-30-20 @ 06:25  --------------------------------------------------------  IN:    IV PiggyBack: 100 mL    Oral Fluid: 1260 mL  Total IN: 1360 mL    OUT:    Voided: 2025 mL  Total OUT: 2025 mL    Total NET: -665 mL          LAB VALUES:  08-29    137  |  100  |  5<L>  ----------------------------<  153<H>  4.3   |  26  |  0.96    Ca    9.5      29 Aug 2020 07:12  Phos  2.9     08-29  Mg     1.6     08-29    TPro  6.2  /  Alb  3.2<L>  /  TBili  0.3  /  DBili  x   /  AST  27  /  ALT  32  /  AlkPhos  79  08-28                               9.7    6.74  )-----------( 353      ( 29 Aug 2020 07:12 )             30.7     LIVER FUNCTIONS - ( 28 Aug 2020 07:07 )  Alb: 3.2 g/dL / Pro: 6.2 g/dL / ALK PHOS: 79 U/L / ALT: 32 U/L / AST: 27 U/L / GGT: x           PT/INR - ( 28 Aug 2020 08:55 )   PT: 13.9 sec;   INR: 1.19 ratio         PTT - ( 28 Aug 2020 08:55 )  PTT:31.7 sec            MICROBIOLOGY:      RADIOLOGY:        MEDICATIONS  (STANDING):  atorvastatin 20 milliGRAM(s) Oral at bedtime  dextrose 5%. 1000 milliLiter(s) (50 mL/Hr) IV Continuous <Continuous>  dextrose 5%. 1000 milliLiter(s) (50 mL/Hr) IV Continuous <Continuous>  dextrose 50% Injectable 12.5 Gram(s) IV Push once  dextrose 50% Injectable 25 Gram(s) IV Push once  dextrose 50% Injectable 25 Gram(s) IV Push once  dextrose 50% Injectable 12.5 Gram(s) IV Push once  dextrose 50% Injectable 25 Gram(s) IV Push once  dextrose 50% Injectable 25 Gram(s) IV Push once  enoxaparin Injectable 40 milliGRAM(s) SubCutaneous daily  insulin lispro (HumaLOG) corrective regimen sliding scale   SubCutaneous three times a day before meals  insulin lispro (HumaLOG) corrective regimen sliding scale   SubCutaneous at bedtime  pantoprazole    Tablet 40 milliGRAM(s) Oral before breakfast    MEDICATIONS  (PRN):  acetaminophen   Tablet .. 650 milliGRAM(s) Oral every 6 hours PRN Mild Pain (1 - 3)  dextrose 40% Gel 15 Gram(s) Oral once PRN Blood Glucose LESS THAN 70 milliGRAM(s)/deciliter  dextrose 40% Gel 15 Gram(s) Oral once PRN Blood Glucose LESS THAN 70 milliGRAM(s)/deciliter  glucagon  Injectable 1 milliGRAM(s) IntraMuscular once PRN Glucose LESS THAN 70 milligrams/deciliter  glucagon  Injectable 1 milliGRAM(s) IntraMuscular once PRN Glucose LESS THAN 70 milligrams/deciliter  ondansetron Injectable 4 milliGRAM(s) IV Push every 6 hours PRN Nausea

## 2020-09-04 LAB — SURGICAL PATHOLOGY STUDY: SIGNIFICANT CHANGE UP

## 2020-09-09 NOTE — H&P ADULT - REASON FOR ADMISSION
----- Message from Whitney King sent at 9/9/2020  8:49 AM CDT -----  Please call pt @ [phone] regarding an order to get labs before appt on 9/17     Resection of Abdominal Mass

## 2020-09-10 ENCOUNTER — APPOINTMENT (OUTPATIENT)
Dept: SURGICAL ONCOLOGY | Facility: CLINIC | Age: 70
End: 2020-09-10
Payer: MEDICARE

## 2020-09-10 VITALS
RESPIRATION RATE: 16 BRPM | HEIGHT: 69 IN | OXYGEN SATURATION: 97 % | WEIGHT: 171 LBS | HEART RATE: 78 BPM | BODY MASS INDEX: 25.33 KG/M2 | DIASTOLIC BLOOD PRESSURE: 67 MMHG | SYSTOLIC BLOOD PRESSURE: 107 MMHG

## 2020-09-10 PROCEDURE — 99214 OFFICE O/P EST MOD 30 MIN: CPT | Mod: 24

## 2020-09-10 NOTE — REASON FOR VISIT
[Post-Op] : a post-op for [FreeTextEntry2] : s/p resection of abdominal masses, portal node dissection and wedge resection of the liver on 8/26/2020.

## 2020-09-10 NOTE — CONSULT LETTER
[Dear  ___] : Dear  [unfilled], [Courtesy Letter:] : I had the pleasure of seeing your patient, [unfilled], in my office today. [Please see my note below.] : Please see my note below. [Consult Closing:] : Thank you very much for allowing me to participate in the care of this patient.  If you have any questions, please do not hesitate to contact me. [Sincerely,] : Sincerely, [FreeTextEntry1] : I will keep you informed of my follow-up. [DrArnulfo  ___] : Dr. RUSSO [FreeTextEntry3] : Shadi Ferreira MD FACS\par Chief of Surgical Oncology\par \par  [DrArnulfo ___] : Dr. RUSSO

## 2020-09-10 NOTE — ASSESSMENT
[FreeTextEntry1] : Impression:\par s/p resection of 25cm abdominal mass, portal node dissection and wedge resection of the liver on 8/26/2020.  Final path demonstrated a dedifferentiated liposarcoma.  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  No abnormality noted in lymph node.\par \par \par \par Plan:\par check foundation one results\par Medical oncology evaluation for  adjuvant therapy\par \par

## 2020-09-10 NOTE — HISTORY OF PRESENT ILLNESS
[de-identified] : Mckay is a 70 year old female who presents today for his initial post-op visit.  He is s/p resection of 25cm abdominal mass, portal node dissection and wedge resection of the liver on 8/26/2020.  Final path demonstrated a dedifferentiated liposarcoma.  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  No abnormality noted in lymph node.\par \par PRIOR HISTORY:\par 69 YO male S/P resection of a huge abdominal liposarcoma The tumor recurred locally in June, 2020. He has been undergoing a trial treatment at [facility] but has had tumor progression.\par \par He now presents to discus surgery.\par \par CT shows 3 lesions in the abdomen which have all increased in size during the treatment. No obstruction; Chest CT is negative. \par

## 2020-09-10 NOTE — PHYSICAL EXAM
[Normal] : well developed, well nourished, in no acute distress [de-identified] : Incision healed nicely; no massses

## 2020-09-21 ENCOUNTER — OUTPATIENT (OUTPATIENT)
Dept: OUTPATIENT SERVICES | Facility: HOSPITAL | Age: 70
LOS: 1 days | End: 2020-09-21
Payer: MEDICARE

## 2020-09-21 ENCOUNTER — APPOINTMENT (OUTPATIENT)
Dept: CT IMAGING | Facility: CLINIC | Age: 70
End: 2020-09-21
Payer: MEDICARE

## 2020-09-21 DIAGNOSIS — C49.9 MALIGNANT NEOPLASM OF CONNECTIVE AND SOFT TISSUE, UNSPECIFIED: ICD-10-CM

## 2020-09-21 DIAGNOSIS — Z90.49 ACQUIRED ABSENCE OF OTHER SPECIFIED PARTS OF DIGESTIVE TRACT: Chronic | ICD-10-CM

## 2020-09-21 DIAGNOSIS — Z98.890 OTHER SPECIFIED POSTPROCEDURAL STATES: Chronic | ICD-10-CM

## 2020-09-21 DIAGNOSIS — Z86.69 PERSONAL HISTORY OF OTHER DISEASES OF THE NERVOUS SYSTEM AND SENSE ORGANS: Chronic | ICD-10-CM

## 2020-09-21 PROCEDURE — 74177 CT ABD & PELVIS W/CONTRAST: CPT

## 2020-09-21 PROCEDURE — 74177 CT ABD & PELVIS W/CONTRAST: CPT | Mod: 26

## 2020-09-21 PROCEDURE — 71260 CT THORAX DX C+: CPT

## 2020-09-21 PROCEDURE — 71260 CT THORAX DX C+: CPT | Mod: 26

## 2020-09-30 DIAGNOSIS — K57.10 DIVERTICULOSIS OF SMALL INTESTINE WITHOUT PERFORATION OR ABSCESS WITHOUT BLEEDING: ICD-10-CM

## 2020-09-30 DIAGNOSIS — N40.0 BENIGN PROSTATIC HYPERPLASIA WITHOUT LOWER URINARY TRACT SYMPTOMS: ICD-10-CM

## 2020-09-30 DIAGNOSIS — Z85.9 PERSONAL HISTORY OF MALIGNANT NEOPLASM, UNSPECIFIED: ICD-10-CM

## 2020-09-30 DIAGNOSIS — K44.9 DIAPHRAGMATIC HERNIA WITHOUT OBSTRUCTION OR GANGRENE: ICD-10-CM

## 2020-10-06 NOTE — PRE-ANESTHESIA EVALUATION ADULT - MALLAMPATI CLASS
----- Message from Peri Obando NP sent at 10/6/2020 12:17 PM CDT -----  Please let patient and her daughter know that her TSH level is therapeutic.  Continue with current plan of care.  Thank you   Class II - visualization of the soft palate, fauces, and uvula

## 2020-12-01 ENCOUNTER — APPOINTMENT (OUTPATIENT)
Dept: CT IMAGING | Facility: CLINIC | Age: 70
End: 2020-12-01
Payer: MEDICARE

## 2020-12-01 ENCOUNTER — OUTPATIENT (OUTPATIENT)
Dept: OUTPATIENT SERVICES | Facility: HOSPITAL | Age: 70
LOS: 1 days | End: 2020-12-01
Payer: MEDICARE

## 2020-12-01 DIAGNOSIS — Z86.69 PERSONAL HISTORY OF OTHER DISEASES OF THE NERVOUS SYSTEM AND SENSE ORGANS: Chronic | ICD-10-CM

## 2020-12-01 DIAGNOSIS — C48.0 MALIGNANT NEOPLASM OF RETROPERITONEUM: ICD-10-CM

## 2020-12-01 DIAGNOSIS — Z98.890 OTHER SPECIFIED POSTPROCEDURAL STATES: Chronic | ICD-10-CM

## 2020-12-01 DIAGNOSIS — Z90.49 ACQUIRED ABSENCE OF OTHER SPECIFIED PARTS OF DIGESTIVE TRACT: Chronic | ICD-10-CM

## 2020-12-01 DIAGNOSIS — Z00.8 ENCOUNTER FOR OTHER GENERAL EXAMINATION: ICD-10-CM

## 2020-12-01 PROCEDURE — 74177 CT ABD & PELVIS W/CONTRAST: CPT

## 2020-12-01 PROCEDURE — 82565 ASSAY OF CREATININE: CPT

## 2020-12-01 PROCEDURE — 71260 CT THORAX DX C+: CPT | Mod: 26

## 2020-12-01 PROCEDURE — 71260 CT THORAX DX C+: CPT

## 2020-12-01 PROCEDURE — 74177 CT ABD & PELVIS W/CONTRAST: CPT | Mod: 26

## 2020-12-10 ENCOUNTER — APPOINTMENT (OUTPATIENT)
Dept: SURGICAL ONCOLOGY | Facility: CLINIC | Age: 70
End: 2020-12-10

## 2021-01-01 ENCOUNTER — APPOINTMENT (OUTPATIENT)
Dept: CT IMAGING | Facility: CLINIC | Age: 71
End: 2021-01-01
Payer: MEDICARE

## 2021-01-01 ENCOUNTER — OUTPATIENT (OUTPATIENT)
Dept: OUTPATIENT SERVICES | Facility: HOSPITAL | Age: 71
LOS: 1 days | End: 2021-01-01
Payer: MEDICARE

## 2021-01-01 ENCOUNTER — TRANSCRIPTION ENCOUNTER (OUTPATIENT)
Age: 71
End: 2021-01-01

## 2021-01-01 DIAGNOSIS — C78.6 SECONDARY MALIGNANT NEOPLASM OF RETROPERITONEUM AND PERITONEUM: ICD-10-CM

## 2021-01-01 DIAGNOSIS — K44.9 DIAPHRAGMATIC HERNIA WITHOUT OBSTRUCTION OR GANGRENE: ICD-10-CM

## 2021-01-01 DIAGNOSIS — Z90.49 ACQUIRED ABSENCE OF OTHER SPECIFIED PARTS OF DIGESTIVE TRACT: Chronic | ICD-10-CM

## 2021-01-01 DIAGNOSIS — C49.9 MALIGNANT NEOPLASM OF CONNECTIVE AND SOFT TISSUE, UNSPECIFIED: ICD-10-CM

## 2021-01-01 DIAGNOSIS — Z86.69 PERSONAL HISTORY OF OTHER DISEASES OF THE NERVOUS SYSTEM AND SENSE ORGANS: Chronic | ICD-10-CM

## 2021-01-01 DIAGNOSIS — Z00.8 ENCOUNTER FOR OTHER GENERAL EXAMINATION: ICD-10-CM

## 2021-01-01 DIAGNOSIS — K57.10 DIVERTICULOSIS OF SMALL INTESTINE WITHOUT PERFORATION OR ABSCESS WITHOUT BLEEDING: ICD-10-CM

## 2021-01-01 DIAGNOSIS — Z98.890 OTHER SPECIFIED POSTPROCEDURAL STATES: Chronic | ICD-10-CM

## 2021-01-01 DIAGNOSIS — Z00.00 ENCOUNTER FOR GENERAL ADULT MEDICAL EXAMINATION WITHOUT ABNORMAL FINDINGS: ICD-10-CM

## 2021-01-01 PROCEDURE — 74177 CT ABD & PELVIS W/CONTRAST: CPT | Mod: MH

## 2021-01-01 PROCEDURE — 74177 CT ABD & PELVIS W/CONTRAST: CPT | Mod: 26,MH

## 2021-01-01 PROCEDURE — 82565 ASSAY OF CREATININE: CPT

## 2021-01-01 PROCEDURE — 74177 CT ABD & PELVIS W/CONTRAST: CPT

## 2021-01-01 PROCEDURE — 71260 CT THORAX DX C+: CPT | Mod: 26,MH

## 2021-01-01 PROCEDURE — 71260 CT THORAX DX C+: CPT

## 2021-04-01 ENCOUNTER — OUTPATIENT (OUTPATIENT)
Dept: OUTPATIENT SERVICES | Facility: HOSPITAL | Age: 71
LOS: 1 days | End: 2021-04-01
Payer: MEDICARE

## 2021-04-01 ENCOUNTER — APPOINTMENT (OUTPATIENT)
Dept: CT IMAGING | Facility: CLINIC | Age: 71
End: 2021-04-01
Payer: MEDICARE

## 2021-04-01 DIAGNOSIS — Z90.49 ACQUIRED ABSENCE OF OTHER SPECIFIED PARTS OF DIGESTIVE TRACT: Chronic | ICD-10-CM

## 2021-04-01 DIAGNOSIS — Z98.890 OTHER SPECIFIED POSTPROCEDURAL STATES: Chronic | ICD-10-CM

## 2021-04-01 DIAGNOSIS — Z86.69 PERSONAL HISTORY OF OTHER DISEASES OF THE NERVOUS SYSTEM AND SENSE ORGANS: Chronic | ICD-10-CM

## 2021-04-01 DIAGNOSIS — Z00.8 ENCOUNTER FOR OTHER GENERAL EXAMINATION: ICD-10-CM

## 2021-04-01 PROCEDURE — 71260 CT THORAX DX C+: CPT

## 2021-04-01 PROCEDURE — 82565 ASSAY OF CREATININE: CPT

## 2021-04-01 PROCEDURE — 74177 CT ABD & PELVIS W/CONTRAST: CPT | Mod: 26,MH

## 2021-04-01 PROCEDURE — 74177 CT ABD & PELVIS W/CONTRAST: CPT

## 2021-04-01 PROCEDURE — 71260 CT THORAX DX C+: CPT | Mod: 26,MH

## 2021-04-08 ENCOUNTER — APPOINTMENT (OUTPATIENT)
Dept: SURGICAL ONCOLOGY | Facility: CLINIC | Age: 71
End: 2021-04-08
Payer: MEDICARE

## 2021-04-08 VITALS
HEIGHT: 69 IN | SYSTOLIC BLOOD PRESSURE: 116 MMHG | DIASTOLIC BLOOD PRESSURE: 74 MMHG | OXYGEN SATURATION: 98 % | BODY MASS INDEX: 26.36 KG/M2 | WEIGHT: 178 LBS | HEART RATE: 70 BPM | TEMPERATURE: 98.1 F | RESPIRATION RATE: 16 BRPM

## 2021-04-08 DIAGNOSIS — C49.9 MALIGNANT NEOPLASM OF CONNECTIVE AND SOFT TISSUE, UNSPECIFIED: ICD-10-CM

## 2021-04-08 PROCEDURE — 99214 OFFICE O/P EST MOD 30 MIN: CPT

## 2021-04-08 NOTE — HISTORY OF PRESENT ILLNESS
[de-identified] : Mckay is a 70 year old female who presents today for his follow-up visit.  He is s/p resection of 25cm abdominal mass, portal node dissection and wedge resection of the liver on 8/26/2020.  Final path demonstrated a dedifferentiated liposarcoma.  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  No abnormality noted in lymph node.\par \par He is currently under the care of two medicall oncologists Dr. Man and Dr. Farley ([facility]).\par \par CT Chest/Abdomen/Pelvis 4/1/21: Multiple new regions of omental and mesenteric disease seen.Stable serosal implant along the inferior tip the right hepatic lobe. Scattered hazy ground glass changes and linear changes seen in the periphery of the lungs questioned the patient had a recent history of Covid.\par \par PRIOR HISTORY:\par 69 YO male S/P resection of a huge abdominal liposarcoma The tumor recurred locally in June, 2020. He has been undergoing a trial treatment at [facility] but has had tumor progression.\par \par \par CT shows 3 lesions in the abdomen which have all increased in size during the treatment. No obstruction; Chest CT is negative. \par \par he has had progression of disease on Ibrance so he is starting on doxil.\par

## 2021-04-08 NOTE — REASON FOR VISIT
[Follow-Up Visit] : a follow-up visit for [FreeTextEntry2] : s/p resection of abdominal masses, portal node dissection and wedge resection the liver on 8/26/2020

## 2021-04-08 NOTE — ASSESSMENT
[FreeTextEntry1] : Impression:\par s/p resection of 25cm abdominal mass, portal node dissection and wedge resection of the liver on 8/26/2020.  Final path demonstrated a dedifferentiated liposarcoma.  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  No abnormality noted in lymph node.\par CT Chest/Abdomen/Pelvis 4/1/21: Multiple new regions of omental and mesenteric disease seen.\par Stable serosal implant along the inferior tip the right hepatic lobe. Scattered hazy groundglass changes and linear changes seen in the periphery of the lungs questioned the patient had a recent history of Covid\par \par now starting Doxil for progression of disease on Ibrance\par \par Plan:\par RTO 3 months\par Imaging as per Dr. Man\par \par

## 2021-04-08 NOTE — PHYSICAL EXAM
[Normal] : supple, no neck mass and thyroid not enlarged [Normal Supraclavicular Lymph Nodes] : normal supraclavicular lymph nodes [Normal Groin Lymph Nodes] : normal groin lymph nodes [Normal] : oriented to person, place and time, with appropriate affect [de-identified] : Abdomen soft without any palpable masses

## 2022-01-01 ENCOUNTER — APPOINTMENT (OUTPATIENT)
Dept: HEMATOLOGY ONCOLOGY | Facility: CLINIC | Age: 72
End: 2022-01-01

## 2022-01-01 ENCOUNTER — APPOINTMENT (OUTPATIENT)
Dept: ULTRASOUND IMAGING | Facility: CLINIC | Age: 72
End: 2022-01-01

## 2022-01-01 ENCOUNTER — OUTPATIENT (OUTPATIENT)
Dept: OUTPATIENT SERVICES | Facility: HOSPITAL | Age: 72
LOS: 1 days | Discharge: HOME | End: 2022-01-01

## 2022-01-01 ENCOUNTER — INPATIENT (INPATIENT)
Facility: HOSPITAL | Age: 72
LOS: 1 days | Discharge: HOME | End: 2022-01-26
Attending: INTERNAL MEDICINE | Admitting: INTERNAL MEDICINE
Payer: MEDICARE

## 2022-01-01 ENCOUNTER — INPATIENT (INPATIENT)
Facility: HOSPITAL | Age: 72
LOS: 16 days | Discharge: HOME HEALTH | End: 2022-04-21
Attending: INTERNAL MEDICINE | Admitting: INTERNAL MEDICINE
Payer: MEDICARE

## 2022-01-01 ENCOUNTER — APPOINTMENT (OUTPATIENT)
Dept: CT IMAGING | Facility: CLINIC | Age: 72
End: 2022-01-01
Payer: MEDICARE

## 2022-01-01 ENCOUNTER — OUTPATIENT (OUTPATIENT)
Dept: OUTPATIENT SERVICES | Facility: HOSPITAL | Age: 72
LOS: 1 days | End: 2022-01-01
Payer: MEDICARE

## 2022-01-01 ENCOUNTER — TRANSCRIPTION ENCOUNTER (OUTPATIENT)
Age: 72
End: 2022-01-01

## 2022-01-01 ENCOUNTER — INPATIENT (INPATIENT)
Facility: HOSPITAL | Age: 72
LOS: 7 days | Discharge: HOME HEALTH | End: 2022-05-04
Attending: INTERNAL MEDICINE | Admitting: INTERNAL MEDICINE
Payer: MEDICARE

## 2022-01-01 ENCOUNTER — RESULT REVIEW (OUTPATIENT)
Age: 72
End: 2022-01-01

## 2022-01-01 ENCOUNTER — INPATIENT (INPATIENT)
Facility: HOSPITAL | Age: 72
LOS: 5 days | Discharge: EXPIRED | End: 2022-05-11
Attending: INTERNAL MEDICINE | Admitting: INTERNAL MEDICINE
Payer: MEDICARE

## 2022-01-01 VITALS
TEMPERATURE: 98 F | SYSTOLIC BLOOD PRESSURE: 85 MMHG | OXYGEN SATURATION: 95 % | HEIGHT: 68 IN | DIASTOLIC BLOOD PRESSURE: 51 MMHG | RESPIRATION RATE: 18 BRPM | HEART RATE: 95 BPM

## 2022-01-01 VITALS
OXYGEN SATURATION: 100 % | SYSTOLIC BLOOD PRESSURE: 94 MMHG | TEMPERATURE: 98 F | DIASTOLIC BLOOD PRESSURE: 53 MMHG | HEART RATE: 103 BPM

## 2022-01-01 VITALS
DIASTOLIC BLOOD PRESSURE: 51 MMHG | TEMPERATURE: 98 F | HEIGHT: 68 IN | RESPIRATION RATE: 18 BRPM | SYSTOLIC BLOOD PRESSURE: 86 MMHG | OXYGEN SATURATION: 100 % | HEART RATE: 103 BPM

## 2022-01-01 VITALS
TEMPERATURE: 98 F | RESPIRATION RATE: 18 BRPM | SYSTOLIC BLOOD PRESSURE: 95 MMHG | DIASTOLIC BLOOD PRESSURE: 57 MMHG | OXYGEN SATURATION: 100 % | HEART RATE: 81 BPM

## 2022-01-01 VITALS
TEMPERATURE: 98 F | SYSTOLIC BLOOD PRESSURE: 91 MMHG | HEIGHT: 68 IN | DIASTOLIC BLOOD PRESSURE: 51 MMHG | OXYGEN SATURATION: 100 % | HEART RATE: 99 BPM | RESPIRATION RATE: 16 BRPM

## 2022-01-01 VITALS
HEART RATE: 72 BPM | TEMPERATURE: 99 F | RESPIRATION RATE: 18 BRPM | SYSTOLIC BLOOD PRESSURE: 100 MMHG | DIASTOLIC BLOOD PRESSURE: 60 MMHG | OXYGEN SATURATION: 96 %

## 2022-01-01 VITALS
SYSTOLIC BLOOD PRESSURE: 82 MMHG | RESPIRATION RATE: 17 BRPM | OXYGEN SATURATION: 100 % | HEART RATE: 95 BPM | DIASTOLIC BLOOD PRESSURE: 52 MMHG

## 2022-01-01 VITALS — HEIGHT: 69 IN

## 2022-01-01 DIAGNOSIS — Z98.890 OTHER SPECIFIED POSTPROCEDURAL STATES: Chronic | ICD-10-CM

## 2022-01-01 DIAGNOSIS — R11.2 NAUSEA WITH VOMITING, UNSPECIFIED: ICD-10-CM

## 2022-01-01 DIAGNOSIS — D63.0 ANEMIA IN NEOPLASTIC DISEASE: ICD-10-CM

## 2022-01-01 DIAGNOSIS — C49.9 MALIGNANT NEOPLASM OF CONNECTIVE AND SOFT TISSUE, UNSPECIFIED: ICD-10-CM

## 2022-01-01 DIAGNOSIS — Z51.5 ENCOUNTER FOR PALLIATIVE CARE: ICD-10-CM

## 2022-01-01 DIAGNOSIS — I26.99 OTHER PULMONARY EMBOLISM WITHOUT ACUTE COR PULMONALE: ICD-10-CM

## 2022-01-01 DIAGNOSIS — D64.9 ANEMIA, UNSPECIFIED: ICD-10-CM

## 2022-01-01 DIAGNOSIS — D70.9 NEUTROPENIA, UNSPECIFIED: ICD-10-CM

## 2022-01-01 DIAGNOSIS — R53.81 OTHER MALAISE: ICD-10-CM

## 2022-01-01 DIAGNOSIS — Z90.49 ACQUIRED ABSENCE OF OTHER SPECIFIED PARTS OF DIGESTIVE TRACT: Chronic | ICD-10-CM

## 2022-01-01 DIAGNOSIS — Z00.8 ENCOUNTER FOR OTHER GENERAL EXAMINATION: ICD-10-CM

## 2022-01-01 DIAGNOSIS — R60.0 LOCALIZED EDEMA: ICD-10-CM

## 2022-01-01 DIAGNOSIS — R09.89 OTHER SPECIFIED SYMPTOMS AND SIGNS INVOLVING THE CIRCULATORY AND RESPIRATORY SYSTEMS: ICD-10-CM

## 2022-01-01 DIAGNOSIS — Z86.69 PERSONAL HISTORY OF OTHER DISEASES OF THE NERVOUS SYSTEM AND SENSE ORGANS: Chronic | ICD-10-CM

## 2022-01-01 DIAGNOSIS — Z71.89 OTHER SPECIFIED COUNSELING: ICD-10-CM

## 2022-01-01 DIAGNOSIS — E11.9 TYPE 2 DIABETES MELLITUS WITHOUT COMPLICATIONS: ICD-10-CM

## 2022-01-01 DIAGNOSIS — R06.02 SHORTNESS OF BREATH: ICD-10-CM

## 2022-01-01 DIAGNOSIS — Z29.9 ENCOUNTER FOR PROPHYLACTIC MEASURES, UNSPECIFIED: ICD-10-CM

## 2022-01-01 DIAGNOSIS — I25.10 ATHEROSCLEROTIC HEART DISEASE OF NATIVE CORONARY ARTERY WITHOUT ANGINA PECTORIS: ICD-10-CM

## 2022-01-01 DIAGNOSIS — J96.01 ACUTE RESPIRATORY FAILURE WITH HYPOXIA: ICD-10-CM

## 2022-01-01 DIAGNOSIS — C49.21 MALIGNANT NEOPLASM OF CONNECTIVE AND SOFT TISSUE OF RIGHT LOWER LIMB, INCLUDING HIP: ICD-10-CM

## 2022-01-01 DIAGNOSIS — R52 PAIN, UNSPECIFIED: ICD-10-CM

## 2022-01-01 DIAGNOSIS — E78.5 HYPERLIPIDEMIA, UNSPECIFIED: ICD-10-CM

## 2022-01-01 DIAGNOSIS — R18.0 MALIGNANT ASCITES: ICD-10-CM

## 2022-01-01 DIAGNOSIS — A41.9 SEPSIS, UNSPECIFIED ORGANISM: ICD-10-CM

## 2022-01-01 DIAGNOSIS — R19.7 DIARRHEA, UNSPECIFIED: ICD-10-CM

## 2022-01-01 DIAGNOSIS — R62.7 ADULT FAILURE TO THRIVE: ICD-10-CM

## 2022-01-01 DIAGNOSIS — Z86.711 PERSONAL HISTORY OF PULMONARY EMBOLISM: ICD-10-CM

## 2022-01-01 DIAGNOSIS — E87.1 HYPO-OSMOLALITY AND HYPONATREMIA: ICD-10-CM

## 2022-01-01 DIAGNOSIS — R14.0 ABDOMINAL DISTENSION (GASEOUS): ICD-10-CM

## 2022-01-01 DIAGNOSIS — R65.10 SYSTEMIC INFLAMMATORY RESPONSE SYNDROME (SIRS) OF NON-INFECTIOUS ORIGIN WITHOUT ACUTE ORGAN DYSFUNCTION: ICD-10-CM

## 2022-01-01 LAB
24R-OH-CALCIDIOL SERPL-MCNC: 15 NG/ML — LOW (ref 30–80)
A1C WITH ESTIMATED AVERAGE GLUCOSE RESULT: 5.6 % — SIGNIFICANT CHANGE UP (ref 4–5.6)
ACANTHOCYTES BLD QL SMEAR: SLIGHT — SIGNIFICANT CHANGE UP
ALBUMIN FLD-MCNC: 1.3 G/DL — SIGNIFICANT CHANGE UP
ALBUMIN FLD-MCNC: 1.4 G/DL — SIGNIFICANT CHANGE UP
ALBUMIN FLD-MCNC: 2 G/DL — SIGNIFICANT CHANGE UP
ALBUMIN SERPL ELPH-MCNC: 2.1 G/DL — LOW (ref 3.3–5)
ALBUMIN SERPL ELPH-MCNC: 2.2 G/DL — LOW (ref 3.3–5)
ALBUMIN SERPL ELPH-MCNC: 2.3 G/DL — LOW (ref 3.3–5)
ALBUMIN SERPL ELPH-MCNC: 2.4 G/DL — LOW (ref 3.3–5)
ALBUMIN SERPL ELPH-MCNC: 2.6 G/DL — LOW (ref 3.3–5)
ALBUMIN SERPL ELPH-MCNC: 2.6 G/DL — LOW (ref 3.3–5)
ALBUMIN SERPL ELPH-MCNC: 2.7 G/DL — LOW (ref 3.3–5)
ALBUMIN SERPL ELPH-MCNC: 2.7 G/DL — LOW (ref 3.3–5)
ALBUMIN SERPL ELPH-MCNC: 2.8 G/DL — LOW (ref 3.3–5)
ALBUMIN SERPL ELPH-MCNC: 2.9 G/DL — LOW (ref 3.3–5)
ALBUMIN SERPL ELPH-MCNC: 3.2 G/DL — LOW (ref 3.3–5)
ALP SERPL-CCNC: 52 U/L — SIGNIFICANT CHANGE UP (ref 40–120)
ALP SERPL-CCNC: 57 U/L — SIGNIFICANT CHANGE UP (ref 40–120)
ALP SERPL-CCNC: 57 U/L — SIGNIFICANT CHANGE UP (ref 40–120)
ALP SERPL-CCNC: 58 U/L — SIGNIFICANT CHANGE UP (ref 40–120)
ALP SERPL-CCNC: 58 U/L — SIGNIFICANT CHANGE UP (ref 40–120)
ALP SERPL-CCNC: 59 U/L — SIGNIFICANT CHANGE UP (ref 40–120)
ALP SERPL-CCNC: 62 U/L — SIGNIFICANT CHANGE UP (ref 40–120)
ALP SERPL-CCNC: 63 U/L — SIGNIFICANT CHANGE UP (ref 40–120)
ALP SERPL-CCNC: 65 U/L — SIGNIFICANT CHANGE UP (ref 40–120)
ALP SERPL-CCNC: 69 U/L — SIGNIFICANT CHANGE UP (ref 40–120)
ALP SERPL-CCNC: 72 U/L — SIGNIFICANT CHANGE UP (ref 40–120)
ALP SERPL-CCNC: 75 U/L — SIGNIFICANT CHANGE UP (ref 40–120)
ALP SERPL-CCNC: 78 U/L — SIGNIFICANT CHANGE UP (ref 40–120)
ALP SERPL-CCNC: 82 U/L — SIGNIFICANT CHANGE UP (ref 40–120)
ALT FLD-CCNC: 13 U/L — SIGNIFICANT CHANGE UP (ref 4–41)
ALT FLD-CCNC: 17 U/L — SIGNIFICANT CHANGE UP (ref 4–41)
ALT FLD-CCNC: 5 U/L — SIGNIFICANT CHANGE UP (ref 4–41)
ALT FLD-CCNC: 6 U/L — SIGNIFICANT CHANGE UP (ref 4–41)
ALT FLD-CCNC: 7 U/L — SIGNIFICANT CHANGE UP (ref 4–41)
ALT FLD-CCNC: <5 U/L — LOW (ref 4–41)
ALT FLD-CCNC: <5 U/L — SIGNIFICANT CHANGE UP (ref 4–41)
ALT FLD-CCNC: <5 U/L — SIGNIFICANT CHANGE UP (ref 4–41)
ANION GAP SERPL CALC-SCNC: 10 MMOL/L — SIGNIFICANT CHANGE UP (ref 7–14)
ANION GAP SERPL CALC-SCNC: 11 MMOL/L — SIGNIFICANT CHANGE UP (ref 7–14)
ANION GAP SERPL CALC-SCNC: 12 MMOL/L — SIGNIFICANT CHANGE UP (ref 7–14)
ANION GAP SERPL CALC-SCNC: 13 MMOL/L — SIGNIFICANT CHANGE UP (ref 7–14)
ANION GAP SERPL CALC-SCNC: 14 MMOL/L — SIGNIFICANT CHANGE UP (ref 7–14)
ANION GAP SERPL CALC-SCNC: 16 MMOL/L — HIGH (ref 7–14)
ANION GAP SERPL CALC-SCNC: 16 MMOL/L — HIGH (ref 7–14)
ANION GAP SERPL CALC-SCNC: 17 MMOL/L — HIGH (ref 7–14)
ANION GAP SERPL CALC-SCNC: 18 MMOL/L — HIGH (ref 7–14)
ANION GAP SERPL CALC-SCNC: 7 MMOL/L — SIGNIFICANT CHANGE UP (ref 7–14)
ANION GAP SERPL CALC-SCNC: 9 MMOL/L — SIGNIFICANT CHANGE UP (ref 7–14)
ANISOCYTOSIS BLD QL: SLIGHT — SIGNIFICANT CHANGE UP
APPEARANCE UR: ABNORMAL
APPEARANCE UR: CLEAR — SIGNIFICANT CHANGE UP
APPEARANCE UR: CLEAR — SIGNIFICANT CHANGE UP
APTT BLD: 103.2 SEC — HIGH (ref 27–36.3)
APTT BLD: 116.2 SEC — SIGNIFICANT CHANGE UP (ref 27–36.3)
APTT BLD: 129 SEC — CRITICAL HIGH (ref 27–36.3)
APTT BLD: 134.2 SEC — SIGNIFICANT CHANGE UP (ref 27–36.3)
APTT BLD: 134.4 SEC — CRITICAL HIGH (ref 27–36.3)
APTT BLD: 186.2 SEC — SIGNIFICANT CHANGE UP (ref 27–36.3)
APTT BLD: 21.7 SEC — LOW (ref 27–36.3)
APTT BLD: 24.3 SEC — LOW (ref 27–36.3)
APTT BLD: 26.5 SEC — LOW (ref 27–36.3)
APTT BLD: 26.6 SEC — LOW (ref 27–36.3)
APTT BLD: 27.4 SEC — SIGNIFICANT CHANGE UP (ref 27–36.3)
APTT BLD: 28.6 SEC — SIGNIFICANT CHANGE UP (ref 27–36.3)
APTT BLD: 28.9 SEC — SIGNIFICANT CHANGE UP (ref 27–36.3)
APTT BLD: 29.2 SEC — SIGNIFICANT CHANGE UP (ref 27–36.3)
APTT BLD: 30.5 SEC — SIGNIFICANT CHANGE UP (ref 27–36.3)
APTT BLD: 31 SEC — SIGNIFICANT CHANGE UP (ref 27–36.3)
APTT BLD: 31.1 SEC — SIGNIFICANT CHANGE UP (ref 27–36.3)
APTT BLD: 36 SEC — SIGNIFICANT CHANGE UP (ref 27–36.3)
APTT BLD: 38 SEC — HIGH (ref 27–36.3)
APTT BLD: 64.8 SEC — HIGH (ref 27–36.3)
APTT BLD: 91.8 SEC — HIGH (ref 27–36.3)
APTT BLD: >200 SEC — SIGNIFICANT CHANGE UP (ref 27–36.3)
AST SERPL-CCNC: 14 U/L — SIGNIFICANT CHANGE UP (ref 4–40)
AST SERPL-CCNC: 14 U/L — SIGNIFICANT CHANGE UP (ref 4–40)
AST SERPL-CCNC: 15 U/L — SIGNIFICANT CHANGE UP (ref 4–40)
AST SERPL-CCNC: 15 U/L — SIGNIFICANT CHANGE UP (ref 4–40)
AST SERPL-CCNC: 17 U/L — SIGNIFICANT CHANGE UP (ref 4–40)
AST SERPL-CCNC: 24 U/L — SIGNIFICANT CHANGE UP (ref 4–40)
AST SERPL-CCNC: 6 U/L — SIGNIFICANT CHANGE UP (ref 4–40)
AST SERPL-CCNC: 7 U/L — SIGNIFICANT CHANGE UP (ref 4–40)
AST SERPL-CCNC: 8 U/L — SIGNIFICANT CHANGE UP (ref 4–40)
AST SERPL-CCNC: 9 U/L — SIGNIFICANT CHANGE UP (ref 4–40)
B PERT DNA SPEC QL NAA+PROBE: SIGNIFICANT CHANGE UP
B PERT DNA SPEC QL NAA+PROBE: SIGNIFICANT CHANGE UP
B PERT IGG+IGM PNL SER: ABNORMAL
B PERT+PARAPERT DNA PNL SPEC NAA+PROBE: SIGNIFICANT CHANGE UP
B PERT+PARAPERT DNA PNL SPEC NAA+PROBE: SIGNIFICANT CHANGE UP
BACTERIA # UR AUTO: NEGATIVE — SIGNIFICANT CHANGE UP
BACTERIA # UR AUTO: NEGATIVE — SIGNIFICANT CHANGE UP
BASE EXCESS BLDV CALC-SCNC: -3 MMOL/L — LOW (ref -2–3)
BASE EXCESS BLDV CALC-SCNC: 0 MMOL/L — SIGNIFICANT CHANGE UP (ref -2–3)
BASOPHILS # BLD AUTO: 0 K/UL — SIGNIFICANT CHANGE UP (ref 0–0.2)
BASOPHILS # BLD AUTO: 0.01 K/UL — SIGNIFICANT CHANGE UP (ref 0–0.2)
BASOPHILS # BLD AUTO: 0.03 K/UL — SIGNIFICANT CHANGE UP (ref 0–0.2)
BASOPHILS # BLD AUTO: 0.04 K/UL — SIGNIFICANT CHANGE UP (ref 0–0.2)
BASOPHILS # BLD AUTO: 0.05 K/UL — SIGNIFICANT CHANGE UP (ref 0–0.2)
BASOPHILS # BLD AUTO: 0.07 K/UL — SIGNIFICANT CHANGE UP (ref 0–0.2)
BASOPHILS NFR BLD AUTO: 0 % — SIGNIFICANT CHANGE UP (ref 0–2)
BASOPHILS NFR BLD AUTO: 0.2 % — SIGNIFICANT CHANGE UP (ref 0–2)
BASOPHILS NFR BLD AUTO: 0.3 % — SIGNIFICANT CHANGE UP (ref 0–2)
BASOPHILS NFR BLD AUTO: 0.3 % — SIGNIFICANT CHANGE UP (ref 0–2)
BASOPHILS NFR BLD AUTO: 0.4 % — SIGNIFICANT CHANGE UP (ref 0–2)
BASOPHILS NFR BLD AUTO: 0.4 % — SIGNIFICANT CHANGE UP (ref 0–2)
BASOPHILS NFR BLD AUTO: 0.5 % — SIGNIFICANT CHANGE UP (ref 0–2)
BASOPHILS NFR BLD AUTO: 0.5 % — SIGNIFICANT CHANGE UP (ref 0–2)
BASOPHILS NFR BLD AUTO: 0.6 % — SIGNIFICANT CHANGE UP (ref 0–2)
BASOPHILS NFR BLD AUTO: 0.7 % — SIGNIFICANT CHANGE UP (ref 0–2)
BASOPHILS NFR BLD AUTO: 0.8 % — SIGNIFICANT CHANGE UP (ref 0–2)
BASOPHILS NFR BLD AUTO: 0.9 % — SIGNIFICANT CHANGE UP (ref 0–2)
BASOPHILS NFR BLD AUTO: 1 % — SIGNIFICANT CHANGE UP (ref 0–2)
BASOPHILS NFR BLD AUTO: 1.1 % — SIGNIFICANT CHANGE UP (ref 0–2)
BASOPHILS NFR BLD AUTO: 1.2 % — SIGNIFICANT CHANGE UP (ref 0–2)
BILIRUB SERPL-MCNC: 0.2 MG/DL — SIGNIFICANT CHANGE UP (ref 0.2–1.2)
BILIRUB SERPL-MCNC: 0.3 MG/DL — SIGNIFICANT CHANGE UP (ref 0.2–1.2)
BILIRUB SERPL-MCNC: 0.4 MG/DL — SIGNIFICANT CHANGE UP (ref 0.2–1.2)
BILIRUB SERPL-MCNC: 0.6 MG/DL — SIGNIFICANT CHANGE UP (ref 0.2–1.2)
BILIRUB SERPL-MCNC: 0.6 MG/DL — SIGNIFICANT CHANGE UP (ref 0.2–1.2)
BILIRUB SERPL-MCNC: 0.7 MG/DL — SIGNIFICANT CHANGE UP (ref 0.2–1.2)
BILIRUB SERPL-MCNC: 0.7 MG/DL — SIGNIFICANT CHANGE UP (ref 0.2–1.2)
BILIRUB SERPL-MCNC: 0.9 MG/DL — SIGNIFICANT CHANGE UP (ref 0.2–1.2)
BILIRUB SERPL-MCNC: <0.2 MG/DL — SIGNIFICANT CHANGE UP (ref 0.2–1.2)
BILIRUB UR-MCNC: ABNORMAL
BILIRUB UR-MCNC: ABNORMAL
BILIRUB UR-MCNC: NEGATIVE — SIGNIFICANT CHANGE UP
BILIRUBIN DIRECT, FLUID RESULT: 0.4 MG/DL — SIGNIFICANT CHANGE UP
BLD GP AB SCN SERPL QL: NEGATIVE — SIGNIFICANT CHANGE UP
BLOOD GAS VENOUS COMPREHENSIVE RESULT: SIGNIFICANT CHANGE UP
BORDETELLA PARAPERTUSSIS (RAPRVP): SIGNIFICANT CHANGE UP
BORDETELLA PARAPERTUSSIS (RAPRVP): SIGNIFICANT CHANGE UP
BUN SERPL-MCNC: 10 MG/DL — SIGNIFICANT CHANGE UP (ref 7–23)
BUN SERPL-MCNC: 11 MG/DL — SIGNIFICANT CHANGE UP (ref 7–23)
BUN SERPL-MCNC: 12 MG/DL — SIGNIFICANT CHANGE UP (ref 7–23)
BUN SERPL-MCNC: 13 MG/DL — SIGNIFICANT CHANGE UP (ref 7–23)
BUN SERPL-MCNC: 13 MG/DL — SIGNIFICANT CHANGE UP (ref 7–23)
BUN SERPL-MCNC: 14 MG/DL — SIGNIFICANT CHANGE UP (ref 7–23)
BUN SERPL-MCNC: 15 MG/DL — SIGNIFICANT CHANGE UP (ref 7–23)
BUN SERPL-MCNC: 15 MG/DL — SIGNIFICANT CHANGE UP (ref 7–23)
BUN SERPL-MCNC: 16 MG/DL — SIGNIFICANT CHANGE UP (ref 7–23)
BUN SERPL-MCNC: 17 MG/DL — SIGNIFICANT CHANGE UP (ref 7–23)
BUN SERPL-MCNC: 18 MG/DL — SIGNIFICANT CHANGE UP (ref 7–23)
BUN SERPL-MCNC: 22 MG/DL — SIGNIFICANT CHANGE UP (ref 7–23)
BUN SERPL-MCNC: 23 MG/DL — SIGNIFICANT CHANGE UP (ref 7–23)
BUN SERPL-MCNC: 24 MG/DL — HIGH (ref 7–23)
BUN SERPL-MCNC: 25 MG/DL — HIGH (ref 7–23)
BUN SERPL-MCNC: 25 MG/DL — HIGH (ref 7–23)
BUN SERPL-MCNC: 26 MG/DL — HIGH (ref 7–23)
BUN SERPL-MCNC: 27 MG/DL — HIGH (ref 7–23)
BUN SERPL-MCNC: 28 MG/DL — HIGH (ref 7–23)
BUN SERPL-MCNC: 30 MG/DL — HIGH (ref 7–23)
BUN SERPL-MCNC: 33 MG/DL — HIGH (ref 7–23)
BUN SERPL-MCNC: 38 MG/DL — HIGH (ref 7–23)
BUN SERPL-MCNC: 41 MG/DL — HIGH (ref 7–23)
BURR CELLS BLD QL SMEAR: PRESENT — SIGNIFICANT CHANGE UP
C PNEUM DNA SPEC QL NAA+PROBE: SIGNIFICANT CHANGE UP
C PNEUM DNA SPEC QL NAA+PROBE: SIGNIFICANT CHANGE UP
CALCIUM SERPL-MCNC: 7.2 MG/DL — LOW (ref 8.4–10.5)
CALCIUM SERPL-MCNC: 7.2 MG/DL — LOW (ref 8.4–10.5)
CALCIUM SERPL-MCNC: 7.4 MG/DL — LOW (ref 8.4–10.5)
CALCIUM SERPL-MCNC: 7.5 MG/DL — LOW (ref 8.4–10.5)
CALCIUM SERPL-MCNC: 7.6 MG/DL — LOW (ref 8.4–10.5)
CALCIUM SERPL-MCNC: 7.7 MG/DL — LOW (ref 8.4–10.5)
CALCIUM SERPL-MCNC: 7.7 MG/DL — LOW (ref 8.4–10.5)
CALCIUM SERPL-MCNC: 7.8 MG/DL — LOW (ref 8.4–10.5)
CALCIUM SERPL-MCNC: 7.9 MG/DL — LOW (ref 8.4–10.5)
CALCIUM SERPL-MCNC: 8 MG/DL — LOW (ref 8.4–10.5)
CALCIUM SERPL-MCNC: 8.1 MG/DL — LOW (ref 8.4–10.5)
CALCIUM SERPL-MCNC: 8.2 MG/DL — LOW (ref 8.4–10.5)
CALCIUM SERPL-MCNC: 8.3 MG/DL — LOW (ref 8.4–10.5)
CALCIUM SERPL-MCNC: 8.4 MG/DL — SIGNIFICANT CHANGE UP (ref 8.4–10.5)
CALCIUM SERPL-MCNC: 8.6 MG/DL — SIGNIFICANT CHANGE UP (ref 8.4–10.5)
CHLORIDE BLDV-SCNC: 100 MMOL/L — SIGNIFICANT CHANGE UP (ref 96–108)
CHLORIDE BLDV-SCNC: 104 MMOL/L — SIGNIFICANT CHANGE UP (ref 96–108)
CHLORIDE SERPL-SCNC: 100 MMOL/L — SIGNIFICANT CHANGE UP (ref 98–107)
CHLORIDE SERPL-SCNC: 101 MMOL/L — SIGNIFICANT CHANGE UP (ref 98–107)
CHLORIDE SERPL-SCNC: 102 MMOL/L — SIGNIFICANT CHANGE UP (ref 98–107)
CHLORIDE SERPL-SCNC: 103 MMOL/L — SIGNIFICANT CHANGE UP (ref 98–107)
CHLORIDE SERPL-SCNC: 104 MMOL/L — SIGNIFICANT CHANGE UP (ref 98–107)
CHLORIDE SERPL-SCNC: 105 MMOL/L — SIGNIFICANT CHANGE UP (ref 98–107)
CHLORIDE SERPL-SCNC: 105 MMOL/L — SIGNIFICANT CHANGE UP (ref 98–107)
CHLORIDE SERPL-SCNC: 96 MMOL/L — LOW (ref 98–107)
CHLORIDE SERPL-SCNC: 97 MMOL/L — LOW (ref 98–107)
CHLORIDE SERPL-SCNC: 98 MMOL/L — SIGNIFICANT CHANGE UP (ref 98–107)
CHLORIDE SERPL-SCNC: 99 MMOL/L — SIGNIFICANT CHANGE UP (ref 98–107)
CHOLEST SERPL-MCNC: 138 MG/DL — SIGNIFICANT CHANGE UP
CK MB BLD-MCNC: 4.6 % — HIGH (ref 0–2.5)
CK MB BLD-MCNC: 7.1 % — HIGH (ref 0–2.5)
CK MB CFR SERPL CALC: 2.9 NG/ML — SIGNIFICANT CHANGE UP
CK MB CFR SERPL CALC: 3.5 NG/ML — SIGNIFICANT CHANGE UP
CK MB CFR SERPL CALC: 4.5 NG/ML — SIGNIFICANT CHANGE UP
CK SERPL-CCNC: 49 U/L — SIGNIFICANT CHANGE UP (ref 30–200)
CK SERPL-CCNC: 63 U/L — SIGNIFICANT CHANGE UP (ref 30–200)
CLOSURE TME COLL+EPINEP BLD: 32 K/UL — LOW (ref 150–400)
CLOSURE TME COLL+EPINEP BLD: 47 K/UL — LOW (ref 150–400)
CLOSURE TME COLL+EPINEP BLD: 55 K/UL — LOW (ref 150–400)
CLOSURE TME COLL+EPINEP BLD: 58 K/UL — LOW (ref 150–400)
CLOSURE TME COLL+EPINEP BLD: 6 K/UL — CRITICAL LOW (ref 150–400)
CLOSURE TME COLL+EPINEP BLD: 74 K/UL — LOW (ref 150–400)
CLOSURE TME COLL+EPINEP BLD: SIGNIFICANT CHANGE UP K/UL (ref 150–400)
CO2 BLDV-SCNC: 23.2 MMOL/L — SIGNIFICANT CHANGE UP (ref 22–26)
CO2 BLDV-SCNC: 26 MMOL/L — SIGNIFICANT CHANGE UP (ref 22–26)
CO2 SERPL-SCNC: 16 MMOL/L — LOW (ref 22–31)
CO2 SERPL-SCNC: 17 MMOL/L — LOW (ref 22–31)
CO2 SERPL-SCNC: 18 MMOL/L — LOW (ref 22–31)
CO2 SERPL-SCNC: 19 MMOL/L — LOW (ref 22–31)
CO2 SERPL-SCNC: 19 MMOL/L — LOW (ref 22–31)
CO2 SERPL-SCNC: 20 MMOL/L — LOW (ref 22–31)
CO2 SERPL-SCNC: 21 MMOL/L — LOW (ref 22–31)
CO2 SERPL-SCNC: 22 MMOL/L — SIGNIFICANT CHANGE UP (ref 22–31)
CO2 SERPL-SCNC: 24 MMOL/L — SIGNIFICANT CHANGE UP (ref 22–31)
CO2 SERPL-SCNC: 25 MMOL/L — SIGNIFICANT CHANGE UP (ref 22–31)
COLOR FLD: ABNORMAL
COLOR FLD: SIGNIFICANT CHANGE UP
COLOR FLD: YELLOW
COLOR SPEC: YELLOW — SIGNIFICANT CHANGE UP
CREAT SERPL-MCNC: 0.65 MG/DL — SIGNIFICANT CHANGE UP (ref 0.5–1.3)
CREAT SERPL-MCNC: 0.66 MG/DL — SIGNIFICANT CHANGE UP (ref 0.5–1.3)
CREAT SERPL-MCNC: 0.68 MG/DL — SIGNIFICANT CHANGE UP (ref 0.5–1.3)
CREAT SERPL-MCNC: 0.68 MG/DL — SIGNIFICANT CHANGE UP (ref 0.5–1.3)
CREAT SERPL-MCNC: 0.69 MG/DL — SIGNIFICANT CHANGE UP (ref 0.5–1.3)
CREAT SERPL-MCNC: 0.69 MG/DL — SIGNIFICANT CHANGE UP (ref 0.5–1.3)
CREAT SERPL-MCNC: 0.71 MG/DL — SIGNIFICANT CHANGE UP (ref 0.5–1.3)
CREAT SERPL-MCNC: 0.72 MG/DL — SIGNIFICANT CHANGE UP (ref 0.5–1.3)
CREAT SERPL-MCNC: 0.72 MG/DL — SIGNIFICANT CHANGE UP (ref 0.5–1.3)
CREAT SERPL-MCNC: 0.73 MG/DL — SIGNIFICANT CHANGE UP (ref 0.5–1.3)
CREAT SERPL-MCNC: 0.73 MG/DL — SIGNIFICANT CHANGE UP (ref 0.5–1.3)
CREAT SERPL-MCNC: 0.74 MG/DL — SIGNIFICANT CHANGE UP (ref 0.5–1.3)
CREAT SERPL-MCNC: 0.75 MG/DL — SIGNIFICANT CHANGE UP (ref 0.5–1.3)
CREAT SERPL-MCNC: 0.76 MG/DL — SIGNIFICANT CHANGE UP (ref 0.5–1.3)
CREAT SERPL-MCNC: 0.76 MG/DL — SIGNIFICANT CHANGE UP (ref 0.5–1.3)
CREAT SERPL-MCNC: 0.77 MG/DL — SIGNIFICANT CHANGE UP (ref 0.5–1.3)
CREAT SERPL-MCNC: 0.8 MG/DL — SIGNIFICANT CHANGE UP (ref 0.5–1.3)
CREAT SERPL-MCNC: 0.81 MG/DL — SIGNIFICANT CHANGE UP (ref 0.5–1.3)
CREAT SERPL-MCNC: 0.81 MG/DL — SIGNIFICANT CHANGE UP (ref 0.5–1.3)
CREAT SERPL-MCNC: 0.84 MG/DL — SIGNIFICANT CHANGE UP (ref 0.5–1.3)
CREAT SERPL-MCNC: 0.84 MG/DL — SIGNIFICANT CHANGE UP (ref 0.5–1.3)
CREAT SERPL-MCNC: 0.85 MG/DL — SIGNIFICANT CHANGE UP (ref 0.5–1.3)
CREAT SERPL-MCNC: 0.88 MG/DL — SIGNIFICANT CHANGE UP (ref 0.5–1.3)
CREAT SERPL-MCNC: 0.9 MG/DL — SIGNIFICANT CHANGE UP (ref 0.5–1.3)
CREAT SERPL-MCNC: 0.9 MG/DL — SIGNIFICANT CHANGE UP (ref 0.5–1.3)
CREAT SERPL-MCNC: 0.91 MG/DL — SIGNIFICANT CHANGE UP (ref 0.5–1.3)
CREAT SERPL-MCNC: 0.97 MG/DL — SIGNIFICANT CHANGE UP (ref 0.5–1.3)
CREAT SERPL-MCNC: 1.01 MG/DL — SIGNIFICANT CHANGE UP (ref 0.5–1.3)
CREAT SERPL-MCNC: 1.04 MG/DL — SIGNIFICANT CHANGE UP (ref 0.5–1.3)
CREAT SERPL-MCNC: 1.12 MG/DL — SIGNIFICANT CHANGE UP (ref 0.5–1.3)
CREAT SERPL-MCNC: 1.13 MG/DL — SIGNIFICANT CHANGE UP (ref 0.5–1.3)
CREAT SERPL-MCNC: 1.13 MG/DL — SIGNIFICANT CHANGE UP (ref 0.5–1.3)
CREAT SERPL-MCNC: 1.18 MG/DL — SIGNIFICANT CHANGE UP (ref 0.5–1.3)
CREAT SERPL-MCNC: 1.22 MG/DL — SIGNIFICANT CHANGE UP (ref 0.5–1.3)
CREAT SERPL-MCNC: 1.51 MG/DL — HIGH (ref 0.5–1.3)
CREAT SERPL-MCNC: 1.58 MG/DL — HIGH (ref 0.5–1.3)
CULTURE RESULTS: NO GROWTH — SIGNIFICANT CHANGE UP
CULTURE RESULTS: NO GROWTH — SIGNIFICANT CHANGE UP
CULTURE RESULTS: SIGNIFICANT CHANGE UP
D DIMER BLD IA.RAPID-MCNC: 3076 NG/ML DDU — HIGH
DACRYOCYTES BLD QL SMEAR: SLIGHT — SIGNIFICANT CHANGE UP
DAT POLY-SP REAG RBC QL: NEGATIVE — SIGNIFICANT CHANGE UP
DIFF PNL FLD: ABNORMAL
DIFF PNL FLD: NEGATIVE — SIGNIFICANT CHANGE UP
DIFF PNL FLD: NEGATIVE — SIGNIFICANT CHANGE UP
EGFR: 100 ML/MIN/1.73M2 — SIGNIFICANT CHANGE UP
EGFR: 101 ML/MIN/1.73M2 — SIGNIFICANT CHANGE UP
EGFR: 101 ML/MIN/1.73M2 — SIGNIFICANT CHANGE UP
EGFR: 47 ML/MIN/1.73M2 — LOW
EGFR: 49 ML/MIN/1.73M2 — LOW
EGFR: 64 ML/MIN/1.73M2 — SIGNIFICANT CHANGE UP
EGFR: 66 ML/MIN/1.73M2 — SIGNIFICANT CHANGE UP
EGFR: 70 ML/MIN/1.73M2 — SIGNIFICANT CHANGE UP
EGFR: 70 ML/MIN/1.73M2 — SIGNIFICANT CHANGE UP
EGFR: 71 ML/MIN/1.73M2 — SIGNIFICANT CHANGE UP
EGFR: 77 ML/MIN/1.73M2 — SIGNIFICANT CHANGE UP
EGFR: 80 ML/MIN/1.73M2 — SIGNIFICANT CHANGE UP
EGFR: 84 ML/MIN/1.73M2 — SIGNIFICANT CHANGE UP
EGFR: 91 ML/MIN/1.73M2 — SIGNIFICANT CHANGE UP
EGFR: 92 ML/MIN/1.73M2 — SIGNIFICANT CHANGE UP
EGFR: 93 ML/MIN/1.73M2 — SIGNIFICANT CHANGE UP
EGFR: 94 ML/MIN/1.73M2 — SIGNIFICANT CHANGE UP
EGFR: 95 ML/MIN/1.73M2 — SIGNIFICANT CHANGE UP
EGFR: 96 ML/MIN/1.73M2 — SIGNIFICANT CHANGE UP
EGFR: 97 ML/MIN/1.73M2 — SIGNIFICANT CHANGE UP
EGFR: 98 ML/MIN/1.73M2 — SIGNIFICANT CHANGE UP
EGFR: 99 ML/MIN/1.73M2 — SIGNIFICANT CHANGE UP
EOSINOPHIL # BLD AUTO: 0 K/UL — SIGNIFICANT CHANGE UP (ref 0–0.5)
EOSINOPHIL # BLD AUTO: 0.01 K/UL — SIGNIFICANT CHANGE UP (ref 0–0.5)
EOSINOPHIL # BLD AUTO: 0.03 K/UL — SIGNIFICANT CHANGE UP (ref 0–0.5)
EOSINOPHIL # BLD AUTO: 0.04 K/UL — SIGNIFICANT CHANGE UP (ref 0–0.5)
EOSINOPHIL # BLD AUTO: 0.05 K/UL — SIGNIFICANT CHANGE UP (ref 0–0.5)
EOSINOPHIL # BLD AUTO: 0.06 K/UL — SIGNIFICANT CHANGE UP (ref 0–0.5)
EOSINOPHIL # BLD AUTO: 0.07 K/UL — SIGNIFICANT CHANGE UP (ref 0–0.5)
EOSINOPHIL # BLD AUTO: 0.09 K/UL — SIGNIFICANT CHANGE UP (ref 0–0.5)
EOSINOPHIL # FLD: 0 % — SIGNIFICANT CHANGE UP
EOSINOPHIL # FLD: 5 % — SIGNIFICANT CHANGE UP
EOSINOPHIL NFR BLD AUTO: 0 % — SIGNIFICANT CHANGE UP (ref 0–6)
EOSINOPHIL NFR BLD AUTO: 0.2 % — SIGNIFICANT CHANGE UP (ref 0–6)
EOSINOPHIL NFR BLD AUTO: 0.2 % — SIGNIFICANT CHANGE UP (ref 0–6)
EOSINOPHIL NFR BLD AUTO: 0.3 % — SIGNIFICANT CHANGE UP (ref 0–6)
EOSINOPHIL NFR BLD AUTO: 0.4 % — SIGNIFICANT CHANGE UP (ref 0–6)
EOSINOPHIL NFR BLD AUTO: 0.7 % — SIGNIFICANT CHANGE UP (ref 0–6)
EOSINOPHIL NFR BLD AUTO: 1 % — SIGNIFICANT CHANGE UP (ref 0–6)
EOSINOPHIL NFR BLD AUTO: 1.6 % — SIGNIFICANT CHANGE UP (ref 0–6)
EOSINOPHIL NFR BLD AUTO: 1.8 % — SIGNIFICANT CHANGE UP (ref 0–6)
EOSINOPHIL NFR BLD AUTO: 1.9 % — SIGNIFICANT CHANGE UP (ref 0–6)
EOSINOPHIL NFR BLD AUTO: 2.5 % — SIGNIFICANT CHANGE UP (ref 0–6)
EOSINOPHIL NFR BLD AUTO: 2.5 % — SIGNIFICANT CHANGE UP (ref 0–6)
EOSINOPHIL NFR BLD AUTO: 2.6 % — SIGNIFICANT CHANGE UP (ref 0–6)
EOSINOPHIL NFR BLD AUTO: 3.4 % — SIGNIFICANT CHANGE UP (ref 0–6)
EOSINOPHIL NFR BLD AUTO: 3.4 % — SIGNIFICANT CHANGE UP (ref 0–6)
EOSINOPHIL NFR BLD AUTO: 3.6 % — SIGNIFICANT CHANGE UP (ref 0–6)
EOSINOPHIL NFR BLD AUTO: 3.7 % — SIGNIFICANT CHANGE UP (ref 0–6)
EOSINOPHIL NFR BLD AUTO: 4.4 % — SIGNIFICANT CHANGE UP (ref 0–6)
EOSINOPHIL NFR BLD AUTO: 6.7 % — HIGH (ref 0–6)
EPI CELLS # UR: 3 /HPF — SIGNIFICANT CHANGE UP (ref 0–5)
EPI CELLS # UR: 3 /HPF — SIGNIFICANT CHANGE UP (ref 0–5)
ESTIMATED AVERAGE GLUCOSE: 114 — SIGNIFICANT CHANGE UP
FERRITIN SERPL-MCNC: 807 NG/ML — HIGH (ref 30–400)
FERRITIN SERPL-MCNC: 868 NG/ML — HIGH (ref 30–400)
FERRITIN SERPL-MCNC: 945 NG/ML — HIGH (ref 30–400)
FIBRINOGEN AG PPP IA-MCNC: 527 MG/DL — HIGH (ref 180–350)
FIBRINOGEN PPP-MCNC: 627 MG/DL — HIGH (ref 330–520)
FLUAV AG NPH QL: SIGNIFICANT CHANGE UP
FLUAV SUBTYP SPEC NAA+PROBE: SIGNIFICANT CHANGE UP
FLUAV SUBTYP SPEC NAA+PROBE: SIGNIFICANT CHANGE UP
FLUBV AG NPH QL: SIGNIFICANT CHANGE UP
FLUBV RNA SPEC QL NAA+PROBE: SIGNIFICANT CHANGE UP
FLUBV RNA SPEC QL NAA+PROBE: SIGNIFICANT CHANGE UP
FLUID INTAKE SUBSTANCE CLASS: SIGNIFICANT CHANGE UP
FOLATE SERPL-MCNC: 2.3 NG/ML — LOW (ref 3.1–17.5)
FOLATE SERPL-MCNC: 2.8 NG/ML — LOW (ref 3.1–17.5)
FOLATE+VIT B12 SERBLD-IMP: 0 % — SIGNIFICANT CHANGE UP
FOLATE+VIT B12 SERBLD-IMP: 0 % — SIGNIFICANT CHANGE UP
GAS PNL BLDV: 132 MMOL/L — LOW (ref 136–145)
GAS PNL BLDV: 133 MMOL/L — LOW (ref 136–145)
GAS PNL BLDV: SIGNIFICANT CHANGE UP
GAS PNL BLDV: SIGNIFICANT CHANGE UP
GIANT PLATELETS BLD QL SMEAR: PRESENT — SIGNIFICANT CHANGE UP
GIANT PLATELETS BLD QL SMEAR: PRESENT — SIGNIFICANT CHANGE UP
GLUCOSE BLDC GLUCOMTR-MCNC: 105 MG/DL — HIGH (ref 70–99)
GLUCOSE BLDC GLUCOMTR-MCNC: 105 MG/DL — HIGH (ref 70–99)
GLUCOSE BLDC GLUCOMTR-MCNC: 106 MG/DL — HIGH (ref 70–99)
GLUCOSE BLDC GLUCOMTR-MCNC: 106 MG/DL — HIGH (ref 70–99)
GLUCOSE BLDC GLUCOMTR-MCNC: 108 MG/DL — HIGH (ref 70–99)
GLUCOSE BLDC GLUCOMTR-MCNC: 109 MG/DL — HIGH (ref 70–99)
GLUCOSE BLDC GLUCOMTR-MCNC: 110 MG/DL — HIGH (ref 70–99)
GLUCOSE BLDC GLUCOMTR-MCNC: 114 MG/DL — HIGH (ref 70–99)
GLUCOSE BLDC GLUCOMTR-MCNC: 116 MG/DL — HIGH (ref 70–99)
GLUCOSE BLDC GLUCOMTR-MCNC: 117 MG/DL — HIGH (ref 70–99)
GLUCOSE BLDC GLUCOMTR-MCNC: 118 MG/DL — HIGH (ref 70–99)
GLUCOSE BLDC GLUCOMTR-MCNC: 120 MG/DL — HIGH (ref 70–99)
GLUCOSE BLDC GLUCOMTR-MCNC: 121 MG/DL — HIGH (ref 70–99)
GLUCOSE BLDC GLUCOMTR-MCNC: 123 MG/DL — HIGH (ref 70–99)
GLUCOSE BLDC GLUCOMTR-MCNC: 123 MG/DL — HIGH (ref 70–99)
GLUCOSE BLDC GLUCOMTR-MCNC: 124 MG/DL — HIGH (ref 70–99)
GLUCOSE BLDC GLUCOMTR-MCNC: 124 MG/DL — HIGH (ref 70–99)
GLUCOSE BLDC GLUCOMTR-MCNC: 125 MG/DL — HIGH (ref 70–99)
GLUCOSE BLDC GLUCOMTR-MCNC: 125 MG/DL — HIGH (ref 70–99)
GLUCOSE BLDC GLUCOMTR-MCNC: 126 MG/DL — HIGH (ref 70–99)
GLUCOSE BLDC GLUCOMTR-MCNC: 127 MG/DL — HIGH (ref 70–99)
GLUCOSE BLDC GLUCOMTR-MCNC: 128 MG/DL — HIGH (ref 70–99)
GLUCOSE BLDC GLUCOMTR-MCNC: 130 MG/DL — HIGH (ref 70–99)
GLUCOSE BLDC GLUCOMTR-MCNC: 130 MG/DL — HIGH (ref 70–99)
GLUCOSE BLDC GLUCOMTR-MCNC: 131 MG/DL — HIGH (ref 70–99)
GLUCOSE BLDC GLUCOMTR-MCNC: 132 MG/DL — HIGH (ref 70–99)
GLUCOSE BLDC GLUCOMTR-MCNC: 134 MG/DL — HIGH (ref 70–99)
GLUCOSE BLDC GLUCOMTR-MCNC: 135 MG/DL — HIGH (ref 70–99)
GLUCOSE BLDC GLUCOMTR-MCNC: 136 MG/DL — HIGH (ref 70–99)
GLUCOSE BLDC GLUCOMTR-MCNC: 138 MG/DL — HIGH (ref 70–99)
GLUCOSE BLDC GLUCOMTR-MCNC: 139 MG/DL — HIGH (ref 70–99)
GLUCOSE BLDC GLUCOMTR-MCNC: 140 MG/DL — HIGH (ref 70–99)
GLUCOSE BLDC GLUCOMTR-MCNC: 141 MG/DL — HIGH (ref 70–99)
GLUCOSE BLDC GLUCOMTR-MCNC: 143 MG/DL — HIGH (ref 70–99)
GLUCOSE BLDC GLUCOMTR-MCNC: 144 MG/DL — HIGH (ref 70–99)
GLUCOSE BLDC GLUCOMTR-MCNC: 144 MG/DL — HIGH (ref 70–99)
GLUCOSE BLDC GLUCOMTR-MCNC: 145 MG/DL — HIGH (ref 70–99)
GLUCOSE BLDC GLUCOMTR-MCNC: 145 MG/DL — HIGH (ref 70–99)
GLUCOSE BLDC GLUCOMTR-MCNC: 146 MG/DL — HIGH (ref 70–99)
GLUCOSE BLDC GLUCOMTR-MCNC: 146 MG/DL — HIGH (ref 70–99)
GLUCOSE BLDC GLUCOMTR-MCNC: 147 MG/DL — HIGH (ref 70–99)
GLUCOSE BLDC GLUCOMTR-MCNC: 148 MG/DL — HIGH (ref 70–99)
GLUCOSE BLDC GLUCOMTR-MCNC: 148 MG/DL — HIGH (ref 70–99)
GLUCOSE BLDC GLUCOMTR-MCNC: 149 MG/DL — HIGH (ref 70–99)
GLUCOSE BLDC GLUCOMTR-MCNC: 149 MG/DL — HIGH (ref 70–99)
GLUCOSE BLDC GLUCOMTR-MCNC: 151 MG/DL — HIGH (ref 70–99)
GLUCOSE BLDC GLUCOMTR-MCNC: 152 MG/DL — HIGH (ref 70–99)
GLUCOSE BLDC GLUCOMTR-MCNC: 153 MG/DL — HIGH (ref 70–99)
GLUCOSE BLDC GLUCOMTR-MCNC: 155 MG/DL — HIGH (ref 70–99)
GLUCOSE BLDC GLUCOMTR-MCNC: 156 MG/DL — HIGH (ref 70–99)
GLUCOSE BLDC GLUCOMTR-MCNC: 156 MG/DL — HIGH (ref 70–99)
GLUCOSE BLDC GLUCOMTR-MCNC: 159 MG/DL — HIGH (ref 70–99)
GLUCOSE BLDC GLUCOMTR-MCNC: 160 MG/DL — HIGH (ref 70–99)
GLUCOSE BLDC GLUCOMTR-MCNC: 160 MG/DL — HIGH (ref 70–99)
GLUCOSE BLDC GLUCOMTR-MCNC: 161 MG/DL — HIGH (ref 70–99)
GLUCOSE BLDC GLUCOMTR-MCNC: 162 MG/DL — HIGH (ref 70–99)
GLUCOSE BLDC GLUCOMTR-MCNC: 163 MG/DL — HIGH (ref 70–99)
GLUCOSE BLDC GLUCOMTR-MCNC: 164 MG/DL — HIGH (ref 70–99)
GLUCOSE BLDC GLUCOMTR-MCNC: 166 MG/DL — HIGH (ref 70–99)
GLUCOSE BLDC GLUCOMTR-MCNC: 166 MG/DL — HIGH (ref 70–99)
GLUCOSE BLDC GLUCOMTR-MCNC: 167 MG/DL — HIGH (ref 70–99)
GLUCOSE BLDC GLUCOMTR-MCNC: 169 MG/DL — HIGH (ref 70–99)
GLUCOSE BLDC GLUCOMTR-MCNC: 169 MG/DL — HIGH (ref 70–99)
GLUCOSE BLDC GLUCOMTR-MCNC: 171 MG/DL — HIGH (ref 70–99)
GLUCOSE BLDC GLUCOMTR-MCNC: 175 MG/DL — HIGH (ref 70–99)
GLUCOSE BLDC GLUCOMTR-MCNC: 175 MG/DL — HIGH (ref 70–99)
GLUCOSE BLDC GLUCOMTR-MCNC: 176 MG/DL — HIGH (ref 70–99)
GLUCOSE BLDC GLUCOMTR-MCNC: 177 MG/DL — HIGH (ref 70–99)
GLUCOSE BLDC GLUCOMTR-MCNC: 178 MG/DL — HIGH (ref 70–99)
GLUCOSE BLDC GLUCOMTR-MCNC: 187 MG/DL — HIGH (ref 70–99)
GLUCOSE BLDC GLUCOMTR-MCNC: 201 MG/DL — HIGH (ref 70–99)
GLUCOSE BLDC GLUCOMTR-MCNC: 95 MG/DL — SIGNIFICANT CHANGE UP (ref 70–99)
GLUCOSE BLDC GLUCOMTR-MCNC: 95 MG/DL — SIGNIFICANT CHANGE UP (ref 70–99)
GLUCOSE BLDC GLUCOMTR-MCNC: 98 MG/DL — SIGNIFICANT CHANGE UP (ref 70–99)
GLUCOSE BLDC GLUCOMTR-MCNC: 98 MG/DL — SIGNIFICANT CHANGE UP (ref 70–99)
GLUCOSE BLDV-MCNC: 125 MG/DL — HIGH (ref 70–99)
GLUCOSE BLDV-MCNC: 159 MG/DL — HIGH (ref 70–99)
GLUCOSE FLD-MCNC: 100 MG/DL — SIGNIFICANT CHANGE UP
GLUCOSE FLD-MCNC: 129 MG/DL — SIGNIFICANT CHANGE UP
GLUCOSE SERPL-MCNC: 100 MG/DL — HIGH (ref 70–99)
GLUCOSE SERPL-MCNC: 101 MG/DL — HIGH (ref 70–99)
GLUCOSE SERPL-MCNC: 104 MG/DL — HIGH (ref 70–99)
GLUCOSE SERPL-MCNC: 105 MG/DL — HIGH (ref 70–99)
GLUCOSE SERPL-MCNC: 105 MG/DL — HIGH (ref 70–99)
GLUCOSE SERPL-MCNC: 107 MG/DL — HIGH (ref 70–99)
GLUCOSE SERPL-MCNC: 107 MG/DL — HIGH (ref 70–99)
GLUCOSE SERPL-MCNC: 108 MG/DL — HIGH (ref 70–99)
GLUCOSE SERPL-MCNC: 109 MG/DL — HIGH (ref 70–99)
GLUCOSE SERPL-MCNC: 111 MG/DL — HIGH (ref 70–99)
GLUCOSE SERPL-MCNC: 112 MG/DL — HIGH (ref 70–99)
GLUCOSE SERPL-MCNC: 113 MG/DL — HIGH (ref 70–99)
GLUCOSE SERPL-MCNC: 114 MG/DL — HIGH (ref 70–99)
GLUCOSE SERPL-MCNC: 115 MG/DL — HIGH (ref 70–99)
GLUCOSE SERPL-MCNC: 116 MG/DL — HIGH (ref 70–99)
GLUCOSE SERPL-MCNC: 124 MG/DL — HIGH (ref 70–99)
GLUCOSE SERPL-MCNC: 126 MG/DL — HIGH (ref 70–99)
GLUCOSE SERPL-MCNC: 127 MG/DL — HIGH (ref 70–99)
GLUCOSE SERPL-MCNC: 129 MG/DL — HIGH (ref 70–99)
GLUCOSE SERPL-MCNC: 131 MG/DL — HIGH (ref 70–99)
GLUCOSE SERPL-MCNC: 132 MG/DL — HIGH (ref 70–99)
GLUCOSE SERPL-MCNC: 133 MG/DL — HIGH (ref 70–99)
GLUCOSE SERPL-MCNC: 134 MG/DL — HIGH (ref 70–99)
GLUCOSE SERPL-MCNC: 138 MG/DL — HIGH (ref 70–99)
GLUCOSE SERPL-MCNC: 149 MG/DL — HIGH (ref 70–99)
GLUCOSE SERPL-MCNC: 152 MG/DL — HIGH (ref 70–99)
GLUCOSE SERPL-MCNC: 157 MG/DL — HIGH (ref 70–99)
GLUCOSE SERPL-MCNC: 165 MG/DL — HIGH (ref 70–99)
GLUCOSE SERPL-MCNC: 175 MG/DL — HIGH (ref 70–99)
GLUCOSE SERPL-MCNC: 178 MG/DL — HIGH (ref 70–99)
GLUCOSE SERPL-MCNC: 98 MG/DL — SIGNIFICANT CHANGE UP (ref 70–99)
GLUCOSE UR QL: ABNORMAL
GLUCOSE UR QL: NEGATIVE — SIGNIFICANT CHANGE UP
GLUCOSE UR QL: NEGATIVE — SIGNIFICANT CHANGE UP
GRAM STN FLD: SIGNIFICANT CHANGE UP
HADV DNA SPEC QL NAA+PROBE: SIGNIFICANT CHANGE UP
HADV DNA SPEC QL NAA+PROBE: SIGNIFICANT CHANGE UP
HAPTOGLOB SERPL-MCNC: 294 MG/DL — HIGH (ref 34–200)
HAPTOGLOB SERPL-MCNC: 320 MG/DL — HIGH (ref 34–200)
HCO3 BLDV-SCNC: 22 MMOL/L — SIGNIFICANT CHANGE UP (ref 22–29)
HCO3 BLDV-SCNC: 25 MMOL/L — SIGNIFICANT CHANGE UP (ref 22–29)
HCOV 229E RNA SPEC QL NAA+PROBE: SIGNIFICANT CHANGE UP
HCOV 229E RNA SPEC QL NAA+PROBE: SIGNIFICANT CHANGE UP
HCOV HKU1 RNA SPEC QL NAA+PROBE: SIGNIFICANT CHANGE UP
HCOV HKU1 RNA SPEC QL NAA+PROBE: SIGNIFICANT CHANGE UP
HCOV NL63 RNA SPEC QL NAA+PROBE: SIGNIFICANT CHANGE UP
HCOV NL63 RNA SPEC QL NAA+PROBE: SIGNIFICANT CHANGE UP
HCOV OC43 RNA SPEC QL NAA+PROBE: SIGNIFICANT CHANGE UP
HCOV OC43 RNA SPEC QL NAA+PROBE: SIGNIFICANT CHANGE UP
HCT VFR BLD CALC: 18.5 % — CRITICAL LOW (ref 39–50)
HCT VFR BLD CALC: 19.2 % — CRITICAL LOW (ref 39–50)
HCT VFR BLD CALC: 20.3 % — CRITICAL LOW (ref 39–50)
HCT VFR BLD CALC: 20.8 % — CRITICAL LOW (ref 39–50)
HCT VFR BLD CALC: 21.1 % — LOW (ref 39–50)
HCT VFR BLD CALC: 21.1 % — LOW (ref 39–50)
HCT VFR BLD CALC: 21.3 % — LOW (ref 39–50)
HCT VFR BLD CALC: 21.7 % — LOW (ref 39–50)
HCT VFR BLD CALC: 22 % — LOW (ref 39–50)
HCT VFR BLD CALC: 22.3 % — LOW (ref 39–50)
HCT VFR BLD CALC: 22.3 % — LOW (ref 39–50)
HCT VFR BLD CALC: 22.4 % — LOW (ref 39–50)
HCT VFR BLD CALC: 22.4 % — LOW (ref 39–50)
HCT VFR BLD CALC: 22.5 % — LOW (ref 39–50)
HCT VFR BLD CALC: 22.5 % — LOW (ref 39–50)
HCT VFR BLD CALC: 22.7 % — LOW (ref 39–50)
HCT VFR BLD CALC: 22.7 % — LOW (ref 39–50)
HCT VFR BLD CALC: 22.8 % — LOW (ref 39–50)
HCT VFR BLD CALC: 22.8 % — LOW (ref 39–50)
HCT VFR BLD CALC: 23 % — LOW (ref 39–50)
HCT VFR BLD CALC: 23.3 % — LOW (ref 39–50)
HCT VFR BLD CALC: 23.5 % — LOW (ref 39–50)
HCT VFR BLD CALC: 23.8 % — LOW (ref 39–50)
HCT VFR BLD CALC: 23.9 % — LOW (ref 39–50)
HCT VFR BLD CALC: 24 % — LOW (ref 39–50)
HCT VFR BLD CALC: 24 % — LOW (ref 39–50)
HCT VFR BLD CALC: 24.5 % — LOW (ref 39–50)
HCT VFR BLD CALC: 24.7 % — LOW (ref 39–50)
HCT VFR BLD CALC: 24.8 % — LOW (ref 39–50)
HCT VFR BLD CALC: 24.9 % — LOW (ref 39–50)
HCT VFR BLD CALC: 25.2 % — LOW (ref 39–50)
HCT VFR BLD CALC: 25.3 % — LOW (ref 39–50)
HCT VFR BLD CALC: 25.4 % — LOW (ref 39–50)
HCT VFR BLD CALC: 25.8 % — LOW (ref 39–50)
HCT VFR BLD CALC: 26.1 % — LOW (ref 39–50)
HCT VFR BLD CALC: 26.5 % — LOW (ref 39–50)
HCT VFR BLD CALC: 26.9 % — LOW (ref 39–50)
HCT VFR BLD CALC: 26.9 % — LOW (ref 39–50)
HCT VFR BLD CALC: 27 % — LOW (ref 39–50)
HCT VFR BLD CALC: 27.2 % — LOW (ref 39–50)
HCT VFR BLD CALC: 27.4 % — LOW (ref 39–50)
HCT VFR BLD CALC: 27.6 % — LOW (ref 39–50)
HCT VFR BLD CALC: 27.9 % — LOW (ref 39–50)
HCT VFR BLD CALC: 28.1 % — LOW (ref 39–50)
HCT VFR BLD CALC: 28.3 % — LOW (ref 39–50)
HCT VFR BLD CALC: 28.6 % — LOW (ref 39–50)
HCT VFR BLD CALC: 30.5 % — LOW (ref 39–50)
HCT VFR BLD CALC: 31.9 % — LOW (ref 39–50)
HCT VFR BLD CALC: 32 % — LOW (ref 39–50)
HCT VFR BLDA CALC: 21 % — CRITICAL LOW (ref 39–51)
HCT VFR BLDA CALC: 24 % — LOW (ref 39–51)
HDLC SERPL-MCNC: 34 MG/DL — LOW
HEPARIN-PF4 AB RESULT: <0.6 U/ML — SIGNIFICANT CHANGE UP (ref 0–0.9)
HEPARIN-PF4 AB RESULT: <0.6 U/ML — SIGNIFICANT CHANGE UP (ref 0–0.9)
HGB BLD CALC-MCNC: 7 G/DL — CRITICAL LOW (ref 13–17)
HGB BLD CALC-MCNC: 7.9 G/DL — LOW (ref 13–17)
HGB BLD-MCNC: 10 G/DL — LOW (ref 13–17)
HGB BLD-MCNC: 5.8 G/DL — CRITICAL LOW (ref 13–17)
HGB BLD-MCNC: 6.4 G/DL — CRITICAL LOW (ref 13–17)
HGB BLD-MCNC: 6.5 G/DL — CRITICAL LOW (ref 13–17)
HGB BLD-MCNC: 6.5 G/DL — CRITICAL LOW (ref 13–17)
HGB BLD-MCNC: 6.6 G/DL — CRITICAL LOW (ref 13–17)
HGB BLD-MCNC: 6.7 G/DL — CRITICAL LOW (ref 13–17)
HGB BLD-MCNC: 6.8 G/DL — CRITICAL LOW (ref 13–17)
HGB BLD-MCNC: 6.8 G/DL — CRITICAL LOW (ref 13–17)
HGB BLD-MCNC: 6.9 G/DL — CRITICAL LOW (ref 13–17)
HGB BLD-MCNC: 6.9 G/DL — CRITICAL LOW (ref 13–17)
HGB BLD-MCNC: 7 G/DL — CRITICAL LOW (ref 13–17)
HGB BLD-MCNC: 7.1 G/DL — LOW (ref 13–17)
HGB BLD-MCNC: 7.1 G/DL — LOW (ref 13–17)
HGB BLD-MCNC: 7.2 G/DL — LOW (ref 13–17)
HGB BLD-MCNC: 7.3 G/DL — LOW (ref 13–17)
HGB BLD-MCNC: 7.4 G/DL — LOW (ref 13–17)
HGB BLD-MCNC: 7.5 G/DL — LOW (ref 13–17)
HGB BLD-MCNC: 7.6 G/DL — LOW (ref 13–17)
HGB BLD-MCNC: 7.7 G/DL — LOW (ref 13–17)
HGB BLD-MCNC: 7.9 G/DL — LOW (ref 13–17)
HGB BLD-MCNC: 8 G/DL — LOW (ref 13–17)
HGB BLD-MCNC: 8.1 G/DL — LOW (ref 13–17)
HGB BLD-MCNC: 8.3 G/DL — LOW (ref 13–17)
HGB BLD-MCNC: 8.4 G/DL — LOW (ref 13–17)
HGB BLD-MCNC: 8.4 G/DL — LOW (ref 13–17)
HGB BLD-MCNC: 8.5 G/DL — LOW (ref 13–17)
HGB BLD-MCNC: 8.6 G/DL — LOW (ref 13–17)
HGB BLD-MCNC: 8.7 G/DL — LOW (ref 13–17)
HGB BLD-MCNC: 9.1 G/DL — LOW (ref 13–17)
HGB BLD-MCNC: 9.2 G/DL — LOW (ref 13–17)
HGB BLD-MCNC: 9.6 G/DL — LOW (ref 13–17)
HGB BLD-MCNC: 9.7 G/DL — LOW (ref 13–17)
HMPV RNA SPEC QL NAA+PROBE: SIGNIFICANT CHANGE UP
HMPV RNA SPEC QL NAA+PROBE: SIGNIFICANT CHANGE UP
HPIV1 RNA SPEC QL NAA+PROBE: SIGNIFICANT CHANGE UP
HPIV1 RNA SPEC QL NAA+PROBE: SIGNIFICANT CHANGE UP
HPIV2 RNA SPEC QL NAA+PROBE: SIGNIFICANT CHANGE UP
HPIV2 RNA SPEC QL NAA+PROBE: SIGNIFICANT CHANGE UP
HPIV3 RNA SPEC QL NAA+PROBE: SIGNIFICANT CHANGE UP
HPIV3 RNA SPEC QL NAA+PROBE: SIGNIFICANT CHANGE UP
HPIV4 RNA SPEC QL NAA+PROBE: SIGNIFICANT CHANGE UP
HPIV4 RNA SPEC QL NAA+PROBE: SIGNIFICANT CHANGE UP
HYALINE CASTS # UR AUTO: 2 /LPF — SIGNIFICANT CHANGE UP (ref 0–7)
HYALINE CASTS # UR AUTO: 8 /LPF — HIGH (ref 0–7)
IANC: 0.23 K/UL — LOW (ref 1.8–7.4)
IANC: 0.7 K/UL — LOW (ref 1.8–7.4)
IANC: 0.77 K/UL — LOW (ref 1.8–7.4)
IANC: 0.79 K/UL — LOW (ref 1.8–7.4)
IANC: 0.81 K/UL — LOW (ref 1.8–7.4)
IANC: 0.81 K/UL — LOW (ref 1.8–7.4)
IANC: 0.83 K/UL — LOW (ref 1.8–7.4)
IANC: 0.93 K/UL — LOW (ref 1.8–7.4)
IANC: 0.94 K/UL — LOW (ref 1.8–7.4)
IANC: 1.2 K/UL — LOW (ref 1.8–7.4)
IANC: 1.22 K/UL — LOW (ref 1.8–7.4)
IANC: 1.29 K/UL — LOW (ref 1.8–7.4)
IANC: 1.36 K/UL — LOW (ref 1.8–7.4)
IANC: 1.76 K/UL — LOW (ref 1.8–7.4)
IANC: 1.94 K/UL — SIGNIFICANT CHANGE UP (ref 1.8–7.4)
IANC: 10.92 K/UL — HIGH (ref 1.5–8.5)
IANC: 2.09 K/UL — SIGNIFICANT CHANGE UP (ref 1.8–7.4)
IANC: 2.39 K/UL — SIGNIFICANT CHANGE UP (ref 1.8–7.4)
IANC: 3.09 K/UL — SIGNIFICANT CHANGE UP (ref 1.8–7.4)
IANC: 3.23 K/UL — SIGNIFICANT CHANGE UP (ref 1.8–7.4)
IANC: 3.56 K/UL — SIGNIFICANT CHANGE UP (ref 1.8–7.4)
IANC: 3.6 K/UL — SIGNIFICANT CHANGE UP (ref 1.8–7.4)
IANC: 4.01 K/UL — SIGNIFICANT CHANGE UP (ref 1.8–7.4)
IANC: 5.17 K/UL — SIGNIFICANT CHANGE UP (ref 1.8–7.4)
IANC: 5.37 K/UL — SIGNIFICANT CHANGE UP (ref 1.8–7.4)
IANC: 6.82 K/UL — SIGNIFICANT CHANGE UP (ref 1.5–8.5)
IMM GRANULOCYTES NFR BLD AUTO: 0.2 % — SIGNIFICANT CHANGE UP (ref 0–1.5)
IMM GRANULOCYTES NFR BLD AUTO: 0.2 % — SIGNIFICANT CHANGE UP (ref 0–1.5)
IMM GRANULOCYTES NFR BLD AUTO: 0.3 % — SIGNIFICANT CHANGE UP (ref 0–1.5)
IMM GRANULOCYTES NFR BLD AUTO: 0.4 % — SIGNIFICANT CHANGE UP (ref 0–1.5)
IMM GRANULOCYTES NFR BLD AUTO: 0.5 % — SIGNIFICANT CHANGE UP (ref 0–1.5)
IMM GRANULOCYTES NFR BLD AUTO: 0.6 % — SIGNIFICANT CHANGE UP (ref 0–1.5)
IMM GRANULOCYTES NFR BLD AUTO: 0.6 % — SIGNIFICANT CHANGE UP (ref 0–1.5)
IMM GRANULOCYTES NFR BLD AUTO: 0.8 % — SIGNIFICANT CHANGE UP (ref 0–1.5)
IMM GRANULOCYTES NFR BLD AUTO: 1 % — SIGNIFICANT CHANGE UP (ref 0–1.5)
IMM GRANULOCYTES NFR BLD AUTO: 1.5 % — SIGNIFICANT CHANGE UP (ref 0–1.5)
IMM GRANULOCYTES NFR BLD AUTO: 3.5 % — HIGH (ref 0–1.5)
INR BLD: 0.98 RATIO — SIGNIFICANT CHANGE UP (ref 0.88–1.16)
INR BLD: 0.99 RATIO — SIGNIFICANT CHANGE UP (ref 0.88–1.16)
INR BLD: 1.01 RATIO — SIGNIFICANT CHANGE UP (ref 0.88–1.16)
INR BLD: 1.04 RATIO — SIGNIFICANT CHANGE UP (ref 0.88–1.16)
INR BLD: 1.04 RATIO — SIGNIFICANT CHANGE UP (ref 0.88–1.16)
INR BLD: 1.05 RATIO — SIGNIFICANT CHANGE UP (ref 0.88–1.16)
INR BLD: 1.06 RATIO — SIGNIFICANT CHANGE UP (ref 0.88–1.16)
INR BLD: 1.06 RATIO — SIGNIFICANT CHANGE UP (ref 0.88–1.16)
INR BLD: 1.08 RATIO — SIGNIFICANT CHANGE UP (ref 0.88–1.16)
INR BLD: 1.1 RATIO — SIGNIFICANT CHANGE UP (ref 0.88–1.16)
IRON SATN MFR SERPL: 36 % — SIGNIFICANT CHANGE UP (ref 14–50)
IRON SATN MFR SERPL: 41 UG/DL — LOW (ref 45–165)
IRON SATN MFR SERPL: 57 % — HIGH (ref 14–50)
IRON SATN MFR SERPL: 82 UG/DL — SIGNIFICANT CHANGE UP (ref 45–165)
KETONES UR-MCNC: ABNORMAL
LACTATE BLDV-MCNC: 1.3 MMOL/L — SIGNIFICANT CHANGE UP (ref 0.5–2)
LACTATE BLDV-MCNC: 1.4 MMOL/L — SIGNIFICANT CHANGE UP (ref 0.5–2)
LACTATE BLDV-MCNC: 1.5 MMOL/L — SIGNIFICANT CHANGE UP (ref 0.5–2)
LACTATE SERPL-SCNC: 1.8 MMOL/L — SIGNIFICANT CHANGE UP (ref 0.5–2)
LDH SERPL L TO P-CCNC: 1270 U/L — SIGNIFICANT CHANGE UP
LDH SERPL L TO P-CCNC: 1772 U/L — SIGNIFICANT CHANGE UP
LDH SERPL L TO P-CCNC: 194 U/L — SIGNIFICANT CHANGE UP
LDH SERPL L TO P-CCNC: 214 U/L — SIGNIFICANT CHANGE UP (ref 135–225)
LDH SERPL L TO P-CCNC: 254 U/L — HIGH (ref 135–225)
LEUKOCYTE ESTERASE UR-ACNC: NEGATIVE — SIGNIFICANT CHANGE UP
LIDOCAIN IGE QN: 28 U/L — SIGNIFICANT CHANGE UP (ref 7–60)
LIPID PNL WITH DIRECT LDL SERPL: 82 MG/DL — SIGNIFICANT CHANGE UP
LYMPHOCYTES # BLD AUTO: 0.16 K/UL — LOW (ref 1–3.3)
LYMPHOCYTES # BLD AUTO: 0.24 K/UL — LOW (ref 1–3.3)
LYMPHOCYTES # BLD AUTO: 0.28 K/UL — LOW (ref 1–3.3)
LYMPHOCYTES # BLD AUTO: 0.3 K/UL — LOW (ref 1–3.3)
LYMPHOCYTES # BLD AUTO: 0.32 K/UL — LOW (ref 1–3.3)
LYMPHOCYTES # BLD AUTO: 0.32 K/UL — LOW (ref 1–3.3)
LYMPHOCYTES # BLD AUTO: 0.34 K/UL — LOW (ref 1–3.3)
LYMPHOCYTES # BLD AUTO: 0.37 K/UL — LOW (ref 1–3.3)
LYMPHOCYTES # BLD AUTO: 0.4 K/UL — LOW (ref 1–3.3)
LYMPHOCYTES # BLD AUTO: 0.41 K/UL — LOW (ref 1–3.3)
LYMPHOCYTES # BLD AUTO: 0.42 K/UL — LOW (ref 1–3.3)
LYMPHOCYTES # BLD AUTO: 0.44 K/UL — LOW (ref 1–3.3)
LYMPHOCYTES # BLD AUTO: 0.44 K/UL — LOW (ref 1–3.3)
LYMPHOCYTES # BLD AUTO: 0.45 K/UL — LOW (ref 1–3.3)
LYMPHOCYTES # BLD AUTO: 0.51 K/UL — LOW (ref 1–3.3)
LYMPHOCYTES # BLD AUTO: 0.52 K/UL — LOW (ref 1–3.3)
LYMPHOCYTES # BLD AUTO: 0.54 K/UL — LOW (ref 1–3.3)
LYMPHOCYTES # BLD AUTO: 0.55 K/UL — LOW (ref 1–3.3)
LYMPHOCYTES # BLD AUTO: 0.57 K/UL — LOW (ref 1–3.3)
LYMPHOCYTES # BLD AUTO: 0.62 K/UL — LOW (ref 1–3.3)
LYMPHOCYTES # BLD AUTO: 0.62 K/UL — LOW (ref 1–3.3)
LYMPHOCYTES # BLD AUTO: 0.67 K/UL — LOW (ref 1–3.3)
LYMPHOCYTES # BLD AUTO: 0.68 K/UL — LOW (ref 1–3.3)
LYMPHOCYTES # BLD AUTO: 0.71 K/UL — LOW (ref 1–3.3)
LYMPHOCYTES # BLD AUTO: 0.79 K/UL — LOW (ref 1–3.3)
LYMPHOCYTES # BLD AUTO: 0.8 K/UL — LOW (ref 1–3.3)
LYMPHOCYTES # BLD AUTO: 10.3 % — LOW (ref 13–44)
LYMPHOCYTES # BLD AUTO: 10.4 % — LOW (ref 13–44)
LYMPHOCYTES # BLD AUTO: 11.1 % — LOW (ref 13–44)
LYMPHOCYTES # BLD AUTO: 17.4 % — SIGNIFICANT CHANGE UP (ref 13–44)
LYMPHOCYTES # BLD AUTO: 2.9 % — LOW (ref 13–44)
LYMPHOCYTES # BLD AUTO: 20.2 % — SIGNIFICANT CHANGE UP (ref 13–44)
LYMPHOCYTES # BLD AUTO: 20.2 % — SIGNIFICANT CHANGE UP (ref 13–44)
LYMPHOCYTES # BLD AUTO: 22.3 % — SIGNIFICANT CHANGE UP (ref 13–44)
LYMPHOCYTES # BLD AUTO: 23 % — SIGNIFICANT CHANGE UP (ref 13–44)
LYMPHOCYTES # BLD AUTO: 23.3 % — SIGNIFICANT CHANGE UP (ref 13–44)
LYMPHOCYTES # BLD AUTO: 24 % — SIGNIFICANT CHANGE UP (ref 13–44)
LYMPHOCYTES # BLD AUTO: 25.7 % — SIGNIFICANT CHANGE UP (ref 13–44)
LYMPHOCYTES # BLD AUTO: 26.1 % — SIGNIFICANT CHANGE UP (ref 13–44)
LYMPHOCYTES # BLD AUTO: 26.4 % — SIGNIFICANT CHANGE UP (ref 13–44)
LYMPHOCYTES # BLD AUTO: 26.9 % — SIGNIFICANT CHANGE UP (ref 13–44)
LYMPHOCYTES # BLD AUTO: 27.2 % — SIGNIFICANT CHANGE UP (ref 13–44)
LYMPHOCYTES # BLD AUTO: 27.3 % — SIGNIFICANT CHANGE UP (ref 13–44)
LYMPHOCYTES # BLD AUTO: 33.6 % — SIGNIFICANT CHANGE UP (ref 13–44)
LYMPHOCYTES # BLD AUTO: 38.4 % — SIGNIFICANT CHANGE UP (ref 13–44)
LYMPHOCYTES # BLD AUTO: 41.9 % — SIGNIFICANT CHANGE UP (ref 13–44)
LYMPHOCYTES # BLD AUTO: 5 % — LOW (ref 13–44)
LYMPHOCYTES # BLD AUTO: 5.3 % — LOW (ref 13–44)
LYMPHOCYTES # BLD AUTO: 7.7 % — LOW (ref 13–44)
LYMPHOCYTES # BLD AUTO: 71.9 % — HIGH (ref 13–44)
LYMPHOCYTES # BLD AUTO: 9.2 % — LOW (ref 13–44)
LYMPHOCYTES # BLD AUTO: 9.2 % — LOW (ref 13–44)
LYMPHOCYTES # FLD: 2 % — SIGNIFICANT CHANGE UP
LYMPHOCYTES # FLD: 32 % — SIGNIFICANT CHANGE UP
LYMPHOCYTES # FLD: 38 % — SIGNIFICANT CHANGE UP
M PNEUMO DNA SPEC QL NAA+PROBE: SIGNIFICANT CHANGE UP
M PNEUMO DNA SPEC QL NAA+PROBE: SIGNIFICANT CHANGE UP
MACROCYTES BLD QL: SLIGHT — SIGNIFICANT CHANGE UP
MAGNESIUM SERPL-MCNC: 1.6 MG/DL — SIGNIFICANT CHANGE UP (ref 1.6–2.6)
MAGNESIUM SERPL-MCNC: 1.7 MG/DL — SIGNIFICANT CHANGE UP (ref 1.6–2.6)
MAGNESIUM SERPL-MCNC: 1.8 MG/DL — SIGNIFICANT CHANGE UP (ref 1.6–2.6)
MAGNESIUM SERPL-MCNC: 1.9 MG/DL — SIGNIFICANT CHANGE UP (ref 1.6–2.6)
MANUAL SMEAR VERIFICATION: SIGNIFICANT CHANGE UP
MANUAL SMEAR VERIFICATION: SIGNIFICANT CHANGE UP
MCHC RBC-ENTMCNC: 27.4 PG — SIGNIFICANT CHANGE UP (ref 27–34)
MCHC RBC-ENTMCNC: 27.6 PG — SIGNIFICANT CHANGE UP (ref 27–34)
MCHC RBC-ENTMCNC: 27.7 PG — SIGNIFICANT CHANGE UP (ref 27–34)
MCHC RBC-ENTMCNC: 27.8 PG — SIGNIFICANT CHANGE UP (ref 27–34)
MCHC RBC-ENTMCNC: 27.8 PG — SIGNIFICANT CHANGE UP (ref 27–34)
MCHC RBC-ENTMCNC: 27.9 PG — SIGNIFICANT CHANGE UP (ref 27–34)
MCHC RBC-ENTMCNC: 28.1 PG — SIGNIFICANT CHANGE UP (ref 27–34)
MCHC RBC-ENTMCNC: 28.2 PG — SIGNIFICANT CHANGE UP (ref 27–34)
MCHC RBC-ENTMCNC: 28.2 PG — SIGNIFICANT CHANGE UP (ref 27–34)
MCHC RBC-ENTMCNC: 28.5 PG — SIGNIFICANT CHANGE UP (ref 27–34)
MCHC RBC-ENTMCNC: 28.6 PG — SIGNIFICANT CHANGE UP (ref 27–34)
MCHC RBC-ENTMCNC: 28.7 PG — SIGNIFICANT CHANGE UP (ref 27–34)
MCHC RBC-ENTMCNC: 28.8 PG — SIGNIFICANT CHANGE UP (ref 27–34)
MCHC RBC-ENTMCNC: 28.9 PG — SIGNIFICANT CHANGE UP (ref 27–34)
MCHC RBC-ENTMCNC: 29 PG — SIGNIFICANT CHANGE UP (ref 27–34)
MCHC RBC-ENTMCNC: 29.1 PG — SIGNIFICANT CHANGE UP (ref 27–34)
MCHC RBC-ENTMCNC: 29.1 PG — SIGNIFICANT CHANGE UP (ref 27–34)
MCHC RBC-ENTMCNC: 29.3 GM/DL — LOW (ref 32–36)
MCHC RBC-ENTMCNC: 29.3 PG — SIGNIFICANT CHANGE UP (ref 27–34)
MCHC RBC-ENTMCNC: 29.4 PG — SIGNIFICANT CHANGE UP (ref 27–34)
MCHC RBC-ENTMCNC: 29.4 PG — SIGNIFICANT CHANGE UP (ref 27–34)
MCHC RBC-ENTMCNC: 29.5 PG — SIGNIFICANT CHANGE UP (ref 27–34)
MCHC RBC-ENTMCNC: 29.6 GM/DL — LOW (ref 32–36)
MCHC RBC-ENTMCNC: 29.6 PG — SIGNIFICANT CHANGE UP (ref 27–34)
MCHC RBC-ENTMCNC: 29.8 GM/DL — LOW (ref 32–36)
MCHC RBC-ENTMCNC: 29.8 PG — SIGNIFICANT CHANGE UP (ref 27–34)
MCHC RBC-ENTMCNC: 29.8 PG — SIGNIFICANT CHANGE UP (ref 27–34)
MCHC RBC-ENTMCNC: 30 GM/DL — LOW (ref 32–36)
MCHC RBC-ENTMCNC: 30 PG — SIGNIFICANT CHANGE UP (ref 27–34)
MCHC RBC-ENTMCNC: 30.1 GM/DL — LOW (ref 32–36)
MCHC RBC-ENTMCNC: 30.1 PG — SIGNIFICANT CHANGE UP (ref 27–34)
MCHC RBC-ENTMCNC: 30.2 PG — SIGNIFICANT CHANGE UP (ref 27–34)
MCHC RBC-ENTMCNC: 30.3 PG — SIGNIFICANT CHANGE UP (ref 27–34)
MCHC RBC-ENTMCNC: 30.4 GM/DL — LOW (ref 32–36)
MCHC RBC-ENTMCNC: 30.5 GM/DL — LOW (ref 32–36)
MCHC RBC-ENTMCNC: 30.5 PG — SIGNIFICANT CHANGE UP (ref 27–34)
MCHC RBC-ENTMCNC: 30.6 GM/DL — LOW (ref 32–36)
MCHC RBC-ENTMCNC: 30.6 GM/DL — LOW (ref 32–36)
MCHC RBC-ENTMCNC: 30.6 PG — SIGNIFICANT CHANGE UP (ref 27–34)
MCHC RBC-ENTMCNC: 30.7 GM/DL — LOW (ref 32–36)
MCHC RBC-ENTMCNC: 30.8 GM/DL — LOW (ref 32–36)
MCHC RBC-ENTMCNC: 30.9 GM/DL — LOW (ref 32–36)
MCHC RBC-ENTMCNC: 30.9 GM/DL — LOW (ref 32–36)
MCHC RBC-ENTMCNC: 30.9 PG — SIGNIFICANT CHANGE UP (ref 27–34)
MCHC RBC-ENTMCNC: 30.9 PG — SIGNIFICANT CHANGE UP (ref 27–34)
MCHC RBC-ENTMCNC: 31 GM/DL — LOW (ref 32–36)
MCHC RBC-ENTMCNC: 31 GM/DL — LOW (ref 32–36)
MCHC RBC-ENTMCNC: 31.1 GM/DL — LOW (ref 32–36)
MCHC RBC-ENTMCNC: 31.1 PG — SIGNIFICANT CHANGE UP (ref 27–34)
MCHC RBC-ENTMCNC: 31.3 GM/DL — LOW (ref 32–36)
MCHC RBC-ENTMCNC: 31.4 GM/DL — LOW (ref 32–36)
MCHC RBC-ENTMCNC: 31.4 GM/DL — LOW (ref 32–36)
MCHC RBC-ENTMCNC: 31.5 GM/DL — LOW (ref 32–36)
MCHC RBC-ENTMCNC: 31.6 GM/DL — LOW (ref 32–36)
MCHC RBC-ENTMCNC: 31.7 GM/DL — LOW (ref 32–36)
MCHC RBC-ENTMCNC: 31.7 GM/DL — LOW (ref 32–36)
MCHC RBC-ENTMCNC: 31.8 GM/DL — LOW (ref 32–36)
MCHC RBC-ENTMCNC: 32 GM/DL — SIGNIFICANT CHANGE UP (ref 32–36)
MCHC RBC-ENTMCNC: 32.1 GM/DL — SIGNIFICANT CHANGE UP (ref 32–36)
MCHC RBC-ENTMCNC: 32.1 GM/DL — SIGNIFICANT CHANGE UP (ref 32–36)
MCHC RBC-ENTMCNC: 32.2 GM/DL — SIGNIFICANT CHANGE UP (ref 32–36)
MCHC RBC-ENTMCNC: 32.3 GM/DL — SIGNIFICANT CHANGE UP (ref 32–36)
MCHC RBC-ENTMCNC: 32.4 GM/DL — SIGNIFICANT CHANGE UP (ref 32–36)
MCHC RBC-ENTMCNC: 32.5 GM/DL — SIGNIFICANT CHANGE UP (ref 32–36)
MCHC RBC-ENTMCNC: 32.6 GM/DL — SIGNIFICANT CHANGE UP (ref 32–36)
MCHC RBC-ENTMCNC: 32.6 GM/DL — SIGNIFICANT CHANGE UP (ref 32–36)
MCHC RBC-ENTMCNC: 32.7 GM/DL — SIGNIFICANT CHANGE UP (ref 32–36)
MCHC RBC-ENTMCNC: 32.7 GM/DL — SIGNIFICANT CHANGE UP (ref 32–36)
MCHC RBC-ENTMCNC: 32.8 GM/DL — SIGNIFICANT CHANGE UP (ref 32–36)
MCHC RBC-ENTMCNC: 32.9 GM/DL — SIGNIFICANT CHANGE UP (ref 32–36)
MCHC RBC-ENTMCNC: 33 GM/DL — SIGNIFICANT CHANGE UP (ref 32–36)
MCHC RBC-ENTMCNC: 33.2 GM/DL — SIGNIFICANT CHANGE UP (ref 32–36)
MCHC RBC-ENTMCNC: 33.3 GM/DL — SIGNIFICANT CHANGE UP (ref 32–36)
MCV RBC AUTO: 87.6 FL — SIGNIFICANT CHANGE UP (ref 80–100)
MCV RBC AUTO: 87.6 FL — SIGNIFICANT CHANGE UP (ref 80–100)
MCV RBC AUTO: 88.3 FL — SIGNIFICANT CHANGE UP (ref 80–100)
MCV RBC AUTO: 88.8 FL — SIGNIFICANT CHANGE UP (ref 80–100)
MCV RBC AUTO: 88.9 FL — SIGNIFICANT CHANGE UP (ref 80–100)
MCV RBC AUTO: 89 FL — SIGNIFICANT CHANGE UP (ref 80–100)
MCV RBC AUTO: 89.4 FL — SIGNIFICANT CHANGE UP (ref 80–100)
MCV RBC AUTO: 89.7 FL — SIGNIFICANT CHANGE UP (ref 80–100)
MCV RBC AUTO: 90 FL — SIGNIFICANT CHANGE UP (ref 80–100)
MCV RBC AUTO: 90 FL — SIGNIFICANT CHANGE UP (ref 80–100)
MCV RBC AUTO: 90.2 FL — SIGNIFICANT CHANGE UP (ref 80–100)
MCV RBC AUTO: 90.4 FL — SIGNIFICANT CHANGE UP (ref 80–100)
MCV RBC AUTO: 90.4 FL — SIGNIFICANT CHANGE UP (ref 80–100)
MCV RBC AUTO: 90.6 FL — SIGNIFICANT CHANGE UP (ref 80–100)
MCV RBC AUTO: 90.7 FL — SIGNIFICANT CHANGE UP (ref 80–100)
MCV RBC AUTO: 90.9 FL — SIGNIFICANT CHANGE UP (ref 80–100)
MCV RBC AUTO: 91.3 FL — SIGNIFICANT CHANGE UP (ref 80–100)
MCV RBC AUTO: 91.7 FL — SIGNIFICANT CHANGE UP (ref 80–100)
MCV RBC AUTO: 91.7 FL — SIGNIFICANT CHANGE UP (ref 80–100)
MCV RBC AUTO: 91.8 FL — SIGNIFICANT CHANGE UP (ref 80–100)
MCV RBC AUTO: 92.2 FL — SIGNIFICANT CHANGE UP (ref 80–100)
MCV RBC AUTO: 92.2 FL — SIGNIFICANT CHANGE UP (ref 80–100)
MCV RBC AUTO: 92.3 FL — SIGNIFICANT CHANGE UP (ref 80–100)
MCV RBC AUTO: 92.3 FL — SIGNIFICANT CHANGE UP (ref 80–100)
MCV RBC AUTO: 92.6 FL — SIGNIFICANT CHANGE UP (ref 80–100)
MCV RBC AUTO: 93 FL — SIGNIFICANT CHANGE UP (ref 80–100)
MCV RBC AUTO: 93.1 FL — SIGNIFICANT CHANGE UP (ref 80–100)
MCV RBC AUTO: 93.2 FL — SIGNIFICANT CHANGE UP (ref 80–100)
MCV RBC AUTO: 93.2 FL — SIGNIFICANT CHANGE UP (ref 80–100)
MCV RBC AUTO: 93.4 FL — SIGNIFICANT CHANGE UP (ref 80–100)
MCV RBC AUTO: 93.8 FL — SIGNIFICANT CHANGE UP (ref 80–100)
MCV RBC AUTO: 94 FL — SIGNIFICANT CHANGE UP (ref 80–100)
MCV RBC AUTO: 94 FL — SIGNIFICANT CHANGE UP (ref 80–100)
MCV RBC AUTO: 94.1 FL — SIGNIFICANT CHANGE UP (ref 80–100)
MCV RBC AUTO: 94.3 FL — SIGNIFICANT CHANGE UP (ref 80–100)
MCV RBC AUTO: 94.4 FL — SIGNIFICANT CHANGE UP (ref 80–100)
MCV RBC AUTO: 94.9 FL — SIGNIFICANT CHANGE UP (ref 80–100)
MCV RBC AUTO: 95 FL — SIGNIFICANT CHANGE UP (ref 80–100)
MCV RBC AUTO: 95.1 FL — SIGNIFICANT CHANGE UP (ref 80–100)
MCV RBC AUTO: 95.3 FL — SIGNIFICANT CHANGE UP (ref 80–100)
MCV RBC AUTO: 95.3 FL — SIGNIFICANT CHANGE UP (ref 80–100)
MCV RBC AUTO: 95.4 FL — SIGNIFICANT CHANGE UP (ref 80–100)
MCV RBC AUTO: 95.7 FL — SIGNIFICANT CHANGE UP (ref 80–100)
MCV RBC AUTO: 95.8 FL — SIGNIFICANT CHANGE UP (ref 80–100)
MCV RBC AUTO: 95.9 FL — SIGNIFICANT CHANGE UP (ref 80–100)
MESOTHL CELL # FLD: 0 % — SIGNIFICANT CHANGE UP
MESOTHL CELL # FLD: 54 % — SIGNIFICANT CHANGE UP
MONOCYTES # BLD AUTO: 0 K/UL — SIGNIFICANT CHANGE UP (ref 0–0.9)
MONOCYTES # BLD AUTO: 0.01 K/UL — SIGNIFICANT CHANGE UP (ref 0–0.9)
MONOCYTES # BLD AUTO: 0.02 K/UL — SIGNIFICANT CHANGE UP (ref 0–0.9)
MONOCYTES # BLD AUTO: 0.04 K/UL — SIGNIFICANT CHANGE UP (ref 0–0.9)
MONOCYTES # BLD AUTO: 0.05 K/UL — SIGNIFICANT CHANGE UP (ref 0–0.9)
MONOCYTES # BLD AUTO: 0.07 K/UL — SIGNIFICANT CHANGE UP (ref 0–0.9)
MONOCYTES # BLD AUTO: 0.08 K/UL — SIGNIFICANT CHANGE UP (ref 0–0.9)
MONOCYTES # BLD AUTO: 0.08 K/UL — SIGNIFICANT CHANGE UP (ref 0–0.9)
MONOCYTES # BLD AUTO: 0.09 K/UL — SIGNIFICANT CHANGE UP (ref 0–0.9)
MONOCYTES # BLD AUTO: 0.1 K/UL — SIGNIFICANT CHANGE UP (ref 0–0.9)
MONOCYTES # BLD AUTO: 0.11 K/UL — SIGNIFICANT CHANGE UP (ref 0–0.9)
MONOCYTES # BLD AUTO: 0.13 K/UL — SIGNIFICANT CHANGE UP (ref 0–0.9)
MONOCYTES # BLD AUTO: 0.13 K/UL — SIGNIFICANT CHANGE UP (ref 0–0.9)
MONOCYTES # BLD AUTO: 0.14 K/UL — SIGNIFICANT CHANGE UP (ref 0–0.9)
MONOCYTES # BLD AUTO: 0.15 K/UL — SIGNIFICANT CHANGE UP (ref 0–0.9)
MONOCYTES # BLD AUTO: 0.17 K/UL — SIGNIFICANT CHANGE UP (ref 0–0.9)
MONOCYTES # BLD AUTO: 0.17 K/UL — SIGNIFICANT CHANGE UP (ref 0–0.9)
MONOCYTES # BLD AUTO: 0.19 K/UL — SIGNIFICANT CHANGE UP (ref 0–0.9)
MONOCYTES # BLD AUTO: 0.19 K/UL — SIGNIFICANT CHANGE UP (ref 0–0.9)
MONOCYTES # BLD AUTO: 0.2 K/UL — SIGNIFICANT CHANGE UP (ref 0–0.9)
MONOCYTES # BLD AUTO: 0.22 K/UL — SIGNIFICANT CHANGE UP (ref 0–0.9)
MONOCYTES # BLD AUTO: 0.29 K/UL — SIGNIFICANT CHANGE UP (ref 0–0.9)
MONOCYTES # BLD AUTO: 0.71 K/UL — SIGNIFICANT CHANGE UP (ref 0–0.9)
MONOCYTES # BLD AUTO: 0.95 K/UL — HIGH (ref 0–0.9)
MONOCYTES NFR BLD AUTO: 0 % — LOW (ref 2–14)
MONOCYTES NFR BLD AUTO: 0.8 % — LOW (ref 2–14)
MONOCYTES NFR BLD AUTO: 1.8 % — LOW (ref 2–14)
MONOCYTES NFR BLD AUTO: 10.6 % — SIGNIFICANT CHANGE UP (ref 2–14)
MONOCYTES NFR BLD AUTO: 10.9 % — SIGNIFICANT CHANGE UP (ref 2–14)
MONOCYTES NFR BLD AUTO: 2.6 % — SIGNIFICANT CHANGE UP (ref 2–14)
MONOCYTES NFR BLD AUTO: 2.7 % — SIGNIFICANT CHANGE UP (ref 2–14)
MONOCYTES NFR BLD AUTO: 2.8 % — SIGNIFICANT CHANGE UP (ref 2–14)
MONOCYTES NFR BLD AUTO: 3.4 % — SIGNIFICANT CHANGE UP (ref 2–14)
MONOCYTES NFR BLD AUTO: 3.5 % — SIGNIFICANT CHANGE UP (ref 2–14)
MONOCYTES NFR BLD AUTO: 3.6 % — SIGNIFICANT CHANGE UP (ref 2–14)
MONOCYTES NFR BLD AUTO: 4.3 % — SIGNIFICANT CHANGE UP (ref 2–14)
MONOCYTES NFR BLD AUTO: 4.4 % — SIGNIFICANT CHANGE UP (ref 2–14)
MONOCYTES NFR BLD AUTO: 4.6 % — SIGNIFICANT CHANGE UP (ref 2–14)
MONOCYTES NFR BLD AUTO: 4.6 % — SIGNIFICANT CHANGE UP (ref 2–14)
MONOCYTES NFR BLD AUTO: 4.8 % — SIGNIFICANT CHANGE UP (ref 2–14)
MONOCYTES NFR BLD AUTO: 5.1 % — SIGNIFICANT CHANGE UP (ref 2–14)
MONOCYTES NFR BLD AUTO: 5.2 % — SIGNIFICANT CHANGE UP (ref 2–14)
MONOCYTES NFR BLD AUTO: 5.2 % — SIGNIFICANT CHANGE UP (ref 2–14)
MONOCYTES NFR BLD AUTO: 5.5 % — SIGNIFICANT CHANGE UP (ref 2–14)
MONOCYTES NFR BLD AUTO: 5.7 % — SIGNIFICANT CHANGE UP (ref 2–14)
MONOCYTES NFR BLD AUTO: 5.8 % — SIGNIFICANT CHANGE UP (ref 2–14)
MONOCYTES NFR BLD AUTO: 6 % — SIGNIFICANT CHANGE UP (ref 2–14)
MONOCYTES NFR BLD AUTO: 6 % — SIGNIFICANT CHANGE UP (ref 2–14)
MONOCYTES NFR BLD AUTO: 6.3 % — SIGNIFICANT CHANGE UP (ref 2–14)
MONOCYTES NFR BLD AUTO: 6.7 % — SIGNIFICANT CHANGE UP (ref 2–14)
MONOS+MACROS # FLD: 6 % — SIGNIFICANT CHANGE UP
MONOS+MACROS # FLD: 8 % — SIGNIFICANT CHANGE UP
MONOS+MACROS # FLD: 90 % — SIGNIFICANT CHANGE UP
MRSA PCR RESULT.: SIGNIFICANT CHANGE UP
MRSA PCR RESULT.: SIGNIFICANT CHANGE UP
NEUTROPHILS # BLD AUTO: 0.19 K/UL — LOW (ref 1.8–7.4)
NEUTROPHILS # BLD AUTO: 0.7 K/UL — LOW (ref 1.8–7.4)
NEUTROPHILS # BLD AUTO: 0.77 K/UL — LOW (ref 1.8–7.4)
NEUTROPHILS # BLD AUTO: 0.79 K/UL — LOW (ref 1.8–7.4)
NEUTROPHILS # BLD AUTO: 0.83 K/UL — LOW (ref 1.8–7.4)
NEUTROPHILS # BLD AUTO: 0.88 K/UL — LOW (ref 1.8–7.4)
NEUTROPHILS # BLD AUTO: 0.93 K/UL — LOW (ref 1.8–7.4)
NEUTROPHILS # BLD AUTO: 1.05 K/UL — LOW (ref 1.8–7.4)
NEUTROPHILS # BLD AUTO: 1.15 K/UL — LOW (ref 1.8–7.4)
NEUTROPHILS # BLD AUTO: 1.2 K/UL — LOW (ref 1.8–7.4)
NEUTROPHILS # BLD AUTO: 1.22 K/UL — LOW (ref 1.8–7.4)
NEUTROPHILS # BLD AUTO: 1.29 K/UL — LOW (ref 1.8–7.4)
NEUTROPHILS # BLD AUTO: 1.36 K/UL — LOW (ref 1.8–7.4)
NEUTROPHILS # BLD AUTO: 1.76 K/UL — LOW (ref 1.8–7.4)
NEUTROPHILS # BLD AUTO: 1.94 K/UL — SIGNIFICANT CHANGE UP (ref 1.8–7.4)
NEUTROPHILS # BLD AUTO: 10.92 K/UL — HIGH (ref 1.8–7.4)
NEUTROPHILS # BLD AUTO: 2.05 K/UL — SIGNIFICANT CHANGE UP (ref 1.8–7.4)
NEUTROPHILS # BLD AUTO: 2.39 K/UL — SIGNIFICANT CHANGE UP (ref 1.8–7.4)
NEUTROPHILS # BLD AUTO: 3.09 K/UL — SIGNIFICANT CHANGE UP (ref 1.8–7.4)
NEUTROPHILS # BLD AUTO: 3.23 K/UL — SIGNIFICANT CHANGE UP (ref 1.8–7.4)
NEUTROPHILS # BLD AUTO: 3.56 K/UL — SIGNIFICANT CHANGE UP (ref 1.8–7.4)
NEUTROPHILS # BLD AUTO: 3.6 K/UL — SIGNIFICANT CHANGE UP (ref 1.8–7.4)
NEUTROPHILS # BLD AUTO: 4.01 K/UL — SIGNIFICANT CHANGE UP (ref 1.8–7.4)
NEUTROPHILS # BLD AUTO: 5.17 K/UL — SIGNIFICANT CHANGE UP (ref 1.8–7.4)
NEUTROPHILS # BLD AUTO: 5.37 K/UL — SIGNIFICANT CHANGE UP (ref 1.8–7.4)
NEUTROPHILS # BLD AUTO: 6.82 K/UL — SIGNIFICANT CHANGE UP (ref 1.8–7.4)
NEUTROPHILS NFR BLD AUTO: 21 % — LOW (ref 43–77)
NEUTROPHILS NFR BLD AUTO: 48.1 % — SIGNIFICANT CHANGE UP (ref 43–77)
NEUTROPHILS NFR BLD AUTO: 52.2 % — SIGNIFICANT CHANGE UP (ref 43–77)
NEUTROPHILS NFR BLD AUTO: 52.5 % — SIGNIFICANT CHANGE UP (ref 43–77)
NEUTROPHILS NFR BLD AUTO: 58.4 % — SIGNIFICANT CHANGE UP (ref 43–77)
NEUTROPHILS NFR BLD AUTO: 62.8 % — SIGNIFICANT CHANGE UP (ref 43–77)
NEUTROPHILS NFR BLD AUTO: 63.3 % — SIGNIFICANT CHANGE UP (ref 43–77)
NEUTROPHILS NFR BLD AUTO: 64 % — SIGNIFICANT CHANGE UP (ref 43–77)
NEUTROPHILS NFR BLD AUTO: 64.2 % — SIGNIFICANT CHANGE UP (ref 43–77)
NEUTROPHILS NFR BLD AUTO: 65 % — SIGNIFICANT CHANGE UP (ref 43–77)
NEUTROPHILS NFR BLD AUTO: 65.3 % — SIGNIFICANT CHANGE UP (ref 43–77)
NEUTROPHILS NFR BLD AUTO: 67.4 % — SIGNIFICANT CHANGE UP (ref 43–77)
NEUTROPHILS NFR BLD AUTO: 68.3 % — SIGNIFICANT CHANGE UP (ref 43–77)
NEUTROPHILS NFR BLD AUTO: 69.7 % — SIGNIFICANT CHANGE UP (ref 43–77)
NEUTROPHILS NFR BLD AUTO: 72.7 % — SIGNIFICANT CHANGE UP (ref 43–77)
NEUTROPHILS NFR BLD AUTO: 72.7 % — SIGNIFICANT CHANGE UP (ref 43–77)
NEUTROPHILS NFR BLD AUTO: 73.9 % — SIGNIFICANT CHANGE UP (ref 43–77)
NEUTROPHILS NFR BLD AUTO: 78.6 % — HIGH (ref 43–77)
NEUTROPHILS NFR BLD AUTO: 83.5 % — HIGH (ref 43–77)
NEUTROPHILS NFR BLD AUTO: 83.6 % — HIGH (ref 43–77)
NEUTROPHILS NFR BLD AUTO: 84.4 % — HIGH (ref 43–77)
NEUTROPHILS NFR BLD AUTO: 87 % — HIGH (ref 43–77)
NEUTROPHILS NFR BLD AUTO: 87.4 % — HIGH (ref 43–77)
NEUTROPHILS NFR BLD AUTO: 88.5 % — HIGH (ref 43–77)
NEUTROPHILS NFR BLD AUTO: 88.9 % — HIGH (ref 43–77)
NEUTROPHILS NFR BLD AUTO: 93.1 % — HIGH (ref 43–77)
NEUTROPHILS-BODY FLUID: 51 % — SIGNIFICANT CHANGE UP
NEUTROPHILS-BODY FLUID: 6 % — SIGNIFICANT CHANGE UP
NEUTROPHILS-BODY FLUID: 8 % — SIGNIFICANT CHANGE UP
NEUTS BAND # BLD: 2 % — SIGNIFICANT CHANGE UP (ref 0–6)
NEUTS BAND # BLD: 8.7 % — HIGH (ref 0–6)
NITRITE UR-MCNC: NEGATIVE — SIGNIFICANT CHANGE UP
NON HDL CHOLESTEROL: 104 MG/DL — SIGNIFICANT CHANGE UP
NON-GYNECOLOGICAL CYTOLOGY STUDY: SIGNIFICANT CHANGE UP
NRBC # BLD: 0 /100 WBCS — SIGNIFICANT CHANGE UP
NRBC # BLD: 1 /100 — HIGH (ref 0–0)
NRBC # FLD: 0 K/UL — SIGNIFICANT CHANGE UP
NT-PROBNP SERPL-SCNC: 331 PG/ML — HIGH
NT-PROBNP SERPL-SCNC: 788 PG/ML — HIGH
OB PNL STL: NEGATIVE — SIGNIFICANT CHANGE UP
OSMOLALITY UR: 564 MOSM/KG — SIGNIFICANT CHANGE UP (ref 50–1200)
OTHER CELLS FLD MANUAL: 0 % — SIGNIFICANT CHANGE UP
OTHER CELLS FLD MANUAL: 0 % — SIGNIFICANT CHANGE UP
OVALOCYTES BLD QL SMEAR: SLIGHT — SIGNIFICANT CHANGE UP
PCO2 BLDV: 38 MMHG — LOW (ref 42–55)
PCO2 BLDV: 40 MMHG — LOW (ref 42–55)
PF4 HEPARIN CMPLX AB SER-ACNC: NEGATIVE — SIGNIFICANT CHANGE UP
PF4 HEPARIN CMPLX AB SER-ACNC: NEGATIVE — SIGNIFICANT CHANGE UP
PH BLDV: 7.37 — SIGNIFICANT CHANGE UP (ref 7.32–7.43)
PH BLDV: 7.4 — SIGNIFICANT CHANGE UP (ref 7.32–7.43)
PH FLD: 7.3 — SIGNIFICANT CHANGE UP
PH FLD: 7.6 — SIGNIFICANT CHANGE UP
PH FLD: 7.6 — SIGNIFICANT CHANGE UP
PH UR: 5.5 — SIGNIFICANT CHANGE UP (ref 5–8)
PH UR: 6 — SIGNIFICANT CHANGE UP (ref 5–8)
PH UR: 6 — SIGNIFICANT CHANGE UP (ref 5–8)
PHOSPHATE SERPL-MCNC: 1.5 MG/DL — LOW (ref 2.5–4.5)
PHOSPHATE SERPL-MCNC: 1.5 MG/DL — LOW (ref 2.5–4.5)
PHOSPHATE SERPL-MCNC: 1.7 MG/DL — LOW (ref 2.5–4.5)
PHOSPHATE SERPL-MCNC: 1.8 MG/DL — LOW (ref 2.5–4.5)
PHOSPHATE SERPL-MCNC: 1.9 MG/DL — LOW (ref 2.5–4.5)
PHOSPHATE SERPL-MCNC: 1.9 MG/DL — LOW (ref 2.5–4.5)
PHOSPHATE SERPL-MCNC: 2 MG/DL — LOW (ref 2.5–4.5)
PHOSPHATE SERPL-MCNC: 2.1 MG/DL — LOW (ref 2.5–4.5)
PHOSPHATE SERPL-MCNC: 2.2 MG/DL — LOW (ref 2.5–4.5)
PHOSPHATE SERPL-MCNC: 2.3 MG/DL — LOW (ref 2.5–4.5)
PHOSPHATE SERPL-MCNC: 2.4 MG/DL — LOW (ref 2.5–4.5)
PHOSPHATE SERPL-MCNC: 2.5 MG/DL — SIGNIFICANT CHANGE UP (ref 2.5–4.5)
PHOSPHATE SERPL-MCNC: 2.6 MG/DL — SIGNIFICANT CHANGE UP (ref 2.5–4.5)
PHOSPHATE SERPL-MCNC: 2.6 MG/DL — SIGNIFICANT CHANGE UP (ref 2.5–4.5)
PHOSPHATE SERPL-MCNC: 2.7 MG/DL — SIGNIFICANT CHANGE UP (ref 2.5–4.5)
PHOSPHATE SERPL-MCNC: 2.9 MG/DL — SIGNIFICANT CHANGE UP (ref 2.5–4.5)
PHOSPHATE SERPL-MCNC: 3 MG/DL — SIGNIFICANT CHANGE UP (ref 2.5–4.5)
PHOSPHATE SERPL-MCNC: 3.1 MG/DL — SIGNIFICANT CHANGE UP (ref 2.5–4.5)
PHOSPHATE SERPL-MCNC: 3.1 MG/DL — SIGNIFICANT CHANGE UP (ref 2.5–4.5)
PHOSPHATE SERPL-MCNC: 3.2 MG/DL — SIGNIFICANT CHANGE UP (ref 2.5–4.5)
PHOSPHATE SERPL-MCNC: 3.2 MG/DL — SIGNIFICANT CHANGE UP (ref 2.5–4.5)
PHOSPHATE SERPL-MCNC: 3.5 MG/DL — SIGNIFICANT CHANGE UP (ref 2.5–4.5)
PHOSPHATE SERPL-MCNC: 3.7 MG/DL — SIGNIFICANT CHANGE UP (ref 2.5–4.5)
PHOSPHATE SERPL-MCNC: 3.8 MG/DL — SIGNIFICANT CHANGE UP (ref 2.5–4.5)
PLAT MORPH BLD: NORMAL — SIGNIFICANT CHANGE UP
PLAT MORPH BLD: NORMAL — SIGNIFICANT CHANGE UP
PLATELET # BLD AUTO: 106 K/UL — LOW (ref 150–400)
PLATELET # BLD AUTO: 13 K/UL — CRITICAL LOW (ref 150–400)
PLATELET # BLD AUTO: 159 K/UL — SIGNIFICANT CHANGE UP (ref 150–400)
PLATELET # BLD AUTO: 16 K/UL — CRITICAL LOW (ref 150–400)
PLATELET # BLD AUTO: 20 K/UL — CRITICAL LOW (ref 150–400)
PLATELET # BLD AUTO: 230 K/UL — SIGNIFICANT CHANGE UP (ref 150–400)
PLATELET # BLD AUTO: 25 K/UL — LOW (ref 150–400)
PLATELET # BLD AUTO: 25 K/UL — LOW (ref 150–400)
PLATELET # BLD AUTO: 267 K/UL — SIGNIFICANT CHANGE UP (ref 150–400)
PLATELET # BLD AUTO: 27 K/UL — LOW (ref 150–400)
PLATELET # BLD AUTO: 271 K/UL — SIGNIFICANT CHANGE UP (ref 150–400)
PLATELET # BLD AUTO: 276 K/UL — SIGNIFICANT CHANGE UP (ref 150–400)
PLATELET # BLD AUTO: 29 K/UL — LOW (ref 150–400)
PLATELET # BLD AUTO: 30 K/UL — LOW (ref 150–400)
PLATELET # BLD AUTO: 35 K/UL — LOW (ref 150–400)
PLATELET # BLD AUTO: 361 K/UL — SIGNIFICANT CHANGE UP (ref 150–400)
PLATELET # BLD AUTO: 37 K/UL — LOW (ref 150–400)
PLATELET # BLD AUTO: 376 K/UL — SIGNIFICANT CHANGE UP (ref 150–400)
PLATELET # BLD AUTO: 394 K/UL — SIGNIFICANT CHANGE UP (ref 150–400)
PLATELET # BLD AUTO: 398 K/UL — SIGNIFICANT CHANGE UP (ref 150–400)
PLATELET # BLD AUTO: 40 K/UL — LOW (ref 150–400)
PLATELET # BLD AUTO: 40 K/UL — LOW (ref 150–400)
PLATELET # BLD AUTO: 405 K/UL — HIGH (ref 150–400)
PLATELET # BLD AUTO: 413 K/UL — HIGH (ref 150–400)
PLATELET # BLD AUTO: 42 K/UL — LOW (ref 150–400)
PLATELET # BLD AUTO: 460 K/UL — HIGH (ref 150–400)
PLATELET # BLD AUTO: 49 K/UL — LOW (ref 150–400)
PLATELET # BLD AUTO: 497 K/UL — HIGH (ref 150–400)
PLATELET # BLD AUTO: 50 K/UL — LOW (ref 150–400)
PLATELET # BLD AUTO: 50 K/UL — LOW (ref 150–400)
PLATELET # BLD AUTO: 54 K/UL — LOW (ref 150–400)
PLATELET # BLD AUTO: 54 K/UL — LOW (ref 150–400)
PLATELET # BLD AUTO: 56 K/UL — LOW (ref 150–400)
PLATELET # BLD AUTO: 56 K/UL — LOW (ref 150–400)
PLATELET # BLD AUTO: 59 K/UL — LOW (ref 150–400)
PLATELET # BLD AUTO: 63 K/UL — LOW (ref 150–400)
PLATELET # BLD AUTO: 68 K/UL — LOW (ref 150–400)
PLATELET # BLD AUTO: 69 K/UL — LOW (ref 150–400)
PLATELET # BLD AUTO: 69 K/UL — LOW (ref 150–400)
PLATELET # BLD AUTO: 70 K/UL — LOW (ref 150–400)
PLATELET # BLD AUTO: 73 K/UL — LOW (ref 150–400)
PLATELET # BLD AUTO: 76 K/UL — LOW (ref 150–400)
PLATELET # BLD AUTO: 77 K/UL — LOW (ref 150–400)
PLATELET # BLD AUTO: 77 K/UL — LOW (ref 150–400)
PLATELET # BLD AUTO: 84 K/UL — LOW (ref 150–400)
PLATELET # BLD AUTO: 86 K/UL — LOW (ref 150–400)
PLATELET # BLD AUTO: 87 K/UL — LOW (ref 150–400)
PLATELET # BLD AUTO: 89 K/UL — LOW (ref 150–400)
PLATELET # BLD AUTO: 97 K/UL — LOW (ref 150–400)
PLATELET COUNT - ESTIMATE: ABNORMAL
PLATELET COUNT - ESTIMATE: ABNORMAL
PO2 BLDV: 26 MMHG — SIGNIFICANT CHANGE UP
PO2 BLDV: 57 MMHG — SIGNIFICANT CHANGE UP
POIKILOCYTOSIS BLD QL AUTO: SLIGHT — SIGNIFICANT CHANGE UP
POIKILOCYTOSIS BLD QL AUTO: SLIGHT — SIGNIFICANT CHANGE UP
POLYCHROMASIA BLD QL SMEAR: SLIGHT — SIGNIFICANT CHANGE UP
POTASSIUM BLDV-SCNC: 3.8 MMOL/L — SIGNIFICANT CHANGE UP (ref 3.5–5.1)
POTASSIUM BLDV-SCNC: 4 MMOL/L — SIGNIFICANT CHANGE UP (ref 3.5–5.1)
POTASSIUM SERPL-MCNC: 3.2 MMOL/L — LOW (ref 3.5–5.3)
POTASSIUM SERPL-MCNC: 3.4 MMOL/L — LOW (ref 3.5–5.3)
POTASSIUM SERPL-MCNC: 3.4 MMOL/L — LOW (ref 3.5–5.3)
POTASSIUM SERPL-MCNC: 3.5 MMOL/L — SIGNIFICANT CHANGE UP (ref 3.5–5.3)
POTASSIUM SERPL-MCNC: 3.6 MMOL/L — SIGNIFICANT CHANGE UP (ref 3.5–5.3)
POTASSIUM SERPL-MCNC: 3.7 MMOL/L — SIGNIFICANT CHANGE UP (ref 3.5–5.3)
POTASSIUM SERPL-MCNC: 3.8 MMOL/L — SIGNIFICANT CHANGE UP (ref 3.5–5.3)
POTASSIUM SERPL-MCNC: 3.9 MMOL/L — SIGNIFICANT CHANGE UP (ref 3.5–5.3)
POTASSIUM SERPL-MCNC: 4 MMOL/L — SIGNIFICANT CHANGE UP (ref 3.5–5.3)
POTASSIUM SERPL-MCNC: 4.1 MMOL/L — SIGNIFICANT CHANGE UP (ref 3.5–5.3)
POTASSIUM SERPL-MCNC: 4.2 MMOL/L — SIGNIFICANT CHANGE UP (ref 3.5–5.3)
POTASSIUM SERPL-MCNC: 4.2 MMOL/L — SIGNIFICANT CHANGE UP (ref 3.5–5.3)
POTASSIUM SERPL-MCNC: 4.3 MMOL/L — SIGNIFICANT CHANGE UP (ref 3.5–5.3)
POTASSIUM SERPL-MCNC: 4.3 MMOL/L — SIGNIFICANT CHANGE UP (ref 3.5–5.3)
POTASSIUM SERPL-MCNC: 4.4 MMOL/L — SIGNIFICANT CHANGE UP (ref 3.5–5.3)
POTASSIUM SERPL-MCNC: 4.5 MMOL/L — SIGNIFICANT CHANGE UP (ref 3.5–5.3)
POTASSIUM SERPL-MCNC: 4.5 MMOL/L — SIGNIFICANT CHANGE UP (ref 3.5–5.3)
POTASSIUM SERPL-MCNC: 5.1 MMOL/L — SIGNIFICANT CHANGE UP (ref 3.5–5.3)
POTASSIUM SERPL-SCNC: 3.2 MMOL/L — LOW (ref 3.5–5.3)
POTASSIUM SERPL-SCNC: 3.4 MMOL/L — LOW (ref 3.5–5.3)
POTASSIUM SERPL-SCNC: 3.4 MMOL/L — LOW (ref 3.5–5.3)
POTASSIUM SERPL-SCNC: 3.5 MMOL/L — SIGNIFICANT CHANGE UP (ref 3.5–5.3)
POTASSIUM SERPL-SCNC: 3.6 MMOL/L — SIGNIFICANT CHANGE UP (ref 3.5–5.3)
POTASSIUM SERPL-SCNC: 3.7 MMOL/L — SIGNIFICANT CHANGE UP (ref 3.5–5.3)
POTASSIUM SERPL-SCNC: 3.8 MMOL/L — SIGNIFICANT CHANGE UP (ref 3.5–5.3)
POTASSIUM SERPL-SCNC: 3.9 MMOL/L — SIGNIFICANT CHANGE UP (ref 3.5–5.3)
POTASSIUM SERPL-SCNC: 4 MMOL/L — SIGNIFICANT CHANGE UP (ref 3.5–5.3)
POTASSIUM SERPL-SCNC: 4.1 MMOL/L — SIGNIFICANT CHANGE UP (ref 3.5–5.3)
POTASSIUM SERPL-SCNC: 4.2 MMOL/L — SIGNIFICANT CHANGE UP (ref 3.5–5.3)
POTASSIUM SERPL-SCNC: 4.2 MMOL/L — SIGNIFICANT CHANGE UP (ref 3.5–5.3)
POTASSIUM SERPL-SCNC: 4.3 MMOL/L — SIGNIFICANT CHANGE UP (ref 3.5–5.3)
POTASSIUM SERPL-SCNC: 4.3 MMOL/L — SIGNIFICANT CHANGE UP (ref 3.5–5.3)
POTASSIUM SERPL-SCNC: 4.4 MMOL/L — SIGNIFICANT CHANGE UP (ref 3.5–5.3)
POTASSIUM SERPL-SCNC: 4.5 MMOL/L — SIGNIFICANT CHANGE UP (ref 3.5–5.3)
POTASSIUM SERPL-SCNC: 4.5 MMOL/L — SIGNIFICANT CHANGE UP (ref 3.5–5.3)
POTASSIUM SERPL-SCNC: 5.1 MMOL/L — SIGNIFICANT CHANGE UP (ref 3.5–5.3)
PROT FLD-MCNC: 2.5 G/DL — SIGNIFICANT CHANGE UP
PROT FLD-MCNC: 3.6 G/DL — SIGNIFICANT CHANGE UP
PROT SERPL-MCNC: 4.6 G/DL — LOW (ref 6–8.3)
PROT SERPL-MCNC: 4.8 G/DL — LOW (ref 6–8.3)
PROT SERPL-MCNC: 5.1 G/DL — LOW (ref 6–8.3)
PROT SERPL-MCNC: 5.2 G/DL — LOW (ref 6–8.3)
PROT SERPL-MCNC: 5.2 G/DL — LOW (ref 6–8.3)
PROT SERPL-MCNC: 5.4 G/DL — LOW (ref 6–8.3)
PROT SERPL-MCNC: 5.4 G/DL — LOW (ref 6–8.3)
PROT SERPL-MCNC: 5.5 G/DL — LOW (ref 6–8.3)
PROT SERPL-MCNC: 5.5 G/DL — LOW (ref 6–8.3)
PROT SERPL-MCNC: 5.7 G/DL — LOW (ref 6–8.3)
PROT SERPL-MCNC: 5.9 G/DL — LOW (ref 6–8.3)
PROT SERPL-MCNC: 6 G/DL — SIGNIFICANT CHANGE UP (ref 6–8.3)
PROT SERPL-MCNC: 6.3 G/DL — SIGNIFICANT CHANGE UP (ref 6–8.3)
PROT SERPL-MCNC: 6.6 G/DL — SIGNIFICANT CHANGE UP (ref 6–8.3)
PROT UR-MCNC: ABNORMAL
PROTHROM AB SERPL-ACNC: 11.4 SEC — SIGNIFICANT CHANGE UP (ref 10.5–13.4)
PROTHROM AB SERPL-ACNC: 11.5 SEC — SIGNIFICANT CHANGE UP (ref 10.5–13.4)
PROTHROM AB SERPL-ACNC: 11.7 SEC — SIGNIFICANT CHANGE UP (ref 10.5–13.4)
PROTHROM AB SERPL-ACNC: 12.1 SEC — SIGNIFICANT CHANGE UP (ref 10.5–13.4)
PROTHROM AB SERPL-ACNC: 12.1 SEC — SIGNIFICANT CHANGE UP (ref 10.5–13.4)
PROTHROM AB SERPL-ACNC: 12.2 SEC — SIGNIFICANT CHANGE UP (ref 10.5–13.4)
PROTHROM AB SERPL-ACNC: 12.3 SEC — SIGNIFICANT CHANGE UP (ref 10.5–13.4)
PROTHROM AB SERPL-ACNC: 12.3 SEC — SIGNIFICANT CHANGE UP (ref 10.5–13.4)
PROTHROM AB SERPL-ACNC: 12.5 SEC — SIGNIFICANT CHANGE UP (ref 10.5–13.4)
PROTHROM AB SERPL-ACNC: 12.8 SEC — SIGNIFICANT CHANGE UP (ref 10.5–13.4)
RAPID RVP RESULT: SIGNIFICANT CHANGE UP
RAPID RVP RESULT: SIGNIFICANT CHANGE UP
RBC # BLD: 1.93 M/UL — LOW (ref 4.2–5.8)
RBC # BLD: 2.06 M/UL — LOW (ref 4.2–5.8)
RBC # BLD: 2.16 M/UL — LOW (ref 4.2–5.8)
RBC # BLD: 2.21 M/UL — LOW (ref 4.2–5.8)
RBC # BLD: 2.27 M/UL — LOW (ref 4.2–5.8)
RBC # BLD: 2.29 M/UL — LOW (ref 4.2–5.8)
RBC # BLD: 2.33 M/UL — LOW (ref 4.2–5.8)
RBC # BLD: 2.35 M/UL — LOW (ref 4.2–5.8)
RBC # BLD: 2.36 M/UL — LOW (ref 4.2–5.8)
RBC # BLD: 2.36 M/UL — LOW (ref 4.2–5.8)
RBC # BLD: 2.38 M/UL — LOW (ref 4.2–5.8)
RBC # BLD: 2.39 M/UL — LOW (ref 4.2–5.8)
RBC # BLD: 2.42 M/UL — LOW (ref 4.2–5.8)
RBC # BLD: 2.43 M/UL — LOW (ref 4.2–5.8)
RBC # BLD: 2.44 M/UL — LOW (ref 4.2–5.8)
RBC # BLD: 2.44 M/UL — LOW (ref 4.2–5.8)
RBC # BLD: 2.49 M/UL — LOW (ref 4.2–5.8)
RBC # BLD: 2.58 M/UL — LOW (ref 4.2–5.8)
RBC # BLD: 2.59 M/UL — LOW (ref 4.2–5.8)
RBC # BLD: 2.6 M/UL — LOW (ref 4.2–5.8)
RBC # BLD: 2.64 M/UL — LOW (ref 4.2–5.8)
RBC # BLD: 2.66 M/UL — LOW (ref 4.2–5.8)
RBC # BLD: 2.66 M/UL — LOW (ref 4.2–5.8)
RBC # BLD: 2.67 M/UL — LOW (ref 4.2–5.8)
RBC # BLD: 2.67 M/UL — LOW (ref 4.2–5.8)
RBC # BLD: 2.72 M/UL — LOW (ref 4.2–5.8)
RBC # BLD: 2.77 M/UL — LOW (ref 4.2–5.8)
RBC # BLD: 2.77 M/UL — LOW (ref 4.2–5.8)
RBC # BLD: 2.78 M/UL — LOW (ref 4.2–5.8)
RBC # BLD: 2.79 M/UL — LOW (ref 4.2–5.8)
RBC # BLD: 2.82 M/UL — LOW (ref 4.2–5.8)
RBC # BLD: 2.82 M/UL — LOW (ref 4.2–5.8)
RBC # BLD: 2.9 M/UL — LOW (ref 4.2–5.8)
RBC # BLD: 2.91 M/UL — LOW (ref 4.2–5.8)
RBC # BLD: 2.95 M/UL — LOW (ref 4.2–5.8)
RBC # BLD: 2.97 M/UL — LOW (ref 4.2–5.8)
RBC # BLD: 2.99 M/UL — LOW (ref 4.2–5.8)
RBC # BLD: 3.01 M/UL — LOW (ref 4.2–5.8)
RBC # BLD: 3.03 M/UL — LOW (ref 4.2–5.8)
RBC # BLD: 3.12 M/UL — LOW (ref 4.2–5.8)
RBC # BLD: 3.12 M/UL — LOW (ref 4.2–5.8)
RBC # BLD: 3.28 M/UL — LOW (ref 4.2–5.8)
RBC # BLD: 3.39 M/UL — LOW (ref 4.2–5.8)
RBC # BLD: 3.52 M/UL — LOW (ref 4.2–5.8)
RBC # FLD: 18.6 % — HIGH (ref 10.3–14.5)
RBC # FLD: 18.7 % — HIGH (ref 10.3–14.5)
RBC # FLD: 19 % — HIGH (ref 10.3–14.5)
RBC # FLD: 19.9 % — HIGH (ref 10.3–14.5)
RBC # FLD: 20.2 % — HIGH (ref 10.3–14.5)
RBC # FLD: 20.4 % — HIGH (ref 10.3–14.5)
RBC # FLD: 20.8 % — HIGH (ref 10.3–14.5)
RBC # FLD: 21 % — HIGH (ref 10.3–14.5)
RBC # FLD: 21.1 % — HIGH (ref 10.3–14.5)
RBC # FLD: 21.1 % — HIGH (ref 10.3–14.5)
RBC # FLD: 21.2 % — HIGH (ref 10.3–14.5)
RBC # FLD: 21.4 % — HIGH (ref 10.3–14.5)
RBC # FLD: 21.4 % — HIGH (ref 10.3–14.5)
RBC # FLD: 21.5 % — HIGH (ref 10.3–14.5)
RBC # FLD: 21.7 % — HIGH (ref 10.3–14.5)
RBC # FLD: 21.8 % — HIGH (ref 10.3–14.5)
RBC # FLD: 22.5 % — HIGH (ref 10.3–14.5)
RBC # FLD: 22.7 % — HIGH (ref 10.3–14.5)
RBC # FLD: 22.8 % — HIGH (ref 10.3–14.5)
RBC # FLD: 23 % — HIGH (ref 10.3–14.5)
RBC # FLD: 23.1 % — HIGH (ref 10.3–14.5)
RBC # FLD: 23.2 % — HIGH (ref 10.3–14.5)
RBC # FLD: 23.3 % — HIGH (ref 10.3–14.5)
RBC # FLD: 23.3 % — HIGH (ref 10.3–14.5)
RBC # FLD: 23.4 % — HIGH (ref 10.3–14.5)
RBC # FLD: 23.4 % — HIGH (ref 10.3–14.5)
RBC # FLD: 23.6 % — HIGH (ref 10.3–14.5)
RBC # FLD: 24.9 % — HIGH (ref 10.3–14.5)
RBC # FLD: 26.4 % — HIGH (ref 10.3–14.5)
RBC # FLD: 26.4 % — HIGH (ref 10.3–14.5)
RBC # FLD: 26.5 % — HIGH (ref 10.3–14.5)
RBC # FLD: 26.6 % — HIGH (ref 10.3–14.5)
RBC # FLD: 26.7 % — HIGH (ref 10.3–14.5)
RBC # FLD: 26.8 % — HIGH (ref 10.3–14.5)
RBC # FLD: 26.9 % — HIGH (ref 10.3–14.5)
RBC # FLD: 27 % — HIGH (ref 10.3–14.5)
RBC # FLD: 27.1 % — HIGH (ref 10.3–14.5)
RBC # FLD: 27.1 % — HIGH (ref 10.3–14.5)
RBC BLD AUTO: ABNORMAL
RBC BLD AUTO: ABNORMAL
RBC CASTS # UR COMP ASSIST: 2 /HPF — SIGNIFICANT CHANGE UP (ref 0–4)
RBC CASTS # UR COMP ASSIST: 59 /HPF — HIGH (ref 0–4)
RCV VOL RI: 1000 CELLS/UL — HIGH (ref 0–5)
RCV VOL RI: HIGH CELLS/UL (ref 0–5)
RCV VOL RI: HIGH CELLS/UL (ref 0–5)
RETICS #: 25.9 K/UL — SIGNIFICANT CHANGE UP (ref 25–125)
RETICS #: 43.3 K/UL — SIGNIFICANT CHANGE UP (ref 25–125)
RETICS #: 48.2 K/UL — SIGNIFICANT CHANGE UP (ref 25–125)
RETICS/RBC NFR: 1.4 % — SIGNIFICANT CHANGE UP (ref 0.5–2.5)
RETICS/RBC NFR: 1.7 % — SIGNIFICANT CHANGE UP (ref 0.5–2.5)
RETICS/RBC NFR: 1.8 % — SIGNIFICANT CHANGE UP (ref 0.5–2.5)
RH IG SCN BLD-IMP: POSITIVE — SIGNIFICANT CHANGE UP
RSV RNA NPH QL NAA+NON-PROBE: SIGNIFICANT CHANGE UP
RSV RNA SPEC QL NAA+PROBE: SIGNIFICANT CHANGE UP
RSV RNA SPEC QL NAA+PROBE: SIGNIFICANT CHANGE UP
RV+EV RNA SPEC QL NAA+PROBE: SIGNIFICANT CHANGE UP
RV+EV RNA SPEC QL NAA+PROBE: SIGNIFICANT CHANGE UP
S AUREUS DNA NOSE QL NAA+PROBE: SIGNIFICANT CHANGE UP
S AUREUS DNA NOSE QL NAA+PROBE: SIGNIFICANT CHANGE UP
SAO2 % BLDV: 41.9 % — SIGNIFICANT CHANGE UP
SAO2 % BLDV: 88 % — SIGNIFICANT CHANGE UP
SARS-COV-2 RNA SPEC QL NAA+PROBE: SIGNIFICANT CHANGE UP
SODIUM SERPL-SCNC: 127 MMOL/L — LOW (ref 135–145)
SODIUM SERPL-SCNC: 128 MMOL/L — LOW (ref 135–145)
SODIUM SERPL-SCNC: 129 MMOL/L — LOW (ref 135–145)
SODIUM SERPL-SCNC: 129 MMOL/L — LOW (ref 135–145)
SODIUM SERPL-SCNC: 130 MMOL/L — LOW (ref 135–145)
SODIUM SERPL-SCNC: 131 MMOL/L — LOW (ref 135–145)
SODIUM SERPL-SCNC: 132 MMOL/L — LOW (ref 135–145)
SODIUM SERPL-SCNC: 133 MMOL/L — LOW (ref 135–145)
SODIUM SERPL-SCNC: 134 MMOL/L — LOW (ref 135–145)
SODIUM SERPL-SCNC: 135 MMOL/L — SIGNIFICANT CHANGE UP (ref 135–145)
SODIUM SERPL-SCNC: 136 MMOL/L — SIGNIFICANT CHANGE UP (ref 135–145)
SODIUM SERPL-SCNC: 137 MMOL/L — SIGNIFICANT CHANGE UP (ref 135–145)
SODIUM UR-SCNC: <20 MMOL/L — SIGNIFICANT CHANGE UP
SP GR SPEC: 1.02 — SIGNIFICANT CHANGE UP (ref 1–1.05)
SP GR SPEC: 1.02 — SIGNIFICANT CHANGE UP (ref 1–1.05)
SP GR SPEC: 1.04 — SIGNIFICANT CHANGE UP (ref 1–1.05)
SPECIMEN SOURCE FLD: SIGNIFICANT CHANGE UP
SPECIMEN SOURCE: SIGNIFICANT CHANGE UP
SRA INTERP SER-IMP: SIGNIFICANT CHANGE UP
SRA INTERP SER-IMP: SIGNIFICANT CHANGE UP
TIBC SERPL-MCNC: 115 UG/DL — LOW (ref 220–430)
TIBC SERPL-MCNC: 143 UG/DL — LOW (ref 220–430)
TOTAL CELLS COUNTED, BODY FLUID: 100 CELLS — SIGNIFICANT CHANGE UP
TOTAL NUCLEATED CELL COUNT, BODY FLUID: 116 CELLS/UL — HIGH (ref 0–5)
TOTAL NUCLEATED CELL COUNT, BODY FLUID: 324 CELLS/UL — HIGH (ref 0–5)
TOTAL NUCLEATED CELL COUNT, BODY FLUID: 360 CELLS/UL — HIGH (ref 0–5)
TRIGL SERPL-MCNC: 108 MG/DL — SIGNIFICANT CHANGE UP
TROPONIN T, HIGH SENSITIVITY RESULT: 32 NG/L — SIGNIFICANT CHANGE UP
TROPONIN T, HIGH SENSITIVITY RESULT: 36 NG/L — SIGNIFICANT CHANGE UP
TROPONIN T, HIGH SENSITIVITY RESULT: 41 NG/L — SIGNIFICANT CHANGE UP
TROPONIN T, HIGH SENSITIVITY RESULT: 51 NG/L — HIGH
TSH SERPL-MCNC: 2.88 UIU/ML — SIGNIFICANT CHANGE UP (ref 0.27–4.2)
TUBE TYPE: SIGNIFICANT CHANGE UP
TUBE TYPE: SIGNIFICANT CHANGE UP
UIBC SERPL-MCNC: 61 UG/DL — LOW (ref 110–370)
UIBC SERPL-MCNC: 74 UG/DL — LOW (ref 110–370)
URATE SERPL-MCNC: 3.9 MG/DL — SIGNIFICANT CHANGE UP (ref 3.4–8.8)
UROBILINOGEN FLD QL: ABNORMAL
UROBILINOGEN FLD QL: SIGNIFICANT CHANGE UP
UROBILINOGEN FLD QL: SIGNIFICANT CHANGE UP
VARIANT LYMPHS # BLD: 1 % — SIGNIFICANT CHANGE UP (ref 0–6)
VIT B12 SERPL-MCNC: 471 PG/ML — SIGNIFICANT CHANGE UP (ref 200–900)
WBC # BLD: 0.79 K/UL — CRITICAL LOW (ref 3.8–10.5)
WBC # BLD: 1.18 K/UL — LOW (ref 3.8–10.5)
WBC # BLD: 1.33 K/UL — LOW (ref 3.8–10.5)
WBC # BLD: 1.34 K/UL — LOW (ref 3.8–10.5)
WBC # BLD: 1.39 K/UL — LOW (ref 3.8–10.5)
WBC # BLD: 1.42 K/UL — LOW (ref 3.8–10.5)
WBC # BLD: 1.48 K/UL — LOW (ref 3.8–10.5)
WBC # BLD: 1.48 K/UL — LOW (ref 3.8–10.5)
WBC # BLD: 1.49 K/UL — LOW (ref 3.8–10.5)
WBC # BLD: 1.58 K/UL — LOW (ref 3.8–10.5)
WBC # BLD: 1.6 K/UL — LOW (ref 3.8–10.5)
WBC # BLD: 1.62 K/UL — LOW (ref 3.8–10.5)
WBC # BLD: 1.77 K/UL — LOW (ref 3.8–10.5)
WBC # BLD: 1.79 K/UL — LOW (ref 3.8–10.5)
WBC # BLD: 1.87 K/UL — LOW (ref 3.8–10.5)
WBC # BLD: 1.89 K/UL — LOW (ref 3.8–10.5)
WBC # BLD: 1.93 K/UL — LOW (ref 3.8–10.5)
WBC # BLD: 1.94 K/UL — LOW (ref 3.8–10.5)
WBC # BLD: 1.97 K/UL — LOW (ref 3.8–10.5)
WBC # BLD: 1.97 K/UL — LOW (ref 3.8–10.5)
WBC # BLD: 12.35 K/UL — HIGH (ref 3.8–10.5)
WBC # BLD: 2.08 K/UL — LOW (ref 3.8–10.5)
WBC # BLD: 2.23 K/UL — LOW (ref 3.8–10.5)
WBC # BLD: 2.24 K/UL — LOW (ref 3.8–10.5)
WBC # BLD: 2.25 K/UL — LOW (ref 3.8–10.5)
WBC # BLD: 2.36 K/UL — LOW (ref 3.8–10.5)
WBC # BLD: 2.56 K/UL — LOW (ref 3.8–10.5)
WBC # BLD: 2.61 K/UL — LOW (ref 3.8–10.5)
WBC # BLD: 2.67 K/UL — LOW (ref 3.8–10.5)
WBC # BLD: 2.71 K/UL — LOW (ref 3.8–10.5)
WBC # BLD: 2.74 K/UL — LOW (ref 3.8–10.5)
WBC # BLD: 2.76 K/UL — LOW (ref 3.8–10.5)
WBC # BLD: 2.79 K/UL — LOW (ref 3.8–10.5)
WBC # BLD: 2.87 K/UL — LOW (ref 3.8–10.5)
WBC # BLD: 3.11 K/UL — LOW (ref 3.8–10.5)
WBC # BLD: 3.22 K/UL — LOW (ref 3.8–10.5)
WBC # BLD: 3.43 K/UL — LOW (ref 3.8–10.5)
WBC # BLD: 3.49 K/UL — LOW (ref 3.8–10.5)
WBC # BLD: 3.7 K/UL — LOW (ref 3.8–10.5)
WBC # BLD: 3.72 K/UL — LOW (ref 3.8–10.5)
WBC # BLD: 3.83 K/UL — SIGNIFICANT CHANGE UP (ref 3.8–10.5)
WBC # BLD: 4.12 K/UL — SIGNIFICANT CHANGE UP (ref 3.8–10.5)
WBC # BLD: 4.14 K/UL — SIGNIFICANT CHANGE UP (ref 3.8–10.5)
WBC # BLD: 4.26 K/UL — SIGNIFICANT CHANGE UP (ref 3.8–10.5)
WBC # BLD: 4.59 K/UL — SIGNIFICANT CHANGE UP (ref 3.8–10.5)
WBC # BLD: 5.55 K/UL — SIGNIFICANT CHANGE UP (ref 3.8–10.5)
WBC # BLD: 6.04 K/UL — SIGNIFICANT CHANGE UP (ref 3.8–10.5)
WBC # BLD: 7.95 K/UL — SIGNIFICANT CHANGE UP (ref 3.8–10.5)
WBC # BLD: 8.68 K/UL — SIGNIFICANT CHANGE UP (ref 3.8–10.5)
WBC # FLD AUTO: 0.79 K/UL — CRITICAL LOW (ref 3.8–10.5)
WBC # FLD AUTO: 1.18 K/UL — LOW (ref 3.8–10.5)
WBC # FLD AUTO: 1.33 K/UL — LOW (ref 3.8–10.5)
WBC # FLD AUTO: 1.34 K/UL — LOW (ref 3.8–10.5)
WBC # FLD AUTO: 1.39 K/UL — LOW (ref 3.8–10.5)
WBC # FLD AUTO: 1.42 K/UL — LOW (ref 3.8–10.5)
WBC # FLD AUTO: 1.48 K/UL — LOW (ref 3.8–10.5)
WBC # FLD AUTO: 1.48 K/UL — LOW (ref 3.8–10.5)
WBC # FLD AUTO: 1.49 K/UL — LOW (ref 3.8–10.5)
WBC # FLD AUTO: 1.58 K/UL — LOW (ref 3.8–10.5)
WBC # FLD AUTO: 1.6 K/UL — LOW (ref 3.8–10.5)
WBC # FLD AUTO: 1.62 K/UL — LOW (ref 3.8–10.5)
WBC # FLD AUTO: 1.77 K/UL — LOW (ref 3.8–10.5)
WBC # FLD AUTO: 1.79 K/UL — LOW (ref 3.8–10.5)
WBC # FLD AUTO: 1.87 K/UL — LOW (ref 3.8–10.5)
WBC # FLD AUTO: 1.89 K/UL — LOW (ref 3.8–10.5)
WBC # FLD AUTO: 1.93 K/UL — LOW (ref 3.8–10.5)
WBC # FLD AUTO: 1.94 K/UL — LOW (ref 3.8–10.5)
WBC # FLD AUTO: 1.97 K/UL — LOW (ref 3.8–10.5)
WBC # FLD AUTO: 1.97 K/UL — LOW (ref 3.8–10.5)
WBC # FLD AUTO: 12.35 K/UL — HIGH (ref 3.8–10.5)
WBC # FLD AUTO: 2.08 K/UL — LOW (ref 3.8–10.5)
WBC # FLD AUTO: 2.23 K/UL — LOW (ref 3.8–10.5)
WBC # FLD AUTO: 2.24 K/UL — LOW (ref 3.8–10.5)
WBC # FLD AUTO: 2.25 K/UL — LOW (ref 3.8–10.5)
WBC # FLD AUTO: 2.36 K/UL — LOW (ref 3.8–10.5)
WBC # FLD AUTO: 2.56 K/UL — LOW (ref 3.8–10.5)
WBC # FLD AUTO: 2.61 K/UL — LOW (ref 3.8–10.5)
WBC # FLD AUTO: 2.67 K/UL — LOW (ref 3.8–10.5)
WBC # FLD AUTO: 2.71 K/UL — LOW (ref 3.8–10.5)
WBC # FLD AUTO: 2.74 K/UL — LOW (ref 3.8–10.5)
WBC # FLD AUTO: 2.76 K/UL — LOW (ref 3.8–10.5)
WBC # FLD AUTO: 2.79 K/UL — LOW (ref 3.8–10.5)
WBC # FLD AUTO: 2.87 K/UL — LOW (ref 3.8–10.5)
WBC # FLD AUTO: 3.11 K/UL — LOW (ref 3.8–10.5)
WBC # FLD AUTO: 3.22 K/UL — LOW (ref 3.8–10.5)
WBC # FLD AUTO: 3.43 K/UL — LOW (ref 3.8–10.5)
WBC # FLD AUTO: 3.49 K/UL — LOW (ref 3.8–10.5)
WBC # FLD AUTO: 3.7 K/UL — LOW (ref 3.8–10.5)
WBC # FLD AUTO: 3.72 K/UL — LOW (ref 3.8–10.5)
WBC # FLD AUTO: 3.83 K/UL — SIGNIFICANT CHANGE UP (ref 3.8–10.5)
WBC # FLD AUTO: 4.12 K/UL — SIGNIFICANT CHANGE UP (ref 3.8–10.5)
WBC # FLD AUTO: 4.14 K/UL — SIGNIFICANT CHANGE UP (ref 3.8–10.5)
WBC # FLD AUTO: 4.26 K/UL — SIGNIFICANT CHANGE UP (ref 3.8–10.5)
WBC # FLD AUTO: 4.59 K/UL — SIGNIFICANT CHANGE UP (ref 3.8–10.5)
WBC # FLD AUTO: 5.55 K/UL — SIGNIFICANT CHANGE UP (ref 3.8–10.5)
WBC # FLD AUTO: 6.04 K/UL — SIGNIFICANT CHANGE UP (ref 3.8–10.5)
WBC # FLD AUTO: 7.95 K/UL — SIGNIFICANT CHANGE UP (ref 3.8–10.5)
WBC # FLD AUTO: 8.68 K/UL — SIGNIFICANT CHANGE UP (ref 3.8–10.5)
WBC UR QL: 16 /HPF — HIGH (ref 0–5)
WBC UR QL: 6 /HPF — HIGH (ref 0–5)

## 2022-01-01 PROCEDURE — 99233 SBSQ HOSP IP/OBS HIGH 50: CPT

## 2022-01-01 PROCEDURE — 99232 SBSQ HOSP IP/OBS MODERATE 35: CPT

## 2022-01-01 PROCEDURE — 71275 CT ANGIOGRAPHY CHEST: CPT | Mod: 26

## 2022-01-01 PROCEDURE — 99223 1ST HOSP IP/OBS HIGH 75: CPT

## 2022-01-01 PROCEDURE — 49083 ABD PARACENTESIS W/IMAGING: CPT

## 2022-01-01 PROCEDURE — 93970 EXTREMITY STUDY: CPT | Mod: 26

## 2022-01-01 PROCEDURE — 74177 CT ABD & PELVIS W/CONTRAST: CPT | Mod: MH

## 2022-01-01 PROCEDURE — 74177 CT ABD & PELVIS W/CONTRAST: CPT | Mod: 26,MA

## 2022-01-01 PROCEDURE — 88112 CYTOPATH CELL ENHANCE TECH: CPT | Mod: 26

## 2022-01-01 PROCEDURE — 99291 CRITICAL CARE FIRST HOUR: CPT | Mod: FT,25

## 2022-01-01 PROCEDURE — 99223 1ST HOSP IP/OBS HIGH 75: CPT | Mod: GC

## 2022-01-01 PROCEDURE — 76705 ECHO EXAM OF ABDOMEN: CPT | Mod: 26

## 2022-01-01 PROCEDURE — 88305 TISSUE EXAM BY PATHOLOGIST: CPT | Mod: 26

## 2022-01-01 PROCEDURE — 99232 SBSQ HOSP IP/OBS MODERATE 35: CPT | Mod: GC

## 2022-01-01 PROCEDURE — 49082 ABD PARACENTESIS: CPT

## 2022-01-01 PROCEDURE — 88341 IMHCHEM/IMCYTCHM EA ADD ANTB: CPT | Mod: 26

## 2022-01-01 PROCEDURE — 82565 ASSAY OF CREATININE: CPT

## 2022-01-01 PROCEDURE — 99222 1ST HOSP IP/OBS MODERATE 55: CPT | Mod: GC

## 2022-01-01 PROCEDURE — 99285 EMERGENCY DEPT VISIT HI MDM: CPT | Mod: 25,GC

## 2022-01-01 PROCEDURE — 71260 CT THORAX DX C+: CPT | Mod: MH

## 2022-01-01 PROCEDURE — 88342 IMHCHEM/IMCYTCHM 1ST ANTB: CPT | Mod: 26

## 2022-01-01 PROCEDURE — 71045 X-RAY EXAM CHEST 1 VIEW: CPT | Mod: 26

## 2022-01-01 PROCEDURE — 36000 PLACE NEEDLE IN VEIN: CPT | Mod: GC

## 2022-01-01 PROCEDURE — 99497 ADVNCD CARE PLAN 30 MIN: CPT | Mod: 25

## 2022-01-01 PROCEDURE — 99222 1ST HOSP IP/OBS MODERATE 55: CPT

## 2022-01-01 PROCEDURE — 93306 TTE W/DOPPLER COMPLETE: CPT | Mod: 26

## 2022-01-01 PROCEDURE — 74177 CT ABD & PELVIS W/CONTRAST: CPT | Mod: 26,MH

## 2022-01-01 PROCEDURE — 93010 ELECTROCARDIOGRAM REPORT: CPT

## 2022-01-01 PROCEDURE — 99285 EMERGENCY DEPT VISIT HI MDM: CPT | Mod: CS

## 2022-01-01 PROCEDURE — 99291 CRITICAL CARE FIRST HOUR: CPT

## 2022-01-01 PROCEDURE — 71260 CT THORAX DX C+: CPT | Mod: 26,MH

## 2022-01-01 PROCEDURE — 99285 EMERGENCY DEPT VISIT HI MDM: CPT | Mod: CS,25,GC

## 2022-01-01 PROCEDURE — 99233 SBSQ HOSP IP/OBS HIGH 50: CPT | Mod: GC

## 2022-01-01 RX ORDER — LACTOBACILLUS ACIDOPHILUS 100MM CELL
1 CAPSULE ORAL
Qty: 60 | Refills: 0
Start: 2022-01-01 | End: 2022-05-20

## 2022-01-01 RX ORDER — LIDOCAINE HCL 20 MG/ML
20 VIAL (ML) INJECTION ONCE
Refills: 0 | Status: DISCONTINUED | OUTPATIENT
Start: 2022-01-01 | End: 2022-01-01

## 2022-01-01 RX ORDER — ACETAMINOPHEN 500 MG
650 TABLET ORAL ONCE
Refills: 0 | Status: COMPLETED | OUTPATIENT
Start: 2022-01-01 | End: 2022-01-01

## 2022-01-01 RX ORDER — SODIUM CHLORIDE 9 MG/ML
4 INJECTION INTRAMUSCULAR; INTRAVENOUS; SUBCUTANEOUS EVERY 12 HOURS
Refills: 0 | Status: DISCONTINUED | OUTPATIENT
Start: 2022-01-01 | End: 2022-01-01

## 2022-01-01 RX ORDER — BUDESONIDE AND FORMOTEROL FUMARATE DIHYDRATE 160; 4.5 UG/1; UG/1
2 AEROSOL RESPIRATORY (INHALATION)
Refills: 0 | Status: DISCONTINUED | OUTPATIENT
Start: 2022-01-01 | End: 2022-01-01

## 2022-01-01 RX ORDER — CHLORHEXIDINE GLUCONATE 213 G/1000ML
1 SOLUTION TOPICAL DAILY
Refills: 0 | Status: DISCONTINUED | OUTPATIENT
Start: 2022-01-01 | End: 2022-01-01

## 2022-01-01 RX ORDER — SODIUM CHLORIDE 9 MG/ML
1000 INJECTION, SOLUTION INTRAVENOUS
Refills: 0 | Status: DISCONTINUED | OUTPATIENT
Start: 2022-01-01 | End: 2022-01-01

## 2022-01-01 RX ORDER — ATORVASTATIN CALCIUM 80 MG/1
80 TABLET, FILM COATED ORAL AT BEDTIME
Refills: 0 | Status: DISCONTINUED | OUTPATIENT
Start: 2022-01-01 | End: 2022-01-01

## 2022-01-01 RX ORDER — INSULIN LISPRO 100/ML
VIAL (ML) SUBCUTANEOUS
Refills: 0 | Status: DISCONTINUED | OUTPATIENT
Start: 2022-01-01 | End: 2022-01-01

## 2022-01-01 RX ORDER — SODIUM CHLORIDE 9 MG/ML
500 INJECTION INTRAMUSCULAR; INTRAVENOUS; SUBCUTANEOUS ONCE
Refills: 0 | Status: COMPLETED | OUTPATIENT
Start: 2022-01-01 | End: 2022-01-01

## 2022-01-01 RX ORDER — SODIUM,POTASSIUM PHOSPHATES 278-250MG
1 POWDER IN PACKET (EA) ORAL EVERY 4 HOURS
Refills: 0 | Status: COMPLETED | OUTPATIENT
Start: 2022-01-01 | End: 2022-01-01

## 2022-01-01 RX ORDER — CEFEPIME 1 G/1
INJECTION, POWDER, FOR SOLUTION INTRAMUSCULAR; INTRAVENOUS
Refills: 0 | Status: DISCONTINUED | OUTPATIENT
Start: 2022-01-01 | End: 2022-01-01

## 2022-01-01 RX ORDER — VANCOMYCIN HCL 1 G
1250 VIAL (EA) INTRAVENOUS ONCE
Refills: 0 | Status: COMPLETED | OUTPATIENT
Start: 2022-01-01 | End: 2022-01-01

## 2022-01-01 RX ORDER — SENNA PLUS 8.6 MG/1
2 TABLET ORAL AT BEDTIME
Refills: 0 | Status: DISCONTINUED | OUTPATIENT
Start: 2022-01-01 | End: 2022-01-01

## 2022-01-01 RX ORDER — SODIUM CHLORIDE 9 MG/ML
1000 INJECTION INTRAMUSCULAR; INTRAVENOUS; SUBCUTANEOUS
Refills: 0 | Status: DISCONTINUED | OUTPATIENT
Start: 2022-01-01 | End: 2022-01-01

## 2022-01-01 RX ORDER — MIDODRINE HYDROCHLORIDE 2.5 MG/1
10 TABLET ORAL ONCE
Refills: 0 | Status: COMPLETED | OUTPATIENT
Start: 2022-01-01 | End: 2022-01-01

## 2022-01-01 RX ORDER — PANTOPRAZOLE SODIUM 20 MG/1
40 TABLET, DELAYED RELEASE ORAL
Refills: 0 | Status: DISCONTINUED | OUTPATIENT
Start: 2022-01-01 | End: 2022-01-01

## 2022-01-01 RX ORDER — HYDROMORPHONE HYDROCHLORIDE 2 MG/ML
0.2 INJECTION INTRAMUSCULAR; INTRAVENOUS; SUBCUTANEOUS EVERY 4 HOURS
Refills: 0 | Status: DISCONTINUED | OUTPATIENT
Start: 2022-01-01 | End: 2022-01-01

## 2022-01-01 RX ORDER — MAGNESIUM SULFATE 500 MG/ML
2 VIAL (ML) INJECTION ONCE
Refills: 0 | Status: COMPLETED | OUTPATIENT
Start: 2022-01-01 | End: 2022-01-01

## 2022-01-01 RX ORDER — DULAGLUTIDE 4.5 MG/.5ML
0 INJECTION, SOLUTION SUBCUTANEOUS
Qty: 0 | Refills: 0 | DISCHARGE

## 2022-01-01 RX ORDER — PANTOPRAZOLE SODIUM 20 MG/1
1 TABLET, DELAYED RELEASE ORAL
Qty: 0 | Refills: 0 | DISCHARGE

## 2022-01-01 RX ORDER — DEXTROSE 50 % IN WATER 50 %
25 SYRINGE (ML) INTRAVENOUS ONCE
Refills: 0 | Status: DISCONTINUED | OUTPATIENT
Start: 2022-01-01 | End: 2022-01-01

## 2022-01-01 RX ORDER — HYDROMORPHONE HYDROCHLORIDE 2 MG/ML
1 INJECTION INTRAMUSCULAR; INTRAVENOUS; SUBCUTANEOUS EVERY 4 HOURS
Refills: 0 | Status: DISCONTINUED | OUTPATIENT
Start: 2022-01-01 | End: 2022-01-01

## 2022-01-01 RX ORDER — ALBUMIN HUMAN 25 %
50 VIAL (ML) INTRAVENOUS ONCE
Refills: 0 | Status: COMPLETED | OUTPATIENT
Start: 2022-01-01 | End: 2022-01-01

## 2022-01-01 RX ORDER — PREGABALIN 225 MG/1
1000 CAPSULE ORAL DAILY
Refills: 0 | Status: DISCONTINUED | OUTPATIENT
Start: 2022-01-01 | End: 2022-01-01

## 2022-01-01 RX ORDER — POTASSIUM CHLORIDE 20 MEQ
40 PACKET (EA) ORAL ONCE
Refills: 0 | Status: COMPLETED | OUTPATIENT
Start: 2022-01-01 | End: 2022-01-01

## 2022-01-01 RX ORDER — DEXTROSE 50 % IN WATER 50 %
12.5 SYRINGE (ML) INTRAVENOUS ONCE
Refills: 0 | Status: DISCONTINUED | OUTPATIENT
Start: 2022-01-01 | End: 2022-01-01

## 2022-01-01 RX ORDER — ENOXAPARIN SODIUM 100 MG/ML
80 INJECTION SUBCUTANEOUS EVERY 12 HOURS
Refills: 0 | Status: DISCONTINUED | OUTPATIENT
Start: 2022-01-01 | End: 2022-01-01

## 2022-01-01 RX ORDER — ACETAMINOPHEN 500 MG
650 TABLET ORAL EVERY 6 HOURS
Refills: 0 | Status: DISCONTINUED | OUTPATIENT
Start: 2022-01-01 | End: 2022-01-01

## 2022-01-01 RX ORDER — FUROSEMIDE 40 MG
20 TABLET ORAL ONCE
Refills: 0 | Status: COMPLETED | OUTPATIENT
Start: 2022-01-01 | End: 2022-01-01

## 2022-01-01 RX ORDER — POLYETHYLENE GLYCOL 3350 17 G/17G
17 POWDER, FOR SOLUTION ORAL
Qty: 0 | Refills: 0 | DISCHARGE
Start: 2022-01-01

## 2022-01-01 RX ORDER — MAGNESIUM SULFATE 500 MG/ML
1 VIAL (ML) INJECTION ONCE
Refills: 0 | Status: COMPLETED | OUTPATIENT
Start: 2022-01-01 | End: 2022-01-01

## 2022-01-01 RX ORDER — GLUCAGON INJECTION, SOLUTION 0.5 MG/.1ML
1 INJECTION, SOLUTION SUBCUTANEOUS ONCE
Refills: 0 | Status: DISCONTINUED | OUTPATIENT
Start: 2022-01-01 | End: 2022-01-01

## 2022-01-01 RX ORDER — ALBUMIN HUMAN 25 %
250 VIAL (ML) INTRAVENOUS ONCE
Refills: 0 | Status: COMPLETED | OUTPATIENT
Start: 2022-01-01 | End: 2022-01-01

## 2022-01-01 RX ORDER — SODIUM,POTASSIUM PHOSPHATES 278-250MG
1 POWDER IN PACKET (EA) ORAL THREE TIMES A DAY
Refills: 0 | Status: COMPLETED | OUTPATIENT
Start: 2022-01-01 | End: 2022-01-01

## 2022-01-01 RX ORDER — LIDOCAINE HCL 20 MG/ML
5 VIAL (ML) INJECTION ONCE
Refills: 0 | Status: DISCONTINUED | OUTPATIENT
Start: 2022-01-01 | End: 2022-01-01

## 2022-01-01 RX ORDER — CEFTRIAXONE 500 MG/1
2000 INJECTION, POWDER, FOR SOLUTION INTRAMUSCULAR; INTRAVENOUS ONCE
Refills: 0 | Status: DISCONTINUED | OUTPATIENT
Start: 2022-01-01 | End: 2022-01-01

## 2022-01-01 RX ORDER — PREGABALIN 225 MG/1
1 CAPSULE ORAL
Qty: 0 | Refills: 0 | DISCHARGE
Start: 2022-01-01

## 2022-01-01 RX ORDER — PIPERACILLIN AND TAZOBACTAM 4; .5 G/20ML; G/20ML
3.38 INJECTION, POWDER, LYOPHILIZED, FOR SOLUTION INTRAVENOUS ONCE
Refills: 0 | Status: COMPLETED | OUTPATIENT
Start: 2022-01-01 | End: 2022-01-01

## 2022-01-01 RX ORDER — ACETAMINOPHEN 500 MG
1000 TABLET ORAL ONCE
Refills: 0 | Status: COMPLETED | OUTPATIENT
Start: 2022-01-01 | End: 2022-01-01

## 2022-01-01 RX ORDER — SODIUM,POTASSIUM PHOSPHATES 278-250MG
1 POWDER IN PACKET (EA) ORAL
Refills: 0 | Status: COMPLETED | OUTPATIENT
Start: 2022-01-01 | End: 2022-01-01

## 2022-01-01 RX ORDER — LANOLIN ALCOHOL/MO/W.PET/CERES
3 CREAM (GRAM) TOPICAL AT BEDTIME
Refills: 0 | Status: DISCONTINUED | OUTPATIENT
Start: 2022-01-01 | End: 2022-01-01

## 2022-01-01 RX ORDER — FUROSEMIDE 40 MG
20 TABLET ORAL ONCE
Refills: 0 | Status: DISCONTINUED | OUTPATIENT
Start: 2022-01-01 | End: 2022-01-01

## 2022-01-01 RX ORDER — HYDROMORPHONE HYDROCHLORIDE 2 MG/ML
2 INJECTION INTRAMUSCULAR; INTRAVENOUS; SUBCUTANEOUS EVERY 4 HOURS
Refills: 0 | Status: DISCONTINUED | OUTPATIENT
Start: 2022-01-01 | End: 2022-01-01

## 2022-01-01 RX ORDER — ENOXAPARIN SODIUM 100 MG/ML
60 INJECTION SUBCUTANEOUS ONCE
Refills: 0 | Status: COMPLETED | OUTPATIENT
Start: 2022-01-01 | End: 2022-01-01

## 2022-01-01 RX ORDER — FUROSEMIDE 40 MG
1 TABLET ORAL
Qty: 0 | Refills: 0 | DISCHARGE

## 2022-01-01 RX ORDER — ONDANSETRON 8 MG/1
4 TABLET, FILM COATED ORAL EVERY 8 HOURS
Refills: 0 | Status: DISCONTINUED | OUTPATIENT
Start: 2022-01-01 | End: 2022-01-01

## 2022-01-01 RX ORDER — INSULIN LISPRO 100/ML
VIAL (ML) SUBCUTANEOUS EVERY 6 HOURS
Refills: 0 | Status: DISCONTINUED | OUTPATIENT
Start: 2022-01-01 | End: 2022-01-01

## 2022-01-01 RX ORDER — PIPERACILLIN AND TAZOBACTAM 4; .5 G/20ML; G/20ML
3.38 INJECTION, POWDER, LYOPHILIZED, FOR SOLUTION INTRAVENOUS EVERY 8 HOURS
Refills: 0 | Status: DISCONTINUED | OUTPATIENT
Start: 2022-01-01 | End: 2022-01-01

## 2022-01-01 RX ORDER — POTASSIUM CHLORIDE 20 MEQ
20 PACKET (EA) ORAL
Refills: 0 | Status: COMPLETED | OUTPATIENT
Start: 2022-01-01 | End: 2022-01-01

## 2022-01-01 RX ORDER — VANCOMYCIN HCL 1 G
1000 VIAL (EA) INTRAVENOUS ONCE
Refills: 0 | Status: COMPLETED | OUTPATIENT
Start: 2022-01-01 | End: 2022-01-01

## 2022-01-01 RX ORDER — DEXTROSE 50 % IN WATER 50 %
15 SYRINGE (ML) INTRAVENOUS ONCE
Refills: 0 | Status: DISCONTINUED | OUTPATIENT
Start: 2022-01-01 | End: 2022-01-01

## 2022-01-01 RX ORDER — POLYETHYLENE GLYCOL 3350 17 G/17G
17 POWDER, FOR SOLUTION ORAL ONCE
Refills: 0 | Status: COMPLETED | OUTPATIENT
Start: 2022-01-01 | End: 2022-01-01

## 2022-01-01 RX ORDER — ASPIRIN/CALCIUM CARB/MAGNESIUM 324 MG
1 TABLET ORAL
Qty: 0 | Refills: 0 | DISCHARGE

## 2022-01-01 RX ORDER — FERROUS SULFATE 325(65) MG
325 TABLET ORAL DAILY
Refills: 0 | Status: DISCONTINUED | OUTPATIENT
Start: 2022-01-01 | End: 2022-01-01

## 2022-01-01 RX ORDER — CEFEPIME 1 G/1
2000 INJECTION, POWDER, FOR SOLUTION INTRAMUSCULAR; INTRAVENOUS ONCE
Refills: 0 | Status: COMPLETED | OUTPATIENT
Start: 2022-01-01 | End: 2022-01-01

## 2022-01-01 RX ORDER — SODIUM CHLORIDE 9 MG/ML
1000 INJECTION INTRAMUSCULAR; INTRAVENOUS; SUBCUTANEOUS ONCE
Refills: 0 | Status: DISCONTINUED | OUTPATIENT
Start: 2022-01-01 | End: 2022-01-01

## 2022-01-01 RX ORDER — FONDAPARINUX SODIUM 2.5 MG/.5ML
1 INJECTION, SOLUTION SUBCUTANEOUS
Qty: 42 | Refills: 0
Start: 2022-01-01 | End: 2022-01-01

## 2022-01-01 RX ORDER — ONDANSETRON 8 MG/1
1 TABLET, FILM COATED ORAL
Qty: 0 | Refills: 0 | DISCHARGE

## 2022-01-01 RX ORDER — SODIUM CHLORIDE 9 MG/ML
1000 INJECTION, SOLUTION INTRAVENOUS ONCE
Refills: 0 | Status: COMPLETED | OUTPATIENT
Start: 2022-01-01 | End: 2022-01-01

## 2022-01-01 RX ORDER — INSULIN LISPRO 100/ML
VIAL (ML) SUBCUTANEOUS AT BEDTIME
Refills: 0 | Status: DISCONTINUED | OUTPATIENT
Start: 2022-01-01 | End: 2022-01-01

## 2022-01-01 RX ORDER — ENOXAPARIN SODIUM 100 MG/ML
80 INJECTION SUBCUTANEOUS ONCE
Refills: 0 | Status: DISCONTINUED | OUTPATIENT
Start: 2022-01-01 | End: 2022-01-01

## 2022-01-01 RX ORDER — LIDOCAINE HCL 20 MG/ML
2 VIAL (ML) INJECTION ONCE
Refills: 0 | Status: DISCONTINUED | OUTPATIENT
Start: 2022-01-01 | End: 2022-01-01

## 2022-01-01 RX ORDER — POTASSIUM CHLORIDE 20 MEQ
40 PACKET (EA) ORAL EVERY 4 HOURS
Refills: 0 | Status: COMPLETED | OUTPATIENT
Start: 2022-01-01 | End: 2022-01-01

## 2022-01-01 RX ORDER — MAGNESIUM OXIDE 400 MG ORAL TABLET 241.3 MG
400 TABLET ORAL
Refills: 0 | Status: COMPLETED | OUTPATIENT
Start: 2022-01-01 | End: 2022-01-01

## 2022-01-01 RX ORDER — ACETAMINOPHEN 500 MG
2 TABLET ORAL
Qty: 0 | Refills: 0 | DISCHARGE
Start: 2022-01-01

## 2022-01-01 RX ORDER — EZETIMIBE 10 MG/1
1 TABLET ORAL
Qty: 0 | Refills: 0 | DISCHARGE

## 2022-01-01 RX ORDER — CHOLECALCIFEROL (VITAMIN D3) 125 MCG
2000 CAPSULE ORAL DAILY
Refills: 0 | Status: DISCONTINUED | OUTPATIENT
Start: 2022-01-01 | End: 2022-01-01

## 2022-01-01 RX ORDER — ENOXAPARIN SODIUM 100 MG/ML
80 INJECTION SUBCUTANEOUS
Qty: 60 | Refills: 0
Start: 2022-01-01 | End: 2022-05-20

## 2022-01-01 RX ORDER — VANCOMYCIN HCL 1 G
1000 VIAL (EA) INTRAVENOUS EVERY 12 HOURS
Refills: 0 | Status: DISCONTINUED | OUTPATIENT
Start: 2022-01-01 | End: 2022-01-01

## 2022-01-01 RX ORDER — CEFEPIME 1 G/1
2000 INJECTION, POWDER, FOR SOLUTION INTRAMUSCULAR; INTRAVENOUS EVERY 8 HOURS
Refills: 0 | Status: DISCONTINUED | OUTPATIENT
Start: 2022-01-01 | End: 2022-01-01

## 2022-01-01 RX ORDER — LACTOBACILLUS ACIDOPHILUS 100MM CELL
1 CAPSULE ORAL DAILY
Refills: 0 | Status: DISCONTINUED | OUTPATIENT
Start: 2022-01-01 | End: 2022-01-01

## 2022-01-01 RX ORDER — ENOXAPARIN SODIUM 100 MG/ML
125 INJECTION SUBCUTANEOUS EVERY 24 HOURS
Refills: 0 | Status: DISCONTINUED | OUTPATIENT
Start: 2022-01-01 | End: 2022-01-01

## 2022-01-01 RX ORDER — FERROUS SULFATE 325(65) MG
1 TABLET ORAL
Qty: 0 | Refills: 0 | DISCHARGE
Start: 2022-01-01

## 2022-01-01 RX ORDER — FOLIC ACID 0.8 MG
1 TABLET ORAL DAILY
Refills: 0 | Status: DISCONTINUED | OUTPATIENT
Start: 2022-01-01 | End: 2022-01-01

## 2022-01-01 RX ORDER — CHOLECALCIFEROL (VITAMIN D3) 125 MCG
1 CAPSULE ORAL
Qty: 0 | Refills: 0 | DISCHARGE

## 2022-01-01 RX ORDER — LACTOBACILLUS ACIDOPHILUS 100MM CELL
1 CAPSULE ORAL
Refills: 0 | Status: DISCONTINUED | OUTPATIENT
Start: 2022-01-01 | End: 2022-01-01

## 2022-01-01 RX ORDER — HEPARIN SODIUM 5000 [USP'U]/ML
6500 INJECTION INTRAVENOUS; SUBCUTANEOUS EVERY 6 HOURS
Refills: 0 | Status: DISCONTINUED | OUTPATIENT
Start: 2022-01-01 | End: 2022-01-01

## 2022-01-01 RX ORDER — ENOXAPARIN SODIUM 100 MG/ML
120 INJECTION SUBCUTANEOUS EVERY 24 HOURS
Refills: 0 | Status: DISCONTINUED | OUTPATIENT
Start: 2022-01-01 | End: 2022-01-01

## 2022-01-01 RX ORDER — IBUPROFEN 200 MG
1 TABLET ORAL
Qty: 0 | Refills: 0 | DISCHARGE

## 2022-01-01 RX ORDER — BUDESONIDE AND FORMOTEROL FUMARATE DIHYDRATE 160; 4.5 UG/1; UG/1
2 AEROSOL RESPIRATORY (INHALATION)
Qty: 1 | Refills: 0
Start: 2022-01-01 | End: 2022-05-20

## 2022-01-01 RX ORDER — HEPARIN SODIUM 5000 [USP'U]/ML
INJECTION INTRAVENOUS; SUBCUTANEOUS
Qty: 25000 | Refills: 0 | Status: DISCONTINUED | OUTPATIENT
Start: 2022-01-01 | End: 2022-01-01

## 2022-01-01 RX ORDER — ALBUMIN HUMAN 25 %
125 VIAL (ML) INTRAVENOUS ONCE
Refills: 0 | Status: COMPLETED | OUTPATIENT
Start: 2022-01-01 | End: 2022-01-01

## 2022-01-01 RX ORDER — IPRATROPIUM/ALBUTEROL SULFATE 18-103MCG
3 AEROSOL WITH ADAPTER (GRAM) INHALATION ONCE
Refills: 0 | Status: COMPLETED | OUTPATIENT
Start: 2022-01-01 | End: 2022-01-01

## 2022-01-01 RX ORDER — METFORMIN HYDROCHLORIDE 850 MG/1
1 TABLET ORAL
Qty: 0 | Refills: 0 | DISCHARGE

## 2022-01-01 RX ORDER — HYDROMORPHONE HYDROCHLORIDE 2 MG/ML
0.2 INJECTION INTRAMUSCULAR; INTRAVENOUS; SUBCUTANEOUS EVERY 6 HOURS
Refills: 0 | Status: DISCONTINUED | OUTPATIENT
Start: 2022-01-01 | End: 2022-01-01

## 2022-01-01 RX ORDER — ASPIRIN/CALCIUM CARB/MAGNESIUM 324 MG
81 TABLET ORAL DAILY
Refills: 0 | Status: DISCONTINUED | OUTPATIENT
Start: 2022-01-01 | End: 2022-01-01

## 2022-01-01 RX ORDER — RIVAROXABAN 15 MG-20MG
1 KIT ORAL
Qty: 42 | Refills: 0
Start: 2022-01-01 | End: 2022-01-01

## 2022-01-01 RX ORDER — FOLIC ACID 0.8 MG
1 TABLET ORAL
Qty: 0 | Refills: 0 | DISCHARGE
Start: 2022-01-01

## 2022-01-01 RX ORDER — ROSUVASTATIN CALCIUM 5 MG/1
1 TABLET ORAL
Qty: 0 | Refills: 0 | DISCHARGE

## 2022-01-01 RX ORDER — ENOXAPARIN SODIUM 100 MG/ML
40 INJECTION SUBCUTANEOUS DAILY
Refills: 0 | Status: DISCONTINUED | OUTPATIENT
Start: 2022-01-01 | End: 2022-01-01

## 2022-01-01 RX ORDER — HEPARIN SODIUM 5000 [USP'U]/ML
6500 INJECTION INTRAVENOUS; SUBCUTANEOUS ONCE
Refills: 0 | Status: COMPLETED | OUTPATIENT
Start: 2022-01-01 | End: 2022-01-01

## 2022-01-01 RX ORDER — DIPHENHYDRAMINE HCL 50 MG
50 CAPSULE ORAL ONCE
Refills: 0 | Status: DISCONTINUED | OUTPATIENT
Start: 2022-01-01 | End: 2022-01-01

## 2022-01-01 RX ORDER — ALBUMIN HUMAN 25 %
50 VIAL (ML) INTRAVENOUS ONCE
Refills: 0 | Status: DISCONTINUED | OUTPATIENT
Start: 2022-01-01 | End: 2022-01-01

## 2022-01-01 RX ORDER — HYDROMORPHONE HYDROCHLORIDE 2 MG/ML
0.5 INJECTION INTRAMUSCULAR; INTRAVENOUS; SUBCUTANEOUS EVERY 6 HOURS
Refills: 0 | Status: DISCONTINUED | OUTPATIENT
Start: 2022-01-01 | End: 2022-01-01

## 2022-01-01 RX ORDER — HYDROMORPHONE HYDROCHLORIDE 2 MG/ML
0.5 INJECTION INTRAMUSCULAR; INTRAVENOUS; SUBCUTANEOUS ONCE
Refills: 0 | Status: DISCONTINUED | OUTPATIENT
Start: 2022-01-01 | End: 2022-01-01

## 2022-01-01 RX ORDER — ENOXAPARIN SODIUM 100 MG/ML
80 INJECTION SUBCUTANEOUS ONCE
Refills: 0 | Status: COMPLETED | OUTPATIENT
Start: 2022-01-01 | End: 2022-01-01

## 2022-01-01 RX ORDER — HEPARIN SODIUM 5000 [USP'U]/ML
3000 INJECTION INTRAVENOUS; SUBCUTANEOUS EVERY 6 HOURS
Refills: 0 | Status: DISCONTINUED | OUTPATIENT
Start: 2022-01-01 | End: 2022-01-01

## 2022-01-01 RX ORDER — RIVAROXABAN 15 MG-20MG
1 KIT ORAL
Qty: 30 | Refills: 0
Start: 2022-01-01 | End: 2022-01-01

## 2022-01-01 RX ADMIN — BUDESONIDE AND FORMOTEROL FUMARATE DIHYDRATE 2 PUFF(S): 160; 4.5 AEROSOL RESPIRATORY (INHALATION) at 21:07

## 2022-01-01 RX ADMIN — Medication 1 TABLET(S): at 17:46

## 2022-01-01 RX ADMIN — Medication 1: at 08:04

## 2022-01-01 RX ADMIN — Medication 1 TABLET(S): at 09:39

## 2022-01-01 RX ADMIN — HYDROMORPHONE HYDROCHLORIDE 2 MILLIGRAM(S): 2 INJECTION INTRAMUSCULAR; INTRAVENOUS; SUBCUTANEOUS at 08:34

## 2022-01-01 RX ADMIN — CHLORHEXIDINE GLUCONATE 1 APPLICATION(S): 213 SOLUTION TOPICAL at 14:39

## 2022-01-01 RX ADMIN — HEPARIN SODIUM 1300 UNIT(S)/HR: 5000 INJECTION INTRAVENOUS; SUBCUTANEOUS at 11:21

## 2022-01-01 RX ADMIN — Medication 166.67 MILLIGRAM(S): at 08:30

## 2022-01-01 RX ADMIN — SODIUM CHLORIDE 4 MILLILITER(S): 9 INJECTION INTRAMUSCULAR; INTRAVENOUS; SUBCUTANEOUS at 21:45

## 2022-01-01 RX ADMIN — CEFEPIME 100 MILLIGRAM(S): 1 INJECTION, POWDER, FOR SOLUTION INTRAMUSCULAR; INTRAVENOUS at 13:51

## 2022-01-01 RX ADMIN — HEPARIN SODIUM 1700 UNIT(S)/HR: 5000 INJECTION INTRAVENOUS; SUBCUTANEOUS at 18:39

## 2022-01-01 RX ADMIN — PANTOPRAZOLE SODIUM 40 MILLIGRAM(S): 20 TABLET, DELAYED RELEASE ORAL at 17:45

## 2022-01-01 RX ADMIN — PANTOPRAZOLE SODIUM 40 MILLIGRAM(S): 20 TABLET, DELAYED RELEASE ORAL at 05:23

## 2022-01-01 RX ADMIN — CEFEPIME 100 MILLIGRAM(S): 1 INJECTION, POWDER, FOR SOLUTION INTRAMUSCULAR; INTRAVENOUS at 21:49

## 2022-01-01 RX ADMIN — SODIUM CHLORIDE 4 MILLILITER(S): 9 INJECTION INTRAMUSCULAR; INTRAVENOUS; SUBCUTANEOUS at 19:14

## 2022-01-01 RX ADMIN — CEFEPIME 100 MILLIGRAM(S): 1 INJECTION, POWDER, FOR SOLUTION INTRAMUSCULAR; INTRAVENOUS at 05:15

## 2022-01-01 RX ADMIN — PANTOPRAZOLE SODIUM 40 MILLIGRAM(S): 20 TABLET, DELAYED RELEASE ORAL at 05:07

## 2022-01-01 RX ADMIN — PANTOPRAZOLE SODIUM 40 MILLIGRAM(S): 20 TABLET, DELAYED RELEASE ORAL at 17:44

## 2022-01-01 RX ADMIN — PREGABALIN 1000 MICROGRAM(S): 225 CAPSULE ORAL at 11:34

## 2022-01-01 RX ADMIN — Medication 20 MILLIGRAM(S): at 23:08

## 2022-01-01 RX ADMIN — PANTOPRAZOLE SODIUM 40 MILLIGRAM(S): 20 TABLET, DELAYED RELEASE ORAL at 06:36

## 2022-01-01 RX ADMIN — CHLORHEXIDINE GLUCONATE 1 APPLICATION(S): 213 SOLUTION TOPICAL at 11:36

## 2022-01-01 RX ADMIN — ENOXAPARIN SODIUM 80 MILLIGRAM(S): 100 INJECTION SUBCUTANEOUS at 18:09

## 2022-01-01 RX ADMIN — HEPARIN SODIUM 1200 UNIT(S)/HR: 5000 INJECTION INTRAVENOUS; SUBCUTANEOUS at 09:46

## 2022-01-01 RX ADMIN — PREGABALIN 1000 MICROGRAM(S): 225 CAPSULE ORAL at 11:45

## 2022-01-01 RX ADMIN — PANTOPRAZOLE SODIUM 40 MILLIGRAM(S): 20 TABLET, DELAYED RELEASE ORAL at 16:30

## 2022-01-01 RX ADMIN — HEPARIN SODIUM 1600 UNIT(S)/HR: 5000 INJECTION INTRAVENOUS; SUBCUTANEOUS at 20:36

## 2022-01-01 RX ADMIN — CHLORHEXIDINE GLUCONATE 1 APPLICATION(S): 213 SOLUTION TOPICAL at 11:29

## 2022-01-01 RX ADMIN — PIPERACILLIN AND TAZOBACTAM 25 GRAM(S): 4; .5 INJECTION, POWDER, LYOPHILIZED, FOR SOLUTION INTRAVENOUS at 13:05

## 2022-01-01 RX ADMIN — Medication 325 MILLIGRAM(S): at 11:00

## 2022-01-01 RX ADMIN — Medication 1 TABLET(S): at 05:07

## 2022-01-01 RX ADMIN — Medication 1 TABLET(S): at 08:31

## 2022-01-01 RX ADMIN — HEPARIN SODIUM 1200 UNIT(S)/HR: 5000 INJECTION INTRAVENOUS; SUBCUTANEOUS at 06:20

## 2022-01-01 RX ADMIN — HEPARIN SODIUM 0 UNIT(S)/HR: 5000 INJECTION INTRAVENOUS; SUBCUTANEOUS at 14:20

## 2022-01-01 RX ADMIN — ONDANSETRON 4 MILLIGRAM(S): 8 TABLET, FILM COATED ORAL at 21:00

## 2022-01-01 RX ADMIN — Medication 1: at 12:00

## 2022-01-01 RX ADMIN — Medication 1 TABLET(S): at 14:02

## 2022-01-01 RX ADMIN — ENOXAPARIN SODIUM 80 MILLIGRAM(S): 100 INJECTION SUBCUTANEOUS at 17:56

## 2022-01-01 RX ADMIN — ENOXAPARIN SODIUM 40 MILLIGRAM(S): 100 INJECTION SUBCUTANEOUS at 11:58

## 2022-01-01 RX ADMIN — HEPARIN SODIUM 1500 UNIT(S)/HR: 5000 INJECTION INTRAVENOUS; SUBCUTANEOUS at 22:29

## 2022-01-01 RX ADMIN — HYDROMORPHONE HYDROCHLORIDE 2 MILLIGRAM(S): 2 INJECTION INTRAMUSCULAR; INTRAVENOUS; SUBCUTANEOUS at 21:34

## 2022-01-01 RX ADMIN — BUDESONIDE AND FORMOTEROL FUMARATE DIHYDRATE 2 PUFF(S): 160; 4.5 AEROSOL RESPIRATORY (INHALATION) at 20:29

## 2022-01-01 RX ADMIN — Medication 250 MILLIGRAM(S): at 22:28

## 2022-01-01 RX ADMIN — Medication 1 TABLET(S): at 11:37

## 2022-01-01 RX ADMIN — Medication 85 MILLIMOLE(S): at 12:31

## 2022-01-01 RX ADMIN — SODIUM CHLORIDE 4 MILLILITER(S): 9 INJECTION INTRAMUSCULAR; INTRAVENOUS; SUBCUTANEOUS at 09:53

## 2022-01-01 RX ADMIN — Medication 400 MILLIGRAM(S): at 16:17

## 2022-01-01 RX ADMIN — Medication 1: at 17:21

## 2022-01-01 RX ADMIN — Medication 100 MILLIGRAM(S): at 19:28

## 2022-01-01 RX ADMIN — Medication 325 MILLIGRAM(S): at 14:02

## 2022-01-01 RX ADMIN — SODIUM CHLORIDE 4 MILLILITER(S): 9 INJECTION INTRAMUSCULAR; INTRAVENOUS; SUBCUTANEOUS at 19:57

## 2022-01-01 RX ADMIN — Medication 1 PACKET(S): at 13:41

## 2022-01-01 RX ADMIN — Medication 1: at 10:00

## 2022-01-01 RX ADMIN — Medication 325 MILLIGRAM(S): at 11:57

## 2022-01-01 RX ADMIN — CEFEPIME 100 MILLIGRAM(S): 1 INJECTION, POWDER, FOR SOLUTION INTRAMUSCULAR; INTRAVENOUS at 22:02

## 2022-01-01 RX ADMIN — ENOXAPARIN SODIUM 80 MILLIGRAM(S): 100 INJECTION SUBCUTANEOUS at 06:17

## 2022-01-01 RX ADMIN — ENOXAPARIN SODIUM 120 MILLIGRAM(S): 100 INJECTION SUBCUTANEOUS at 18:17

## 2022-01-01 RX ADMIN — Medication 1 TABLET(S): at 12:53

## 2022-01-01 RX ADMIN — Medication 62.5 MILLILITER(S): at 20:12

## 2022-01-01 RX ADMIN — PANTOPRAZOLE SODIUM 40 MILLIGRAM(S): 20 TABLET, DELAYED RELEASE ORAL at 06:10

## 2022-01-01 RX ADMIN — SODIUM CHLORIDE 4 MILLILITER(S): 9 INJECTION INTRAMUSCULAR; INTRAVENOUS; SUBCUTANEOUS at 23:03

## 2022-01-01 RX ADMIN — HYDROMORPHONE HYDROCHLORIDE 2 MILLIGRAM(S): 2 INJECTION INTRAMUSCULAR; INTRAVENOUS; SUBCUTANEOUS at 07:00

## 2022-01-01 RX ADMIN — PREGABALIN 1000 MICROGRAM(S): 225 CAPSULE ORAL at 12:53

## 2022-01-01 RX ADMIN — PANTOPRAZOLE SODIUM 40 MILLIGRAM(S): 20 TABLET, DELAYED RELEASE ORAL at 06:19

## 2022-01-01 RX ADMIN — PANTOPRAZOLE SODIUM 40 MILLIGRAM(S): 20 TABLET, DELAYED RELEASE ORAL at 17:58

## 2022-01-01 RX ADMIN — MAGNESIUM OXIDE 400 MG ORAL TABLET 400 MILLIGRAM(S): 241.3 TABLET ORAL at 09:12

## 2022-01-01 RX ADMIN — Medication 100 MILLIGRAM(S): at 19:35

## 2022-01-01 RX ADMIN — SODIUM CHLORIDE 4 MILLILITER(S): 9 INJECTION INTRAMUSCULAR; INTRAVENOUS; SUBCUTANEOUS at 19:55

## 2022-01-01 RX ADMIN — Medication 40 MILLIEQUIVALENT(S): at 13:25

## 2022-01-01 RX ADMIN — SODIUM CHLORIDE 500 MILLILITER(S): 9 INJECTION INTRAMUSCULAR; INTRAVENOUS; SUBCUTANEOUS at 14:09

## 2022-01-01 RX ADMIN — BUDESONIDE AND FORMOTEROL FUMARATE DIHYDRATE 2 PUFF(S): 160; 4.5 AEROSOL RESPIRATORY (INHALATION) at 20:44

## 2022-01-01 RX ADMIN — HEPARIN SODIUM 1300 UNIT(S)/HR: 5000 INJECTION INTRAVENOUS; SUBCUTANEOUS at 07:22

## 2022-01-01 RX ADMIN — HEPARIN SODIUM 1300 UNIT(S)/HR: 5000 INJECTION INTRAVENOUS; SUBCUTANEOUS at 03:32

## 2022-01-01 RX ADMIN — Medication 1 TABLET(S): at 05:34

## 2022-01-01 RX ADMIN — Medication 1: at 08:06

## 2022-01-01 RX ADMIN — HEPARIN SODIUM 6500 UNIT(S): 5000 INJECTION INTRAVENOUS; SUBCUTANEOUS at 22:29

## 2022-01-01 RX ADMIN — CEFEPIME 100 MILLIGRAM(S): 1 INJECTION, POWDER, FOR SOLUTION INTRAMUSCULAR; INTRAVENOUS at 13:06

## 2022-01-01 RX ADMIN — ENOXAPARIN SODIUM 80 MILLIGRAM(S): 100 INJECTION SUBCUTANEOUS at 17:45

## 2022-01-01 RX ADMIN — Medication 1 PACKET(S): at 16:39

## 2022-01-01 RX ADMIN — PIPERACILLIN AND TAZOBACTAM 25 GRAM(S): 4; .5 INJECTION, POWDER, LYOPHILIZED, FOR SOLUTION INTRAVENOUS at 05:26

## 2022-01-01 RX ADMIN — BUDESONIDE AND FORMOTEROL FUMARATE DIHYDRATE 2 PUFF(S): 160; 4.5 AEROSOL RESPIRATORY (INHALATION) at 08:48

## 2022-01-01 RX ADMIN — SODIUM CHLORIDE 75 MILLILITER(S): 9 INJECTION INTRAMUSCULAR; INTRAVENOUS; SUBCUTANEOUS at 04:51

## 2022-01-01 RX ADMIN — HEPARIN SODIUM 1400 UNIT(S)/HR: 5000 INJECTION INTRAVENOUS; SUBCUTANEOUS at 07:39

## 2022-01-01 RX ADMIN — MIDODRINE HYDROCHLORIDE 10 MILLIGRAM(S): 2.5 TABLET ORAL at 11:57

## 2022-01-01 RX ADMIN — Medication 325 MILLIGRAM(S): at 12:48

## 2022-01-01 RX ADMIN — SODIUM CHLORIDE 500 MILLILITER(S): 9 INJECTION INTRAMUSCULAR; INTRAVENOUS; SUBCUTANEOUS at 03:51

## 2022-01-01 RX ADMIN — PANTOPRAZOLE SODIUM 40 MILLIGRAM(S): 20 TABLET, DELAYED RELEASE ORAL at 18:09

## 2022-01-01 RX ADMIN — HEPARIN SODIUM 6500 UNIT(S): 5000 INJECTION INTRAVENOUS; SUBCUTANEOUS at 06:58

## 2022-01-01 RX ADMIN — Medication 1 MILLIGRAM(S): at 11:45

## 2022-01-01 RX ADMIN — Medication 1 TABLET(S): at 11:00

## 2022-01-01 RX ADMIN — PREGABALIN 1000 MICROGRAM(S): 225 CAPSULE ORAL at 13:38

## 2022-01-01 RX ADMIN — PANTOPRAZOLE SODIUM 40 MILLIGRAM(S): 20 TABLET, DELAYED RELEASE ORAL at 17:04

## 2022-01-01 RX ADMIN — HEPARIN SODIUM 1100 UNIT(S)/HR: 5000 INJECTION INTRAVENOUS; SUBCUTANEOUS at 12:38

## 2022-01-01 RX ADMIN — Medication 1 TABLET(S): at 18:32

## 2022-01-01 RX ADMIN — SODIUM CHLORIDE 4 MILLILITER(S): 9 INJECTION INTRAMUSCULAR; INTRAVENOUS; SUBCUTANEOUS at 22:57

## 2022-01-01 RX ADMIN — PANTOPRAZOLE SODIUM 40 MILLIGRAM(S): 20 TABLET, DELAYED RELEASE ORAL at 06:01

## 2022-01-01 RX ADMIN — Medication 1 TABLET(S): at 11:45

## 2022-01-01 RX ADMIN — Medication 20 MILLIEQUIVALENT(S): at 16:25

## 2022-01-01 RX ADMIN — Medication 1 TABLET(S): at 12:48

## 2022-01-01 RX ADMIN — PANTOPRAZOLE SODIUM 40 MILLIGRAM(S): 20 TABLET, DELAYED RELEASE ORAL at 09:05

## 2022-01-01 RX ADMIN — Medication 650 MILLIGRAM(S): at 12:42

## 2022-01-01 RX ADMIN — Medication 200 MILLIGRAM(S): at 05:07

## 2022-01-01 RX ADMIN — POLYETHYLENE GLYCOL 3350 17 GRAM(S): 17 POWDER, FOR SOLUTION ORAL at 14:33

## 2022-01-01 RX ADMIN — Medication 1 TABLET(S): at 09:56

## 2022-01-01 RX ADMIN — Medication 200 MILLIGRAM(S): at 20:43

## 2022-01-01 RX ADMIN — HYDROMORPHONE HYDROCHLORIDE 2 MILLIGRAM(S): 2 INJECTION INTRAMUSCULAR; INTRAVENOUS; SUBCUTANEOUS at 09:34

## 2022-01-01 RX ADMIN — PANTOPRAZOLE SODIUM 40 MILLIGRAM(S): 20 TABLET, DELAYED RELEASE ORAL at 17:56

## 2022-01-01 RX ADMIN — CEFEPIME 100 MILLIGRAM(S): 1 INJECTION, POWDER, FOR SOLUTION INTRAMUSCULAR; INTRAVENOUS at 05:53

## 2022-01-01 RX ADMIN — PANTOPRAZOLE SODIUM 40 MILLIGRAM(S): 20 TABLET, DELAYED RELEASE ORAL at 05:20

## 2022-01-01 RX ADMIN — CHLORHEXIDINE GLUCONATE 1 APPLICATION(S): 213 SOLUTION TOPICAL at 12:50

## 2022-01-01 RX ADMIN — Medication 100 MILLIGRAM(S): at 20:29

## 2022-01-01 RX ADMIN — SENNA PLUS 2 TABLET(S): 8.6 TABLET ORAL at 21:10

## 2022-01-01 RX ADMIN — Medication 1: at 12:15

## 2022-01-01 RX ADMIN — PANTOPRAZOLE SODIUM 40 MILLIGRAM(S): 20 TABLET, DELAYED RELEASE ORAL at 05:29

## 2022-01-01 RX ADMIN — Medication 1 TABLET(S): at 12:49

## 2022-01-01 RX ADMIN — Medication 1 MILLIGRAM(S): at 11:35

## 2022-01-01 RX ADMIN — BUDESONIDE AND FORMOTEROL FUMARATE DIHYDRATE 2 PUFF(S): 160; 4.5 AEROSOL RESPIRATORY (INHALATION) at 11:01

## 2022-01-01 RX ADMIN — Medication 1 TABLET(S): at 12:30

## 2022-01-01 RX ADMIN — PREGABALIN 1000 MICROGRAM(S): 225 CAPSULE ORAL at 14:41

## 2022-01-01 RX ADMIN — Medication 1: at 12:31

## 2022-01-01 RX ADMIN — CEFEPIME 100 MILLIGRAM(S): 1 INJECTION, POWDER, FOR SOLUTION INTRAMUSCULAR; INTRAVENOUS at 14:00

## 2022-01-01 RX ADMIN — Medication 1 TABLET(S): at 05:27

## 2022-01-01 RX ADMIN — CEFEPIME 100 MILLIGRAM(S): 1 INJECTION, POWDER, FOR SOLUTION INTRAMUSCULAR; INTRAVENOUS at 06:18

## 2022-01-01 RX ADMIN — PANTOPRAZOLE SODIUM 40 MILLIGRAM(S): 20 TABLET, DELAYED RELEASE ORAL at 17:28

## 2022-01-01 RX ADMIN — BUDESONIDE AND FORMOTEROL FUMARATE DIHYDRATE 2 PUFF(S): 160; 4.5 AEROSOL RESPIRATORY (INHALATION) at 22:49

## 2022-01-01 RX ADMIN — PANTOPRAZOLE SODIUM 40 MILLIGRAM(S): 20 TABLET, DELAYED RELEASE ORAL at 14:01

## 2022-01-01 RX ADMIN — PIPERACILLIN AND TAZOBACTAM 25 GRAM(S): 4; .5 INJECTION, POWDER, LYOPHILIZED, FOR SOLUTION INTRAVENOUS at 05:29

## 2022-01-01 RX ADMIN — Medication 325 MILLIGRAM(S): at 12:07

## 2022-01-01 RX ADMIN — Medication 650 MILLIGRAM(S): at 10:56

## 2022-01-01 RX ADMIN — Medication 81 MILLIGRAM(S): at 11:57

## 2022-01-01 RX ADMIN — Medication 1: at 23:19

## 2022-01-01 RX ADMIN — BUDESONIDE AND FORMOTEROL FUMARATE DIHYDRATE 2 PUFF(S): 160; 4.5 AEROSOL RESPIRATORY (INHALATION) at 21:35

## 2022-01-01 RX ADMIN — SODIUM CHLORIDE 4 MILLILITER(S): 9 INJECTION INTRAMUSCULAR; INTRAVENOUS; SUBCUTANEOUS at 20:09

## 2022-01-01 RX ADMIN — ENOXAPARIN SODIUM 120 MILLIGRAM(S): 100 INJECTION SUBCUTANEOUS at 17:38

## 2022-01-01 RX ADMIN — BUDESONIDE AND FORMOTEROL FUMARATE DIHYDRATE 2 PUFF(S): 160; 4.5 AEROSOL RESPIRATORY (INHALATION) at 09:20

## 2022-01-01 RX ADMIN — ONDANSETRON 4 MILLIGRAM(S): 8 TABLET, FILM COATED ORAL at 23:52

## 2022-01-01 RX ADMIN — HYDROMORPHONE HYDROCHLORIDE 2 MILLIGRAM(S): 2 INJECTION INTRAMUSCULAR; INTRAVENOUS; SUBCUTANEOUS at 20:29

## 2022-01-01 RX ADMIN — SODIUM CHLORIDE 4 MILLILITER(S): 9 INJECTION INTRAMUSCULAR; INTRAVENOUS; SUBCUTANEOUS at 09:15

## 2022-01-01 RX ADMIN — Medication 1 MILLIGRAM(S): at 11:00

## 2022-01-01 RX ADMIN — PANTOPRAZOLE SODIUM 40 MILLIGRAM(S): 20 TABLET, DELAYED RELEASE ORAL at 06:06

## 2022-01-01 RX ADMIN — Medication 40 MILLIEQUIVALENT(S): at 08:45

## 2022-01-01 RX ADMIN — HYDROMORPHONE HYDROCHLORIDE 1 MILLIGRAM(S): 2 INJECTION INTRAMUSCULAR; INTRAVENOUS; SUBCUTANEOUS at 01:57

## 2022-01-01 RX ADMIN — HEPARIN SODIUM 1200 UNIT(S)/HR: 5000 INJECTION INTRAVENOUS; SUBCUTANEOUS at 00:58

## 2022-01-01 RX ADMIN — HYDROMORPHONE HYDROCHLORIDE 2 MILLIGRAM(S): 2 INJECTION INTRAMUSCULAR; INTRAVENOUS; SUBCUTANEOUS at 15:57

## 2022-01-01 RX ADMIN — HEPARIN SODIUM 1700 UNIT(S)/HR: 5000 INJECTION INTRAVENOUS; SUBCUTANEOUS at 19:31

## 2022-01-01 RX ADMIN — Medication 1 TABLET(S): at 12:07

## 2022-01-01 RX ADMIN — CEFEPIME 100 MILLIGRAM(S): 1 INJECTION, POWDER, FOR SOLUTION INTRAMUSCULAR; INTRAVENOUS at 05:36

## 2022-01-01 RX ADMIN — CEFEPIME 100 MILLIGRAM(S): 1 INJECTION, POWDER, FOR SOLUTION INTRAMUSCULAR; INTRAVENOUS at 05:24

## 2022-01-01 RX ADMIN — PANTOPRAZOLE SODIUM 40 MILLIGRAM(S): 20 TABLET, DELAYED RELEASE ORAL at 17:36

## 2022-01-01 RX ADMIN — Medication 1 TABLET(S): at 14:38

## 2022-01-01 RX ADMIN — Medication 1 TABLET(S): at 14:52

## 2022-01-01 RX ADMIN — CEFEPIME 100 MILLIGRAM(S): 1 INJECTION, POWDER, FOR SOLUTION INTRAMUSCULAR; INTRAVENOUS at 13:36

## 2022-01-01 RX ADMIN — HEPARIN SODIUM 1900 UNIT(S)/HR: 5000 INJECTION INTRAVENOUS; SUBCUTANEOUS at 08:19

## 2022-01-01 RX ADMIN — CEFEPIME 100 MILLIGRAM(S): 1 INJECTION, POWDER, FOR SOLUTION INTRAMUSCULAR; INTRAVENOUS at 05:07

## 2022-01-01 RX ADMIN — Medication 1 MILLIGRAM(S): at 11:37

## 2022-01-01 RX ADMIN — Medication 325 MILLIGRAM(S): at 13:39

## 2022-01-01 RX ADMIN — Medication 1 TABLET(S): at 11:35

## 2022-01-01 RX ADMIN — Medication 100 MILLIGRAM(S): at 17:49

## 2022-01-01 RX ADMIN — Medication 1 PACKET(S): at 17:45

## 2022-01-01 RX ADMIN — MIDODRINE HYDROCHLORIDE 10 MILLIGRAM(S): 2.5 TABLET ORAL at 10:51

## 2022-01-01 RX ADMIN — ATORVASTATIN CALCIUM 80 MILLIGRAM(S): 80 TABLET, FILM COATED ORAL at 21:46

## 2022-01-01 RX ADMIN — PANTOPRAZOLE SODIUM 40 MILLIGRAM(S): 20 TABLET, DELAYED RELEASE ORAL at 05:54

## 2022-01-01 RX ADMIN — Medication 1 TABLET(S): at 13:38

## 2022-01-01 RX ADMIN — Medication 1: at 08:15

## 2022-01-01 RX ADMIN — PANTOPRAZOLE SODIUM 40 MILLIGRAM(S): 20 TABLET, DELAYED RELEASE ORAL at 06:44

## 2022-01-01 RX ADMIN — Medication 20 MILLIGRAM(S): at 19:20

## 2022-01-01 RX ADMIN — Medication 650 MILLIGRAM(S): at 21:14

## 2022-01-01 RX ADMIN — CEFEPIME 100 MILLIGRAM(S): 1 INJECTION, POWDER, FOR SOLUTION INTRAMUSCULAR; INTRAVENOUS at 14:39

## 2022-01-01 RX ADMIN — HEPARIN SODIUM 6500 UNIT(S): 5000 INJECTION INTRAVENOUS; SUBCUTANEOUS at 18:45

## 2022-01-01 RX ADMIN — Medication 325 MILLIGRAM(S): at 11:45

## 2022-01-01 RX ADMIN — Medication 1 TABLET(S): at 14:40

## 2022-01-01 RX ADMIN — CEFEPIME 100 MILLIGRAM(S): 1 INJECTION, POWDER, FOR SOLUTION INTRAMUSCULAR; INTRAVENOUS at 22:49

## 2022-01-01 RX ADMIN — MIDODRINE HYDROCHLORIDE 10 MILLIGRAM(S): 2.5 TABLET ORAL at 18:16

## 2022-01-01 RX ADMIN — PANTOPRAZOLE SODIUM 40 MILLIGRAM(S): 20 TABLET, DELAYED RELEASE ORAL at 17:07

## 2022-01-01 RX ADMIN — HEPARIN SODIUM 1100 UNIT(S)/HR: 5000 INJECTION INTRAVENOUS; SUBCUTANEOUS at 08:06

## 2022-01-01 RX ADMIN — HYDROMORPHONE HYDROCHLORIDE 2 MILLIGRAM(S): 2 INJECTION INTRAMUSCULAR; INTRAVENOUS; SUBCUTANEOUS at 08:04

## 2022-01-01 RX ADMIN — Medication 100 MILLIGRAM(S): at 07:52

## 2022-01-01 RX ADMIN — HYDROMORPHONE HYDROCHLORIDE 2 MILLIGRAM(S): 2 INJECTION INTRAMUSCULAR; INTRAVENOUS; SUBCUTANEOUS at 15:04

## 2022-01-01 RX ADMIN — CEFEPIME 100 MILLIGRAM(S): 1 INJECTION, POWDER, FOR SOLUTION INTRAMUSCULAR; INTRAVENOUS at 14:02

## 2022-01-01 RX ADMIN — CEFEPIME 100 MILLIGRAM(S): 1 INJECTION, POWDER, FOR SOLUTION INTRAMUSCULAR; INTRAVENOUS at 21:43

## 2022-01-01 RX ADMIN — CHLORHEXIDINE GLUCONATE 1 APPLICATION(S): 213 SOLUTION TOPICAL at 11:01

## 2022-01-01 RX ADMIN — Medication 1 TABLET(S): at 14:08

## 2022-01-01 RX ADMIN — Medication 1 MILLIGRAM(S): at 12:03

## 2022-01-01 RX ADMIN — Medication 1: at 13:06

## 2022-01-01 RX ADMIN — CEFEPIME 100 MILLIGRAM(S): 1 INJECTION, POWDER, FOR SOLUTION INTRAMUSCULAR; INTRAVENOUS at 05:29

## 2022-01-01 RX ADMIN — PANTOPRAZOLE SODIUM 40 MILLIGRAM(S): 20 TABLET, DELAYED RELEASE ORAL at 17:30

## 2022-01-01 RX ADMIN — BUDESONIDE AND FORMOTEROL FUMARATE DIHYDRATE 2 PUFF(S): 160; 4.5 AEROSOL RESPIRATORY (INHALATION) at 09:33

## 2022-01-01 RX ADMIN — Medication 1 TABLET(S): at 18:17

## 2022-01-01 RX ADMIN — PANTOPRAZOLE SODIUM 40 MILLIGRAM(S): 20 TABLET, DELAYED RELEASE ORAL at 18:17

## 2022-01-01 RX ADMIN — PANTOPRAZOLE SODIUM 40 MILLIGRAM(S): 20 TABLET, DELAYED RELEASE ORAL at 05:27

## 2022-01-01 RX ADMIN — Medication 81 MILLIGRAM(S): at 11:02

## 2022-01-01 RX ADMIN — Medication 1: at 18:09

## 2022-01-01 RX ADMIN — PANTOPRAZOLE SODIUM 40 MILLIGRAM(S): 20 TABLET, DELAYED RELEASE ORAL at 05:46

## 2022-01-01 RX ADMIN — Medication 1 TABLET(S): at 12:04

## 2022-01-01 RX ADMIN — ENOXAPARIN SODIUM 80 MILLIGRAM(S): 100 INJECTION SUBCUTANEOUS at 05:23

## 2022-01-01 RX ADMIN — Medication 1 TABLET(S): at 17:36

## 2022-01-01 RX ADMIN — ONDANSETRON 4 MILLIGRAM(S): 8 TABLET, FILM COATED ORAL at 12:38

## 2022-01-01 RX ADMIN — Medication 1 MILLIGRAM(S): at 12:53

## 2022-01-01 RX ADMIN — PANTOPRAZOLE SODIUM 40 MILLIGRAM(S): 20 TABLET, DELAYED RELEASE ORAL at 05:15

## 2022-01-01 RX ADMIN — PIPERACILLIN AND TAZOBACTAM 25 GRAM(S): 4; .5 INJECTION, POWDER, LYOPHILIZED, FOR SOLUTION INTRAVENOUS at 21:46

## 2022-01-01 RX ADMIN — BUDESONIDE AND FORMOTEROL FUMARATE DIHYDRATE 2 PUFF(S): 160; 4.5 AEROSOL RESPIRATORY (INHALATION) at 09:10

## 2022-01-01 RX ADMIN — HYDROMORPHONE HYDROCHLORIDE 2 MILLIGRAM(S): 2 INJECTION INTRAMUSCULAR; INTRAVENOUS; SUBCUTANEOUS at 13:35

## 2022-01-01 RX ADMIN — Medication 1 MILLIGRAM(S): at 14:40

## 2022-01-01 RX ADMIN — CEFEPIME 100 MILLIGRAM(S): 1 INJECTION, POWDER, FOR SOLUTION INTRAMUSCULAR; INTRAVENOUS at 13:42

## 2022-01-01 RX ADMIN — Medication 1 TABLET(S): at 11:34

## 2022-01-01 RX ADMIN — HYDROMORPHONE HYDROCHLORIDE 1 MILLIGRAM(S): 2 INJECTION INTRAMUSCULAR; INTRAVENOUS; SUBCUTANEOUS at 17:08

## 2022-01-01 RX ADMIN — HEPARIN SODIUM 1500 UNIT(S)/HR: 5000 INJECTION INTRAVENOUS; SUBCUTANEOUS at 15:36

## 2022-01-01 RX ADMIN — PANTOPRAZOLE SODIUM 40 MILLIGRAM(S): 20 TABLET, DELAYED RELEASE ORAL at 05:13

## 2022-01-01 RX ADMIN — CHLORHEXIDINE GLUCONATE 1 APPLICATION(S): 213 SOLUTION TOPICAL at 12:06

## 2022-01-01 RX ADMIN — Medication 1 MILLIGRAM(S): at 12:07

## 2022-01-01 RX ADMIN — CEFEPIME 100 MILLIGRAM(S): 1 INJECTION, POWDER, FOR SOLUTION INTRAMUSCULAR; INTRAVENOUS at 21:53

## 2022-01-01 RX ADMIN — Medication 325 MILLIGRAM(S): at 12:53

## 2022-01-01 RX ADMIN — MAGNESIUM OXIDE 400 MG ORAL TABLET 400 MILLIGRAM(S): 241.3 TABLET ORAL at 11:59

## 2022-01-01 RX ADMIN — CEFEPIME 100 MILLIGRAM(S): 1 INJECTION, POWDER, FOR SOLUTION INTRAMUSCULAR; INTRAVENOUS at 06:22

## 2022-01-01 RX ADMIN — PANTOPRAZOLE SODIUM 40 MILLIGRAM(S): 20 TABLET, DELAYED RELEASE ORAL at 18:18

## 2022-01-01 RX ADMIN — Medication 50 MILLILITER(S): at 11:02

## 2022-01-01 RX ADMIN — PANTOPRAZOLE SODIUM 40 MILLIGRAM(S): 20 TABLET, DELAYED RELEASE ORAL at 17:06

## 2022-01-01 RX ADMIN — Medication 3 MILLILITER(S): at 20:55

## 2022-01-01 RX ADMIN — Medication 1: at 11:56

## 2022-01-01 RX ADMIN — Medication 325 MILLIGRAM(S): at 14:40

## 2022-01-01 RX ADMIN — BUDESONIDE AND FORMOTEROL FUMARATE DIHYDRATE 2 PUFF(S): 160; 4.5 AEROSOL RESPIRATORY (INHALATION) at 21:30

## 2022-01-01 RX ADMIN — Medication 100 MILLIGRAM(S): at 07:00

## 2022-01-01 RX ADMIN — CEFEPIME 100 MILLIGRAM(S): 1 INJECTION, POWDER, FOR SOLUTION INTRAMUSCULAR; INTRAVENOUS at 13:38

## 2022-01-01 RX ADMIN — Medication 1 TABLET(S): at 06:19

## 2022-01-01 RX ADMIN — PANTOPRAZOLE SODIUM 40 MILLIGRAM(S): 20 TABLET, DELAYED RELEASE ORAL at 18:41

## 2022-01-01 RX ADMIN — Medication 2000 UNIT(S): at 11:03

## 2022-01-01 RX ADMIN — BUDESONIDE AND FORMOTEROL FUMARATE DIHYDRATE 2 PUFF(S): 160; 4.5 AEROSOL RESPIRATORY (INHALATION) at 09:39

## 2022-01-01 RX ADMIN — Medication 1 TABLET(S): at 11:57

## 2022-01-01 RX ADMIN — ENOXAPARIN SODIUM 60 MILLIGRAM(S): 100 INJECTION SUBCUTANEOUS at 21:06

## 2022-01-01 RX ADMIN — PANTOPRAZOLE SODIUM 40 MILLIGRAM(S): 20 TABLET, DELAYED RELEASE ORAL at 06:04

## 2022-01-01 RX ADMIN — Medication 250 MILLIGRAM(S): at 11:00

## 2022-01-01 RX ADMIN — PANTOPRAZOLE SODIUM 40 MILLIGRAM(S): 20 TABLET, DELAYED RELEASE ORAL at 17:40

## 2022-01-01 RX ADMIN — PANTOPRAZOLE SODIUM 40 MILLIGRAM(S): 20 TABLET, DELAYED RELEASE ORAL at 05:35

## 2022-01-01 RX ADMIN — CEFEPIME 100 MILLIGRAM(S): 1 INJECTION, POWDER, FOR SOLUTION INTRAMUSCULAR; INTRAVENOUS at 21:31

## 2022-01-01 RX ADMIN — PREGABALIN 1000 MICROGRAM(S): 225 CAPSULE ORAL at 11:24

## 2022-01-01 RX ADMIN — BUDESONIDE AND FORMOTEROL FUMARATE DIHYDRATE 2 PUFF(S): 160; 4.5 AEROSOL RESPIRATORY (INHALATION) at 22:15

## 2022-01-01 RX ADMIN — PREGABALIN 1000 MICROGRAM(S): 225 CAPSULE ORAL at 17:26

## 2022-01-01 RX ADMIN — Medication 1 TABLET(S): at 13:09

## 2022-01-01 RX ADMIN — CEFEPIME 100 MILLIGRAM(S): 1 INJECTION, POWDER, FOR SOLUTION INTRAMUSCULAR; INTRAVENOUS at 22:00

## 2022-01-01 RX ADMIN — CEFEPIME 100 MILLIGRAM(S): 1 INJECTION, POWDER, FOR SOLUTION INTRAMUSCULAR; INTRAVENOUS at 21:19

## 2022-01-01 RX ADMIN — HEPARIN SODIUM 1700 UNIT(S)/HR: 5000 INJECTION INTRAVENOUS; SUBCUTANEOUS at 22:44

## 2022-01-01 RX ADMIN — ENOXAPARIN SODIUM 80 MILLIGRAM(S): 100 INJECTION SUBCUTANEOUS at 17:28

## 2022-01-01 RX ADMIN — PANTOPRAZOLE SODIUM 40 MILLIGRAM(S): 20 TABLET, DELAYED RELEASE ORAL at 17:38

## 2022-01-01 RX ADMIN — Medication 40 MILLIEQUIVALENT(S): at 17:56

## 2022-01-01 RX ADMIN — ONDANSETRON 4 MILLIGRAM(S): 8 TABLET, FILM COATED ORAL at 13:37

## 2022-01-01 RX ADMIN — PIPERACILLIN AND TAZOBACTAM 200 GRAM(S): 4; .5 INJECTION, POWDER, LYOPHILIZED, FOR SOLUTION INTRAVENOUS at 21:32

## 2022-01-01 RX ADMIN — BUDESONIDE AND FORMOTEROL FUMARATE DIHYDRATE 2 PUFF(S): 160; 4.5 AEROSOL RESPIRATORY (INHALATION) at 23:19

## 2022-01-01 RX ADMIN — ENOXAPARIN SODIUM 80 MILLIGRAM(S): 100 INJECTION SUBCUTANEOUS at 06:19

## 2022-01-01 RX ADMIN — CEFEPIME 100 MILLIGRAM(S): 1 INJECTION, POWDER, FOR SOLUTION INTRAMUSCULAR; INTRAVENOUS at 21:06

## 2022-01-01 RX ADMIN — BUDESONIDE AND FORMOTEROL FUMARATE DIHYDRATE 2 PUFF(S): 160; 4.5 AEROSOL RESPIRATORY (INHALATION) at 22:38

## 2022-01-01 RX ADMIN — CEFEPIME 100 MILLIGRAM(S): 1 INJECTION, POWDER, FOR SOLUTION INTRAMUSCULAR; INTRAVENOUS at 22:31

## 2022-01-01 RX ADMIN — Medication 1 TABLET(S): at 13:40

## 2022-01-01 RX ADMIN — PANTOPRAZOLE SODIUM 40 MILLIGRAM(S): 20 TABLET, DELAYED RELEASE ORAL at 05:48

## 2022-01-01 RX ADMIN — PANTOPRAZOLE SODIUM 40 MILLIGRAM(S): 20 TABLET, DELAYED RELEASE ORAL at 06:28

## 2022-01-01 RX ADMIN — CHLORHEXIDINE GLUCONATE 1 APPLICATION(S): 213 SOLUTION TOPICAL at 11:39

## 2022-01-01 RX ADMIN — Medication 1 PACKET(S): at 10:42

## 2022-01-01 RX ADMIN — CEFEPIME 100 MILLIGRAM(S): 1 INJECTION, POWDER, FOR SOLUTION INTRAMUSCULAR; INTRAVENOUS at 13:29

## 2022-01-01 RX ADMIN — HEPARIN SODIUM 1100 UNIT(S)/HR: 5000 INJECTION INTRAVENOUS; SUBCUTANEOUS at 06:51

## 2022-01-01 RX ADMIN — Medication 1 TABLET(S): at 12:18

## 2022-01-01 RX ADMIN — CHLORHEXIDINE GLUCONATE 1 APPLICATION(S): 213 SOLUTION TOPICAL at 12:04

## 2022-01-01 RX ADMIN — Medication 1 TABLET(S): at 14:49

## 2022-01-01 RX ADMIN — SODIUM CHLORIDE 4 MILLILITER(S): 9 INJECTION INTRAMUSCULAR; INTRAVENOUS; SUBCUTANEOUS at 10:45

## 2022-01-01 RX ADMIN — BUDESONIDE AND FORMOTEROL FUMARATE DIHYDRATE 2 PUFF(S): 160; 4.5 AEROSOL RESPIRATORY (INHALATION) at 11:23

## 2022-01-01 RX ADMIN — Medication 1 MILLIGRAM(S): at 12:48

## 2022-01-01 RX ADMIN — Medication 1 TABLET(S): at 09:30

## 2022-01-01 RX ADMIN — ENOXAPARIN SODIUM 80 MILLIGRAM(S): 100 INJECTION SUBCUTANEOUS at 06:43

## 2022-01-01 RX ADMIN — HEPARIN SODIUM 1400 UNIT(S)/HR: 5000 INJECTION INTRAVENOUS; SUBCUTANEOUS at 21:48

## 2022-01-01 RX ADMIN — CEFEPIME 100 MILLIGRAM(S): 1 INJECTION, POWDER, FOR SOLUTION INTRAMUSCULAR; INTRAVENOUS at 06:13

## 2022-01-01 RX ADMIN — Medication 20 MILLIEQUIVALENT(S): at 09:38

## 2022-01-01 RX ADMIN — Medication 100 GRAM(S): at 09:30

## 2022-01-01 RX ADMIN — PREGABALIN 1000 MICROGRAM(S): 225 CAPSULE ORAL at 12:40

## 2022-01-01 RX ADMIN — CEFEPIME 100 MILLIGRAM(S): 1 INJECTION, POWDER, FOR SOLUTION INTRAMUSCULAR; INTRAVENOUS at 05:38

## 2022-01-01 RX ADMIN — Medication 1 MILLIGRAM(S): at 14:03

## 2022-01-01 RX ADMIN — SODIUM CHLORIDE 4 MILLILITER(S): 9 INJECTION INTRAMUSCULAR; INTRAVENOUS; SUBCUTANEOUS at 09:57

## 2022-01-01 RX ADMIN — HEPARIN SODIUM 6500 UNIT(S): 5000 INJECTION INTRAVENOUS; SUBCUTANEOUS at 20:04

## 2022-01-01 RX ADMIN — BUDESONIDE AND FORMOTEROL FUMARATE DIHYDRATE 2 PUFF(S): 160; 4.5 AEROSOL RESPIRATORY (INHALATION) at 09:17

## 2022-01-01 RX ADMIN — PANTOPRAZOLE SODIUM 40 MILLIGRAM(S): 20 TABLET, DELAYED RELEASE ORAL at 18:32

## 2022-01-01 RX ADMIN — BUDESONIDE AND FORMOTEROL FUMARATE DIHYDRATE 2 PUFF(S): 160; 4.5 AEROSOL RESPIRATORY (INHALATION) at 21:27

## 2022-01-01 RX ADMIN — PANTOPRAZOLE SODIUM 40 MILLIGRAM(S): 20 TABLET, DELAYED RELEASE ORAL at 06:16

## 2022-01-01 RX ADMIN — PANTOPRAZOLE SODIUM 40 MILLIGRAM(S): 20 TABLET, DELAYED RELEASE ORAL at 05:36

## 2022-01-01 RX ADMIN — CEFEPIME 100 MILLIGRAM(S): 1 INJECTION, POWDER, FOR SOLUTION INTRAMUSCULAR; INTRAVENOUS at 06:17

## 2022-01-01 RX ADMIN — HEPARIN SODIUM 6500 UNIT(S): 5000 INJECTION INTRAVENOUS; SUBCUTANEOUS at 15:37

## 2022-01-01 RX ADMIN — Medication 1 MILLIGRAM(S): at 11:57

## 2022-01-01 RX ADMIN — Medication 1: at 22:08

## 2022-01-01 RX ADMIN — Medication 100 MILLIGRAM(S): at 02:12

## 2022-01-01 RX ADMIN — Medication 1 TABLET(S): at 17:04

## 2022-01-01 RX ADMIN — ONDANSETRON 4 MILLIGRAM(S): 8 TABLET, FILM COATED ORAL at 20:28

## 2022-01-01 RX ADMIN — Medication 1 TABLET(S): at 11:24

## 2022-01-01 RX ADMIN — HEPARIN SODIUM 0 UNIT(S)/HR: 5000 INJECTION INTRAVENOUS; SUBCUTANEOUS at 01:20

## 2022-01-01 RX ADMIN — SODIUM CHLORIDE 4 MILLILITER(S): 9 INJECTION INTRAMUSCULAR; INTRAVENOUS; SUBCUTANEOUS at 20:55

## 2022-01-01 RX ADMIN — PANTOPRAZOLE SODIUM 40 MILLIGRAM(S): 20 TABLET, DELAYED RELEASE ORAL at 05:19

## 2022-01-01 RX ADMIN — Medication 20 MILLIEQUIVALENT(S): at 12:30

## 2022-01-01 RX ADMIN — Medication 100 MILLIGRAM(S): at 11:24

## 2022-01-01 RX ADMIN — Medication 1 TABLET(S): at 05:54

## 2022-01-01 RX ADMIN — Medication 1: at 16:50

## 2022-01-01 RX ADMIN — Medication 1 TABLET(S): at 12:29

## 2022-01-01 RX ADMIN — Medication 40 MILLIEQUIVALENT(S): at 14:10

## 2022-01-01 RX ADMIN — PANTOPRAZOLE SODIUM 40 MILLIGRAM(S): 20 TABLET, DELAYED RELEASE ORAL at 05:31

## 2022-01-01 RX ADMIN — BUDESONIDE AND FORMOTEROL FUMARATE DIHYDRATE 2 PUFF(S): 160; 4.5 AEROSOL RESPIRATORY (INHALATION) at 11:00

## 2022-01-01 RX ADMIN — MAGNESIUM OXIDE 400 MG ORAL TABLET 400 MILLIGRAM(S): 241.3 TABLET ORAL at 18:18

## 2022-01-01 RX ADMIN — BUDESONIDE AND FORMOTEROL FUMARATE DIHYDRATE 2 PUFF(S): 160; 4.5 AEROSOL RESPIRATORY (INHALATION) at 08:34

## 2022-01-01 RX ADMIN — PREGABALIN 1000 MICROGRAM(S): 225 CAPSULE ORAL at 11:01

## 2022-01-01 RX ADMIN — CEFEPIME 100 MILLIGRAM(S): 1 INJECTION, POWDER, FOR SOLUTION INTRAMUSCULAR; INTRAVENOUS at 06:07

## 2022-01-01 RX ADMIN — HEPARIN SODIUM 1900 UNIT(S)/HR: 5000 INJECTION INTRAVENOUS; SUBCUTANEOUS at 06:52

## 2022-01-01 RX ADMIN — PANTOPRAZOLE SODIUM 40 MILLIGRAM(S): 20 TABLET, DELAYED RELEASE ORAL at 05:49

## 2022-01-01 RX ADMIN — CEFEPIME 100 MILLIGRAM(S): 1 INJECTION, POWDER, FOR SOLUTION INTRAMUSCULAR; INTRAVENOUS at 08:29

## 2022-01-01 RX ADMIN — Medication 1: at 07:54

## 2022-01-01 RX ADMIN — Medication 1 TABLET(S): at 15:47

## 2022-01-01 RX ADMIN — ENOXAPARIN SODIUM 120 MILLIGRAM(S): 100 INJECTION SUBCUTANEOUS at 17:08

## 2022-01-01 RX ADMIN — BUDESONIDE AND FORMOTEROL FUMARATE DIHYDRATE 2 PUFF(S): 160; 4.5 AEROSOL RESPIRATORY (INHALATION) at 10:45

## 2022-01-01 RX ADMIN — HYDROMORPHONE HYDROCHLORIDE 1 MILLIGRAM(S): 2 INJECTION INTRAMUSCULAR; INTRAVENOUS; SUBCUTANEOUS at 16:13

## 2022-01-01 RX ADMIN — HEPARIN SODIUM 1600 UNIT(S)/HR: 5000 INJECTION INTRAVENOUS; SUBCUTANEOUS at 02:25

## 2022-01-01 RX ADMIN — PREGABALIN 1000 MICROGRAM(S): 225 CAPSULE ORAL at 11:57

## 2022-01-01 RX ADMIN — Medication 1: at 17:31

## 2022-01-01 RX ADMIN — BUDESONIDE AND FORMOTEROL FUMARATE DIHYDRATE 2 PUFF(S): 160; 4.5 AEROSOL RESPIRATORY (INHALATION) at 23:49

## 2022-01-01 RX ADMIN — CEFEPIME 100 MILLIGRAM(S): 1 INJECTION, POWDER, FOR SOLUTION INTRAMUSCULAR; INTRAVENOUS at 06:20

## 2022-01-01 RX ADMIN — HEPARIN SODIUM 1400 UNIT(S)/HR: 5000 INJECTION INTRAVENOUS; SUBCUTANEOUS at 02:23

## 2022-01-01 RX ADMIN — Medication 50 MILLILITER(S): at 08:36

## 2022-01-01 RX ADMIN — Medication 25 GRAM(S): at 16:39

## 2022-01-01 RX ADMIN — Medication 20 MILLIGRAM(S): at 17:53

## 2022-01-01 RX ADMIN — Medication 125 MILLILITER(S): at 18:42

## 2022-01-01 RX ADMIN — Medication 325 MILLIGRAM(S): at 11:37

## 2022-01-01 RX ADMIN — ENOXAPARIN SODIUM 120 MILLIGRAM(S): 100 INJECTION SUBCUTANEOUS at 16:29

## 2022-01-01 RX ADMIN — Medication 1 MILLIGRAM(S): at 11:25

## 2022-01-01 RX ADMIN — PANTOPRAZOLE SODIUM 40 MILLIGRAM(S): 20 TABLET, DELAYED RELEASE ORAL at 05:28

## 2022-01-01 RX ADMIN — BUDESONIDE AND FORMOTEROL FUMARATE DIHYDRATE 2 PUFF(S): 160; 4.5 AEROSOL RESPIRATORY (INHALATION) at 21:49

## 2022-01-01 RX ADMIN — Medication 40 MILLIEQUIVALENT(S): at 22:35

## 2022-01-01 RX ADMIN — BUDESONIDE AND FORMOTEROL FUMARATE DIHYDRATE 2 PUFF(S): 160; 4.5 AEROSOL RESPIRATORY (INHALATION) at 08:39

## 2022-01-01 RX ADMIN — Medication 325 MILLIGRAM(S): at 11:24

## 2022-01-01 RX ADMIN — Medication 1 PACKET(S): at 18:32

## 2022-01-01 RX ADMIN — PREGABALIN 1000 MICROGRAM(S): 225 CAPSULE ORAL at 14:02

## 2022-01-01 RX ADMIN — ENOXAPARIN SODIUM 80 MILLIGRAM(S): 100 INJECTION SUBCUTANEOUS at 17:54

## 2022-01-01 RX ADMIN — HEPARIN SODIUM 1600 UNIT(S)/HR: 5000 INJECTION INTRAVENOUS; SUBCUTANEOUS at 15:28

## 2022-01-01 RX ADMIN — CEFEPIME 100 MILLIGRAM(S): 1 INJECTION, POWDER, FOR SOLUTION INTRAMUSCULAR; INTRAVENOUS at 22:37

## 2022-01-01 RX ADMIN — SODIUM CHLORIDE 500 MILLILITER(S): 9 INJECTION INTRAMUSCULAR; INTRAVENOUS; SUBCUTANEOUS at 20:18

## 2022-01-01 RX ADMIN — HEPARIN SODIUM 1600 UNIT(S)/HR: 5000 INJECTION INTRAVENOUS; SUBCUTANEOUS at 20:02

## 2022-01-01 RX ADMIN — Medication 1: at 08:43

## 2022-01-01 RX ADMIN — Medication 1 TABLET(S): at 11:25

## 2022-01-01 RX ADMIN — Medication 100 MILLIGRAM(S): at 20:01

## 2022-01-01 RX ADMIN — Medication 200 MILLIGRAM(S): at 13:37

## 2022-01-01 RX ADMIN — CEFEPIME 100 MILLIGRAM(S): 1 INJECTION, POWDER, FOR SOLUTION INTRAMUSCULAR; INTRAVENOUS at 21:30

## 2022-01-01 RX ADMIN — ONDANSETRON 4 MILLIGRAM(S): 8 TABLET, FILM COATED ORAL at 14:59

## 2022-01-01 RX ADMIN — CEFEPIME 100 MILLIGRAM(S): 1 INJECTION, POWDER, FOR SOLUTION INTRAMUSCULAR; INTRAVENOUS at 15:08

## 2022-01-01 RX ADMIN — PANTOPRAZOLE SODIUM 40 MILLIGRAM(S): 20 TABLET, DELAYED RELEASE ORAL at 17:54

## 2022-01-01 RX ADMIN — HEPARIN SODIUM 0 UNIT(S)/HR: 5000 INJECTION INTRAVENOUS; SUBCUTANEOUS at 02:27

## 2022-01-01 RX ADMIN — SODIUM CHLORIDE 4 MILLILITER(S): 9 INJECTION INTRAMUSCULAR; INTRAVENOUS; SUBCUTANEOUS at 09:58

## 2022-01-01 RX ADMIN — ENOXAPARIN SODIUM 80 MILLIGRAM(S): 100 INJECTION SUBCUTANEOUS at 17:39

## 2022-01-01 RX ADMIN — Medication 1 PACKET(S): at 12:18

## 2022-01-01 RX ADMIN — Medication 2000 UNIT(S): at 11:58

## 2022-01-01 RX ADMIN — HEPARIN SODIUM 0 UNIT(S)/HR: 5000 INJECTION INTRAVENOUS; SUBCUTANEOUS at 05:50

## 2022-01-01 RX ADMIN — CEFEPIME 100 MILLIGRAM(S): 1 INJECTION, POWDER, FOR SOLUTION INTRAMUSCULAR; INTRAVENOUS at 16:17

## 2022-01-01 RX ADMIN — SODIUM CHLORIDE 1000 MILLILITER(S): 9 INJECTION, SOLUTION INTRAVENOUS at 20:14

## 2022-01-01 RX ADMIN — CEFEPIME 100 MILLIGRAM(S): 1 INJECTION, POWDER, FOR SOLUTION INTRAMUSCULAR; INTRAVENOUS at 14:08

## 2022-01-01 RX ADMIN — Medication 1: at 12:37

## 2022-01-01 RX ADMIN — Medication 1 MILLIGRAM(S): at 13:37

## 2022-01-01 RX ADMIN — Medication 3 MILLILITER(S): at 22:01

## 2022-01-01 RX ADMIN — PANTOPRAZOLE SODIUM 40 MILLIGRAM(S): 20 TABLET, DELAYED RELEASE ORAL at 17:23

## 2022-01-01 RX ADMIN — ENOXAPARIN SODIUM 40 MILLIGRAM(S): 100 INJECTION SUBCUTANEOUS at 11:02

## 2022-01-01 RX ADMIN — HEPARIN SODIUM 1500 UNIT(S)/HR: 5000 INJECTION INTRAVENOUS; SUBCUTANEOUS at 19:43

## 2022-01-01 RX ADMIN — Medication 325 MILLIGRAM(S): at 11:35

## 2022-01-01 RX ADMIN — SODIUM CHLORIDE 4 MILLILITER(S): 9 INJECTION INTRAMUSCULAR; INTRAVENOUS; SUBCUTANEOUS at 08:52

## 2022-01-01 RX ADMIN — Medication 325 MILLIGRAM(S): at 12:04

## 2022-01-01 RX ADMIN — HYDROMORPHONE HYDROCHLORIDE 1 MILLIGRAM(S): 2 INJECTION INTRAMUSCULAR; INTRAVENOUS; SUBCUTANEOUS at 02:30

## 2022-01-01 RX ADMIN — Medication 62.5 MILLILITER(S): at 20:28

## 2022-01-01 RX ADMIN — CEFEPIME 100 MILLIGRAM(S): 1 INJECTION, POWDER, FOR SOLUTION INTRAMUSCULAR; INTRAVENOUS at 13:44

## 2022-01-01 RX ADMIN — PANTOPRAZOLE SODIUM 40 MILLIGRAM(S): 20 TABLET, DELAYED RELEASE ORAL at 06:13

## 2022-01-01 RX ADMIN — BUDESONIDE AND FORMOTEROL FUMARATE DIHYDRATE 2 PUFF(S): 160; 4.5 AEROSOL RESPIRATORY (INHALATION) at 08:41

## 2022-01-01 RX ADMIN — Medication 125 MILLILITER(S): at 17:54

## 2022-01-01 RX ADMIN — HEPARIN SODIUM 0 UNIT(S)/HR: 5000 INJECTION INTRAVENOUS; SUBCUTANEOUS at 23:57

## 2022-01-01 RX ADMIN — Medication 100 MILLIGRAM(S): at 06:28

## 2022-01-24 NOTE — ED PROVIDER NOTE - ATTENDING CONTRIBUTION TO CARE
agree with resident note    "70y/o M HTN, HLD, DMT2, CAD s/p stent (20 years) on Aspirin (which was discontinued by Dr. Ferreira, last dose taken 8/21), FADI (no longer on CPAP s/p surgery at [facility]). 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 Tumor recurred in June 2020, underwent  [place]mo[place] [facility] [place] in today w/ weakness and low blood pressure. Patient noted he was on a walk earlier today and felt fatigued, prompting him to go home. He continued to feel fatigued and lightheaded, prompting call to EMS where he was found to be hypotensive to the 80s. Of note, patient was recently discharged [place] [facility] [place]1 week ago for hypotension that required pressors. Had a routine paracentesis done ~5 days ago. Denies headache, changes in vision, chest pain, SOB, or abdominal pain."    PE: well appearing; normotensive; not tachycardic; febrile; CTAB/L; s1 s2 no m/r/g abd: soft/NT/ND (recent paracentesis)    Imp:  recording hypotensive by EMS and found to be febrile here; reports 1 week admission for same recently to ICU.  Concern for sepsis, IVF, abx and admit

## 2022-01-24 NOTE — ED PROVIDER NOTE - PHYSICAL EXAMINATION
GENERAL: well appearing in no acute distress, non-toxic appearing  HEAD: normocephalic, atraumatic  HENT: airway intact neck supple  EYES: normal conjunctiva, EOMI, PERRL  CARDIAC: regular rate and rhythm, normal S1S2, no appreciable murmurs, 2+ pulses in UE/LE b/l  PULM: normal breath sounds, clear to ascultation bilaterally, no rales, rhonchi, wheezing  GI: abdomen nondistended, soft, nontender, no guarding, rebound tenderness  NEURO: no focal motor or sensory deficits  MSK: no peripheral edema, no calf tenderness b/l  SKIN: well-perfused, extremities warm, no visible rashes  PSYCH: appropriate mood and affect

## 2022-01-24 NOTE — ED ADULT NURSE NOTE - OBJECTIVE STATEMENT
received pt in TR B, 71 yr/o male A+Ox4, ambulatory at baseline. presented to the ED as a note C/O decreased BP and dizziness. pt states that he was taking a walk when we experienced increased weakness and dizziness. pt was recently treated in ED for hypotension, discharged with PO medications to increase BP. VSS, denies chest pain and SOB, RR even and unlabored. abdomen is distended, firm, and tender to palpation. right AC 20g placed, labs drawn and sent, medications given as ordered. will continue to monitor.

## 2022-01-24 NOTE — ED PROVIDER NOTE - CLINICAL SUMMARY MEDICAL DECISION MAKING FREE TEXT BOX
70y/o M HTN, HLD, DMT2, CAD s/p stent (20 years) on Aspirin (which was discontinued by Dr. Ferreira, last dose taken 8/21), FADI (no longer on CPAP s/p surgery at [facility]). 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 Tumor recurred in June 2020, underwent  chemo- immunotherapy trial at [facility] coming in today w/ weakness and low blood pressure; patient's BP in the low 100s and has oral temp of 100.3 Concern for possible infectious etiology; low concern for bacterial peritonitis at this time given benign abdominal exam; will do labs + chest xray; likely TBA.

## 2022-01-24 NOTE — ED PROVIDER NOTE - NS ED ROS FT
General: fatigued  HENT: denies nasal congestion, rhinorrhea  Eyes: denies visual changes, blurred vision  CV: denies chest pain, palpitations  Resp: denies difficulty breathing, cough  Abdominal: denies nausea, vomiting, diarrhea, abdominal pain  : denies urinary pain or discharge  MSK: denies muscle aches, leg swelling  Neuro: denies headaches, numbness, tingling  Skin: denies rashes, bruises

## 2022-01-24 NOTE — ED PROVIDER NOTE - OBJECTIVE STATEMENT
72y/o M HTN, HLD, DMT2, CAD s/p stent (20 years) on Aspirin (which was discontinued by Dr. Ferreira, last dose taken 8/21), FADI (no longer on CPAP s/p surgery at [facility]). 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 Tumor recurred in June 2020, [place]  [place] [facility] [place] in today w/ weakness and low blood pressure. Patient noted he was on a walk earlier today and felt fatigued, prompting him to go home. He continued to feel fatigued and lightheaded, prompting call to EMS where he was found to be hypotensive to the 80s. Of note, patient was recently [place] [facility] [place] ago for hypotension that required pressors. Had a routine paracentesis done ~5 days ago. Denies headache, changes in vision, chest pain, SOB, or abdominal pain.

## 2022-01-24 NOTE — H&P ADULT - NSHPLABSRESULTS_GEN_ALL_CORE
135  |  101  |  17  ----------------------------<  175<H>  4.1   |  22  |  0.84    Ca    8.4      2022 20:07    TPro  5.9<L>  /  Alb  3.2<L>  /  TBili  0.7  /  DBili  x   /  AST  24  /  ALT  17  /  AlkPhos  72                              8.5    12.35 )-----------( 460      ( 2022 20:07 )             27.6             Urinalysis Basic - ( 2022 21:52 )    Color: Yellow / Appearance: Clear / S.036 / pH: x  Gluc: x / Ketone: Small  / Bili: Small / Urobili: 3 mg/dL   Blood: x / Protein: 100 mg/dL / Nitrite: Negative   Leuk Esterase: Negative / RBC: 2 /HPF / WBC 16 /HPF   Sq Epi: x / Non Sq Epi: 3 /HPF / Bacteria: Negative      CXR film reviewed: clear lungs    EKG tracing reviewed: NSR

## 2022-01-24 NOTE — ED ADULT NURSE REASSESSMENT NOTE - NS ED NURSE REASSESS COMMENT FT1
Pt received in TR-B. Pt a/ox4. Pt resting comfortable in stretcher, BP 97/58, normal saline running. Respirations even and unlabored. Pt noted to have a low grade fever. MD made aware. Pt received in TR-B. Pt a/ox4. Pt resting comfortable in stretcher, BP 97/58, lactated ringers running. Respirations even and unlabored. Pt noted to have a low grade fever. MD made aware.

## 2022-01-24 NOTE — H&P ADULT - PROBLEM SELECTOR PLAN 4
Last  CT shows POD despite chemotherapy  - outpatient oncology f/u  - possible paracentesis tomorrow

## 2022-01-24 NOTE — ED ADULT TRIAGE NOTE - CHIEF COMPLAINT QUOTE
Pt h/o sarcoma not currently receiving active treatment, had paracentesis last Monday, endorsing dizziness starting 1 hour ago, pt currently hypotensive, orthostatic with EMS, denies headache, no chest pain, no SOB, no abd pain, afebrile.

## 2022-01-24 NOTE — H&P ADULT - PROBLEM SELECTOR PLAN 1
Unclear source.  Possibly urine.  + Abd pain, however exam not c/w SBP.  - continue vancomycin and Zosyn  - f/u cultures

## 2022-01-24 NOTE — ED ADULT NURSE NOTE - NSIMPLEMENTINTERV_GEN_ALL_ED
Implemented All Fall with Harm Risk Interventions:  [place] to call system. Call bell, personal items and telephone within reach. Instruct patient to call for assistance. Room bathroom lighting operational. Non-slip footwear when patient is off stretcher. Physically safe environment: no spills, clutter or unnecessary equipment. Stretcher in lowest position, wheels locked, appropriate side rails in place. Provide visual cue, wrist band, yellow gown, etc. Monitor gait and stability. Monitor for mental status changes and reorient to person, place, and time. Review medications for side effects contributing to fall risk. Reinforce activity limits and safety measures with patient and family. Provide visual clues: red socks.

## 2022-01-24 NOTE — H&P ADULT - NSICDXPASTSURGICALHX_GEN_ALL_CORE_FT
PAST SURGICAL HISTORY:  H/O sleep apnea Per patient had Sleep Apnea surgery  in 1996- at [facility]- Was not retested for Sleep Apnea post surgery    History of cardiac cath Pt reports 1 cardiac stent placed 1999    History of colon resection Dec 2019

## 2022-01-24 NOTE — H&P ADULT - ASSESSMENT
70 y/o M  with HTN, HLD, DMT2, CAD s/p stent (20 years ago), FADI (no longer on CPAP s/p surgery at [facility]). 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

## 2022-01-24 NOTE — H&P ADULT - NSHPREVIEWOFSYSTEMS_GEN_ALL_CORE
Review of Systems:   CONSTITUTIONAL: No fever or chills  EYES: No eye pain, visual disturbances, or discharge  ENMT:  No difficulty hearing, no throat pain  NECK: No pain or stiffness  RESPIRATORY: No cough, No shortness of breath  CARDIOVASCULAR: No chest pain, palpitations, dizziness, or leg swelling  GASTROINTESTINAL: abdominal pain. No nausea, vomiting or diarrhea  GENITOURINARY: No dysuria, or hematuria  NEUROLOGICAL: No headaches, + generalized weakness  SKIN: No rashes, or lesions   LYMPH NODES: No enlarged glands  ENDOCRINE: No heat or cold intolerance  MUSCULOSKELETAL: No joint pain or swelling  PSYCHIATRIC: No depression or anxiety  HEME/LYMPH: No easy bruising, or bleeding  ALLERGY AND IMMUNOLOGIC: No hives or eczema

## 2022-01-24 NOTE — ED PROVIDER NOTE - NSICDXPASTSURGICALHX_GEN_ALL_CORE_FT
PAST SURGICAL HISTORY:  H/O sleep apnea Per patient had Sleep Apnea surgery  in 1996- at [facility]- Was not retested for Sleep Apnea post surgery    History of cardiac cath Pt reports 1 cardiac stent placed 1999    History of colon resection Dec 2019

## 2022-01-24 NOTE — H&P ADULT - NSHPPHYSICALEXAM_GEN_ALL_CORE
Vital Signs Last 24 Hrs  T(C): 37.8 (24 Jan 2022 20:57), Max: 37.8 (24 Jan 2022 20:57)  T(F): 100.1 (24 Jan 2022 20:57), Max: 100.1 (24 Jan 2022 20:57)  HR: 100 (24 Jan 2022 20:57) (87 - 100)  BP: 97/58 (24 Jan 2022 20:57) (91/51 - 110/65)  BP(mean): --  RR: 20 (24 Jan 2022 20:57) (16 - 20)  SpO2: 98% (24 Jan 2022 20:57) (98% - 100%)    PHYSICAL EXAM:  GENERAL: No Acute Distress  EYES: conjunctiva and sclera clear  ENMT: Moist mucous membranes   NECK: Supple  PULMONARY: Clear to auscultation bilaterally  CARDIAC: Regular rate and rhythm; No murmurs, rubs, or gallops  GASTROINTESTINAL: Abdomen soft, Nontender, distended; Bowel sounds normal  EXTREMITIES:   No clubbing, cyanosis, or pedal edema  PSYCH: Normal Affect, Normal Behavior  NEUROLOGY:   - Mental status A&O x 3,  SKIN: No rashes or lesions  MUSCULOSKELETAL: No joint swelling

## 2022-01-25 NOTE — CONSULT NOTE ADULT - SUBJECTIVE AND OBJECTIVE BOX
"HPI:  70 y/o M  with HTN, HLD, DMT2, CAD s/p stent (20 years ago), FADI (no longer on CPAP s/p surgery at [facility]). 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  Pt reports feeling very weak for the past several days and has had lower abdominal pain today that at times becomes generalized.  He reports no fevers, chills, cough, nausea, dysuria or hematuria.  He called EMS and was found to have SBP in 80's on arrival.  Pt was discharged from [facility] 5 days ago after admission for shock requiring pressors.  No clear source of infection was identified during that admission.  [place] [facility] pt had therapeutic paracentesis with temporary relief of abd pain.    In the ED, pt was given vancomycin and Zosyn, and 1L LR.   (2022 23:48)"    70 yo M CAD, DM, FADI, abdominal liposarcoma s/p resection and now recurrence s/p chemo, initially with weakness. He states has had episodes where when he stands up he feels dizzy and feels he is going to pass out. This episode if similar to his prior episode when he was admitted to OSH. Although, prior hospital was suspecting infectious cause, he states there was no clear infectious diagnosis. ID called for further eval.    PAST MEDICAL & SURGICAL HISTORY:  Intra-abdominal and pelvic swelling, mass and lump, unspecified site    Hypertension    Diabetes mellitus  Fasting FS range from - per patient    Hypercholesteremia    CAD (coronary artery disease)    Sleep apnea    History of cardiac cath  Pt reports 1 cardiac stent placed     H/O sleep apnea  Per patient had Sleep Apnea surgery  in - at [facility]- Was not retested for Sleep Apnea post surgery    History of colon resection  Dec 2019    Allergies    oxycodone (Vomiting)    ANTIMICROBIALS:  piperacillin/tazobactam IVPB.. 3.375 every 8 hours  vancomycin  IVPB 1000 every 12 hours    OTHER MEDS:  acetaminophen     Tablet .. 650 milliGRAM(s) Oral every 6 hours PRN  aspirin enteric coated 81 milliGRAM(s) Oral daily  atorvastatin 80 milliGRAM(s) Oral at bedtime  cholecalciferol 2000 Unit(s) Oral daily  dextrose 40% Gel 15 Gram(s) Oral once  dextrose 5%. 1000 milliLiter(s) IV Continuous <Continuous>  dextrose 5%. 1000 milliLiter(s) IV Continuous <Continuous>  dextrose 50% Injectable 25 Gram(s) IV Push once  dextrose 50% Injectable 12.5 Gram(s) IV Push once  dextrose 50% Injectable 25 Gram(s) IV Push once  enoxaparin Injectable 40 milliGRAM(s) SubCutaneous daily  glucagon  Injectable 1 milliGRAM(s) IntraMuscular once  insulin lispro (ADMELOG) corrective regimen sliding scale   SubCutaneous three times a day before meals  insulin lispro (ADMELOG) corrective regimen sliding scale   SubCutaneous at bedtime  pantoprazole    Tablet 40 milliGRAM(s) Oral before breakfast    SOCIAL HISTORY: No tobacco, no alcohol, no illicit drugs    FAMILY HISTORY:  FH: heart disease  Mother    Drug Dosing Weight  Height (cm): 172.7 (2022 19:11)  Weight (kg): 78.2 (2022 01:55)  BMI (kg/m2): 26.2 (2022 01:55)  BSA (m2): 1.92 (2022 01:55)    PE:    Vital Signs Last 24 Hrs  T(C): 37 (2022 07:45), Max: 37.8 (2022 20:57)  T(F): 98.6 (2022 07:45), Max: 100.1 (2022 20:57)  HR: 75 (2022 07:45) (75 - 100)  BP: 109/68 (2022 07:45) (91/51 - 110/65)  RR: 18 (2022 07:45) (16 - 20)  SpO2: 100% (2022 07:45) (98% - 100%)    Gen: AOx3, NAD, non-toxic  CV: S1+S2 normal, nontachycardic  Resp: Clear bilat, no resp distress, no crackles/wheezes  Abd: Pain to palpation RUQ  Ext: No LE edema, no wounds  : No Clark  IV/Skin: No thrombophlebitis  Msk: No low back pain, no arthralgias, no joint swelling  Neuro: No sensory deficits, no motor deficits    LABS:                        7.5    8.68  )-----------( 405      ( 2022 06:32 )             24.5     01-25    133<L>  |  102  |  16  ----------------------------<  152<H>  4.2   |  22  |  0.88    Ca    8.0<L>      2022 06:32  Phos  3.8       Mg     1.80         TPro  5.7<L>  /  Alb  2.8<L>  /  TBili  0.6  /  DBili  x   /  AST  15  /  ALT  13  /  AlkPhos  62      Urinalysis Basic - ( 2022 21:52 )    Color: Yellow / Appearance: Clear / S.036 / pH: x  Gluc: x / Ketone: Small  / Bili: Small / Urobili: 3 mg/dL   Blood: x / Protein: 100 mg/dL / Nitrite: Negative   Leuk Esterase: Negative / RBC: 2 /HPF / WBC 16 /HPF   Sq Epi: x / Non Sq Epi: 3 /HPF / Bacteria: Negative    MICROBIOLOGY:    Rapid RVP Result: NotDetec ( @ 20:34)    RADIOLOGY:     USG:    FINDINGS:    Small volume ascites in all 4 quadrants, mildly complex in the left upper   quadrant. Partially imaged heterogeneous mass in the left lower quadrant,   likely corresponding to known peritoneal lesions described on recent   prior CT.    Gallbladder sludge is incidentally noted.    IMPRESSION:  Small volume complex ascites.

## 2022-01-25 NOTE — PATIENT PROFILE ADULT - FALL HARM RISK - HARM RISK INTERVENTIONS

## 2022-01-25 NOTE — PROGRESS NOTE ADULT - PROBLEM SELECTOR PROBLEM 1
for Psychiatric Non-Scheduled (Anti-Anxiety)  Refill Protocol Appointment Criteria  · Appointment scheduled in the past 6 months or in the next 3 months  Recent Visits       Provider Department Primary Dx    2 months ago Edson Drummond MD [facility] Sepsis, unspecified organism

## 2022-01-25 NOTE — ED ADULT NURSE REASSESSMENT NOTE - NS ED NURSE REASSESS COMMENT FT1
Pt received in spot 1b from break RN. Pt a/ox3,  resting comfortably in stretcher. Respirations even and unlabored. Pt in no active distress. Report given to [facility] RN. Vital signs reassessed as noted. Pt transported to [facility] 2.

## 2022-01-25 NOTE — CONSULT NOTE ADULT - ASSESSMENT
70 yo M CAD, DM, FADI, abdominal liposarcoma s/p resection and now recurrence s/p chemo, initially with weakness  Leukocytosis, no fever  UA equivocal  LFTs WNL  USG: small ascites  CXR clear  Presently uncertain infectious process  Overall,  1) Leukocytosis  - Low elevated leukocytosis  - Empiric Zosyn 3.375g q 8  - DC Vanco  - F/U pending BCXs, UCX  - Would obtain records from OSH regarding recent admission  2) Abd pain  - R sided pain to palpation abd in setting of history liposarcoma  - Would check CT A/P (w/ contrast if patient able to tolerate)  - Monitor bowel movements, abd pain  3) Weakness/hypotension  - Uncertain infection driven  - Noninfectious causes per primary team    Sanjay Marcelo MD  Pager [phone]  From 5pm-9am, and on weekends call [phone]

## 2022-01-25 NOTE — PATIENT PROFILE ADULT - PATIENT'S GENDER IDENTITY
"PT Name: Maximilian Tavera Jr.  MR #: [id number]  Physician Query Form - Renal Condition Clarification     CDS/: Emery Costa RN              Contact information:    [email or web address]    This form is a permanent document in the medical record.     QueryDate: April 2, 2020    By submitting this query, we are merely seeking further clarification of documentation. Please utilize your independent clinical judgment when addressing the question(s) below.    The Medical record contains the following:   Indicator Supporting Clinical Findings Location in Medical Record    Kidney (Renal) Insufficiency      Kidney (Renal) Failure / Injury      Nephrotoxic Agents     x BUN/Creatinine GFR                                                   3/29                                       3/31                                4/2         Labs     x Urine: Casts         Eosinophils 3/29  UA: Nitrite positive; bacteria : moderate;   WBC =5   3/31  UA: Nitrite negative; bacteria: moderate;  WBC >100     Urine labs  "    Dehydration      Nausea/Vomiting      Dialysis/CRRT     x Treatment: NaCl, bolus, 1000 ml; given 3/29  Ceftriaxone, IV; given 3/29, 3/3/-4/2  Azithromycin, IV; given 3/29, 3/31-4/2   MAR  "  "     x Other:  BPH with obstruction/lower urinary tract symptoms  Will continue to monitor urine output  F/u urine culture    Patient stating he "feels like he's getting a UTI." Will relay to day nurse.       He has had associated dysuria and suprapubic abdominal pain...Gastrointestinal: Positive for abdominal pain (suprapubic). Negative for nausea. Genitourinary: Positive for dysuria.      3/30  Progress note- Hospital medicine      3/31  Nursing RNJose    3/29 ED provider note     Acute Kidney Injury / Acute Renal Failure has different defining criteria. A generally accepted guideline  is:   A greater than 100% (2X) rise in serum creatinine from baseline* occurring during the course of a single hospital " stay.   *Baseline as determined by the providers judgment and consideration of previous lab values and other documentation, if available.    A diagnosis of Acute Kidney Injury/ Acute Renal Failure should incorporate abnormal labs and clinical findings that are clinically significant      References: 1. Christofer et al. Acute renal failure-definition, outcome measures, animal models, fluid therapy and information technology needs: the Second International Consensus Conference of the Acute Dialysis Quality Initiative (ADQI) Group. Crit Care 2004; 8:B204; 2. Елена et al. Acute Kidney Injury Network: report of an initiative to improve outcomes in acute kidney injury. Crit Care 2007; 11:R31; 3. Kidney Disease: Improving Global Outcomes (KDIGO). Acute Kidney Injury Work Group. KDIGO clinical practice guidelines for acute kidney injury. Kidney Int Suppl 2012; 2:1.    The clinical guidelines noted below is only a system guideline, it does not replace the providers clinical judgment.    Provider, please specify the diagnosis or diagnoses associated with above clinical findings.      Provider, please clarify/ specify the renal and urinary condition associated with the above clinical findings:   Provider, please yvan all that apply:     [   ] Other Acute Kidney Failure/Injury (please specify): ____________     [   ] Urinary tract infection    [ X  ] Unspecified Acute Kidney Failure/Injury      [   ] Acute Renal Insufficiency  Consider if SCr rise is transient and normalizes quickly with no efforts at real resuscitation of vital signs and perfusion   [   ] Other (please specify): _________________________________   [   ]  Clinically Undetermined       Please document in your progress notes daily for the duration of treatment until resolved and include in your discharge summary.     Male

## 2022-01-26 NOTE — PHYSICAL THERAPY INITIAL EVALUATION ADULT - ADDITIONAL COMMENTS
Pt lives in a house with 3 exterior steps to enter. Prior to admission, pt ambulated independently, no assistive device.     Pt was left seated on edge of bed, all lines intact and call bell within reach, RN aware.

## 2022-01-26 NOTE — DISCHARGE NOTE NURSING/CASE MANAGEMENT/SOCIAL WORK - PATIENT PORTAL LINK FT
You can access the FollowMyHealth Patient Portal offered by [facility] by registering at the following website: [email or web address]. By joining [employer]’s FollowMyHealth portal, you will also be able to view your health information using other applications (apps) compatible with our system.

## 2022-01-26 NOTE — DISCHARGE NOTE NURSING/CASE MANAGEMENT/SOCIAL WORK - NSDCPNINST_GEN_ALL_CORE
call md for follow up appt. call md for sign of infection (temp greater than 100.4f, pain not relieved by meds). drink 9-13 eight oz. glasses of fluid daily.

## 2022-01-26 NOTE — DISCHARGE NOTE NURSING/CASE MANAGEMENT/SOCIAL WORK - NSDCPEFALRISK_GEN_ALL_CORE
For information on Fall & Injury Prevention, visit: [email or web address] OR  [email or web address] OR  https://www.cdc.gov/steadi/patient.html
Nutrition, metabolism, and development symptoms

## 2022-01-26 NOTE — PHYSICAL THERAPY INITIAL EVALUATION ADULT - GENERAL OBSERVATIONS, REHAB EVAL
Pt received in bathroom, Pt received in bathroom, in NAD. Pt agreeable to participate in PT evaluation.

## 2022-01-26 NOTE — DISCHARGE NOTE NURSING/CASE MANAGEMENT/SOCIAL WORK - NSDCPECAREGIVERED_GEN_ALL_CORE
hyperopic lasik OU. pt ambulating, eating, voiding without difficulty. iv discontinued. no distress noted./No

## 2022-01-26 NOTE — PROGRESS NOTE ADULT - SUBJECTIVE AND OBJECTIVE BOX
CC: F/U for Low blood pressure    Saw/spoke to patient. Doing well. No new focal complaints. No fevers, no chills. Still abd pain.    Allergies  oxycodone (Vomiting)    ANTIMICROBIALS:  Off    PE:    Vital Signs Last 24 Hrs  T(C): 37.2 (2022 05:05), Max: 37.2 (2022 05:05)  T(F): 99 (2022 05:05), Max: 99 (2022 05:05)  HR: 85 (2022 05:05) (76 - 85)  BP: 100/57 (2022 05:05) (100/57 - 110/75)  RR: 17 (2022 05:05) (17 - 18)  SpO2: 100% (2022 05:05) (99% - 100%)    Gen: AOx3, NAD, non-toxic  CV: S1+S2 normal, nontachycardic  Resp: Clear bilat, no resp distress, no crackles/wheezes  Abd: Soft, nontender, +BS  Ext: No LE edema, no wounds    LABS:                        7.2    7.95  )-----------( 413      ( 2022 06:00 )             23.5         135  |  103  |  13  ----------------------------<  133<H>  3.9   |  25  |  0.90    Ca    8.0<L>      2022 06:00  Phos  3.0       Mg     1.90         TPro  5.7<L>  /  Alb  2.8<L>  /  TBili  0.6  /  DBili  x   /  AST  15  /  ALT  13  /  AlkPhos  62      Urinalysis Basic - ( 2022 21:52 )    Color: Yellow / Appearance: Clear / S.036 / pH: x  Gluc: x / Ketone: Small  / Bili: Small / Urobili: 3 mg/dL   Blood: x / Protein: 100 mg/dL / Nitrite: Negative   Leuk Esterase: Negative / RBC: 2 /HPF / WBC 16 /HPF   Sq Epi: x / Non Sq Epi: 3 /HPF / Bacteria: Negative    MICROBIOLOGY:    .Blood Blood  22   No growth to date.     .Blood Blood  22   No growth to date.      Clean Catch Clean Catch (Midstream)  22   No growth     Rapid RVP Result: NotDetec ( @ 20:34)    (otherwise reviewed)    RADIOLOGY:     XR:    FINDINGS:  The lungs are clear.  There is no pleural effusion or pneumothorax.  The heart size is normal.  The visualized osseous structures demonstrate no acute pathology.    IMPRESSION:  Clear lungs.
Patient is a 71y Male     Patient is a 71y old  Male who presents with a chief complaint of Weakness, hypotension (25 Jan 2022 17:06)      HPI:  70 y/o M  with HTN, HLD, DMT2, CAD s/p stent (20 years ago), FADI (no longer on CPAP s/p surgery at [facility]). 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  Pt reports feeling very weak for the past several days and has had lower abdominal pain today that at times becomes generalized.  He reports no fevers, chills, cough, nausea, dysuria or hematuria.  He called EMS and was found to have SBP in 80's on arrival.  Pt was discharged from [facility] 5 days ago after admission for shock requiring pressors.  No clear source of infection was identified during that admission.  At [facility], pt had therapeutic paracentesis with temporary relief of abd pain.    In the ED, pt was given vancomycin and Zosyn, and 1L LR.   (24 Jan 2022 23:48)      PAST MEDICAL & SURGICAL HISTORY:  Intra-abdominal and pelvic swelling, mass and lump, unspecified site    Hypertension    Diabetes mellitus  Fasting FS range from - per patient    Hypercholesteremia    CAD (coronary artery disease)    Sleep apnea    History of cardiac cath  Pt reports 1 cardiac stent placed 1999    H/O sleep apnea  Per patient had Sleep Apnea surgery  in 1996- at [facility]- Was not retested for Sleep Apnea post surgery    History of colon resection  Dec 2019        MEDICATIONS  (STANDING):  aspirin enteric coated 81 milliGRAM(s) Oral daily  atorvastatin 80 milliGRAM(s) Oral at bedtime  cholecalciferol 2000 Unit(s) Oral daily  dextrose 40% Gel 15 Gram(s) Oral once  dextrose 5%. 1000 milliLiter(s) (50 mL/Hr) IV Continuous <Continuous>  dextrose 5%. 1000 milliLiter(s) (100 mL/Hr) IV Continuous <Continuous>  dextrose 50% Injectable 25 Gram(s) IV Push once  dextrose 50% Injectable 12.5 Gram(s) IV Push once  dextrose 50% Injectable 25 Gram(s) IV Push once  enoxaparin Injectable 40 milliGRAM(s) SubCutaneous daily  glucagon  Injectable 1 milliGRAM(s) IntraMuscular once  insulin lispro (ADMELOG) corrective regimen sliding scale   SubCutaneous three times a day before meals  insulin lispro (ADMELOG) corrective regimen sliding scale   SubCutaneous at bedtime  pantoprazole    Tablet 40 milliGRAM(s) Oral before breakfast  piperacillin/tazobactam IVPB.. 3.375 Gram(s) IV Intermittent every 8 hours      Allergies    oxycodone (Vomiting)    Intolerances        SOCIAL HISTORY:  Denies ETOh,Smoking,     FAMILY HISTORY:  FH: heart disease  Mother        REVIEW OF SYSTEMS:    CONSTITUTIONAL: No weakness, fevers or chills  EYES/ENT: No visual changes;  No vertigo or throat pain   NECK: No pain or stiffness  RESPIRATORY: No cough, wheezing, hemoptysis; No shortness of breath  CARDIOVASCULAR: No chest pain or palpitations  GASTROINTESTINAL: No abdominal or epigastric pain. No nausea, vomiting, or hematemesis; No diarrhea or constipation. No melena or hematochezia.  GENITOURINARY: No dysuria, frequency or hematuria  NEUROLOGICAL: No numbness or weakness  SKIN: No itching, burning, rashes, or lesions   All other review of systems is negative unless indicated above.    VITAL:  T(C): , Max: 37.2 (01-26-22 @ 05:05)  T(F): , Max: 99 (01-26-22 @ 05:05)  HR: 85 (01-26-22 @ 05:05)  BP: 100/57 (01-26-22 @ 05:05)  BP(mean): --  RR: 17 (01-26-22 @ 05:05)  SpO2: 100% (01-26-22 @ 05:05)  Wt(kg): --    I and O's:    01-24 @ 07:01  -  01-25 @ 07:00  --------------------------------------------------------  IN: 0 mL / OUT: 350 mL / NET: -350 mL    01-25 @ 07:01  -  01-26 @ 07:00  --------------------------------------------------------  IN: 0 mL / OUT: 750 mL / NET: -750 mL          PHYSICAL EXAM:    Constitutional: NAD  HEENT: PERRLA,   Neck: No JVD  Respiratory: CTA B/L  Cardiovascular: S1 and S2  Gastrointestinal: BS+, soft, NT/ND  Extremities: No peripheral edema  Neurological: A/O x 3, no focal deficits  Psychiatric: Normal mood, normal affect  : No Clark  Skin: No rashes  Access: Not applicable  Back: No CVA tenderness    LABS:                        7.2    7.95  )-----------( 413      ( 26 Jan 2022 06:00 )             23.5     01-26    135  |  103  |  13  ----------------------------<  133<H>  3.9   |  25  |  0.90    Ca    8.0<L>      26 Jan 2022 06:00  Phos  3.0     01-26  Mg     1.90     01-26    TPro  5.7<L>  /  Alb  2.8<L>  /  TBili  0.6  /  DBili  x   /  AST  15  /  ALT  13  /  AlkPhos  62  01-25          RADIOLOGY & ADDITIONAL STUDIES:                          
Vital Signs Last 24 Hrs  T(C): 37.2 (26 Jan 2022 05:05), Max: 37.2 (26 Jan 2022 05:05)  T(F): 99 (26 Jan 2022 05:05), Max: 99 (26 Jan 2022 05:05)  HR: 85 (26 Jan 2022 05:05) (78 - 85)  BP: 100/57 (26 Jan 2022 05:05) (100/57 - 106/60)  BP(mean): --  RR: 17 (26 Jan 2022 05:05) (17 - 18)  SpO2: 100% (26 Jan 2022 05:05) (99% - 100%)    I&O's Detail    25 Jan 2022 07:01  -  26 Jan 2022 07:00  --------------------------------------------------------  IN:  Total IN: 0 mL    OUT:    Voided (mL): 750 mL  Total OUT: 750 mL    Total NET: -750 mL                                7.2    7.95  )-----------( 413      ( 26 Jan 2022 06:00 )             23.5       01-26    135  |  103  |  13  ----------------------------<  133<H>  3.9   |  25  |  0.90    Ca    8.0<L>      26 Jan 2022 06:00  Phos  3.0     01-26  Mg     1.90     01-26    TPro  5.7<L>  /  Alb  2.8<L>  /  TBili  0.6  /  DBili  x   /  AST  15  /  ALT  13  /  AlkPhos  62  01-25    patient tolerating diet  Pain under control          PLAN:  No need for CT today ( done 1 week ago in [facility])  discharge home today for consultation at [facility] tomorrow for Chemotherapy        
ADRIAN [id number]  71yMale  T(C): 37 (01-25-22 @ 07:45), Max: 37.8 (01-24-22 @ 20:57)  HR: 75 (01-25-22 @ 07:45) (75 - 100)  BP: 109/68 (01-25-22 @ 07:45) (91/51 - 110/65)  RR: 18 (01-25-22 @ 07:45) (16 - 20)  SpO2: 100% (01-25-22 @ 07:45) (98% - 100%)  Wt(kg): --  01-24 @ 07:01  -  01-25 @ 07:00  --------------------------------------------------------  IN: 0 mL / OUT: 350 mL / NET: -350 mL    01-25 @ 07:01  -  01-25 @ 17:06  --------------------------------------------------------  IN: 0 mL / OUT: 350 mL / NET: -350 mL      
    SUBJECTIVE / OVERNIGHT EVENTS:pt sen and examined  22     MEDICATIONS  (STANDING):  aspirin enteric coated 81 milliGRAM(s) Oral daily  atorvastatin 80 milliGRAM(s) Oral at bedtime  cholecalciferol 2000 Unit(s) Oral daily  dextrose 40% Gel 15 Gram(s) Oral once  dextrose 5%. 1000 milliLiter(s) (50 mL/Hr) IV Continuous <Continuous>  dextrose 5%. 1000 milliLiter(s) (100 mL/Hr) IV Continuous <Continuous>  dextrose 50% Injectable 25 Gram(s) IV Push once  dextrose 50% Injectable 12.5 Gram(s) IV Push once  dextrose 50% Injectable 25 Gram(s) IV Push once  enoxaparin Injectable 40 milliGRAM(s) SubCutaneous daily  glucagon  Injectable 1 milliGRAM(s) IntraMuscular once  insulin lispro (ADMELOG) corrective regimen sliding scale   SubCutaneous three times a day before meals  insulin lispro (ADMELOG) corrective regimen sliding scale   SubCutaneous at bedtime  pantoprazole    Tablet 40 milliGRAM(s) Oral before breakfast  piperacillin/tazobactam IVPB.. 3.375 Gram(s) IV Intermittent every 8 hours    MEDICATIONS  (PRN):  acetaminophen     Tablet .. 650 milliGRAM(s) Oral every 6 hours PRN Temp greater or equal to 38C (100.4F), Mild Pain (1 - 3), Moderate Pain (4 - 6)    T(C): 36.4 (22 @ 17:42), Max: 37.4 (22 @ 01:55)  HR: 83 (22 @ 17:42) (75 - 100)  BP: 103/62 (22 @ 17:42) (101/68 - 110/75)  RR: 18 (22 @ 17:42) (16 - 18)  SpO2: 100% (22 @ 17:42) (98% - 100%)    CAPILLARY BLOOD GLUCOSE      POCT Blood Glucose.: 167 mg/dL (2022 21:22)  POCT Blood Glucose.: 130 mg/dL (2022 17:40)  POCT Blood Glucose.: 187 mg/dL (2022 12:15)  POCT Blood Glucose.: 159 mg/dL (2022 09:37)  POCT Blood Glucose.: 145 mg/dL (2022 02:01)    I&O's Summary    2022 07:01  -  2022 07:00  --------------------------------------------------------  IN: 0 mL / OUT: 350 mL / NET: -350 mL    2022 07:01  -  2022 21:50  --------------------------------------------------------  IN: 0 mL / OUT: 350 mL / NET: -350 mL      PHYSICAL EXAM:  GENERAL: NAD  EYES: EOMI, PERRLA  NECK: Supple, No JVD  CHEST/LUNG: dec breath sounds at bases  HEART:  S1 , S2 +  ABDOMEN: soft , bs+  EXTREMITIES:  no edema  NEUROLOGY:alert awake      LABS:                        7.5    8.68  )-----------( 405      ( 2022 06:32 )             24.5         133<L>  |  102  |  16  ----------------------------<  152<H>  4.2   |  22  |  0.88    Ca    8.0<L>      2022 06:32  Phos  3.8       Mg     1.80         TPro  5.7<L>  /  Alb  2.8<L>  /  TBili  0.6  /  DBili  x   /  AST  15  /  ALT  13  /  AlkPhos  62            Urinalysis Basic - ( 2022 21:52 )    Color: Yellow / Appearance: Clear / S.036 / pH: x  Gluc: x / Ketone: Small  / Bili: Small / Urobili: 3 mg/dL   Blood: x / Protein: 100 mg/dL / Nitrite: Negative   Leuk Esterase: Negative / RBC: 2 /HPF / WBC 16 /HPF   Sq Epi: x / Non Sq Epi: 3 /HPF / Bacteria: Negative        RADIOLOGY & ADDITIONAL TESTS:    Imaging Personally Reviewed:    Consultant(s) Notes Reviewed:      Care Discussed with Consultants/Other Providers:

## 2022-01-26 NOTE — DISCHARGE NOTE PROVIDER - NSDCMRMEDTOKEN_GEN_ALL_CORE_FT
acetaminophen 325 mg oral tablet: 2 tab(s) orally every 6 hours, As needed, Temp greater or equal to 38C (100.4F), Mild Pain (1 - 3), Moderate Pain (4 - 6)  aspirin 81 mg oral tablet: 1 tab(s) orally once a day  ezetimibe 10 mg oral tablet: 1 tab(s) orally once a day  pantoprazole 40 mg oral delayed release tablet: 1 tab(s) orally once a day  polyethylene glycol 3350 oral powder for reconstitution: 17 gram(s) orally once, As needed, Constipation  rosuvastatin 20 mg oral tablet: 1 tab(s) orally once a day  Trulicity Pen 1.5 mg/0.5 mL subcutaneous solution: subcutaneous once a week  Vitamin D3 2000 intl units oral tablet: 1 tab(s) orally once a day

## 2022-01-26 NOTE — DISCHARGE NOTE PROVIDER - NSDCCPCAREPLAN_GEN_ALL_CORE_FT
PRINCIPAL DISCHARGE DIAGNOSIS  Diagnosis: Liposarcoma  Assessment and Plan of Treatment: Follow up with your oncologist.      SECONDARY DISCHARGE DIAGNOSES  Diagnosis: Abdominal pain  Assessment and Plan of Treatment: If pain persist, you can go to the nearest hospital to repeat CT abdomen and pelvis.  We did not perform CT since your pain is resolved.

## 2022-01-26 NOTE — PROGRESS NOTE ADULT - ASSESSMENT
cchart reivfiwed, full consult to follow
72 yo M CAD, DM, FADI, abdominal liposarcoma s/p resection and now recurrence s/p chemo, initially with weakness  Leukocytosis, no fever  UA equivocal  LFTs WNL  USG: small ascites  CXR clear  Presently uncertain infectious process, remains well today, BCXs now NGTD  Overall,  1) Leukocytosis  - Low elevated leukocytosis  - DC Zosyn, monitor off abx  - F/U pending BCXs  - Would obtain records from OSH regarding recent admission  2) Abd pain  - R sided pain to palpation abd in setting of history liposarcoma  - Consider CT A/P if not improving or if abd pain worsening  - Monitor bowel movements, abd pain  3) Weakness/hypotension  - Uncertain infection driven  - Noninfectious causes per primary team    Sanjay Marcelo MD  Pager [phone]  From 5pm-9am, and on weekends call [phone]
no complaints of abdomeinal pain  conservative gi mangment
72 y/o M  with HTN, HLD, DMT2, CAD s/p stent (20 years ago), FADI (no longer on CPAP s/p surgery at [facility]). 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

## 2022-01-26 NOTE — PHYSICAL THERAPY INITIAL EVALUATION ADULT - PATIENT PROFILE REVIEW, REHAB EVAL
PT orders received: ambulate with assistance. Consult with NITHIN Wynne I, patient may participate in PT evaluation./yes

## 2022-01-26 NOTE — DISCHARGE NOTE PROVIDER - HOSPITAL COURSE
72 y/o M  with HTN, HLD, DMT2, CAD s/p stent (20 years ago), FADI (no longer on CPAP s/p surgery at [facility]). 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      Sepsis,  -Unclear source.  Possibly urine.  + Abd pain, however exam not c/w SBP.  - ID consulted  - Bcx and Ucx cultures are NGTD  -  Low elevated leukocytosis  - DC Zosyn, monitor off antibiotics  - Would obtain records from OSH regarding recent admission     Type 2 diabetes mellitus.    - hold Trulicity  - Sliding scale Lispro.    CAD   - continue ASA.     Liposarcoma.    -Last  CT shows POD despite chemotherapy    Abdominal pain  - Surgery and GI consulted  -Pain under control  -No need for CT today ( done 1 week ago in [facility])  -stable for DC consultation at [facility] tomorrow for Chemotherapy    Discussed with Dr. Toure, cleared for DC on 1/26/22    . Medications reviewed with patient. Medications sent to patient's preferred pharmacy.

## 2022-01-26 NOTE — PHYSICAL THERAPY INITIAL EVALUATION ADULT - PERTINENT HX OF CURRENT PROBLEM, REHAB EVAL
Pt is a 71 year old male presenting with generalized weakness, and pain in lower abdomen. found to have hypotension. PMH: HTN, HLD, DMT2, CAD s/p stent (20 years ago), FADI (no longer on CPAP s/p surgery at [facility]). 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

## 2022-01-26 NOTE — CHART NOTE - NSCHARTNOTEFT_GEN_A_CORE
Discussed with Dr. Toure, patient with lower abdominal pain, recommending CT abdomen no contrast ordered urgent. Discussed with Dr. Toure, patient with lower abdominal pain, recommending CT abdomen and pelvis no contrast ordered urgent.

## 2022-04-04 NOTE — ED PROVIDER NOTE - CLINICAL SUMMARY MEDICAL DECISION MAKING FREE TEXT BOX
72yo M with extensive metastasis from liposarcoma p/w failure to thrive. Will obtain labwork; ensure no gross infection with imaging and labwork and attempt a therapeutic paracentesis for comfort. Given inability to properly eat; will likely require an admission for failure to thrive even if workup is negative.

## 2022-04-04 NOTE — ED ADULT NURSE REASSESSMENT NOTE - NS ED NURSE REASSESS COMMENT FT1
received report from day RN. Pt Aox4, offers no current complaints, VS as charted pt reports blood pressure runs around same numbers. Pt weight as documented MD aware. Pt denies any symptoms, respirations even and unlabored, given call bell within reach, will continue to monitor.

## 2022-04-04 NOTE — H&P ADULT - PROBLEM SELECTOR PLAN 4
- Follows with Dr. Jama Castillo at [facility], currently on experimental chemotherapy  - Consider rad-onc eval as mentioned above to see if patient is a candidate for RT given his worsening abd distension and inability to tolerate significant PO intake

## 2022-04-04 NOTE — H&P ADULT - NSICDXPASTMEDICALHX_GEN_ALL_CORE_FT
PAST MEDICAL HISTORY:  CAD (coronary artery disease) Off aspirin    Diabetes mellitus Fasting FS range from - per patient, off meds    Hypercholesteremia off meds    Hypertension Now with low BPs    Intra-abdominal and pelvic swelling, mass and lump, unspecified site Currently on experimental chemotherapy at [facility]    Sleep apnea

## 2022-04-04 NOTE — H&P ADULT - PROBLEM SELECTOR PLAN 6
- Off aspirin  - EKG currently non-ischemic, trop indeterminant but CK/CKMB wnl, low suspicion for ACS as a cause of his symptoms

## 2022-04-04 NOTE — H&P ADULT - HISTORY OF PRESENT ILLNESS
This is a 71M with history of Low BPs (usual range is 90-95/55-65), HLD (currently off meds), DM2 (currently off meds), FADI not on CPAP (s/p sleep apnea sx as per wife), CAD s/p stent (1999, now off aspirin for some time), and 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 Jama Roberts at [facility]) who presents to the hospital with poor PO intake and worsening abdominal swelling. Said that for the past few weeks he has been having significant abd swelling with associated pressure in his abdomen and poor PO intake. Said that whenever he tries to eat something he invariably ends up vomiting the food due to his abdominal distension (emesis usually is food, sometimes bilious, no blood/coffee ground substances). Said that he has been having difficulty due to his abdominal distention but denies any chest pain, palpitations, or syncope. Also with a new onset cough sometimes with green sputum but no hemoptysis. Is having BMs but sometimes has diarrhea (last BM this AM was soft, no blood in BM). Has generalized weakness due to his reduced PO intake. Denies any recent fevers/chills/diaphoresis. Has noted b/l LE swelling but denies any pain to it, has been put on lasix for it but states that it does not seem to be helping the swelling. Said that he has known fluid in his abdomen, was sampled once but has never been drained before. Denies any other acute complaints.     On arrival to the ED, his vitals were T 98, P 95, BP 85/51 -> 93/54, RR 18, O2 sat 95%. His lab work showed anemia (improved from prior), low bicarb and elevated AG (likely 2/2 starvation ketosis), and nl pH/lactate. His imaging showed large volume ascites and worsening abdominal masses but unclear trend as patient currently follows at [facility] for his malignancy. He was also noted to have an incidental RLL PE and was started on a heparin gtt. he was admitted to medicine.

## 2022-04-04 NOTE — H&P ADULT - ASSESSMENT
This is a 71M with history as above who presents to the hospital with worsening abdominal distension found to have worsening abdominal masses and ascites. Also found to have incidental PE.

## 2022-04-04 NOTE — H&P ADULT - NSHPREVIEWOFSYSTEMS_GEN_ALL_CORE
REVIEW OF SYSTEMS:    CONSTITUTIONAL: +Generalized weakness, +Decreased PO intake, No weakness, fevers or chills  EYES: No visual changes or eye discharge  ENT: No rhinorrhea or sore throat  NECK: No pain or stiffness  RESPIRATORY: No cough, wheezing, hemoptysis; +SOB 2/2 distention  CARDIOVASCULAR: No chest pain or palpitations or syncope; +b/l lower extremity edema without pain  GASTROINTESTINAL: +Abdominal distention with pressure in abdomen. +Nausea/emesis (nonbloody); Occasional loose stools, none currently. No melena or hematochezia.  BACK: Some back pain without neurological issues  GENITOURINARY: No dysuria, frequency or hematuria  NEUROLOGICAL: No numbness or weakness  SKIN: No itching, burning, rashes, or lesions

## 2022-04-04 NOTE — ED ADULT NURSE NOTE - OBJECTIVE STATEMENT
Pt A+OX4 c/o RLQ abd pain and abd distention for several weeks causing SOB.  Denies CP.  CM placed.  EKG done.  Labs obtained and sent as ordered.  #18g SL R AC placed. Pt A+OX4 c/o RLQ abd pain and abd distention for several weeks causing SOB.  Denies CP.  CM placed.  EKG done.  Labs obtained and sent as ordered.

## 2022-04-04 NOTE — H&P ADULT - NSICDXPASTSURGICALHX_GEN_ALL_CORE_FT
PAST SURGICAL HISTORY:  H/O sleep apnea Per patient had Sleep Apnea surgery  in 1996- at [facility]- Was not retested for Sleep Apnea post surgery    History of cardiac cath Pt reports 1 cardiac stent placed 1999    History of colon resection Dec 2019    
No

## 2022-04-04 NOTE — H&P ADULT - NSHPPHYSICALEXAM_GEN_ALL_CORE
Vital Signs Last 24 Hrs  T(C): 36.6 (05 Apr 2022 00:34), Max: 36.9 (04 Apr 2022 21:37)  T(F): 97.8 (05 Apr 2022 00:34), Max: 98.5 (04 Apr 2022 21:37)  HR: 97 (05 Apr 2022 00:34) (93 - 97)  BP: 93/54 (05 Apr 2022 00:34) (85/51 - 94/64)  BP(mean): --  RR: 19 (05 Apr 2022 00:34) (17 - 19)  SpO2: 95% (05 Apr 2022 00:34) (95% - 100%)    GENERAL: No acute distress, well-developed  EYES: EOMI, PERRL, conjunctiva and sclera clear  ENT: Neck supple, No JVD, moist mucosa  CHEST/LUNG: Clear to auscultation bilaterally; No wheeze, equal breath sounds bilaterally   BACK: No spinal tenderness, no CVA TTP  HEART: Regular rate and rhythm; No murmurs, rubs, or gallops  ABDOMEN: Distended, R abdomen soft, L abdomen hard, no TTP, +BS  EXTREMITIES: +DP/PT/Radial pulses, 2+ pitting edema b/l without asymmetry, no TTP/warmth/erythema of LE  PSYCH: Nl behavior, nl affect  NEUROLOGY: AAOx3, non-focal, cranial nerves intact  SKIN: Normal color, No rashes or lesions Vital Signs Last 24 Hrs  T(C): 36.6 (05 Apr 2022 00:34), Max: 36.9 (04 Apr 2022 21:37)  T(F): 97.8 (05 Apr 2022 00:34), Max: 98.5 (04 Apr 2022 21:37)  HR: 97 (05 Apr 2022 00:34) (93 - 97)  BP: 93/54 (05 Apr 2022 00:34) (85/51 - 94/64)  BP(mean): --  RR: 19 (05 Apr 2022 00:34) (17 - 19)  SpO2: 95% (05 Apr 2022 00:34) (95% - 100%)    GENERAL: No acute distress, +temporal wasting  EYES: EOMI, PERRL, conjunctiva and sclera clear  ENT: Neck supple, No JVD, moist mucosa  CHEST/LUNG: Clear to auscultation bilaterally; No wheeze, equal breath sounds bilaterally   BACK: No spinal tenderness, no CVA TTP  HEART: Regular rate and rhythm; No murmurs, rubs, or gallops  ABDOMEN: Distended, R abdomen soft, L abdomen hard, no TTP, +BS  EXTREMITIES: +DP/PT/Radial pulses, 2+ pitting edema b/l without asymmetry, no TTP/warmth/erythema of LE  PSYCH: Nl behavior, nl affect  NEUROLOGY: AAOx3, non-focal, cranial nerves intact  SKIN: Normal color, No rashes or lesions

## 2022-04-04 NOTE — H&P ADULT - PROBLEM SELECTOR PLAN 1
- Patient with worsening abdominal distension here with imaging showing him with worsening abdominal masses and worsening loculated ascites, now large  - Abdomen soft and non-tender, Large L sided lass likely causing the hard sensation on the L abdomen  - Patient feels pressure 2/2 worsening distension, seems like he has been unable to tolerate PO intake 2/2 the worsening distension  - Would likely benefit from ascites drainage -> please call procedure team in AM to assess for paracentesis, if not feasible by them then he would need an IR eval  - Also with worsening masses though imaging from [facility] not available here so unclear if the masses are definitely growing or not, spoke with wife about patient's candidacy for RT and she said that they were told that the patient was a poor candidate for RT due to the location of the masses, currently with suspicion of worsening masses and worsening patient symptoms consider calling Rad-Onc in AM to assess patient for RT (as per primary team), also consider reaching out to Dr. Jama Farley at [facility] to see if he has any recommendations to patient's management - Patient with worsening abdominal distension here with imaging showing him with worsening abdominal masses and worsening loculated ascites, now large  - Abdomen soft and non-tender, Large L sided lass likely causing the hard sensation on the L abdomen  - Patient feels pressure 2/2 worsening distension, seems like he has been unable to tolerate PO intake 2/2 the worsening distension  - Would likely benefit from ascites drainage -> please call procedure team in AM to assess for paracentesis, if not feasible by them then he would need an IR eval  - Also with worsening masses though imaging from [facility] not available here so unclear if the masses are definitely growing or not, spoke with wife about patient's candidacy for RT and she said that they were told that the patient was a poor candidate for RT due to the location of the masses, currently with suspicion of worsening masses and worsening patient symptoms consider calling Rad-Onc in AM to assess patient for RT (as per primary team), also consider reaching out to Dr. Jama Farley at [facility] to see if he has any recommendations to patient's management  - BCx x2 sent in ED but currently abdomen without TTP, no leukocytosis, no fevers, low suspicion for intra-abdominal infection, will hold off on abx for now

## 2022-04-04 NOTE — H&P ADULT - NSHPLABSRESULTS_GEN_ALL_CORE
LABS and ADDITIONAL STUDIES:                          9.7    6.04  )-----------( 497      ( 04 Apr 2022 16:06 )              31.9     04-04    134<L>  |  97<L>  |  24<H>  ----------------------------<  138<H>  4.3   |  20<L>  |  1.01    Ca    8.3<L>      04 Apr 2022 16:06    TPro  6.3  /  Alb  2.6<L>  /  TBili  0.3  /  DBili  x   /  AST  14  /  ALT  5   /  AlkPhos  82  04-04    CARDIAC MARKERS ( 04 Apr 2022 16:08 )  x     / x     / 49 U/L / x     / 3.5 ng/mL    LIVER FUNCTIONS - ( 04 Apr 2022 16:06 )  Alb: 2.6 g/dL / Pro: 6.3 g/dL / ALK PHOS: 82 U/L / ALT: 5 U/L / AST: 14 U/L / GGT: x           PT/INR - ( 04 Apr 2022 16:37 )   PT: 12.8 sec;   INR: 1.10 ratio     PTT - ( 04 Apr 2022 16:37 )  PTT:30.5 sec    < from: CT Abdomen and Pelvis w/ IV Cont (04.04.22 @ 20:18) >  FINDINGS:  LOWER CHEST: Partially imaged pulmonary embolism in the right lower lobe. Small right and trace left pleural effusions with associated atelectasis. Bilateral linear atelectasis. Coronary artery calcifications.    LIVER: Previously visualized posterior hepatic implant is better evaluated on prior study.  BILE DUCTS: Normal caliber.  GALLBLADDER: Possible posterior stone.  SPLEEN: Within normal limits.  PANCREAS: Within normal limits.  ADRENALS: Within normal limits.  KIDNEYS/URETERS: Bilateral symmetric enhancement. No hydronephrosis.    BLADDER: A complex mixed cystic and calcified mass abuts the bladder.  REPRODUCTIVE ORGANS: Prostate is enlarged.    BOWEL: Moderate size hiatal hernia. No bowel obstruction. Appendix is not visualized. No evidence of inflammation in the pericecal region.  PERITONEUM: Large volume loculated ascites with worsening peritoneal disease. For example:  *  Left pelvic mass (series 2, image 88), measuring 21.4 x 19.8 x 29.6 cm, previously 17.5 x 9.2 x 20 cm  *  Anterior right upper quadrant mass (series 2, image 52), measuring 11.0 x 7.4 x 9.6 cm (2-52), previously 10.1 x 6.1 cm  *  Mixed solid and cystic right pelvic mass (series 2, image 80), measuring 12.4 x 14.4 x 17.8 cm (2-80), previously 8.8 x 7.8 cm  VESSELS: Atherosclerotic changes.  RETROPERITONEUM/LYMPH NODES: No lymphadenopathy.  ABDOMINAL WALL: Anasarca. Small fat-containing umbilical hernia.  BONES: Degenerative changes.    IMPRESSION:  Partially imaged pulmonary embolism in the right lower lobe.  Large volume loculated ascites with evidence of worsening peritoneal disease.    < end of copied text >    EKG - LABS and ADDITIONAL STUDIES:                          9.7    6.04  )-----------( 497      ( 04 Apr 2022 16:06 )              31.9     04-04    134<L>  |  97<L>  |  24<H>  ----------------------------<  138<H>  4.3   |  20<L>  |  1.01    Ca    8.3<L>      04 Apr 2022 16:06    TPro  6.3  /  Alb  2.6<L>  /  TBili  0.3  /  DBili  x   /  AST  14  /  ALT  5   /  AlkPhos  82  04-04    CARDIAC MARKERS ( 04 Apr 2022 16:08 )  x     / x     / 49 U/L / x     / 3.5 ng/mL  Trop 36    LIVER FUNCTIONS - ( 04 Apr 2022 16:06 )  Alb: 2.6 g/dL / Pro: 6.3 g/dL / ALK PHOS: 82 U/L / ALT: 5 U/L / AST: 14 U/L / GGT: x           PT/INR - ( 04 Apr 2022 16:37 )   PT: 12.8 sec;   INR: 1.10 ratio     PTT - ( 04 Apr 2022 16:37 )  PTT:30.5 sec    < from: CT Abdomen and Pelvis w/ IV Cont (04.04.22 @ 20:18) >  FINDINGS:  LOWER CHEST: Partially imaged pulmonary embolism in the right lower lobe. Small right and trace left pleural effusions with associated atelectasis. Bilateral linear atelectasis. Coronary artery calcifications.    LIVER: Previously visualized posterior hepatic implant is better evaluated on prior study.  BILE DUCTS: Normal caliber.  GALLBLADDER: Possible posterior stone.  SPLEEN: Within normal limits.  PANCREAS: Within normal limits.  ADRENALS: Within normal limits.  KIDNEYS/URETERS: Bilateral symmetric enhancement. No hydronephrosis.    BLADDER: A complex mixed cystic and calcified mass abuts the bladder.  REPRODUCTIVE ORGANS: Prostate is enlarged.    BOWEL: Moderate size hiatal hernia. No bowel obstruction. Appendix is not visualized. No evidence of inflammation in the pericecal region.  PERITONEUM: Large volume loculated ascites with worsening peritoneal disease. For example:  *  Left pelvic mass (series 2, image 88), measuring 21.4 x 19.8 x 29.6 cm, previously 17.5 x 9.2 x 20 cm  *  Anterior right upper quadrant mass (series 2, image 52), measuring 11.0 x 7.4 x 9.6 cm (2-52), previously 10.1 x 6.1 cm  *  Mixed solid and cystic right pelvic mass (series 2, image 80), measuring 12.4 x 14.4 x 17.8 cm (2-80), previously 8.8 x 7.8 cm  VESSELS: Atherosclerotic changes.  RETROPERITONEUM/LYMPH NODES: No lymphadenopathy.  ABDOMINAL WALL: Anasarca. Small fat-containing umbilical hernia.  BONES: Degenerative changes.    IMPRESSION:  Partially imaged pulmonary embolism in the right lower lobe.  Large volume loculated ascites with evidence of worsening peritoneal disease.    < end of copied text >    EKG - NSR at 92, QTc 462, Low voltage, T wave flat I, aVL, V2 (present on prior EKG), no other acute ST-T wave changes

## 2022-04-04 NOTE — ED PROVIDER NOTE - ATTENDING CONTRIBUTION TO CARE
72yo M with a PMHx of metastatic liposarcoma p/w abdominal discomfort, shortness of breath and failure to thrive. As per the patient's daughter; he has been having decreased PO intake for several weeks, vomiting nearly daily and unable to keep himself hydrated. He has been having an enlarged abdomen with increasing discomfort, but without pain. He has had several admissions in the past due to infections and she is worried he may have another infection. Recently started a new trial for chemotherapy and last dose was 2 days ago.

## 2022-04-04 NOTE — H&P ADULT - PROBLEM SELECTOR PLAN 5
- Off meds  - Will place on ISS, FS qAC, check A1C, if not significant for DM2 then can likely d/c sliding scale insulin  - Will liberalize diet for now

## 2022-04-04 NOTE — H&P ADULT - PROBLEM SELECTOR PLAN 7
DVT ppx - on heparin gtt  Diet - Regular  Activity - OOB to chair/with assistance, PT eval  Fall and aspiration precautions

## 2022-04-04 NOTE — H&P ADULT - PROBLEM SELECTOR PLAN 2
- Incidentally noted on CT A/P, CTA Chest obtained that showed the RLL PE with downstream extension, no large PE, also with b/l pleural effusions (small) but suspect this is 2/2 patient's abdominal distension  - On hep gtt, will c/w for now  - Initial cardiac enzymes not significant for strain (trop indeterminant but CK/CKMB wnl), would check repeat CE in AM, check proBNP, consider TTE but unclear if it is needed at this time  - Patient's SOB likely multifactorial but his PE is also likely playing a role in in, c/w management as above  - Patient with new onset cough with green sputum, currently suspect likely 2/2 atelectasis from his small b/l pleural effusions, no consolidations seen by me on CTA Chest (offical read pending), patient afebrile, no leukocytosis, low suspicion for PNA, would hold off on abx for now, will give incentive spirometer

## 2022-04-04 NOTE — ED PROVIDER NOTE - GASTROINTESTINAL, MLM
Abdomen soft, but distended. Large periumbilical hernia filled with fluid. + fluid wave in the abdomen. Bedside ultrasound with significant ascites on the right side.

## 2022-04-04 NOTE — H&P ADULT - NSHPSOCIALHISTORY_GEN_ALL_CORE
Lives with wife  Ambulates with walker  No current tobacco or illicit substance use  Social EtOH but none recently

## 2022-04-04 NOTE — ED PROVIDER NOTE - NSICDXPASTSURGICALHX_GEN_ALL_CORE_FT
PAST SURGICAL HISTORY:  H/O sleep apnea Per patient had Sleep Apnea surgery  in 1996- at [facility]- Was not retested for Sleep Apnea post surgery    History of cardiac cath Pt reports 1 cardiac stent placed 1999    History of colon resection Dec 2019

## 2022-04-04 NOTE — ED ADULT TRIAGE NOTE - CHIEF COMPLAINT QUOTE
p/t with hx stomach ca, on chemo, c/o of diffuse abd pain and increased girth, p/t c.o of swelling to BLE as well, mild nausea denies vomiting

## 2022-04-04 NOTE — ED PROVIDER NOTE - OBJECTIVE STATEMENT
70yo M with a PMHx of metastatic liposarcoma p/w abdominal discomfort, shortness of breath and failure to thrive. As per the patient's daughter; he has been having decreased PO intake for several weeks, vomiting nearly daily and unable to keep himself hydrated. He has been having an enlarged abdomen with increasing discomfort, but without pain. He has had several admissions in the past due to infections and she is worried he may have another infection. Recently started a new trial for chemotherapy and last dose was 2 days ago.

## 2022-04-04 NOTE — ED PROVIDER NOTE - PROGRESS NOTE DETAILS
CT with worsening malignancy and ascites, also demonstrating new PE. Discussed results with patien, will admit for heparin gtt. Marco Timmons, DO PGY3

## 2022-04-04 NOTE — H&P ADULT - PROBLEM SELECTOR PLAN 3
- Likely in setting of enlarging abd masses and ascites, lab work with mild elevated AG/low bicarb but nl pH and lactate, likely has some degree of starvation ketosis given his history  - Would obtain nutrition eval  - Will liberalize diet   - Unable to give IVF currently as patient with 1 IV insert which is running heparin (advised that LR and NS currently not fully compatible with heparin gtt), would therefore encourage PO intake as tolerated, attempt to place additional IV for IVF

## 2022-04-05 NOTE — PHYSICAL THERAPY INITIAL EVALUATION ADULT - PERTINENT HX OF CURRENT PROBLEM, REHAB EVAL
Patient is 71 year old male history of Low BPs (usual range is 90-95/55-65), HLD (currently off meds), DM2 (currently off meds), FADI not on CPAP (s/p sleep apnea sx as per wife), CAD s/p stent (1999, now off aspirin for some time), and 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 Jama Roberts at [facility]) who presents to the hospital with poor PO intake and worsening abdominal swelling.

## 2022-04-05 NOTE — CHART NOTE - NSCHARTNOTEFT_GEN_A_CORE
Spoke with Charlene Lowe -[phone].  Patient is on an experimental drug scheduled 3 days on, 2 weeks off, last dose 04/02.  No need for any treatment at the moment.   Monitor for thrombocytopenia, neutropenia as major side effects of the drug.  Will hold off on oncology consult for now.   In addition, patient has a known ascites and was evaluated by GI surgery at [place] with unsuccessful paracentesis.   Dr Jama Farley office number is [phone].     Sarthak Gillis NP   pager [phone]

## 2022-04-05 NOTE — CHART NOTE - NSCHARTNOTEFT_GEN_A_CORE
Procedure team consulted for large volume paracentesis.  Briefly, patient is 71M with pmh metastatic liposarcoma undergoing experimental therapy, now with worsening malignancy and ascites, new PE with SOB on heparin gtt.    Imaging reviewed.  Ascites is loculated within multiple abdominal and pelvic masses, largest of which measures [id number].  There is some ascites present that is not loculated but this does not appear to be the majority of the fluid present, based on CT imaging.  I believe that if a paracentesis is to be done, an IR procedure may be most appropriate so that these masses can be avoided.    If appropriate for the patient's goals of care and patient requiring repeat paracentesis, an abdominal drain could also be considered, which IR could also discuss.    -Bryan Durham M.D.   PGY-3 EM/IM   Pager #[phone]

## 2022-04-05 NOTE — CONSULT NOTE ADULT - SUBJECTIVE AND OBJECTIVE BOX
Interventional Radiology    HPI: 71M with pmh metastatic liposarcoma undergoing experimental therapy, now with worsening malignancy and ascites, new PE with SOB on heparin gtt.    Allergies: oxycodone (Vomiting)    Medications (Abx/Cardiac/Anticoagulation/Blood Products)    heparin   Injectable: 6500 Unit(s) IV Push (04-04 @ 22:29)  heparin  Infusion.: 1600 Unit(s)/Hr IV Continuous (04-05 @ 14:21)    Data:    83.4  T(C): 36.6  HR: 91  BP: 88/56  RR: 18  SpO2: 100%    -WBC 4.14 / HgB 8.3 / Hct 27.2 / Plt 398  -Na 132 / Cl 98 / BUN 25 / Glucose 127  -K 4.5 / CO2 18 / Cr 0.91  -ALT <5 / Alk Phos 75 / T.Bili 0.3  -INR -- / PTT >200      Imaging: [email or web address].     -----------------------------------------------------------------------------------------------------------------------------------------------------------------------    Assessment/Plan: 71M with pmh metastatic liposarcoma undergoing experimental therapy, now with worsening malignancy and ascites, new PE with SOB on heparin gtt.    -- IR will plan to perform therapeutic paracentesis on Thursday 4/7  -- On heparin drip for PE, low risk procedure, hold for 2 hours prior to procedure  -- Please obtain COVID PCR within 5 days of procedure.  -- AM coags and labs  -- Please place IR procedure request order under Dr. Martins and write pre-procedure note.    Thank You for the consultation.      Risa Lea D.O.  Radiology Resident (PGY-2)   IR Pager #[phone]

## 2022-04-05 NOTE — CONSULT NOTE ADULT - SUBJECTIVE AND OBJECTIVE BOX
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 Ferreira 8/26/20; (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 Jama Farley at [facility]) who presents to the hospital with poor PO intake and worsening abdominal swelling.   Two days ago began an experimental chemotherapy called: Milademetan    Now with ascites, dyspnea, without acute abdominal pain,  May be admitted for failure to thrive; inability for po intake.  He has a 30cm left pelvic mass.    I also spoke with his wife who indicated they have been told by multiple oncologists from [facility] and from [facility] there is no role for radiation treatment.     For the past few weeks he has been having significant abd swelling with associated pressure in his abdomen and poor PO intake. Said that whenever he tries to eat something he invariably ends up vomiting the food due to his abdominal distension (emesis usually is food, sometimes bilious, no blood/coffee ground substances). Said that he has been having difficulty due to his abdominal distention but denies any chest pain, palpitations, or syncope. Also with a new onset cough sometimes with green sputum but no hemoptysis. Is having BMs but sometimes has diarrhea (last BM this AM was soft, no blood in BM). Has generalized weakness due to his reduced PO intake. Denies any recent fevers/chills/diaphoresis. Has noted b/l LE swelling but denies any pain to it, has been put on lasix for it but states that it does not seem to be helping the swelling. Said that he has known fluid in his abdomen, was sampled once but has never been drained before. Denies any other acute complaints.     Found to have an acute PE on Chest CT below.  Also, numerous abdominal masses the largest being 29.6cm at the left pelvis.    IR is consulted for paracentesis due to ascites.  Palliative care consult appropriate.       < from: CT Abdomen and Pelvis w/ IV Cont (04.04.22 @ 20:18) >  BOWEL: Moderate size hiatal hernia. No bowel obstruction. Appendix is not   visualized. No evidence of inflammation in the pericecal region.  PERITONEUM: Large volume loculated ascites with worsening peritoneal   disease. For example:  *  Left pelvic mass (series 2, image 88), measuring 21.4 x 19.8 x 29.6   cm, previously 17.5 x 9.2 x 20 cm  *  Anterior right upper quadrant mass (series 2, image 52), measuring   11.0 x 7.4 x 9.6 cm (2-52), previously 10.1 x 6.1 cm  *  Mixed solid and cystic right pelvic mass (series 2, image 80),   measuring 12.4 x 14.4 x 17.8 cm (2-80), previously 8.8 x 7.8 cm  VESSELS: Atherosclerotic changes.  RETROPERITONEUM/LYMPH NODES: No lymphadenopathy.  ABDOMINAL WALL: Anasarca. Small fat-containing umbilical hernia.  BONES: Degenerative changes.    IMPRESSION:  Partially imaged pulmonary embolism in the right lower lobe.    Large volume loculated ascites with evidence of worsening peritoneal   disease.    Findings were discussed with Dr. ELLIOT SALCEDO on 4/4/2022 8:49 PM by       < from: CT Angio Chest PE Protocol w/ IV Cont (04.05.22 @ 01:16) >  IMPRESSION:  Acute right lower lobar pulmonary embolus extending into the segmental   branches.  Small bilateral right greater than left pleuraleffusions.      < end of copied text >      Allergies    oxycodone (Vomiting)    Intolerances        ROS: [  ] Fever  [  ] Chills  [  ]Chest Pain [  ] SOB  [  ]Cough [  ] N/V  [  ] Diarrhea [  ]Constipation [  ]Other ROS:  [  ] ROS otherwise negative    PAST MEDICAL & SURGICAL HISTORY:  Intra-abdominal and pelvic swelling, mass and lump, unspecified site  Currently on experimental chemotherapy at [facility]    Hypertension  Now with low BPs    Diabetes mellitus  Fasting FS range from - per patient, off meds    Hypercholesteremia  off meds    CAD (coronary artery disease)  Off aspirin    Sleep apnea    History of cardiac cath  Pt reports 1 cardiac stent placed 1999    H/O sleep apnea  Per patient had Sleep Apnea surgery  in 1996- at [facility]- Was not retested for Sleep Apnea post surgery    History of colon resection  Dec 2019        FAMILY HISTORY:  FH: heart disease  Mother        MEDICATIONS  (STANDING):  dextrose 5%. 1000 milliLiter(s) (50 mL/Hr) IV Continuous <Continuous>  dextrose 5%. 1000 milliLiter(s) (100 mL/Hr) IV Continuous <Continuous>  dextrose 50% Injectable 25 Gram(s) IV Push once  dextrose 50% Injectable 12.5 Gram(s) IV Push once  dextrose 50% Injectable 25 Gram(s) IV Push once  glucagon  Injectable 1 milliGRAM(s) IntraMuscular once  heparin  Infusion.  Unit(s)/Hr (15 mL/Hr) IV Continuous <Continuous>  insulin lispro (ADMELOG) corrective regimen sliding scale   SubCutaneous three times a day before meals  insulin lispro (ADMELOG) corrective regimen sliding scale   SubCutaneous at bedtime  pantoprazole    Tablet 40 milliGRAM(s) Oral two times a day    MEDICATIONS  (PRN):  acetaminophen     Tablet .. 650 milliGRAM(s) Oral every 6 hours PRN Temp greater or equal to 38C (100.4F), Mild Pain (1 - 3)  aluminum hydroxide/magnesium hydroxide/simethicone Suspension 30 milliLiter(s) Oral every 4 hours PRN Dyspepsia  dextrose Oral Gel 15 Gram(s) Oral once PRN Blood Glucose LESS THAN 70 milliGRAM(s)/deciliter  heparin   Injectable 6500 Unit(s) IV Push every 6 hours PRN For aPTT less than 40  heparin   Injectable 3000 Unit(s) IV Push every 6 hours PRN For aPTT between 40 - 57  HYDROmorphone  Injectable 0.5 milliGRAM(s) IV Push every 6 hours PRN moderate to severe pain  melatonin 3 milliGRAM(s) Oral at bedtime PRN Insomnia  ondansetron Injectable 4 milliGRAM(s) IV Push every 8 hours PRN Nausea and/or Vomiting      PHYSICAL EXAM  Vital Signs Last 24 Hrs  T(C): 36.6 (05 Apr 2022 11:35), Max: 36.9 (04 Apr 2022 21:37)  T(F): 97.9 (05 Apr 2022 11:35), Max: 98.5 (04 Apr 2022 21:37)  HR: 91 (05 Apr 2022 11:35) (87 - 97)  BP: 88/56 (05 Apr 2022 11:35) (85/51 - 98/60)  BP(mean): --  RR: 18 (05 Apr 2022 11:35) (17 - 19)  SpO2: 100% (05 Apr 2022 11:35) (95% - 100%)    General: Well nourished, well developed, no acute distress  HEENT: NC/AT; EOMI, PERRL, sclera nonicteric; external ears normal; no rhinorrhea or epistaxis; mucous membranes moist; oropharynx clear and without erythema  CV: NR, RR; no appreciable r/m/g  Lungs: CTAB, no increased work of breathing  Abdomen: Bowel sounds present; soft, NTND  MSK: Vertebral spine non-tender to palpation  Neuro: AAOx3; cranial nerves II-XII intact; strength 5/5 in upper and lower extremities; sensation to light touch in tact bilaterally.  Psych: Full affect; mood congruent  Skin: no visible rashes on limited examination    IMAGING/LABS/PATHOLOGY: I have personally reviewed the relevant labs, pathology, and imaging as noted in the HPI.  In addition,                          8.3    4.14  )-----------( 398      ( 05 Apr 2022 06:04 )             27.2     04-05    132<L>  |  98  |  25<H>  ----------------------------<  127<H>  4.5   |  18<L>  |  0.91    Ca    7.2<L>      05 Apr 2022 06:04  Phos  3.7     04-05  Mg     1.80     04-05    TPro  6.0  /  Alb  2.4<L>  /  TBili  0.3  /  DBili  x   /  AST  17  /  ALT  <5  /  AlkPhos  75  04-05        ASSESSMENT/PLAN    ADRIAN FUNES is a 71y man with an abdominal liposarcoma since 8/26/20 pathology obtained after abdominal resection with Dr. Ferreira.  Since 2020, he has been on multiple lines of chemotherapy and immunotherapy (Ibrance,)  and is now in the ED with ascites, dyspnea, poor po intake, and failure to thrive.  He has a right sided acute PE, and numerous abdominal masses with the largest near 30cm.  The family and he were told by [facility] and [facility] there is no role for radiation treatment.  After discussion with Dr. Saunders, unfortunately we agree that there isn't a role for radiation treatment given the size and number of abdominal masses present.   Would follow up with IR for symptomatic relief of ascites.  Consult oncology as he began an experimental chemotherapy two days ago.   Would also recommend palliative care consultation. 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 Ferreira 8/26/20; (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 Jama Farley at [facility]) who presents to the hospital with poor PO intake and worsening abdominal swelling.   Two days ago began an experimental chemotherapy called: Milademetan    Now with ascites, dyspnea, without acute abdominal pain,  May be admitted for failure to thrive; inability for po intake.  He has a 30cm left pelvic mass.    I also spoke with his wife who indicated they have been told by multiple oncologists from [facility] and from [facility] there is no role for radiation treatment.     For the past few weeks he has been having significant abd swelling with associated pressure in his abdomen and poor PO intake. Said that whenever he tries to eat something he invariably ends up vomiting the food due to his abdominal distension (emesis usually is food, sometimes bilious, no blood/coffee ground substances). Said that he has been having difficulty due to his abdominal distention but denies any chest pain, palpitations, or syncope. Also with a new onset cough sometimes with green sputum but no hemoptysis. Is having BMs but sometimes has diarrhea (last BM this AM was soft, no blood in BM). Has generalized weakness due to his reduced PO intake. Denies any recent fevers/chills/diaphoresis. Has noted b/l LE swelling but denies any pain to it, has been put on lasix for it but states that it does not seem to be helping the swelling. Said that he has known fluid in his abdomen, was sampled once but has never been drained before. Denies any other acute complaints.     Found to have an acute PE on Chest CT below.  Also, numerous abdominal masses the largest being 29.6cm at the left pelvis.    IR is consulted for paracentesis due to ascites.  Palliative care consult appropriate.       < from: CT Abdomen and Pelvis w/ IV Cont (04.04.22 @ 20:18) >  BOWEL: Moderate size hiatal hernia. No bowel obstruction. Appendix is not   visualized. No evidence of inflammation in the pericecal region.  PERITONEUM: Large volume loculated ascites with worsening peritoneal   disease. For example:  *  Left pelvic mass (series 2, image 88), measuring 21.4 x 19.8 x 29.6   cm, previously 17.5 x 9.2 x 20 cm  *  Anterior right upper quadrant mass (series 2, image 52), measuring   11.0 x 7.4 x 9.6 cm (2-52), previously 10.1 x 6.1 cm  *  Mixed solid and cystic right pelvic mass (series 2, image 80),   measuring 12.4 x 14.4 x 17.8 cm (2-80), previously 8.8 x 7.8 cm  VESSELS: Atherosclerotic changes.  RETROPERITONEUM/LYMPH NODES: No lymphadenopathy.  ABDOMINAL WALL: Anasarca. Small fat-containing umbilical hernia.  BONES: Degenerative changes.    IMPRESSION:  Partially imaged pulmonary embolism in the right lower lobe.    Large volume loculated ascites with evidence of worsening peritoneal   disease.    Findings were discussed with Dr. ELLIOT SALCEDO on 4/4/2022 8:49 PM by       < from: CT Angio Chest PE Protocol w/ IV Cont (04.05.22 @ 01:16) >  IMPRESSION:  Acute right lower lobar pulmonary embolus extending into the segmental   branches.  Small bilateral right greater than left pleuraleffusions.      < end of copied text >      Allergies    oxycodone (Vomiting)    Intolerances        ROS: [  ] Fever  [  ] Chills  [  ]Chest Pain [  ] SOB  [  ]Cough [  ] N/V  [  ] Diarrhea [  ]Constipation [  ]Other ROS:  [  ] ROS otherwise negative    PAST MEDICAL & SURGICAL HISTORY:  Intra-abdominal and pelvic swelling, mass and lump, unspecified site  Currently on experimental chemotherapy at [facility]    Hypertension  Now with low BPs    Diabetes mellitus  Fasting FS range from - per patient, off meds    Hypercholesteremia  off meds    CAD (coronary artery disease)  Off aspirin    Sleep apnea    History of cardiac cath  Pt reports 1 cardiac stent placed 1999    H/O sleep apnea  Per patient had Sleep Apnea surgery  in 1996- at [facility]- Was not retested for Sleep Apnea post surgery    History of colon resection  Dec 2019        FAMILY HISTORY:  FH: heart disease  Mother        MEDICATIONS  (STANDING):  dextrose 5%. 1000 milliLiter(s) (50 mL/Hr) IV Continuous <Continuous>  dextrose 5%. 1000 milliLiter(s) (100 mL/Hr) IV Continuous <Continuous>  dextrose 50% Injectable 25 Gram(s) IV Push once  dextrose 50% Injectable 12.5 Gram(s) IV Push once  dextrose 50% Injectable 25 Gram(s) IV Push once  glucagon  Injectable 1 milliGRAM(s) IntraMuscular once  heparin  Infusion.  Unit(s)/Hr (15 mL/Hr) IV Continuous <Continuous>  insulin lispro (ADMELOG) corrective regimen sliding scale   SubCutaneous three times a day before meals  insulin lispro (ADMELOG) corrective regimen sliding scale   SubCutaneous at bedtime  pantoprazole    Tablet 40 milliGRAM(s) Oral two times a day    MEDICATIONS  (PRN):  acetaminophen     Tablet .. 650 milliGRAM(s) Oral every 6 hours PRN Temp greater or equal to 38C (100.4F), Mild Pain (1 - 3)  aluminum hydroxide/magnesium hydroxide/simethicone Suspension 30 milliLiter(s) Oral every 4 hours PRN Dyspepsia  dextrose Oral Gel 15 Gram(s) Oral once PRN Blood Glucose LESS THAN 70 milliGRAM(s)/deciliter  heparin   Injectable 6500 Unit(s) IV Push every 6 hours PRN For aPTT less than 40  heparin   Injectable 3000 Unit(s) IV Push every 6 hours PRN For aPTT between 40 - 57  HYDROmorphone  Injectable 0.5 milliGRAM(s) IV Push every 6 hours PRN moderate to severe pain  melatonin 3 milliGRAM(s) Oral at bedtime PRN Insomnia  ondansetron Injectable 4 milliGRAM(s) IV Push every 8 hours PRN Nausea and/or Vomiting      PHYSICAL EXAM  KPS 50  Vital Signs Last 24 Hrs  T(C): 36.6 (05 Apr 2022 11:35), Max: 36.9 (04 Apr 2022 21:37)  T(F): 97.9 (05 Apr 2022 11:35), Max: 98.5 (04 Apr 2022 21:37)  HR: 91 (05 Apr 2022 11:35) (87 - 97)  BP: 88/56 (05 Apr 2022 11:35) (85/51 - 98/60)  BP(mean): --  RR: 18 (05 Apr 2022 11:35) (17 - 19)  SpO2: 100% (05 Apr 2022 11:35) (95% - 100%)    General: seen in bed, ED, sleepy but alert/arousable, NAD, no obvious dyspnea at rest.  HEENT: NC/AT; EOMI, PERRL, sclera nonicteric; external ears normal; no rhinorrhea or epistaxis; mucous membranes moist; oropharynx clear and without erythema  CV: NR, RR; no appreciable r/m/g  Lungs: CTAB, no increased work of breathing  Abdomen: Bowel sounds present; soft, ++ umbilical hernia, ++ distension, nontender.  Neuro: AAOx3; cranial nerves II-XII intact; strength 5/5 in upper and lower extremities; sensation to light touch in tact bilaterally.      IMAGING/LABS/PATHOLOGY: I have personally reviewed the relevant labs, pathology, and imaging as noted in the HPI.  In addition,                          8.3    4.14  )-----------( 398      ( 05 Apr 2022 06:04 )             27.2     04-05    132<L>  |  98  |  25<H>  ----------------------------<  127<H>  4.5   |  18<L>  |  0.91    Ca    7.2<L>      05 Apr 2022 06:04  Phos  3.7     04-05  Mg     1.80     04-05    TPro  6.0  /  Alb  2.4<L>  /  TBili  0.3  /  DBili  x   /  AST  17  /  ALT  <5  /  AlkPhos  75  04-05        ASSESSMENT/PLAN    ADRIAN FUNES is a 71y man with an abdominal liposarcoma since 8/26/20 pathology obtained after abdominal resection with Dr. Ferreira.  Since 2020, he has been on multiple lines of chemotherapy and immunotherapy (Ibrance,)  and is now in the ED with ascites, dyspnea, poor po intake, and failure to thrive.  He has a right sided acute PE, and numerous abdominal masses with the largest near 30cm.  The family and he were told by [facility] and [facility] there is no role for radiation treatment.  After discussion with Dr. Saunders, unfortunately we agree that there isn't a role for radiation treatment given the size and number of abdominal masses present.   Would follow up with IR for symptomatic relief of ascites.  Consult oncology for follow up as he began an experimental chemotherapy two days ago.   Would also recommend palliative care consultation.

## 2022-04-05 NOTE — CHART NOTE - NSCHARTNOTEFT_GEN_A_CORE
PRE-INTERVENTIONAL RADIOLOGY PROCEDURE NOTE    Patient Age:  71  Patient Gender:  male   Procedure (including site / side if known):  diagnostic and therapeutic paracentesis   Diagnosis / Indication: ascites. hx of ab liposarcoma   Interventional Radiology Attending Physician: Dr Martins   Ordering Attending Physician: Dr Toure  Pertinent medical history: ab liposarcoma, CAD, HTN, HLD, DM   Pertinent labs:     04-05    132<L>  |  98  |  25<H>  ----------------------------<  127<H>  4.5   |  18<L>  |  0.91    Ca    7.2<L>      05 Apr 2022 06:04  Phos  3.7     04-05  Mg     1.80     04-05    TPro  6.0  /  Alb  2.4<L>  /  TBili  0.3  /  DBili  x   /  AST  17  /  ALT  <5  /  AlkPhos  75  04-05                            8.3    4.14  )-----------( 398      ( 05 Apr 2022 06:04 )             27.2     PT/INR - ( 04 Apr 2022 16:37 )   PT: 12.8 sec;   INR: 1.10 ratio         PTT - ( 05 Apr 2022 13:18 )  PTT:>200 sec            Patient and Family aware? Yes      Attending / Resident / NP / PA   Print Sign:  Sarthak Gillis NP   Contact #: [id number] [phone] Rituxan Counseling:  I discussed with the patient the risks of Rituxan infusions. Side effects can include infusion reactions, severe drug rashes including mucocutaneous reactions, reactivation of latent hepatitis and other infections and rarely progressive multifocal leukoencephalopathy.  All of the patient's questions and concerns were addressed.

## 2022-04-05 NOTE — CONSULT NOTE ADULT - ASSESSMENT
EKG SR low voltage     Echo pending     Assessment and plan     1) LE edema and Ascites : ?2/2 abd liposarcoma get echo to assess LV function     2) PE: c/w heparin drip get echo     3) DVT PPX on heparin drip

## 2022-04-05 NOTE — CONSULT NOTE ADULT - SUBJECTIVE AND OBJECTIVE BOX
Max Coffey MD  Interventional Cardiology / Endovascular Specialist  [place] Office : [phone] [place] [address] [place] [address]  Tel:   [place] Office : [address] [facility] [place] [address]  Tel: [phone]        HISTORY OF PRESENTING ILLNESS:  71M with history of Low BPs (usual range is 90-95/55-65), HLD (currently off meds), DM2 (currently off meds), FADI not on CPAP (s/p sleep apnea sx as per wife), CAD s/p stent (1999, now off aspirin for some time), and 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 Jama Roberts at [facility]) who presents to the hospital with poor PO intake and worsening abdominal swelling. Said that for the past few weeks he has been having significant abd swelling with associated pressure in his abdomen and poor PO intake. Said that whenever he tries to eat something he invariably ends up vomiting the food due to his abdominal distension (emesis usually is food, sometimes bilious, no blood/coffee ground substances). Said that he has been having difficulty due to his abdominal distention but denies any chest pain, palpitations, or syncope. Also with a new onset cough sometimes with green sputum but no hemoptysis. Is having BMs but sometimes has diarrhea (last BM this AM was soft, no blood in BM). Has generalized weakness due to his reduced PO intake. Denies any recent fevers/chills/diaphoresis. Has noted b/l LE swelling but denies any pain to it, has been put on lasix for it but states that it does not seem to be helping the swelling. Said that he has known fluid in his abdomen, was sampled once but has never been drained before. Denies any other acute complaints.     On arrival to the ED, his vitals were T 98, P 95, BP 85/51 -> 93/54, RR 18, O2 sat 95%. His lab work showed anemia (improved from prior), low bicarb and elevated AG (likely 2/2 starvation ketosis), and nl pH/lactate. His imaging showed large volume ascites and worsening abdominal masses but unclear trend as patient currently follows at [facility] for his malignancy. He was also noted to have an incidental RLL PE and was started on a heparin gtt    PAST MEDICAL & SURGICAL HISTORY:  Intra-abdominal and pelvic swelling, mass and lump, unspecified site  Currently on experimental chemotherapy at [facility]    Hypertension  Now with low BPs    Diabetes mellitus  Fasting FS range from - per patient, off meds    Hypercholesteremia  off meds    CAD (coronary artery disease)  Off aspirin    Sleep apnea    History of cardiac cath  Pt reports 1 cardiac stent placed 1999    H/O sleep apnea  Per patient had Sleep Apnea surgery  in 1996- at [facility]- Was not retested for Sleep Apnea post surgery    History of colon resection  Dec 2019        SOCIAL HISTORY: Substance Use (street drugs): ( x ) never used  (  ) other:    FAMILY HISTORY:  FH: heart disease  Mother        REVIEW OF SYSTEMS:  CONSTITUTIONAL: No fever, weight loss, or fatigue  EYES: No eye pain, visual disturbances, or discharge  ENMT:  No difficulty hearing, tinnitus, vertigo; No sinus or throat pain  BREASTS: No pain, masses, or nipple discharge  GASTROINTESTINAL: No abdominal or epigastric pain. No nausea, vomiting, or hematemesis; No diarrhea or constipation. No melena or hematochezia.  GENITOURINARY: No dysuria, frequency, hematuria, or incontinence  NEUROLOGICAL: No headaches, memory loss, loss of strength, numbness, or tremors  ENDOCRINE: No heat or cold intolerance; No hair loss  MUSCULOSKELETAL: No joint pain or swelling; No muscle, back, or extremity pain  PSYCHIATRIC: No depression, anxiety, mood swings, or difficulty sleeping  HEME/LYMPH: No easy bruising, or bleeding gums  All others negative    MEDICATIONS:  heparin   Injectable 6500 Unit(s) IV Push every 6 hours PRN  heparin   Injectable 3000 Unit(s) IV Push every 6 hours PRN  heparin  Infusion.  Unit(s)/Hr IV Continuous <Continuous>        acetaminophen     Tablet .. 650 milliGRAM(s) Oral every 6 hours PRN  HYDROmorphone  Injectable 0.5 milliGRAM(s) IV Push every 6 hours PRN  melatonin 3 milliGRAM(s) Oral at bedtime PRN  ondansetron Injectable 4 milliGRAM(s) IV Push every 8 hours PRN    aluminum hydroxide/magnesium hydroxide/simethicone Suspension 30 milliLiter(s) Oral every 4 hours PRN  pantoprazole    Tablet 40 milliGRAM(s) Oral two times a day    dextrose 50% Injectable 25 Gram(s) IV Push once  dextrose 50% Injectable 12.5 Gram(s) IV Push once  dextrose 50% Injectable 25 Gram(s) IV Push once  dextrose Oral Gel 15 Gram(s) Oral once PRN  glucagon  Injectable 1 milliGRAM(s) IntraMuscular once  insulin lispro (ADMELOG) corrective regimen sliding scale   SubCutaneous three times a day before meals  insulin lispro (ADMELOG) corrective regimen sliding scale   SubCutaneous at bedtime    dextrose 5%. 1000 milliLiter(s) IV Continuous <Continuous>  dextrose 5%. 1000 milliLiter(s) IV Continuous <Continuous>      FAMILY HISTORY:  FH: heart disease  Mother          Allergies    oxycodone (Vomiting)    Intolerances    	      PHYSICAL EXAM:  T(C): 36.6 (04-05-22 @ 11:35), Max: 36.9 (04-04-22 @ 21:37)  HR: 91 (04-05-22 @ 11:35) (87 - 97)  BP: 88/56 (04-05-22 @ 11:35) (88/56 - 98/60)  RR: 18 (04-05-22 @ 11:35) (17 - 19)  SpO2: 100% (04-05-22 @ 11:35) (95% - 100%)  Wt(kg): --  I&O's Summary      GENERAL: NAD,  EYES: conjunctiva and sclera clear  ENMT: No tonsillar erythema, exudates, or enlargement  Cardiovascular: Normal S1 S2, No JVD, No murmurs, No edema  Respiratory: Lungs clear to auscultation	  Gastrointestinal:  Soft, Non-tender, + BS	  Extremities: 2+ edema B/L       LABS:	 	    CARDIAC MARKERS:                                  8.3    4.14  )-----------( 398      ( 05 Apr 2022 06:04 )             27.2     04-05    132<L>  |  98  |  25<H>  ----------------------------<  127<H>  4.5   |  18<L>  |  0.91    Ca    7.2<L>      05 Apr 2022 06:04  Phos  3.7     04-05  Mg     1.80     04-05    TPro  6.0  /  Alb  2.4<L>  /  TBili  0.3  /  DBili  x   /  AST  17  /  ALT  <5  /  AlkPhos  75  04-05    proBNP: Serum Pro-Brain Natriuretic Peptide: 331 pg/mL (04-05 @ 06:04)    Lipid Profile:   HgA1c:   TSH:     Consultant(s) Notes Reviewed:  [x ] YES  [ ] NO    Care Discussed with Consultants/Other Providers [ x] YES  [ ] NO    Imaging Personally Reviewed independently:  [x] YES  [ ] NO    All labs, radiologic studies, vitals, orders and medications list reviewed. Patient is seen and examined at bedside. Case discussed with medical team.    ASSESSMENT/PLAN:

## 2022-04-05 NOTE — PATIENT PROFILE ADULT - SW.
social work W Plasty Text: The lesion was extirpated to the level of the fat with a #15 scalpel blade.  Given the location of the defect, shape of the defect and the proximity to free margins a W-plasty was deemed most appropriate for repair.  Using a sterile surgical marker, the appropriate transposition arms of the W-plasty were drawn incorporating the defect and placing the expected incisions within the relaxed skin tension lines where possible.    The area thus outlined was incised deep to adipose tissue with a #15 scalpel blade.  The skin margins were undermined to an appropriate distance in all directions utilizing iris scissors.  The opposing transposition arms were then transposed into place in opposite direction and anchored with interrupted buried subcutaneous sutures.

## 2022-04-05 NOTE — PHYSICAL THERAPY INITIAL EVALUATION ADULT - GENERAL OBSERVATIONS, REHAB EVAL
Patient seen semi supine in bed in [facility]. As per RN, patient with PE in lungs, hold ambulation.

## 2022-04-06 NOTE — DIETITIAN INITIAL EVALUATION ADULT - NSICDXPASTMEDICALHX_GEN_ALL_CORE_FT
PAST MEDICAL HISTORY:  CAD (coronary artery disease) Off aspirin    Diabetes mellitus Fasting FS range from - per patient, off meds    Hypercholesteremia off meds    Hypertension Now with low BPs    Intra-abdominal and pelvic swelling, mass and lump, unspecified site Currently on experimental chemotherapy at [facility]    Sleep apnea

## 2022-04-06 NOTE — CONSULT NOTE ADULT - PROBLEM SELECTOR RECOMMENDATION 2
New PE found on admission.  - Treatment per primary team New PE found on admission. Currently on heparin gtt   - Treatment per primary team

## 2022-04-06 NOTE — DIETITIAN INITIAL EVALUATION ADULT - OTHER INFO
Nutrition interview: No recent episodes of diarrhea at present. C/o nausea, vomiting, and constipation at present. Pt believes his nausea and vomiting is from eating and having severe abdominal swelling (ascites). No BM noted since admission. No Bowel regimen in place, Will recommend adding to aide in constipation. Denies any chewing/swallowing difficulties. No food allergies. Stated UBW: ~172. Unable to specify how long ago he was this weight. Wt gain likely attributed to edema/ascites. Food preferences explored and noted. Intake is 25% or less per RN flowsheets and per pt. Feeding skills: Able to feed self from set up trays. Brief verbal education provided in reference to adequate hydration. Pt claims he only drinks sips of water. Increased fluid consumption was encouraged at this time to help aide in BM/hydration status.  Nutrition interview: No recent episodes of diarrhea at present. C/o nausea, vomiting, and constipation at present. Pt believes his nausea and vomiting is from eating and having severe abdominal swelling (ascites). No BM noted since admission. No Bowel regimen in place, Will recommend adding to aide in constipation. Denies any chewing/swallowing difficulties. No food allergies. Stated UBW: ~172. Unable to specify how long ago he was this weight. Wt gain likely attributed to edema/ascites. Food preferences explored and noted. Intake is 25% or less per RN flowsheets and per pt. Feeding skills: Able to feed self from set up trays. Pt claims he only drinks sips of water. Monitor hydration status.

## 2022-04-06 NOTE — DIETITIAN INITIAL EVALUATION ADULT - PERTINENT LABORATORY DATA
04-06    133<L>  |  98  |  26<H>  ----------------------------<  133<H>  3.4<L>   |  21<L>  |  0.97    Ca    7.4<L>      06 Apr 2022 04:38  Phos  3.2     04-06  Mg     1.70     04-06    TPro  6.0  /  Alb  2.4<L>  /  TBili  0.3  /  DBili  x   /  AST  17  /  ALT  <5  /  AlkPhos  75  04-05  POCT Blood Glucose.: 121 mg/dL (04-06-22 @ 07:38)  A1C with Estimated Average Glucose Result: 5.6 % (04-05-22 @ 06:04)

## 2022-04-06 NOTE — CONSULT NOTE ADULT - ASSESSMENT
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 Jama Roberts at [facility]) admitted with new PE, worsening ascites and severe nausea and vomiting.

## 2022-04-06 NOTE — CONSULT NOTE ADULT - PROBLEM SELECTOR RECOMMENDATION 9
Endorsed that zofran helped outpatient somewhat but the vomiting has been severe since his last chemo session.  - Zofran 4mg IV q8h PRN

## 2022-04-06 NOTE — CONSULT NOTE ADULT - SUBJECTIVE AND OBJECTIVE BOX
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 Jama Roberts at [facility]) who presents to the hospital with poor PO intake and worsening abdominal swelling. Said that for the past few weeks he has been having significant abd swelling with associated pressure in his abdomen and poor PO intake. Said that whenever he tries to eat something he invariably ends up vomiting the food due to his abdominal distension (emesis usually is food, sometimes bilious, no blood/coffee ground substances). Said that he has been having difficulty due to his abdominal distention but denies any chest pain, palpitations, or syncope. Also with a new onset cough sometimes with green sputum but no hemoptysis. Is having BMs but sometimes has diarrhea (last BM this AM was soft, no blood in BM). Has generalized weakness due to his reduced PO intake. Denies any recent fevers/chills/diaphoresis. Has noted b/l LE swelling but denies any pain to it, has been put on lasix for it but states that it does not seem to be helping the swelling. Said that he has known fluid in his abdomen, was sampled once but has never been drained before. Denies any other acute complaints.     On arrival to the ED, his vitals were T 98, P 95, BP 85/51 -> 93/54, RR 18, O2 sat 95%. His lab work showed anemia (improved from prior), low bicarb and elevated AG (likely 2/2 starvation ketosis), and nl pH/lactate. His imaging showed large volume ascites and worsening abdominal masses but unclear trend as patient currently follows at [facility] for his malignancy. He was also noted to have an incidental RLL PE and was started on a heparin gtt. he was admitted to medicine.  (04 Apr 2022 23:23)    Pt endorsed that he has been battling his liposarcoma for 2 years and is now on an experimental treatment. He underwent his third chemo session about 10 days ago and since then has had severe nausea and vomiting, loss of appetite and been unable to keep any food or drink down. He stated that he felt some nausea and vomiting after the first two sessions but it has gotten worse and now he has severe ascites which he hasn't had in the past year. He endorsed that repeat imaging he had showed progression of disease despite the treatment he is on.    PERTINENT PM/SXH:   Intra-abdominal and pelvic swelling, mass and lump, unspecified site     Hypertension    Diabetes mellitus    Hypercholesteremia    CAD (coronary artery disease)    Sleep apnea      History of cardiac cath    H/O sleep apnea    History of colon resection      FAMILY HISTORY:  FH: heart disease  Mother      ITEMS NOT CHECKED ARE NOT PRESENT    SOCIAL HISTORY:   Significant other/partner[X]  Children[X]  Christianity/Spirituality:  Substance hx:  [ ]   Tobacco hx:  [ ]   Alcohol hx: [ ]   Home Opioid hx:  [ ] I-Stop Reference No:  Living Situation: [ ]Home  [ ]Long term care  [ ]Rehab [ ]Other    ADVANCE DIRECTIVES:    DNR  MOLST  [ ]  Living Will  [ ]   DECISION MAKER(s):  [ ] Health Care Proxy(s)  [ ] Surrogate(s)  [ ] Guardian           Name(s): Phone Number(s):    BASELINE (I)ADL(s) (prior to admission):  [place]: [X]Total  [ ] Moderate [ ]Dependent    Allergies    oxycodone (Vomiting)    Intolerances    MEDICATIONS  (STANDING):  dextrose 5%. 1000 milliLiter(s) (50 mL/Hr) IV Continuous <Continuous>  dextrose 5%. 1000 milliLiter(s) (100 mL/Hr) IV Continuous <Continuous>  dextrose 50% Injectable 25 Gram(s) IV Push once  dextrose 50% Injectable 12.5 Gram(s) IV Push once  dextrose 50% Injectable 25 Gram(s) IV Push once  glucagon  Injectable 1 milliGRAM(s) IntraMuscular once  heparin  Infusion.  Unit(s)/Hr (15 mL/Hr) IV Continuous <Continuous>  insulin lispro (ADMELOG) corrective regimen sliding scale   SubCutaneous three times a day before meals  insulin lispro (ADMELOG) corrective regimen sliding scale   SubCutaneous at bedtime  pantoprazole    Tablet 40 milliGRAM(s) Oral two times a day  potassium chloride    Tablet ER 40 milliEquivalent(s) Oral every 4 hours  senna 2 Tablet(s) Oral at bedtime    MEDICATIONS  (PRN):  acetaminophen     Tablet .. 650 milliGRAM(s) Oral every 6 hours PRN Temp greater or equal to 38C (100.4F), Mild Pain (1 - 3)  aluminum hydroxide/magnesium hydroxide/simethicone Suspension 30 milliLiter(s) Oral every 4 hours PRN Dyspepsia  dextrose Oral Gel 15 Gram(s) Oral once PRN Blood Glucose LESS THAN 70 milliGRAM(s)/deciliter  heparin   Injectable 6500 Unit(s) IV Push every 6 hours PRN For aPTT less than 40  heparin   Injectable 3000 Unit(s) IV Push every 6 hours PRN For aPTT between 40 - 57  HYDROmorphone  Injectable 0.5 milliGRAM(s) IV Push every 6 hours PRN moderate to severe pain  melatonin 3 milliGRAM(s) Oral at bedtime PRN Insomnia  ondansetron Injectable 4 milliGRAM(s) IV Push every 8 hours PRN Nausea and/or Vomiting    PRESENT SYMPTOMS: [ ]  Due to Covid 19 infection data obtained from nursing assessment.  Source if other than patient:  [ ]Family   [ ]Team     Pain: [ ]yes [X]no  QOL impact -   Location -                    Aggravating factors -  Quality -  Radiation -  Timing-  Severity (0-10 scale):  Minimal acceptable level (0-10 scale):     CPOT:    https://www.[facility].org/getattachment/[email or web address]/Critical-Care-Pain-Observation-Tool-(CPOT)    PAIN AD Score:     http://geriatrictoolkit.[facility]/cog/painad.pdf (press ctrl +  left click to view)    Dyspnea:                           [ ]Mild [ ]Moderate [ ]Severe  Anxiety:                             [ ]Mild [ ]Moderate [ ]Severe  Fatigue:                             [ ]Mild [ ]Moderate [ ]Severe  Nausea:                             [ ]Mild [ ]Moderate [X]Severe  Loss of appetite:              [ ]Mild [ ]Moderate [ ]Severe  Constipation:                    [ ]Mild [ ]Moderate [ ]Severe    Other Symptoms:  [X]All other review of systems negative     Palliative Performance Status Version 2:        30 %    http://npcrc.org/files/news/palliative_performance_scale_ppsv2.pdf    PHYSICAL EXAM: DUE TO COVID-19 INFECTION  physical exam findings from the clinician caring for this patient directly are used.   Vital Signs Last 24 Hrs  T(C): 36.3 (06 Apr 2022 12:25), Max: 36.8 (06 Apr 2022 05:40)  T(F): 97.4 (06 Apr 2022 12:25), Max: 98.2 (06 Apr 2022 05:40)  HR: 81 (06 Apr 2022 12:25) (78 - 102)  BP: 92/54 (06 Apr 2022 12:25) (92/54 - 99/56)  BP(mean): --  RR: 18 (06 Apr 2022 12:25) (18 - 18)  SpO2: 100% (06 Apr 2022 12:25) (98% - 100%) I&O's Summary    05 Apr 2022 07:01  -  06 Apr 2022 07:00  --------------------------------------------------------  IN: 409 mL / OUT: 700 mL / NET: -291 mL      GENERAL:  [X]Alert  [X]Oriented x3   [ ]Lethargic  [ ]Cachexia  [ ]Unarousable  [ ]Verbal  [ ]Non-Verbal  Behavioral:   [ ] Anxiety  [ ] Delirium [ ] Agitation [ ] Other  HEENT:  [X]Normal   [ ]Dry mouth   [ ]ET Tube/Trach  [ ]Oral lesions  PULMONARY:   [X]Clear [ ]Tachypnea  [ ]Audible excessive secretions   [ ]Rhonchi        [ ]Right [ ]Left [ ]Bilateral  [ ]Crackles        [ ]Right [ ]Left [ ]Bilateral  [ ]Wheezing     [ ]Right [ ]Left [ ]Bilateral  [ ]Diminished breath sounds [ ]right [ ]left [ ]bilateral  CARDIOVASCULAR:    [X]Regular [ ]Irregular [ ]Tachy  [ ]Gui [ ]Murmur [ ]Other  GASTROINTESTINAL:  [ ]Soft  [X]Distended   [ ]+BS  [ ]Non tender [ ]Tender  [ ]PEG [ ]OGT/ NGT  Last BM:   GENITOURINARY:  [X]Normal [ ] Incontinent   [ ]Oliguria/Anuria   [ ][place]ark  MUSCULOSKELETAL:   [ ]Normal   [X]Weakness  [ ]Bed/Wheelchair bound [ ]Edema  NEUROLOGIC:  [ ]No focal deficits  [ ]Cognitive impairment  [ ]Dysphagia [ ]Dysarthria [ ]Paresis [ ]Other   SKIN:   [ ]Normal    [ ]Rash  [ ]Pressure ulcer(s)       Present on admission [ ]y [ ]n    CRITICAL CARE:  [ ] Shock Present  [ ]Septic [ ]Cardiogenic [ ]Neurologic [ ]Hypovolemic  [ ]  Vasopressors [ ]  Inotropes   [ ]Respiratory failure present [ ]Mechanical ventilation [ ]Non-invasive ventilatory support [ ]High flow     [ ]Acute  [ ]Chronic [ ]Hypoxic  [ ]Hypercarbic [ ]Other  [ ]Other organ failure     LABS:                        7.6    3.70  )-----------( 394      ( 06 Apr 2022 04:38 )             23.3   04-06    133<L>  |  98  |  26<H>  ----------------------------<  133<H>  3.4<L>   |  21<L>  |  0.97    Ca    7.4<L>      06 Apr 2022 04:38  Phos  3.2     04-06  Mg     1.70     04-06    TPro  6.0  /  Alb  2.4<L>  /  TBili  0.3  /  DBili  x   /  AST  17  /  ALT  <5  /  AlkPhos  75  04-05  PT/INR - ( 04 Apr 2022 16:37 )   PT: 12.8 sec;   INR: 1.10 ratio         PTT - ( 06 Apr 2022 04:38 )  PTT:134.4 sec              RADIOLOGY & ADDITIONAL STUDIES:    PROTEIN CALORIE MALNUTRITION PRESENT: [ ]mild [ ]moderate [ ]severe [ ]underweight [ ]morbid obesity  https://www.andeal.org/vault/2440/web/files/ONC/Table_Clinical%20Characteristics%20to%20Document%20Malnutrition-White%20JV%20et%20al%202012.pdf    Height (cm): 172.7 (04-04-22 @ 13:44), 172.7 (01-24-22 @ 19:11)  Weight (kg): 83.4 (04-04-22 @ 21:37), 78.2 (01-25-22 @ 01:55)  BMI (kg/m2): 28 (04-04-22 @ 21:37), 26.2 (04-04-22 @ 13:44), 26.2 (01-25-22 @ 01:55)    [ ]PPSV2 < or = to 30% [ ]significant weight loss  [ ]poor nutritional intake  [ ]anasarca      [ ]Artificial Nutrition      REFERRALS:   [ ]Chaplaincy  [ ]Hospice  [ ]Child Life  [ ]Social Work  [ ]Case management [ ]Holistic Therapy     Goals of Care Document:  HPI:  This is a 71M with history of Low BPs (usual range is 90-95/55-65), HLD (currently off meds), DM2 (currently off meds), FADI not on CPAP (s/p sleep apnea sx as per wife), CAD s/p stent (1999, now off aspirin for some time), and 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 Jama Roberts at [facility]) who presents to the hospital with poor PO intake and worsening abdominal swelling. Said that for the past few weeks he has been having significant abd swelling with associated pressure in his abdomen and poor PO intake. Said that whenever he tries to eat something he invariably ends up vomiting the food due to his abdominal distension (emesis usually is food, sometimes bilious, no blood/coffee ground substances). Said that he has been having difficulty due to his abdominal distention but denies any chest pain, palpitations, or syncope. Also with a new onset cough sometimes with green sputum but no hemoptysis. Is having BMs but sometimes has diarrhea (last BM this AM was soft, no blood in BM). Has generalized weakness due to his reduced PO intake. Denies any recent fevers/chills/diaphoresis. Has noted b/l LE swelling but denies any pain to it, has been put on lasix for it but states that it does not seem to be helping the swelling. Said that he has known fluid in his abdomen, was sampled once but has never been drained before. Denies any other acute complaints.     On arrival to the ED, his vitals were T 98, P 95, BP 85/51 -> 93/54, RR 18, O2 sat 95%. His lab work showed anemia (improved from prior), low bicarb and elevated AG (likely 2/2 starvation ketosis), and nl pH/lactate. His imaging showed large volume ascites and worsening abdominal masses but unclear trend as patient currently follows at [facility] for his malignancy. He was also noted to have an incidental RLL PE and was started on a heparin gtt. he was admitted to medicine.  (04 Apr 2022 23:23)    Pt endorsed that he has been battling his liposarcoma for 2 years and is now on an experimental treatment. He underwent his third chemo session about 10 days ago and since then has had severe nausea and vomiting, loss of appetite and been unable to keep any food or drink down. He stated that he felt some nausea and vomiting after the first two sessions but it has gotten worse and now he has severe ascites which he hasn't had in the past year. He endorsed that repeat imaging he had showed progression of disease despite the treatment he is on.    PERTINENT PM/SXH:   Intra-abdominal and pelvic swelling, mass and lump, unspecified site     Hypertension    Diabetes mellitus    Hypercholesteremia    CAD (coronary artery disease)    Sleep apnea      History of cardiac cath    H/O sleep apnea    History of colon resection      FAMILY HISTORY:  FH: heart disease  Mother      ITEMS NOT CHECKED ARE NOT PRESENT    SOCIAL HISTORY:   Significant other/partner[X]  Children[X]  Hinduism/Spirituality:  Substance hx:  [ ]   Tobacco hx:  [ ]   Alcohol hx: [ ]   Home Opioid hx:  [ ] I-Stop Reference No:  Living Situation: [ ]Home  [ ]Long term care  [ ]Rehab [ ]Other    ADVANCE DIRECTIVES:    DNR  MOLST  [ ]  Living Will  [ ]   DECISION MAKER(s):  [ ] Health Care Proxy(s)  [ ] Surrogate(s)  [ ] Guardian           Name(s): Phone Number(s):    BASELINE (I)ADL(s) (prior to admission):  [place]: [X]Total  [ ] Moderate [ ]Dependent    Allergies    oxycodone (Vomiting)    Intolerances    MEDICATIONS  (STANDING):  dextrose 5%. 1000 milliLiter(s) (50 mL/Hr) IV Continuous <Continuous>  dextrose 5%. 1000 milliLiter(s) (100 mL/Hr) IV Continuous <Continuous>  dextrose 50% Injectable 25 Gram(s) IV Push once  dextrose 50% Injectable 12.5 Gram(s) IV Push once  dextrose 50% Injectable 25 Gram(s) IV Push once  glucagon  Injectable 1 milliGRAM(s) IntraMuscular once  heparin  Infusion.  Unit(s)/Hr (15 mL/Hr) IV Continuous <Continuous>  insulin lispro (ADMELOG) corrective regimen sliding scale   SubCutaneous three times a day before meals  insulin lispro (ADMELOG) corrective regimen sliding scale   SubCutaneous at bedtime  pantoprazole    Tablet 40 milliGRAM(s) Oral two times a day  potassium chloride    Tablet ER 40 milliEquivalent(s) Oral every 4 hours  senna 2 Tablet(s) Oral at bedtime    MEDICATIONS  (PRN):  acetaminophen     Tablet .. 650 milliGRAM(s) Oral every 6 hours PRN Temp greater or equal to 38C (100.4F), Mild Pain (1 - 3)  aluminum hydroxide/magnesium hydroxide/simethicone Suspension 30 milliLiter(s) Oral every 4 hours PRN Dyspepsia  dextrose Oral Gel 15 Gram(s) Oral once PRN Blood Glucose LESS THAN 70 milliGRAM(s)/deciliter  heparin   Injectable 6500 Unit(s) IV Push every 6 hours PRN For aPTT less than 40  heparin   Injectable 3000 Unit(s) IV Push every 6 hours PRN For aPTT between 40 - 57  HYDROmorphone  Injectable 0.5 milliGRAM(s) IV Push every 6 hours PRN moderate to severe pain  melatonin 3 milliGRAM(s) Oral at bedtime PRN Insomnia  ondansetron Injectable 4 milliGRAM(s) IV Push every 8 hours PRN Nausea and/or Vomiting    PRESENT SYMPTOMS: [ ]  Due to Covid 19 infection data obtained from nursing assessment.  Source if other than patient:  [ ]Family   [ ]Team     Pain: [ ]yes [X]no  QOL impact -   Location -                    Aggravating factors -  Quality -  Radiation -  Timing-  Severity (0-10 scale):  Minimal acceptable level (0-10 scale):     CPOT:    https://www.[facility].org/getattachment/[email or web address]/Critical-Care-Pain-Observation-Tool-(CPOT)    PAIN AD Score:     http://geriatrictoolkit.[email or web address]/cog/painad.pdf (press ctrl +  left click to view)    Dyspnea:                           [ ]Mild [ ]Moderate [ ]Severe  Anxiety:                             [ ]Mild [ ]Moderate [ ]Severe  Fatigue:                             [ ]Mild [ ]Moderate [ ]Severe  Nausea:                             [ ]Mild [ ]Moderate [X]Severe  Loss of appetite:              [ ]Mild [ ]Moderate [ ]Severe  Constipation:                    [ ]Mild [ ]Moderate [ ]Severe    Other Symptoms:  [X]All other review of systems negative     Palliative Performance Status Version 2:        30 %    http://npcrc.org/files/news/palliative_performance_scale_ppsv2.pdf    PHYSICAL EXAM: DUE TO COVID-19 INFECTION  physical exam findings from the clinician caring for this patient directly are used.   Vital Signs Last 24 Hrs  T(C): 36.3 (06 Apr 2022 12:25), Max: 36.8 (06 Apr 2022 05:40)  T(F): 97.4 (06 Apr 2022 12:25), Max: 98.2 (06 Apr 2022 05:40)  HR: 81 (06 Apr 2022 12:25) (78 - 102)  BP: 92/54 (06 Apr 2022 12:25) (92/54 - 99/56)  BP(mean): --  RR: 18 (06 Apr 2022 12:25) (18 - 18)  SpO2: 100% (06 Apr 2022 12:25) (98% - 100%) I&O's Summary    05 Apr 2022 07:01  -  06 Apr 2022 07:00  --------------------------------------------------------  IN: 409 mL / OUT: 700 mL / NET: -291 mL      GENERAL:  [X]Alert  [X]Oriented x3   [ ]Lethargic  [ ]Cachexia  [ ]Unarousable  [ ]Verbal  [ ]Non-Verbal  Behavioral:   [ ] Anxiety  [ ] Delirium [ ] Agitation [ ] Other  HEENT:  [X]Normal   [ ]Dry mouth   [ ]ET Tube/Trach  [ ]Oral lesions  PULMONARY:   [X]Clear [ ]Tachypnea  [ ]Audible excessive secretions   [ ]Rhonchi        [ ]Right [ ]Left [ ]Bilateral  [ ]Crackles        [ ]Right [ ]Left [ ]Bilateral  [ ]Wheezing     [ ]Right [ ]Left [ ]Bilateral  [ ]Diminished breath sounds [ ]right [ ]left [ ]bilateral  CARDIOVASCULAR:    [X]Regular [ ]Irregular [ ]Tachy  [ ]Gui [ ]Murmur [ ]Other  GASTROINTESTINAL:  [ ]Soft  [X]Distended   [ ]+BS  [ ]Non tender [ ]Tender  [ ]PEG [ ]OGT/ NGT  Last BM:   GENITOURINARY:  [X]Normal [ ] Incontinent   [ ]Oliguria/Anuria   [ ]Clark  MUSCULOSKELETAL:   [ ]Normal   [X]Weakness  [ ]Bed/Wheelchair bound [ ]Edema  NEUROLOGIC:  [ ]No focal deficits  [ ]Cognitive impairment  [ ]Dysphagia [ ]Dysarthria [ ]Paresis [ ]Other   SKIN:   [ ]Normal    [ ]Rash  [ ]Pressure ulcer(s)       Present on admission [ ]y [ ]n    CRITICAL CARE:  [ ] Shock Present  [ ]Septic [ ]Cardiogenic [ ]Neurologic [ ]Hypovolemic  [ ]  Vasopressors [ ]  Inotropes   [ ]Respiratory failure present [ ]Mechanical ventilation [ ]Non-invasive ventilatory support [ ]High flow     [ ]Acute  [ ]Chronic [ ]Hypoxic  [ ]Hypercarbic [ ]Other  [ ]Other organ failure     LABS:                        7.6    3.70  )-----------( 394      ( 06 Apr 2022 04:38 )             23.3   04-06    133<L>  |  98  |  26<H>  ----------------------------<  133<H>  3.4<L>   |  21<L>  |  0.97    Ca    7.4<L>      06 Apr 2022 04:38  Phos  3.2     04-06  Mg     1.70     04-06    TPro  6.0  /  Alb  2.4<L>  /  TBili  0.3  /  DBili  x   /  AST  17  /  ALT  <5  /  AlkPhos  75  04-05  PT/INR - ( 04 Apr 2022 16:37 )   PT: 12.8 sec;   INR: 1.10 ratio         PTT - ( 06 Apr 2022 04:38 )  PTT:134.4 sec              RADIOLOGY & ADDITIONAL STUDIES:    PROTEIN CALORIE MALNUTRITION PRESENT: [ ]mild [ ]moderate [ ]severe [ ]underweight [ ]morbid obesity  https://www.andeal.org/vault/2440/web/files/ONC/Table_Clinical%20Characteristics%20to%20Document%20Malnutrition-White%20JV%20et%20al%202012.pdf    Height (cm): 172.7 (04-04-22 @ 13:44), 172.7 (01-24-22 @ 19:11)  Weight (kg): 83.4 (04-04-22 @ 21:37), 78.2 (01-25-22 @ 01:55)  BMI (kg/m2): 28 (04-04-22 @ 21:37), 26.2 (04-04-22 @ 13:44), 26.2 (01-25-22 @ 01:55)    [ ]PPSV2 < or = to 30% [ ]significant weight loss  [ ]poor nutritional intake  [ ]anasarca      [ ]Artificial Nutrition      REFERRALS:   [ ]Chaplaincy  [ ]Hospice  [ ]Child Life  [ ]Social Work  [X]Case management [ ]Holistic Therapy     Goals of Care Document:  HPI:  This is a 71M with history of Low BPs (usual range is 90-95/55-65), HLD (currently off meds), DM2 (currently off meds), FADI not on CPAP (s/p sleep apnea sx as per wife), CAD s/p stent (1999, now off aspirin for some time), and 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 Jama Roberts at [facility]) who presents to the hospital with poor PO intake and worsening abdominal swelling. Said that for the past few weeks he has been having significant abd swelling with associated pressure in his abdomen and poor PO intake. Said that whenever he tries to eat something he invariably ends up vomiting the food due to his abdominal distension (emesis usually is food, sometimes bilious, no blood/coffee ground substances). Said that he has been having difficulty due to his abdominal distention but denies any chest pain, palpitations, or syncope. Also with a new onset cough sometimes with green sputum but no hemoptysis. Is having BMs but sometimes has diarrhea (last BM this AM was soft, no blood in BM). Has generalized weakness due to his reduced PO intake. Denies any recent fevers/chills/diaphoresis. Has noted b/l LE swelling but denies any pain to it, has been put on lasix for it but states that it does not seem to be helping the swelling. Said that he has known fluid in his abdomen, was sampled once but has never been drained before. Denies any other acute complaints.     On arrival to the ED, his vitals were T 98, P 95, BP 85/51 -> 93/54, RR 18, O2 sat 95%. His lab work showed anemia (improved from prior), low bicarb and elevated AG (likely 2/2 starvation ketosis), and nl pH/lactate. His imaging showed large volume ascites and worsening abdominal masses but unclear trend as patient currently follows at [facility] for his malignancy. He was also noted to have an incidental RLL PE and was started on a heparin gtt. he was admitted to medicine.  (04 Apr 2022 23:23)    Pt endorsed that he has been battling his liposarcoma for 2 years and is now on an experimental treatment. He underwent his third chemo session about 10 days ago and since then has had severe nausea and vomiting, loss of appetite and been unable to keep any food or drink down. He stated that he felt some nausea and vomiting after the first two sessions but it has gotten worse and now he has severe ascites which he hasn't had in the past year. He endorsed that repeat imaging he had showed progression of disease despite the treatment he is on.    PERTINENT PM/SXH:   Intra-abdominal and pelvic swelling, mass and lump, unspecified site     Hypertension    Diabetes mellitus    Hypercholesteremia    CAD (coronary artery disease)    Sleep apnea    History of cardiac cath    H/O sleep apnea    History of colon resection      FAMILY HISTORY:  FH: heart disease  Mother      ITEMS NOT CHECKED ARE NOT PRESENT    SOCIAL HISTORY: Pt is a retired    Significant other/partner[X]  Children[X]  Restoration/Spirituality: Taoist   Substance hx:  [ ]   Tobacco hx:  [ ]   Alcohol hx: [ ]   Home Opioid hx: please see separate chart note [ ] I-Stop Reference No: [id number]  Living Situation: [ x]Home  [ ]Long term care  [ ]Rehab [ ]Other    ADVANCE DIRECTIVES:    MOLST  [ ]  Living Will  [ ]   DECISION MAKER(s): Patient currently awake, answering questions appropriately.   [ ] Health Care Proxy(s)  [ ] Surrogate(s)  [ ] Guardian           Name(s): Phone Number(s):    BASELINE (I)ADL(s) (prior to admission):  [place]: []Total  [ x] Moderate [ ]Dependent    Allergies    oxycodone (Vomiting)    Intolerances    MEDICATIONS  (STANDING):  dextrose 5%. 1000 milliLiter(s) (50 mL/Hr) IV Continuous <Continuous>  dextrose 5%. 1000 milliLiter(s) (100 mL/Hr) IV Continuous <Continuous>  dextrose 50% Injectable 25 Gram(s) IV Push once  dextrose 50% Injectable 12.5 Gram(s) IV Push once  dextrose 50% Injectable 25 Gram(s) IV Push once  glucagon  Injectable 1 milliGRAM(s) IntraMuscular once  heparin  Infusion.  Unit(s)/Hr (15 mL/Hr) IV Continuous <Continuous>  insulin lispro (ADMELOG) corrective regimen sliding scale   SubCutaneous three times a day before meals  insulin lispro (ADMELOG) corrective regimen sliding scale   SubCutaneous at bedtime  pantoprazole    Tablet 40 milliGRAM(s) Oral two times a day  potassium chloride    Tablet ER 40 milliEquivalent(s) Oral every 4 hours  senna 2 Tablet(s) Oral at bedtime    MEDICATIONS  (PRN):  acetaminophen     Tablet .. 650 milliGRAM(s) Oral every 6 hours PRN Temp greater or equal to 38C (100.4F), Mild Pain (1 - 3)  aluminum hydroxide/magnesium hydroxide/simethicone Suspension 30 milliLiter(s) Oral every 4 hours PRN Dyspepsia  dextrose Oral Gel 15 Gram(s) Oral once PRN Blood Glucose LESS THAN 70 milliGRAM(s)/deciliter  heparin   Injectable 6500 Unit(s) IV Push every 6 hours PRN For aPTT less than 40  heparin   Injectable 3000 Unit(s) IV Push every 6 hours PRN For aPTT between 40 - 57  HYDROmorphone  Injectable 0.5 milliGRAM(s) IV Push every 6 hours PRN moderate to severe pain  melatonin 3 milliGRAM(s) Oral at bedtime PRN Insomnia  ondansetron Injectable 4 milliGRAM(s) IV Push every 8 hours PRN Nausea and/or Vomiting    PRESENT SYMPTOMS: [ ]  Due to Covid 19 infection data obtained from nursing assessment.  Source if other than patient:  [ ]Family   [ ]Team     Pain: [ ]yes [X]no  QOL impact -   Location -                    Aggravating factors -  Quality -  Radiation -  Timing-  Severity (0-10 scale):  Minimal acceptable level (0-10 scale):     CPOT:    https://www.[facility].org/getattachment/xzn76k22-2v2s-1k0b-[email or web address]-[email or web address]/Critical-Care-Pain-Observation-Tool-(CPOT)    PAIN AD Score:     http://geriatrictoolkit.[place].[facility]/cog/painad.pdf (press ctrl +  left click to view)    Dyspnea:                           [ ]Mild [ ]Moderate [ ]Severe  Anxiety:                             [ ]Mild [ ]Moderate [ ]Severe  Fatigue:                             [ ]Mild [ ]Moderate [ ]Severe  Nausea:                             [ ]Mild [ ]Moderate [X]Severe  Loss of appetite:              [ ]Mild [ ]Moderate [ ]Severe  Constipation:                    [ ]Mild [ ]Moderate [ ]Severe    Other Symptoms: discomfort from abdominal distention  [X]All other review of systems negative     Palliative Performance Status Version 2:      60 %    http://[facility].org/files/news/palliative_performance_scale_ppsv2.pdf    PHYSICAL EXAM: DUE TO COVID-19 INFECTION  physical exam findings from the clinician caring for this patient directly are used.   Vital Signs Last 24 Hrs  T(C): 36.3 (06 Apr 2022 12:25), Max: 36.8 (06 Apr 2022 05:40)  T(F): 97.4 (06 Apr 2022 12:25), Max: 98.2 (06 Apr 2022 05:40)  HR: 81 (06 Apr 2022 12:25) (78 - 102)  BP: 92/54 (06 Apr 2022 12:25) (92/54 - 99/56)  BP(mean): --  RR: 18 (06 Apr 2022 12:25) (18 - 18)  SpO2: 100% (06 Apr 2022 12:25) (98% - 100%) I&O's Summary    05 Apr 2022 07:01  -  06 Apr 2022 07:00  --------------------------------------------------------  IN: 409 mL / OUT: 700 mL / NET: -291 mL      GENERAL:  [X]Alert  [X]Oriented x3   [ ]Lethargic  [ ]Cachexia  [ ]Unarousable  [x ]Verbal  [ ]Non-Verbal  Behavioral:   [ ] Anxiety  [ ] Delirium [ ] Agitation [ ] Other  HEENT:  [X]Normal   [ ]Dry mouth   [ ]ET Tube/Trach  [ ]Oral lesions  PULMONARY:   [X]Clear [ ]Tachypnea  [ ]Audible excessive secretions   [ ]Rhonchi        [ ]Right [ ]Left [ ]Bilateral  [ ]Crackles        [ ]Right [ ]Left [ ]Bilateral  [ ]Wheezing     [ ]Right [ ]Left [ ]Bilateral  [ ]Diminished breath sounds [ ]right [ ]left [ ]bilateral  CARDIOVASCULAR:    [X]Regular [ ]Irregular [ ]Tachy  [ ]Gui [ ]Murmur [ ]Other  GASTROINTESTINAL: hernia noted   [ ]Soft  [X]Distended   [ ]+BS  [ ]Non tender [ ]Tender  [ ]PEG [ ]OGT/ NGT  Last BM: 4/4  GENITOURINARY:  [X]Normal [ ] Incontinent   [ ]Oliguria/Anuria   [ ]Clark  MUSCULOSKELETAL:   [ ]Normal   [X]Weakness  [ ]Bed/Wheelchair bound [ ]Edema  NEUROLOGIC:  [ ]No focal deficits  [ ]Cognitive impairment  [ ]Dysphagia [ ]Dysarthria [ ]Paresis [ ]Other   SKIN:   [ ]Normal    [ ]Rash  [ ]Pressure ulcer(s)       Present on admission [ ]y [ ]n    CRITICAL CARE:  [ ] Shock Present  [ ]Septic [ ]Cardiogenic [ ]Neurologic [ ]Hypovolemic  [ ]  Vasopressors [ ]  Inotropes   [ ]Respiratory failure present [ ]Mechanical ventilation [ ]Non-invasive ventilatory support [ ]High flow     [ ]Acute  [ ]Chronic [ ]Hypoxic  [ ]Hypercarbic [ ]Other  [ ]Other organ failure     LABS:                        7.6    3.70  )-----------( 394      ( 06 Apr 2022 04:38 )             23.3   04-06    133<L>  |  98  |  26<H>  ----------------------------<  133<H>  3.4<L>   |  21<L>  |  0.97    Ca    7.4<L>      06 Apr 2022 04:38  Phos  3.2     04-06  Mg     1.70     04-06    TPro  6.0  /  Alb  2.4<L>  /  TBili  0.3  /  DBili  x   /  AST  17  /  ALT  <5  /  AlkPhos  75  04-05  PT/INR - ( 04 Apr 2022 16:37 )   PT: 12.8 sec;   INR: 1.10 ratio         PTT - ( 06 Apr 2022 04:38 )  PTT:134.4 sec    RADIOLOGY & ADDITIONAL STUDIES: < from: CT Abdomen and Pelvis w/ IV Cont (04.04.22 @ 20:18) >  IMPRESSION:  Partially imaged pulmonary embolism in the right lower lobe.    Large volume loculated ascites with evidence of worsening peritoneal   disease.    Findings were discussed with Dr. ELLIOT SALCEDO on 4/4/2022 8:49 PM by   Dr. Rodriguez with read back confirmation.    < end of copied text >      PROTEIN CALORIE MALNUTRITION PRESENT: [ ]mild [ ]moderate [ x]severe [ ]underweight [ ]morbid obesity  https://www.andeal.org/vault/4010/web/files/ONC/Table_Clinical%20Characteristics%20to%20Document%20Malnutrition-White%20JV%20et%20al%202012.pdf    Height (cm): 172.7 (04-04-22 @ 13:44), 172.7 (01-24-22 @ 19:11)  Weight (kg): 83.4 (04-04-22 @ 21:37), 78.2 (01-25-22 @ 01:55)  BMI (kg/m2): 28 (04-04-22 @ 21:37), 26.2 (04-04-22 @ 13:44), 26.2 (01-25-22 @ 01:55)    [ ]PPSV2 < or = to 30% [ ]significant weight loss  [ ]poor nutritional intake  [ ]anasarca      [ ]Artificial Nutrition      REFERRALS:   [ ]Chaplaincy  [ ]Hospice  [ ]Child Life  [ ]Social Work  [X]Case management [ ]Holistic Therapy

## 2022-04-06 NOTE — DIETITIAN INITIAL EVALUATION ADULT - PHYSCIAL ASSESSMENT
Per observation today: Pt noted w/ b/l pitting LE and pedal edema, ascites of the abdomin- and as per RN flowsheets, moist oral mucosa.  other (specify)

## 2022-04-06 NOTE — CHART NOTE - NSCHARTNOTEFT_GEN_A_CORE
This report was requested by: Alondra Storey | Reference #: [id number]    Others' Prescriptions  Patient Name: Mckay LujanBirth Date: 1950  Address: [address] [place] [address]  [place], [place] [address]Xsq: Male  Rx Written	Rx Dispensed	Drug	Quantity	Days Supply	Prescriber Name	Prescriber Yeimi #	Payment Method	Dispenser  01/30/2022	02/03/2022	hydromorphone 2 mg tablet	28	7	[facility]	[id number]	Medicare	[id number]  01/24/2022	01/30/2022	tramadol hcl 50 mg tablet	21	7	Jailene Engel [id number]	Medicare	[id number]  01/30/2022	01/30/2022	hydromorphone 2 mg tablet	28	4	[facility]	[id number]	Medicare	[id number]

## 2022-04-06 NOTE — CONSULT NOTE ADULT - PROBLEM SELECTOR RECOMMENDATION 3
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 Now on experimental treatment at [facility]. C/b ascites.  - Appreciate IR recs. Plan for paracentesis tomorrow. 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 Now on experimental treatment at [facility] with Dr. Jama Farley. C/b ascites.  - Appreciate IR recs. Plan for paracentesis tomorrow.

## 2022-04-06 NOTE — DIETITIAN INITIAL EVALUATION ADULT - OTHER CALCULATIONS
Needs estimated based on ABW (154#/ 69.8 kg), as pt with edema/ascites and no true dry weight available at present. Fluid needs per MD discretion

## 2022-04-06 NOTE — CONSULT NOTE ADULT - PROBLEM SELECTOR RECOMMENDATION 4
Pt endorsing worsening debility over the past few weeks but especially over past few days.  - Supportive care

## 2022-04-06 NOTE — CONSULT NOTE ADULT - PROBLEM SELECTOR RECOMMENDATION 5
Following for symptom management and goc. Will try to address goc later when patient more comfortable.    Anastacia Nielsen MD  Palliative Fellow, PGY5  Geriatrics and Palliative Consult Service

## 2022-04-06 NOTE — DIETITIAN INITIAL EVALUATION ADULT - PERTINENT MEDS FT
MEDICATIONS  (STANDING):  dextrose 5%. 1000 milliLiter(s) (50 mL/Hr) IV Continuous <Continuous>  dextrose 5%. 1000 milliLiter(s) (100 mL/Hr) IV Continuous <Continuous>  dextrose 50% Injectable 25 Gram(s) IV Push once  dextrose 50% Injectable 12.5 Gram(s) IV Push once  dextrose 50% Injectable 25 Gram(s) IV Push once  glucagon  Injectable 1 milliGRAM(s) IntraMuscular once  heparin  Infusion.  Unit(s)/Hr (15 mL/Hr) IV Continuous <Continuous>  insulin lispro (ADMELOG) corrective regimen sliding scale   SubCutaneous three times a day before meals  insulin lispro (ADMELOG) corrective regimen sliding scale   SubCutaneous at bedtime  pantoprazole    Tablet 40 milliGRAM(s) Oral two times a day    MEDICATIONS  (PRN):  acetaminophen     Tablet .. 650 milliGRAM(s) Oral every 6 hours PRN Temp greater or equal to 38C (100.4F), Mild Pain (1 - 3)  aluminum hydroxide/magnesium hydroxide/simethicone Suspension 30 milliLiter(s) Oral every 4 hours PRN Dyspepsia  dextrose Oral Gel 15 Gram(s) Oral once PRN Blood Glucose LESS THAN 70 milliGRAM(s)/deciliter  heparin   Injectable 6500 Unit(s) IV Push every 6 hours PRN For aPTT less than 40  heparin   Injectable 3000 Unit(s) IV Push every 6 hours PRN For aPTT between 40 - 57  HYDROmorphone  Injectable 0.5 milliGRAM(s) IV Push every 6 hours PRN moderate to severe pain  melatonin 3 milliGRAM(s) Oral at bedtime PRN Insomnia  ondansetron Injectable 4 milliGRAM(s) IV Push every 8 hours PRN Nausea and/or Vomiting

## 2022-04-06 NOTE — DIETITIAN INITIAL EVALUATION ADULT - NSICDXPASTSURGICALHX_GEN_ALL_CORE_FT
PAST SURGICAL HISTORY:  H/O sleep apnea Per patient had Sleep Apnea surgery  in 1996- at [facility]- Was not retested for Sleep Apnea post surgery    History of cardiac cath Pt reports 1 cardiac stent placed 1999    History of colon resection Dec 2019

## 2022-04-06 NOTE — PROGRESS NOTE ADULT - ASSESSMENT
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 Jama Roberts at [facility]) who presents to the hospital with poor PO intake and worsening abdominal swelling    EKG SR low voltage   Tele: NSR 90s    1) LE edema and Ascites  -?2/2 abd liposarcoma  -echo TDS with grossly normal LV function and mild-mod MR  -IR on board plan for paracentesis    2) PE  -CTA chest with Acute right lower lobar pulmonary embolus extending into the segmental branches  -on RA sating well denies CP or SOB  -c/w heparin drip  -echo TDS grossly normal RV function    3) CAD s/p stent  -s/p stent 20 years ago as per patient  -denies CP    4) DVT PPX  - on heparin drip

## 2022-04-06 NOTE — DIETITIAN INITIAL EVALUATION ADULT - ADD RECOMMEND
Dept to provide: Vital Health Shake 2x daily (1040 leanne and 44 gm protein) to promote optimal PO intake. Encourage PO intake and honor food preferences as able. Obtain weights weekly. RD to f/u prn.

## 2022-04-07 NOTE — PROGRESS NOTE ADULT - ASSESSMENT
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 Jama Roberts at [facility]) who presents to the hospital with poor PO intake and worsening abdominal swelling    EKG SR low voltage   Tele: NSR 90-100s    1) LE edema and Ascites  -?2/2 abd liposarcoma  -echo TDS with grossly normal LV function and mild-mod MR  -IR on board plan for paracentesis    2) PE  -CTA chest with Acute right lower lobar pulmonary embolus extending into the segmental branches  -on RA sating well denies CP or SOB  -c/w heparin drip  -echo TDS grossly normal RV function    3) CAD s/p stent  -s/p stent 20 years ago as per patient  -denies CP    4) DVT PPX  - on heparin drip

## 2022-04-07 NOTE — DISCHARGE NOTE PROVIDER - NSDCFUADDAPPT_GEN_ALL_CORE_FT
-You need your Complete Blood Count and Complete Metabolic Panel repeated on 4/25!- if Hemoglobin you may need blood transfusion, please follow up with Dr Chance, Dr Joshi, and Dr Thorpe for further direction and medical management.     If your Platelet count is less than 50, Hold your Lovenox dose and follow up with Dr Chance and Oncologist Dr Will Thorpe for further instruction and medication direction.    Follow up with Dr Joshi (New PCP) on Monday 4/25 for further instruction and direction on your usual home medications.

## 2022-04-07 NOTE — DISCHARGE NOTE PROVIDER - PROVIDER TOKENS
PROVIDER:[TOKEN:[[id number]],FOLLOWUP:[1 week],ESTABLISHEDPATIENT:[T]],PROVIDER:[TOKEN:[[id number]],FOLLOWUP:[1-3 days],ESTABLISHEDPATIENT:[T]],PROVIDER:[TOKEN:[[id number]],FOLLOWUP:[1-3 days]] PROVIDER:[TOKEN:[[id number]],SCHEDULEDAPPT:[04/28/2022],ESTABLISHEDPATIENT:[T]],PROVIDER:[TOKEN:[[id number]],SCHEDULEDAPPT:[04/25/2022],SCHEDULEDAPPTTIME:[10:15 AM],ESTABLISHEDPATIENT:[T]],PROVIDER:[TOKEN:[[id number]],SCHEDULEDAPPT:[04/25/2022],SCHEDULEDAPPTTIME:[10:30 AM]],PROVIDER:[TOKEN:[[id number]],SCHEDULEDAPPT:[04/29/2022],SCHEDULEDAPPTTIME:[01:00 PM]]

## 2022-04-07 NOTE — DISCHARGE NOTE PROVIDER - NSDCCPCAREPLAN_GEN_ALL_CORE_FT
PRINCIPAL DISCHARGE DIAGNOSIS  Diagnosis: Abdominal distension  Assessment and Plan of Treatment: You presented with  worsening abdominal distension here with imaging showing him with worsening abdominal masses and worsening loculated ascites, that affected your breathing, and ability to eat. You have required several paracentesis while inpatient. Interventional radilogy evaluated you for placement of a tunnelled abdominal catheter though on exam catheter unable to be placed due to worsening tumor burden.        SECONDARY DISCHARGE DIAGNOSES  Diagnosis: Liposarcoma  Assessment and Plan of Treatment: You follow with Oncologist  Dr. Jama Farley at [facility], and are currently on experimental chemotherapy. Your albumin levels, blood counts, and platelet levels have been fluctuating, and you needed to receive blood transfusions, and albumin. The  Onc team will set up the appointment at [facility] after discharge ; staff will call you and your  family.    -Please make sure : after discharge you follow up and are closely monitored. You need to have your blood counts-CBC are monitored as you need to remain on anticoagulatuon for your blood clot. Social work will set up home blood draw for CBC next Monday 4/25 (you may need to have a blood  transfusions pending your blood count,).    Diagnosis: Type 2 diabetes mellitus  Assessment and Plan of Treatment: Your A1C was 5.6  You are not on any diabetic medications and your blood sugars have been well controlled.  Continue your consistent carbohydrate diet (Meaning eating the same amount of carbohydrates at the same time each day). Monitor blood glucose levels throughout the day before meals and at bedtime. Record blood sugars and bring to outpatient providers appointment in order to be reviewed by your doctor for management modifications. If your sugars are more than 400 or less than 70 you should contact your PCP immediately. Monitor for signs/symptoms of low blood glucose, such as, dizziness, altered mental status, or cool/clammy skin. In addition, monitor for signs/symptoms of high blood glucose, such as, feeling hot, dry, fatigued, or with increased thirst/urination. Make regular podiatry appointments in order to have feet checked for wounds and uncontrolled toe nail growth to prevent infections, as well as, appointments with an ophthalmologist to monitor your vision.      Diagnosis: Adult failure to thrive  Assessment and Plan of Treatment: Continue to eat small frquent meals and continue to drink high calorie, high protien shakes to maintain adequate nutrition. Avoid greasy, fatty meals as this may result in nausea and vomiting.    Diagnosis: Pulmonary embolism  Assessment and Plan of Treatment: Incidentally noted on CAT Scan Abdomen/Pelvis, CAT Scan of the  Chest obtained that showed the Right Lower Lobe Pulmonary Embolism with downstream extension, also with bilateral  pleural effusions likely secondary to your abdominal distension. Initially you were placed on IV heparin,and then transitioned to subcutaneous lovenox.   Please continue taking your_lovenox_ as directed and follow up with your primary care provider /Oncologist Dr Jama Farley within 1 week of discharge for further care and recommendations. Report to the ED should you feel any shortness of breath, difficulty breathing with exersion, HA, dizziness, and continued or worsening chest pain.chest pain.       PRINCIPAL DISCHARGE DIAGNOSIS  Diagnosis: Abdominal distension  Assessment and Plan of Treatment: You presented with  worsening abdominal distension here with imaging showing him with worsening abdominal masses and worsening loculated ascites, that affected your breathing, and ability to eat. You have required several paracentesis while inpatient. Interventional radilogy evaluated you for placement of a tunnelled abdominal catheter though on exam catheter unable to be placed due to worsening tumor burden.        SECONDARY DISCHARGE DIAGNOSES  Diagnosis: Liposarcoma  Assessment and Plan of Treatment: You follow with Oncologist  Dr. Jama Farley at [facility], and are currently on experimental chemotherapy. Your albumin levels, blood counts, and platelet levels have been fluctuating, and you needed to receive blood transfusions, and albumin. The  Onc team will set up the appointment at [facility] after discharge ; staff will call you and your  family. Your follow up appointment at the [facility] with Dr Will Thorpe is on 4/29 at 1:00P   -Please make sure : after discharge you follow up and are closely monitored. You need to have your blood counts-CBC are monitored as you need to remain on anticoagulatuon for your blood clot. Social work will set up home blood draw for CBC next Monday 4/25 (you may need to have a blood  transfusions pending your blood count,).  If your Platelet count is less than 50, Hold your Lovenox dose and follow up with Dr Chance and Oncologist Dr Will Thorpe for further instruction and medication direction.    Diagnosis: Type 2 diabetes mellitus  Assessment and Plan of Treatment: Your A1C was 5.6  You are not on any diabetic medications and your blood sugars have been well controlled.  Continue your consistent carbohydrate diet (Meaning eating the same amount of carbohydrates at the same time each day). Monitor blood glucose levels throughout the day before meals and at bedtime. Record blood sugars and bring to outpatient providers appointment in order to be reviewed by your doctor for management modifications. If your sugars are more than 400 or less than 70 you should contact your PCP immediately. Monitor for signs/symptoms of low blood glucose, such as, dizziness, altered mental status, or cool/clammy skin. In addition, monitor for signs/symptoms of high blood glucose, such as, feeling hot, dry, fatigued, or with increased thirst/urination. Make regular podiatry appointments in order to have feet checked for wounds and uncontrolled toe nail growth to prevent infections, as well as, appointments with an ophthalmologist to monitor your vision.      Diagnosis: Adult failure to thrive  Assessment and Plan of Treatment: Continue to eat small frquent meals and continue to drink high calorie, high protien shakes to maintain adequate nutrition. Avoid greasy, fatty meals as this may result in nausea and vomiting.    Diagnosis: Pulmonary embolism  Assessment and Plan of Treatment: Incidentally noted on CAT Scan Abdomen/Pelvis, CAT Scan of the  Chest obtained that showed the Right Lower Lobe Pulmonary Embolism with downstream extension, also with bilateral  pleural effusions likely secondary to your abdominal distension. Initially you were placed on IV heparin,and then transitioned to subcutaneous lovenox.   Please continue taking your_lovenox_ as directed and follow up with your primary care provider /Oncologist Dr Jama Farley within 1 week of discharge for further care and recommendations. Report to the ED should you feel any shortness of breath, difficulty breathing with exersion, HA, dizziness, and continued or worsening chest pain.chest pain.  If your Platelet count is less than 50, Hold your Lovenox dose and follow up with Dr Chance and Oncologist Dr Will Thorep for further instruction and medication direction.

## 2022-04-07 NOTE — DISCHARGE NOTE PROVIDER - CARE PROVIDERS DIRECT ADDRESSES
,DirectAddress_Unknown,[email or web address],DirectAddress_Unknown ,DirectAddress_Unknown,[email or web address],DirectAddress_Unknown,[email or web address]

## 2022-04-07 NOTE — CHART NOTE - NSCHARTNOTEFT_GEN_A_CORE
Pt s/p paracentesis. Discussed with IR when to resume anticoagulation, IR said to hold 6 hours from procedure which ended at 1:45pm. Will plan to resume Heparin gtt at 7: 45pm Pt s/p paracentesis. Discussed with IR when to resume anticoagulation, IR said to hold 6 hours from procedure which ended at 1:45pm. Will plan to resume Heparin gtt at 7: 45pm. 3.8 liters was removed. Will order albumin on pt as discussed with Dr. Toure.

## 2022-04-07 NOTE — DISCHARGE NOTE PROVIDER - CARE PROVIDER_API CALL
ALF SALINAS  Internal Medicine  [facility] [place] [address]  Phone: [phone]  Fax: ([phone]  Established Patient  Follow Up Time: 1 week    Corona Chance (DO)  Gastroenterology; Internal Medicine  [address] [place] [address]  Phone: [phone]  Fax: [phone]  Established Patient  Follow Up Time: 1-3 days    Carolyn Joshi  INTERNAL MEDICINE  [address] [place] [address]  Phone: [phone]  Fax: [phone]  Follow Up Time: 1-3 days   ALF SALINAS  Internal Medicine  [address] [place] [address]  Phone: ([phone]  Fax: ()-  Established Patient  Scheduled Appointment: 04/28/2022    Croona Chance (DO)  Gastroenterology; Internal Medicine  [address] [place] [address]  Phone: [phone]  Fax: [phone]  Established Patient  Scheduled Appointment: 04/25/2022 10:15 AM    Carolyn Joshi  INTERNAL MEDICINE  [address] [place] [address]  Phone: [phone]  Fax: [phone]  Scheduled Appointment: 04/25/2022 10:30 AM    Will Thorpe)  Hematology; Medical Oncology  [address] [place] [address]  Phone: [phone]  Fax: [phone]  Scheduled Appointment: 04/29/2022 01:00 PM

## 2022-04-07 NOTE — DISCHARGE NOTE PROVIDER - HOSPITAL COURSE
This is a 71M with history as above who presents to the hospital with worsening abdominal distension found to have worsening abdominal masses and ascites. Also found to have incidental PE.     Abdominal distension.   ·  Plan: - Patient with worsening abdominal distension here with imaging showing him with worsening abdominal masses and worsening loculated ascites, now large  - Abdomen soft and non-tender, Large L sided lass likely causing the hard sensation on the L abdomen  - Patient feels pressure 2/2 worsening distension, seems like he has been unable to tolerate PO intake 2/2 the worsening distension  - Would likely benefit from ascites drainage -> please call procedure team in AM to assess for paracentesis, if not feasible by them then he would need an IR eval  - Also with worsening masses though imaging from [facility] not available here so unclear if the masses are definitely growing or not, spoke with wife about patient's candidacy for RT and she said that they were told that the patient was a poor candidate for RT due to the location of the masses, currently with suspicion of worsening masses and worsening patient symptoms consider calling Rad-Onc in AM to assess patient for RT (as per primary team), also consider reaching out to Dr. Jama Farley at [facility] to see if he has any recommendations to patient's management  - BCx x2 sent in ED but currently abdomen without TTP, no leukocytosis, no fevers, low suspicion for intra-abdominal infection, will hold off on abx for now.    Pulmonary thromboembolism.   ·  Plan: - Incidentally noted on CT A/P, CTA Chest obtained that showed the RLL PE with downstream extension, no large PE, also with b/l pleural effusions (small) but suspect this is 2/2 patient's abdominal distension  - On hep gtt, transitioned to SQ Lovenox  - Initial cardiac enzymes not significant for strain (trop indeterminant but CK/CKMB wnl), would check repeat CE in AM, check proBNP, consider TTE but unclear if it is needed at this time  - Patient's SOB likely multifactorial but his PE is also likely playing a role in, c/w management as above  restarted AC after Oncology clearance and improvement of platelets, will continue close monitoring of plts  s/p transfusion PRBC with close monitor of hb / volume status - lasix after prbc    Adult failure to thrive.   ·  Plan: - Likely in setting of enlarging abd masses and ascites, lab work with mild elevated AG/low bicarb but nl pH and lactate, likely has some degree of starvation ketosis given his history  - Would obtain nutrition eval  - Will liberalize diet   - Unable to give IVF currently as patient with 1 IV insert which is running heparin (advised that LR and NS currently not fully compatible with heparin gtt), would therefore encourage PO intake as tolerated, attempt to place additional IV for IVF.     Liposarcoma.   ·  Plan: - Follows with Dr. Jama Farley at [facility], currently on experimental chemotherapy  - Consider rad-onc eval as mentioned above to see if patient is a candidate for RT given his worsening abd distension and inability to tolerate significant PO intake.    Type 2 diabetes mellitus.   ·  Plan: - Off meds  - Will place on ISS, FS qAC, check A1C, if not significant for DM2 then can likely d/c sliding scale insulin  - Will liberalize diet for now.    CAD (coronary artery disease).   ·  Plan: - Off aspirin  - EKG currently non-ischemic, trop indeterminant but CK/CKMB wnl, low suspicion for ACS as a cause of his symptoms.    Need for prophylactic measure.   ·  Plan: DVT ppx - on heparin gtt  Diet - Regular  Activity - OOB to chair/with assistance, PT eval  Fall and aspiration precautions.   This is a 71M with history as above who presents to the hospital with worsening abdominal distension found to have worsening abdominal masses and ascites. Also found to have incidental PE.     Abdominal distension.   ·  Plan: - Patient with worsening abdominal distension here with imaging showing him with worsening abdominal masses and worsening loculated ascites, now large  - Abdomen soft and non-tender, Large L sided lass likely causing the hard sensation on the L abdomen  - Patient feels pressure 2/2 worsening distension, seems like he has been unable to tolerate PO intake 2/2 the worsening distension  - Would likely benefit from ascites drainage -> please call procedure team in AM to assess for paracentesis, if not feasible by them then he would need an IR eval  - Also with worsening masses though imaging from [facility] not available here so unclear if the masses are definitely growing or not, spoke with wife about patient's candidacy for RT and she said that they were told that the patient was a poor candidate for RT due to the location of the masses, currently with suspicion of worsening masses and worsening patient symptoms consider calling Rad-Onc in AM to assess patient for RT (as per primary team), also consider reaching out to Dr. Jama Farley at [facility] to see if he has any recommendations to patient's management  - BCx x2 sent in ED but currently abdomen without TTP, no leukocytosis, no fevers, low suspicion for intra-abdominal infection, will hold off on abx for now.  s/p paracentesis by IR, repeat abd ultrasound with large fluid collection - s/p IR to eval pt for palliative drain   - IR unable to place palliative abdominal Pleurx drain sec to extensive nature of sarcoma, palliative eval.    Pulmonary thromboembolism.   ·  Plan: - Incidentally noted on CT A/P, CTA Chest obtained that showed the RLL PE with downstream extension, no large PE, also with b/l pleural effusions (small) but suspect this is 2/2 patient's abdominal distension  - On hep gtt, transitioned to SQ Lovenox  - Initial cardiac enzymes not significant for strain (trop indeterminant but CK/CKMB wnl), would check repeat CE in AM, check proBNP, consider TTE but unclear if it is needed at this time  - Patient's SOB likely multifactorial but his PE is also likely playing a role in, c/w management as above  restarted AC after Oncology clearance and improvement of platelets, will continue close monitoring of plts  s/p transfusion PRBC with close monitor of hb / volume status - lasix after prbc    Adult failure to thrive.   ·  Plan: - Likely in setting of enlarging abd masses and ascites, lab work with mild elevated AG/low bicarb but nl pH and lactate, likely has some degree of starvation ketosis given his history  - Would obtain nutrition eval  - Will liberalize diet   - Unable to give IVF currently as patient with 1 IV insert which is running heparin (advised that LR and NS currently not fully compatible with heparin gtt), would therefore encourage PO intake as tolerated, attempt to place additional IV for IVF.     Liposarcoma.   ·  Plan: - Follows with Dr. Jama Farley at [facility], currently on experimental chemotherapy  - Consider rad-onc eval as mentioned above to see if patient is a candidate for RT given his worsening abd distension and inability to tolerate significant PO intake.    Type 2 diabetes mellitus.   ·  Plan: - Off meds  - Will place on ISS, FS qAC, check A1C, if not significant for DM2 then can likely d/c sliding scale insulin  - Will liberalize diet for now.    CAD (coronary artery disease).   ·  Plan: - Off aspirin  - EKG currently non-ischemic, trop indeterminant but CK/CKMB wnl, low suspicion for ACS as a cause of his symptoms.    Need for prophylactic measure.   ·  Plan: DVT ppx - on heparin gtt  Diet - Regular  Activity - OOB to chair/with assistance, PT eval  Fall and aspiration precautions.    Patient seen and evaluated. Reviewed discharge medications with patient and attending. All new medications requiring new prescriptions were sent to the pharmacy of patient's choice. Reviewed need for prescription for previous home medications and new prescriptions sent if requested. Medically cleared/stable for discharge as per   with appropriate follow up. Patient understands and agrees with plan of care.     This is a 71M with history as above who presents to the hospital with worsening abdominal distension found to have worsening abdominal masses and ascites. Also found to have incidental PE.     Abdominal distension.   ·  Plan: - Patient with worsening abdominal distension here with imaging showing him with worsening abdominal masses and worsening loculated ascites, now large  - Abdomen soft and non-tender, Large L sided lass likely causing the hard sensation on the L abdomen  - Patient feels pressure 2/2 worsening distension, seems like he has been unable to tolerate PO intake 2/2 the worsening distension  - Would likely benefit from ascites drainage -> please call procedure team in AM to assess for paracentesis, if not feasible by them then he would need an IR eval  - Also with worsening masses though imaging from [facility] not available here so unclear if the masses are definitely growing or not, spoke with wife about patient's candidacy for RT and she said that they were told that the patient was a poor candidate for RT due to the location of the masses, currently with suspicion of worsening masses and worsening patient symptoms consider calling Rad-Onc in AM to assess patient for RT (as per primary team), also consider reaching out to Dr. Jama Farley at [facility] to see if he has any recommendations to patient's management  - BCx x2 sent in ED but currently abdomen without TTP, no leukocytosis, no fevers, low suspicion for intra-abdominal infection, will hold off on abx for now.  s/p paracentesis by IR, repeat abd ultrasound with large fluid collection - s/p IR to eval pt for palliative drain   - IR unable to place palliative abdominal Pleurx drain sec to extensive nature of sarcoma, palliative eval.    Pulmonary thromboembolism.   ·  Plan: - Incidentally noted on CT A/P, CTA Chest obtained that showed the RLL PE with downstream extension, no large PE, also with b/l pleural effusions (small) but suspect this is 2/2 patient's abdominal distension  - On hep gtt, transitioned to SQ Lovenox  - Initial cardiac enzymes not significant for strain (trop indeterminant but CK/CKMB wnl), would check repeat CE in AM, check proBNP, consider TTE but unclear if it is needed at this time  - Patient's SOB likely multifactorial but his PE is also likely playing a role in, c/w management as above  restarted AC after Oncology clearance and improvement of platelets, will continue close monitoring of plts  s/p transfusion PRBC with close monitor of hb / volume status - lasix after prbc    Adult failure to thrive.   ·  Plan: - Likely in setting of enlarging abd masses and ascites, lab work with mild elevated AG/low bicarb but nl pH and lactate, likely has some degree of starvation ketosis given his history  - Would obtain nutrition eval  - Will liberalize diet   - Unable to give IVF currently as patient with 1 IV insert which is running heparin (advised that LR and NS currently not fully compatible with heparin gtt), would therefore encourage PO intake as tolerated, attempt to place additional IV for IVF.     Liposarcoma.   ·  Plan: - Follows with Dr. Jama Farley at [facility], currently on experimental chemotherapy  -- IR unable to place palliative abdominal Pleurx drain sec to extensive nature of sarcoma, palliative eval.  -Oncology consulted and following, pt will f/u outpatient at [facility]    Type 2 diabetes mellitus.   ·  Plan: - Off meds  - Will place on ISS, FS qAC, check A1C, if not significant for DM2 then can likely d/c sliding scale insulin  - Will liberalize diet for now.    CAD (coronary artery disease).   ·  Plan: - Off aspirin  - EKG currently non-ischemic, trop indeterminant but CK/CKMB wnl, low suspicion for ACS as a cause of his symptoms.    Need for prophylactic measure.   ·  Plan: DVT ppx - on heparin gtt  Diet - Regular  Activity - OOB to chair/with assistance, PT eval  Fall and aspiration precautions.    Patient seen and evaluated. Afebrile, VSS. Patient case reviewed and discussed with Oncology Fellow and Attending Physician Dr Toure, who agrees the patient is medically stable for discharge home 4/21, with outpatient follow up with Dr Chance, Dr Joshi (New PCP), Dr Farley, and Dr Will Thorpe next week for ongoing medical management. All discharge medications with patient and attending. All new medications requiring new prescriptions were sent to the pharmacy of patient's choice. Patient and wife understands and agrees with plan of care.

## 2022-04-07 NOTE — DISCHARGE NOTE PROVIDER - NSDCMRMEDTOKEN_GEN_ALL_CORE_FT
Lasix 40 mg oral tablet: 1 tab(s) orally once a day  pantoprazole 40 mg oral delayed release tablet: 1 tab(s) orally 2 times a day  rivaroxaban 20 mg oral tablet: 1 tab(s) orally once a day (in the evening)   to be started after 21 days of 15mg BID    Attn pharmacy: do not fill, price check only   Xarelto 15 mg oral tablet: 1 tab(s) orally 2 times a day     Attn pharmacy: do not fill, price check only   Zofran 4 mg oral tablet: 1 tab(s) orally every 8 hours, As Needed   Lasix 40 mg oral tablet: 1 tab(s) orally once a day  pantoprazole 40 mg oral delayed release tablet: 1 tab(s) orally 2 times a day  Xarelto Starter Pack 15 mg-20 mg oral kit: 1 cap(s) orally 2 times a day   Zofran 4 mg oral tablet: 1 tab(s) orally every 8 hours, As Needed   acetaminophen 325 mg oral tablet: 2 tab(s) orally every 6 hours, As needed, Temp greater or equal to 38C (100.4F), Mild Pain (1 - 3)  aluminum hydroxide-magnesium hydroxide 200 mg-200 mg/5 mL oral suspension: 30 milliliter(s) orally every 4 hours, As needed, Dyspepsia  budesonide-formoterol 80 mcg-4.5 mcg/inh inhalation aerosol: 2  inhaled 2 times a day   lactobacillus acidophilus oral capsule: 1 tab(s) orally 2 times a day   Lovenox 80 mg/0.8 mL injectable solution: 80 milligram(s) injection subcutaneously every 12 hours   Multiple Vitamins oral tablet: 1 tab(s) orally once a day  pantoprazole 40 mg oral delayed release tablet: 1 tab(s) orally 2 times a day  Zofran 4 mg oral tablet: 1 tab(s) orally every 8 hours, As Needed   acetaminophen 325 mg oral tablet: 2 tab(s) orally every 6 hours, As needed, Temp greater or equal to 38C (100.4F), Mild Pain (1 - 3)  aluminum hydroxide-magnesium hydroxide 200 mg-200 mg/5 mL oral suspension: 30 milliliter(s) orally every 4 hours, As needed, Dyspepsia  budesonide-formoterol 80 mcg-4.5 mcg/inh inhalation aerosol: 2  inhaled 2 times a day   lactobacillus acidophilus oral capsule: 1 tab(s) orally 2 times a day   Lovenox 80 mg/0.8 mL injectable solution: 80 milligram(s) injection subcutaneously every 12 hours MDD:Hold for Platelet count under 50  Multiple Vitamins oral tablet: 1 tab(s) orally once a day  pantoprazole 40 mg oral delayed release tablet: 1 tab(s) orally 2 times a day  Zofran 4 mg oral tablet: 1 tab(s) orally every 8 hours, As Needed

## 2022-04-08 NOTE — PROGRESS NOTE ADULT - ASSESSMENT
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 Jama Roberts at [facility]) who presents to the hospital with poor PO intake and worsening abdominal swelling    EKG SR low voltage   Tele: NSR 90-100s    1) LE edema and Ascites  - ?2/2 abd liposarcoma  - echo TDS with grossly normal LV function and mild-mod MR  - s/p paracentesis     2) PE  -CTA chest with Acute right lower lobar pulmonary embolus extending into the segmental branches  -on RA sating well denies CP or SOB  -c/w heparin drip  -echo TDS grossly normal RV function    3) CAD s/p stent  -s/p stent 20 years ago as per patient  -denies CP    4) DVT PPX  - on heparin drip

## 2022-04-08 NOTE — PROGRESS NOTE ADULT - ASSESSMENT
70 yo M with HLD, DM2, FADI not on CPAP, CAD s/p stent (1999), and 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 Jama Roberts at [facility]) admitted with new PE, worsening ascites and severe nausea and vomiting.

## 2022-04-09 NOTE — PROGRESS NOTE ADULT - ASSESSMENT
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 Jama Roberts at [facility]) who presents to the hospital with poor PO intake and worsening abdominal swelling    EKG SR low voltage   Tele: NSR 90-100s    1) LE edema and Ascites  - ?2/2 abd liposarcoma  - echo TDS with grossly normal LV function and mild-mod MR  - s/p paracentesis     2) PE  -CTA chest with Acute right lower lobar pulmonary embolus extending into the segmental branches  -on RA sating well denies CP or SOB  -c/w heparin drip  -echo TDS grossly normal RV function    3) CAD s/p stent  -s/p stent 20 years ago as per patient  -denies CP    4) DVT PPX  - on heparin drip

## 2022-04-09 NOTE — PROGRESS NOTE ADULT - ASSESSMENT
conservative gi magnemtn  no acute complaitns  continue current rx  consider pleurex/ pigtail for palliatve drainage? d/w patient

## 2022-04-10 NOTE — PROGRESS NOTE ADULT - ASSESSMENT
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 Jama Roberts at [facility]) who presents to the hospital with poor PO intake and worsening abdominal swelling    EKG SR low voltage   Tele: NSR 90-100s    1) LE edema and Ascites  - ?2/2 abd liposarcoma  - echo TDS with grossly normal LV function and mild-mod MR  - s/p paracentesis     2) PE  -CTA chest with Acute right lower lobar pulmonary embolus extending into the segmental branches  -on RA sating well denies CP or SOB  -c/w Lovenox   -echo TDS grossly normal RV function    3) CAD s/p stent  -s/p stent 20 years ago as per patient  -denies CP    4) DVT PPX  - on heparin drip

## 2022-04-11 NOTE — PROGRESS NOTE ADULT - ASSESSMENT
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 Jama Roberts at [facility]) who presents to the hospital with poor PO intake and worsening abdominal swelling    EKG SR low voltage   Tele: NSR 70-80s    1) LE edema and Ascites  - ?2/2 abd liposarcoma  - echo TDS with grossly normal LV function and mild-mod MR  - s/p paracentesis     2) PE  -CTA chest with Acute right lower lobar pulmonary embolus extending into the segmental branches  -on RA sating well denies CP or SOB  -c/w Lovenox   -echo TDS grossly normal RV function    3) CAD s/p stent  -s/p stent 20 years ago as per patient  -denies CP    4) DVT PPX  -lovenox

## 2022-04-11 NOTE — PROGRESS NOTE ADULT - ASSESSMENT
pt in trial at [facility],   he will speak to them re: appropriatesss of pigtail

## 2022-04-12 NOTE — PROGRESS NOTE ADULT - ASSESSMENT
conservative gi magement  no acute complaints  asctes source of decrased po  consider palliatve pigtail  pt well known to me many years from office

## 2022-04-12 NOTE — CONSULT NOTE ADULT - SUBJECTIVE AND OBJECTIVE BOX
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 Said that for the past few weeks he has been having significant abd swelling with associated pressure in his abdomen and poor PO intake. Said that whenever he tries to eat something he invariably ends up vomiting the food due to his abdominal distension (emesis usually is food, sometimes bilious, no blood/coffee ground substances). Said that he has been having difficulty due to his abdominal distention but denies any chest pain, palpitations, or syncope. Also with a new onset cough sometimes with green sputum but no hemoptysis. Is having BMs but sometimes has diarrhea (last BM this AM was soft, no blood in BM). Has generalized weakness due to his reduced PO intake. Denies any recent fevers/chills/diaphoresis. Has noted b/l LE swelling but denies any pain to it, has been put on lasix for it but states that it does not seem to be helping the swelling. Said that he has known fluid in his abdomen, was sampled once but has never been drained before. Denies any other acute complaints.     On arrival to the ED, his vitals were T 98, P 95, BP 85/51 -> 93/54, RR 18, O2 sat 95%. His lab work showed anemia (improved from prior), low bicarb and elevated AG (likely 2/2 starvation ketosis), and nl pH/lactate. His imaging showed large volume ascites and worsening abdominal masses but unclear trend as patient currently follows at [facility] for his malignancy. He was also noted to have an incidental RLL PE and was started on a heparin gtt. he was admitted to medicine.  (04 Apr 2022 23:23)      Vital Signs: Vital Signs Last 24 Hrs  T(C): 36.4 (12 Apr 2022 11:45), Max: 36.8 (11 Apr 2022 19:45)  T(F): 97.5 (12 Apr 2022 11:45), Max: 98.2 (11 Apr 2022 19:45)  HR: 74 (12 Apr 2022 11:45) (74 - 89)  BP: 89/65 (12 Apr 2022 11:45) (89/65 - 109/58)  BP(mean): --  RR: 18 (12 Apr 2022 11:45) (17 - 18)  SpO2: 97% (12 Apr 2022 11:45) (96% - 100%)    Past Medical/ Surgical History: PAST MEDICAL & SURGICAL HISTORY:  Intra-abdominal and pelvic swelling, mass and lump, unspecified site  Currently on experimental chemotherapy at [facility]    Hypertension  Now with low BPs    Diabetes mellitus  Fasting FS range from - per patient, off meds    Hypercholesteremia  off meds    CAD (coronary artery disease)  Off aspirin    Sleep apnea    History of cardiac cath  Pt reports 1 cardiac stent placed 1999    H/O sleep apnea  Per patient had Sleep Apnea surgery  in 1996- at [facility]- Was not retested for Sleep Apnea post surgery    History of colon resection  Dec 2019        Allergies: Allergies    oxycodone (Vomiting)    Intolerances        Medications: MEDICATIONS  (STANDING):  dextrose 5%. 1000 milliLiter(s) (100 mL/Hr) IV Continuous <Continuous>  dextrose 5%. 1000 milliLiter(s) (50 mL/Hr) IV Continuous <Continuous>  dextrose 50% Injectable 25 Gram(s) IV Push once  dextrose 50% Injectable 12.5 Gram(s) IV Push once  dextrose 50% Injectable 25 Gram(s) IV Push once  enoxaparin Injectable 120 milliGRAM(s) SubCutaneous every 24 hours  glucagon  Injectable 1 milliGRAM(s) IntraMuscular once  insulin lispro (ADMELOG) corrective regimen sliding scale   SubCutaneous three times a day before meals  insulin lispro (ADMELOG) corrective regimen sliding scale   SubCutaneous at bedtime  pantoprazole    Tablet 40 milliGRAM(s) Oral two times a day  senna 2 Tablet(s) Oral at bedtime  sodium chloride 3%  Inhalation 4 milliLiter(s) Inhalation every 12 hours    MEDICATIONS  (PRN):  acetaminophen     Tablet .. 650 milliGRAM(s) Oral every 6 hours PRN Temp greater or equal to 38C (100.4F), Mild Pain (1 - 3)  aluminum hydroxide/magnesium hydroxide/simethicone Suspension 30 milliLiter(s) Oral every 4 hours PRN Dyspepsia  benzonatate 100 milliGRAM(s) Oral three times a day PRN Cough  dextrose Oral Gel 15 Gram(s) Oral once PRN Blood Glucose LESS THAN 70 milliGRAM(s)/deciliter  HYDROmorphone  Injectable 0.2 milliGRAM(s) IV Push every 6 hours PRN moderate to severe pain  melatonin 3 milliGRAM(s) Oral at bedtime PRN Insomnia  ondansetron Injectable 4 milliGRAM(s) IV Push every 8 hours PRN Nausea and/or Vomiting      SOCIAL HISTORY:    FAMILY HISTORY:  FH: heart disease  Mother          PHYSICAL EXAM:    General:   Well-groomed, well-nourished, in no distress  Lungs:  CTA bilaterally  Cardiovascular:   S1, S2,   Abdomen:  Soft, non-tender, non-distended,   Extremities:  no calf tenderness/swelling bilaterally  Musculoskeletal:  Full ROM in all joints w/o swelling/tenderness/effusion  Neuro/Psych:  A &O x 3    LABS:                        7.3    1.62  )-----------( 106      ( 12 Apr 2022 07:23 )             24.9     04-12    134<L>  |  100  |  11  ----------------------------<  105<H>  3.8   |  21<L>  |  0.75    Ca    7.7<L>      12 Apr 2022 07:23  Phos  1.5     04-12  Mg     1.60     04-12            RADIOLOGY & ADDITIONAL STUDIES:  < from: US Abdomen Limited (04.11.22 @ 16:14) >    ACC: [id number] EXAM:  US ABDOMEN LIMITED                          PROCEDURE DATE:  04/11/2022          INTERPRETATION:  CLINICAL INFORMATION: Evaluate for ascites. Known   abdominal mass    COMPARISON: 1/25/2022    Limited abdominal ultrasound is performed.    There is a large amount of ascites in the right upper quadrant. Smaller   amounts of fluid are seen elsewhere in the abdomen. Known large   intra-abdominal masses are again seen.    IMPRESSION: Large amount of ascites in the right upper quadrant    --- End of Report ---            < end of copied text >      A/P: 71M with history of Low BPs (usual range is 90-95/55-65), HLD (currently off meds), DM2 (currently off meds), FADI not on CPAP (s/p sleep apnea sx as per wife), CAD s/p stent (1999, now off aspirin for some time), and Abd Liposarcoma. Pt with recurrent ascites. Last paracentesis (4/8) aborted after 3.8L removed due to hypotension.    Pt had aborted para on 4/8; now w/large ascites. It is unclear how much residual ascites vs. recurrent ascites is present at this time given incomplete procedure 4/8.   Pt should have LVP with procedure team; if procedure team is unwilling to perform the procedure, IR can be reconsulted for paracentesis.     Palliative pigtail can be considered if pt requires serial paracentesis q7d or more frequently for 3 consecutive weeks.

## 2022-04-12 NOTE — PROGRESS NOTE ADULT - ASSESSMENT
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 abd US 4/11 with large ascites needs repeat paracentesis     2) PE  -CTA chest with Acute right lower lobar pulmonary embolus extending into the segmental branches  -on RA sating well denies CP or SOB  -c/w Lovenox   -echo TDS grossly normal RV function    3) CAD s/p stent  -s/p stent 20 years ago as per patient  -denies CP    4) DVT PPX  -lovenox

## 2022-04-13 NOTE — PROGRESS NOTE ADULT - PROBLEM SELECTOR PLAN 7
on lovenox
DVT ppx - on heparin gtt
DVT ppx - on heparin gtt
on lovenox
DVT ppx - on heparin gtt
on lovenox

## 2022-04-13 NOTE — CHART NOTE - NSCHARTNOTEFT_GEN_A_CORE
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     Last paracentesis 4/7 by IR was aborted prior to completion due to hypotension. Consult for IR paracentesis as procedural team has deferred due to complexity.     Pertinent labs:                      7.4    3.49  )-----------( 69       ( 13 Apr 2022 05:44 )             24.0       04-13    133<L>  |  101  |  11  ----------------------------<  107<H>  3.6   |  22  |  0.73    Ca    7.4<L>      13 Apr 2022 05:44  Phos  1.5     04-13  Mg     1.60     04-13            Patient and Family Aware ? Yes

## 2022-04-13 NOTE — CHART NOTE - NSCHARTNOTEFT_GEN_A_CORE
Source: Patient [x]  other [x] nurse, medical chart   Diet rx: Regular (04-05-22 @ 02:09) [Active]    Pt 70 yo male with PMHx of baseline hypotension, DM2, FADI not on CPAP, CAD, and Abd Liposarcoma presented with worsening abdominal distension found to have worsening abdominal masses and ascites - per chart review.   At time of visit, Pt awake, alert, oriented. Per Pt, his appetite getting better. RDN offered to discuss food preferences, but Pt with no interest to discuss. No report of chewing or swallowing difficulty; no report of nausea, vomiting or diarrhea @ this time. Of note, Pt passed Swallow Bedside Assessment Adult, SLP rec: Regular solids and thin liquids (4/13). Case discussed with nurse. RDN remains available.     Pt's height: 68" (4/4)     IBW: 154#+/-10%      Pt's weight: 83.4 kg (4/4)     Pertinent Medications: Lovenox, Insulin (Lispro), K-phos, Senna, Aluminum hydroxide/magnesium hydroxide/simethicone Suspension (PRN), Zofran (PRN), Protonix,    Pertinent Labs: 04-13 Na133 mmol/L<L> Glu 107 mg/dL<H> K+ 3.6 mmol/L Cr  0.73 mg/dL BUN 11 mg/dL 04-13 Phos 1.5 mg/dL<L> 04-09 Alb 2.4 g/dL<L>  Skin: no report of pressure injury at present     Estimated Needs: [x] no change since previous assessment  Previous Nutrition Diagnosis: [x] Malnutrition, severe    Nutrition Diagnosis is [x] ongoing      Nutrition Interventions/Recommendations:   1. Encourage & assist Pt with meals; Monitor PO diet tolerance;   2. Honor food preferences;  3. Add Multivitamins 1 tab daily for micronutrient coverage;   4. Monitor labs, weekly weights, hydration status;

## 2022-04-13 NOTE — SWALLOW BEDSIDE ASSESSMENT ADULT - COMMENTS
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 Last paracentesis 4/7 by IR was aborted prior to completion due to hypotension. Consult for IR paracentesis as procedural team has deferred due to complexity."    WBC is low. CT abdoment 4/4 "Partially imaged pulmonary embolism in the right lower lobe. Small right and trace left pleural effusions with associated atelectasis. Bilateral linear atelectasis. Coronary artery calcifications."    Patient seen at bedside seated upright, awake/alert and oriented, during a clinical swallow assessment this PM. Patient able to follow directions and answer questions appropriately. Patient denied feeding swallowing difficulties, globus sensation and/or odynophagia. Patient reported tolerating a baseline diet of regular solids and thin liquids in the home environment. Patient was cooperative and accepted all PO trials.

## 2022-04-13 NOTE — PROGRESS NOTE ADULT - ASSESSMENT
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 abd US 4/11 with large ascites needs repeat paracentesis     2) PE  -CTA chest with Acute right lower lobar pulmonary embolus extending into the segmental branches  -on RA sating well denies CP or SOB  -c/w Lovenox   -echo TDS grossly normal RV function    3) CAD s/p stent  -s/p stent 20 years ago as per patient  -denies CP    4) DVT PPX  -lovenox

## 2022-04-13 NOTE — CONSULT NOTE ADULT - SUBJECTIVE AND OBJECTIVE BOX
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 Jama Roberts at [facility]) who presents to the hospital with poor PO intake and worsening abdominal swelling. Said that for the past few weeks he has been having significant abd swelling with associated pressure in his abdomen and poor PO intake. Said that whenever he tries to eat something he invariably ends up vomiting the food due to his abdominal distension (emesis usually is food, sometimes bilious, no blood/coffee ground substances). Said that he has been having difficulty due to his abdominal distention but denies any chest pain, palpitations, or syncope. Also with a new onset cough sometimes with green sputum but no hemoptysis. Is having BMs but sometimes has diarrhea (last BM this AM was soft, no blood in BM). Has generalized weakness due to his reduced PO intake. Denies any recent fevers/chills/diaphoresis. Has noted b/l LE swelling but denies any pain to it, has been put on lasix for it but states that it does not seem to be helping the swelling. Said that he has known fluid in his abdomen, was sampled once but has never been drained before. Denies any other acute complaints.     On arrival to the ED, his vitals were T 98, P 95, BP 85/51 -> 93/54, RR 18, O2 sat 95%. His lab work showed anemia (improved from prior), low bicarb and elevated AG (likely 2/2 starvation ketosis), and nl pH/lactate. His imaging showed large volume ascites and worsening abdominal masses but unclear trend as patient currently follows at [facility] for his malignancy. He was also noted to have an incidental RLL PE and was started on a heparin gtt. he was admitted to medicine.  (2022 23:23)      Vital Signs: Vital Signs Last 24 Hrs  T(C): 36.7 (2022 06:30), Max: 36.7 (2022 06:30)  T(F): 98 (2022 06:30), Max: 98 (2022 06:30)  HR: 68 (2022 09:57) (68 - 94)  BP: 95/60 (2022 06:30) (89/65 - 96/60)  BP(mean): --  RR: 18 (2022 06:30) (18 - 18)  SpO2: 96% (2022 09:57) (96% - 100%)    Past Medical/ Surgical History: PAST MEDICAL & SURGICAL HISTORY:  Intra-abdominal and pelvic swelling, mass and lump, unspecified site  Currently on experimental chemotherapy at [facility]    Hypertension  Now with low BPs    Diabetes mellitus  Fasting FS range from - per patient, off meds    Hypercholesteremia  off meds    CAD (coronary artery disease)  Off aspirin    Sleep apnea    History of cardiac cath  Pt reports 1 cardiac stent placed     H/O sleep apnea  Per patient had Sleep Apnea surgery  in - at [facility]- Was not retested for Sleep Apnea post surgery    History of colon resection  Dec 2019        Allergies: Allergies    oxycodone (Vomiting)    Intolerances        Medications: MEDICATIONS  (STANDING):  dextrose 5%. 1000 milliLiter(s) (100 mL/Hr) IV Continuous <Continuous>  dextrose 5%. 1000 milliLiter(s) (50 mL/Hr) IV Continuous <Continuous>  dextrose 50% Injectable 25 Gram(s) IV Push once  dextrose 50% Injectable 12.5 Gram(s) IV Push once  dextrose 50% Injectable 25 Gram(s) IV Push once  enoxaparin Injectable 120 milliGRAM(s) SubCutaneous every 24 hours  glucagon  Injectable 1 milliGRAM(s) IntraMuscular once  insulin lispro (ADMELOG) corrective regimen sliding scale   SubCutaneous three times a day before meals  insulin lispro (ADMELOG) corrective regimen sliding scale   SubCutaneous at bedtime  pantoprazole    Tablet 40 milliGRAM(s) Oral two times a day  potassium phosphate / sodium phosphate Tablet (K-PHOS No. 2) 1 Tablet(s) Oral every 4 hours  senna 2 Tablet(s) Oral at bedtime  sodium chloride 3%  Inhalation 4 milliLiter(s) Inhalation every 12 hours    MEDICATIONS  (PRN):  acetaminophen     Tablet .. 650 milliGRAM(s) Oral every 6 hours PRN Temp greater or equal to 38C (100.4F), Mild Pain (1 - 3)  aluminum hydroxide/magnesium hydroxide/simethicone Suspension 30 milliLiter(s) Oral every 4 hours PRN Dyspepsia  benzonatate 100 milliGRAM(s) Oral three times a day PRN Cough  dextrose Oral Gel 15 Gram(s) Oral once PRN Blood Glucose LESS THAN 70 milliGRAM(s)/deciliter  HYDROmorphone  Injectable 0.2 milliGRAM(s) IV Push every 6 hours PRN moderate to severe pain  melatonin 3 milliGRAM(s) Oral at bedtime PRN Insomnia  ondansetron Injectable 4 milliGRAM(s) IV Push every 8 hours PRN Nausea and/or Vomiting      SOCIAL HISTORY:    FAMILY HISTORY:  FH: heart disease  Mother          PHYSICAL EXAM:    General:   Well-groomed, well-nourished, in no distress  Lungs:  CTA bilaterally  Cardiovascular:   S1, S2,   Abdomen:  Soft, non-tender, non-distended,   Extremities:  no calf tenderness/swelling bilaterally  Musculoskeletal:  Full ROM in all joints w/o swelling/tenderness/effusion  Neuro/Psych:  A &O x 3    LABS:                        7.4    3.49  )-----------( 69       ( 2022 05:44 )             24.0     04-13    133<L>  |  101  |  11  ----------------------------<  107<H>  3.6   |  22  |  0.73    Ca    7.4<L>      2022 05:44  Phos  1.5     04-13  Mg     1.60     04-13            RADIOLOGY & ADDITIONAL STUDIES:  PROCEDURE:   · Procedure Name	Interventional Radiology  · Procedure Name	Diagnostic/ Therapeutic paracentesis  · TIME OUT	Patient's first and last name, , procedure, and correct site confirmed prior to the start of procedure.  · Procedure Date/Time	2022 13:30  · Informed Consent	Benefits, risks, and possible complications of procedure explained to patient/caregiver who verbalized understanding and gave written consent.  · Procedure Performed By	Myself  · Procedure Assisted By	Attending, Dr. Almanzar  · Specimen Obtained	Fluid sent for chemistry, Fluid sent for cytology, Fluid sent for gram stain and culture  · Estimated Blood Loss	Minimal  · Complications	No complications  · Local Anesthesia	1% Lidocaine  · Procedure Findings and Details	3.8L ascitic fluid removed. 50cc fluid collected for cytology/chemistry. During procedure systolic pressure decreased to the 80s. Patient was not in distress and asymptomatic. Decision was made to abort procedure. Systolic pressure returned to the 90s before being discharged from radiology. Discussed with team.  · Patient Condition/Disposition	Back to floor  · Plan	Follow up with lab results. Reconsult PRN      Electronic Signatures:  Kade Cesar)   (Signed 2022 13:38)  	Authored: PROCEDURE  Reginald Almanzar)   (Signed 2022 15:47)  	Co-Signer: PROCEDURE< from: US Abdomen Limited (22 @ 16:14) >  ACC: [id number] EXAM:  US ABDOMEN LIMITED                          PROCEDURE DATE:  2022          INTERPRETATION:  CLINICAL INFORMATION: Evaluate for ascites. Known   abdominal mass    COMPARISON: 2022    Limited abdominal ultrasound is performed.    There is a large amount of ascites in the right upper quadrant. Smaller   amounts of fluid are seen elsewhere in the abdomen. Known large   intra-abdominal masses are again seen.    IMPRESSION: Large amount of ascites in the right upper quadrant    --- End of Report ---            NURY HERNANDEZ MD; Attending Radiologist  This document has been electronically signed. 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 Jama Roberts at [facility]) who presents to the hospital with poor PO intake and worsening abdominal swelling.     Last paracentesis  by IR was aborted prior to completion due to hypotension. Consult for IR paracentesis as procedural team has deferred due to complexity.   - Please place order for IR Procedure "Diagnostic and Therapeutic Paracentesis", approving attending Dr. Sierra  - Pt does not need to be NPO  - hold therapeutic and prophylactic anticoagulants. HOLD AM DOSE OF LOVENOX; pt can restart anticoagulation 6hrs post procedure.   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 Said that for the past few weeks he has been having significant abd swelling with associated pressure in his abdomen and poor PO intake. Said that whenever he tries to eat something he invariably ends up vomiting the food due to his abdominal distension (emesis usually is food, sometimes bilious, no blood/coffee ground substances). Said that he has been having difficulty due to his abdominal distention but denies any chest pain, palpitations, or syncope. Also with a new onset cough sometimes with green sputum but no hemoptysis. Is having BMs but sometimes has diarrhea (last BM this AM was soft, no blood in BM). Has generalized weakness due to his reduced PO intake. Denies any recent fevers/chills/diaphoresis. Has noted b/l LE swelling but denies any pain to it, has been put on lasix for it but states that it does not seem to be helping the swelling. Said that he has known fluid in his abdomen, was sampled once but has never been drained before. Denies any other acute complaints.     On arrival to the ED, his vitals were T 98, P 95, BP 85/51 -> 93/54, RR 18, O2 sat 95%. His lab work showed anemia (improved from prior), low bicarb and elevated AG (likely 2/2 starvation ketosis), and nl pH/lactate. His imaging showed large volume ascites and worsening abdominal masses but unclear trend as patient currently follows at [facility] for his malignancy. He was also noted to have an incidental RLL PE and was started on a heparin gtt. he was admitted to medicine.  (2022 23:23)      Vital Signs: Vital Signs Last 24 Hrs  T(C): 36.7 (2022 06:30), Max: 36.7 (2022 06:30)  T(F): 98 (2022 06:30), Max: 98 (2022 06:30)  HR: 68 (2022 09:57) (68 - 94)  BP: 95/60 (2022 06:30) (89/65 - 96/60)  BP(mean): --  RR: 18 (2022 06:30) (18 - 18)  SpO2: 96% (2022 09:57) (96% - 100%)    Past Medical/ Surgical History: PAST MEDICAL & SURGICAL HISTORY:  Intra-abdominal and pelvic swelling, mass and lump, unspecified site  Currently on experimental chemotherapy at [facility]    Hypertension  Now with low BPs    Diabetes mellitus  Fasting FS range from - per patient, off meds    Hypercholesteremia  off meds    CAD (coronary artery disease)  Off aspirin    Sleep apnea    History of cardiac cath  Pt reports 1 cardiac stent placed     H/O sleep apnea  Per patient had Sleep Apnea surgery  in - at [facility]- Was not retested for Sleep Apnea post surgery    History of colon resection  Dec 2019        Allergies: Allergies    oxycodone (Vomiting)    Intolerances        Medications: MEDICATIONS  (STANDING):  dextrose 5%. 1000 milliLiter(s) (100 mL/Hr) IV Continuous <Continuous>  dextrose 5%. 1000 milliLiter(s) (50 mL/Hr) IV Continuous <Continuous>  dextrose 50% Injectable 25 Gram(s) IV Push once  dextrose 50% Injectable 12.5 Gram(s) IV Push once  dextrose 50% Injectable 25 Gram(s) IV Push once  enoxaparin Injectable 120 milliGRAM(s) SubCutaneous every 24 hours  glucagon  Injectable 1 milliGRAM(s) IntraMuscular once  insulin lispro (ADMELOG) corrective regimen sliding scale   SubCutaneous three times a day before meals  insulin lispro (ADMELOG) corrective regimen sliding scale   SubCutaneous at bedtime  pantoprazole    Tablet 40 milliGRAM(s) Oral two times a day  potassium phosphate / sodium phosphate Tablet (K-PHOS No. 2) 1 Tablet(s) Oral every 4 hours  senna 2 Tablet(s) Oral at bedtime  sodium chloride 3%  Inhalation 4 milliLiter(s) Inhalation every 12 hours    MEDICATIONS  (PRN):  acetaminophen     Tablet .. 650 milliGRAM(s) Oral every 6 hours PRN Temp greater or equal to 38C (100.4F), Mild Pain (1 - 3)  aluminum hydroxide/magnesium hydroxide/simethicone Suspension 30 milliLiter(s) Oral every 4 hours PRN Dyspepsia  benzonatate 100 milliGRAM(s) Oral three times a day PRN Cough  dextrose Oral Gel 15 Gram(s) Oral once PRN Blood Glucose LESS THAN 70 milliGRAM(s)/deciliter  HYDROmorphone  Injectable 0.2 milliGRAM(s) IV Push every 6 hours PRN moderate to severe pain  melatonin 3 milliGRAM(s) Oral at bedtime PRN Insomnia  ondansetron Injectable 4 milliGRAM(s) IV Push every 8 hours PRN Nausea and/or Vomiting      SOCIAL HISTORY:    FAMILY HISTORY:  FH: heart disease  Mother          PHYSICAL EXAM:    General:   Well-groomed, well-nourished, in no distress  Lungs:  CTA bilaterally  Cardiovascular:   S1, S2,   Abdomen:  Soft, non-tender, non-distended,   Extremities:  no calf tenderness/swelling bilaterally  Musculoskeletal:  Full ROM in all joints w/o swelling/tenderness/effusion  Neuro/Psych:  A &O x 3    LABS:                        7.4    3.49  )-----------( 69       ( 2022 05:44 )             24.0     04-13    133<L>  |  101  |  11  ----------------------------<  107<H>  3.6   |  22  |  0.73    Ca    7.4<L>      2022 05:44  Phos  1.5     04-13  Mg     1.60     04-13            RADIOLOGY & ADDITIONAL STUDIES:  PROCEDURE:   · Procedure Name	Interventional Radiology  · Procedure Name	Diagnostic/ Therapeutic paracentesis  · TIME OUT	Patient's first and last name, , procedure, and correct site confirmed prior to the start of procedure.  · Procedure Date/Time	2022 13:30  · Informed Consent	Benefits, risks, and possible complications of procedure explained to patient/caregiver who verbalized understanding and gave written consent.  · Procedure Performed By	Myself  · Procedure Assisted By	Attending, Dr. Almanzar  · Specimen Obtained	Fluid sent for chemistry, Fluid sent for cytology, Fluid sent for gram stain and culture  · Estimated Blood Loss	Minimal  · Complications	No complications  · Local Anesthesia	1% Lidocaine  · Procedure Findings and Details	3.8L ascitic fluid removed. 50cc fluid collected for cytology/chemistry. During procedure systolic pressure decreased to the 80s. Patient was not in distress and asymptomatic. Decision was made to abort procedure. Systolic pressure returned to the 90s before being discharged from radiology. Discussed with team.  · Patient Condition/Disposition	Back to floor  · Plan	Follow up with lab results. Reconsult PRN      Electronic Signatures:  Kade Cesar)   (Signed 2022 13:38)  	Authored: PROCEDURE  Reginald Almanzar)   (Signed 2022 15:47)  	Co-Signer: PROCEDURE< from: US Abdomen Limited (22 @ 16:14) >  ACC: [id number] EXAM:  US ABDOMEN LIMITED                          PROCEDURE DATE:  2022          INTERPRETATION:  CLINICAL INFORMATION: Evaluate for ascites. Known   abdominal mass    COMPARISON: 2022    Limited abdominal ultrasound is performed.    There is a large amount of ascites in the right upper quadrant. Smaller   amounts of fluid are seen elsewhere in the abdomen. Known large   intra-abdominal masses are again seen.    IMPRESSION: Large amount of ascites in the right upper quadrant    --- End of Report ---            NURY HERNANDEZ MD; Attending Radiologist  This document has been electronically signed. 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     Last paracentesis  by IR was aborted prior to completion due to hypotension. Consult for IR paracentesis as procedural team has deferred due to complexity.   - Please place order for IR Procedure "Diagnostic and Therapeutic Paracentesis", approving attending Dr. Sierra  - Pt does not need to be NPO  - hold therapeutic and prophylactic anticoagulants. HOLD PM DOSE OF LOVENOX TONIGHT; pt can restart anticoagulation 6hrs post procedure.   - maintain active type and screen x 2  - Please draw AM labs @4AM - CBC/INR/aPTT/BMP  - COVID PCR Result within 5d of the procedure   - 1:1 albumin replacement (pt last received 375ml of 5% albumin w/prior para on the 7th)

## 2022-04-14 NOTE — CHART NOTE - NSCHARTNOTEFT_GEN_A_CORE
Hematology consulted for low platelet count, Hem deferred consult to Oncology team, with patient to be seen in the morning.   Discussed this with Attending Physician  Dr Toure. Discussed with patient and wife who was at the bedside that due to his low platelets he was at risk for bleeding, and  increased risk if given his therapeutic lovenox dose due this evening. Patient and wife are aware that he is receiving therapeutic lovenox for his pulmonary embolism likely secondary to his metastatic cancer. Reviewed in detail the risks and benefits of giving the lovenox versus holding tonight's dose, including the risk of developing worsening respiratory symptoms from his current PE, including the development of more blood clots that could lead to further demise in his medical condition. Patient has no signs or symptoms of active bleeding, and has requested to receive a lower dose of lovenox tonight. Discussed conversation and patient's request with Attending Dr. Toure, who agreed with request after thorough review of the risks and benefits. Hematology consulted for low platelet count, Hem deferred consult to Oncology team, with patient to be seen in the morning.   Discussed this with Attending Physician  Dr Toure. Discussed with patient and wife who was at the bedside that due to his low platelets he was at risk for bleeding, and  increased risk if given his therapeutic lovenox dose due this evening. Patient and wife are aware that he is receiving therapeutic lovenox for his pulmonary embolism likely secondary to his metastatic cancer. Reviewed in detail the risks and benefits of giving the lovenox versus holding tonight's dose, including the risk of developing worsening respiratory symptoms from his current PE, including the development of more blood clots that could lead to further demise in his medical condition. Patient has no signs or symptoms of active bleeding, and has requested to receive a lower dose of lovenox tonight. Discussed conversation and patient's request with Attending Dr. Toure, who agreed with request after thorough review of the risks and benefits, will give one time dose Lovenox 60 mg.

## 2022-04-14 NOTE — CHART NOTE - NSCHARTNOTEFT_GEN_A_CORE
Notified by RN that Pt hypoxic SpO2 85%, placed on 4L NC by RN. Pt assessed at bedside. He states he feels ok and is currently asymptomatic. Denies F/C, N/V, CP, SOB, palpitations. Pt in NAD, conversant, Breathing comfortably on RA w/ SpO2 92%, Lungs CTA b/l, Heart nl S1/2, Abdomen distended. Tachycardic to 105, afebrile, hemodynamically stable. Hypoxia 2/2 known PE vs restrictive hypoventilation 2/2 abdominal distention. Currently holding AC in anticipation of Paracentesis this am. Will place on Continuous pulse ox, SpO2 in 90s on RA but will place on 2L NC for comfort, f/u CXR. Paracentesis this am, f/u CXR, am labs. Will continue to monitor. Notified by RN that Pt hypoxic SpO2 85%, placed on 4L NC by RN. Pt assessed at bedside. He states he feels ok and is currently asymptomatic. Denies F/C, N/V, CP, SOB, palpitations. Pt in NAD, conversant, Breathing comfortably on RA w/ SpO2 92%, Lungs CTA b/l, Heart nl S1/2, Abdomen distended. Tachycardic to 105, Febrile w/ T: 100.5, hemodynamically stable. Hypoxia 2/2 known PE vs Pneumonia vs restrictive hypoventilation 2/2 abdominal distention. Currently holding AC in anticipation of Paracentesis this am. Will place on Continuous pulse ox, SpO2 in 90s on RA but will place on 2L NC for comfort, f/u CXR, BCx x 2, CBC. Low grade temp x 1 however leukopenic w/ WBC: 3.49 so will treat for now w/ Vanco x 1 and Cefepime given ascites. Consider ID. Paracentesis this am. Will continue to monitor.

## 2022-04-14 NOTE — PROCEDURE NOTE - PROCEDURE FINDINGS AND DETAILS
Therapeutic and diagnostic paracentesis with removal of 3 L of whitney colored ascites, sample sent for diagnostic labs/workup
3.8L ascitic fluid removed. 50cc fluid collected for cytology/chemistry. During procedure systolic pressure decreased to the 80s. Patient was not in distress and asymptomatic. Decision was made to abort procedure. Systolic pressure returned to the 90s before being discharged from radiology. Discussed with team.

## 2022-04-14 NOTE — PROVIDER CONTACT NOTE (OTHER) - ACTION/TREATMENT ORDERED:
antibiotics ordered and STAT labs are ordered
Heparin gtt nomogram followed
ACP NancyZhane Costa has no further interventions at this time for the patient in regards to low blood pressure. ACP recommends to wean pt off of oxygen slowly, would like on 2L NC, ACP at bedside now

## 2022-04-14 NOTE — PROVIDER CONTACT NOTE (OTHER) - BACKGROUND
70 y/o M on Heparin gtt for PEs.
70 y/o male admitted with failure to thrive, found to have incidental PE, pt supposed to have paracentesis on 4/14, has baseline hypotension, abdominal liposarcoma, worsening abdominal mass+ascites
pt admitted with failure to thrive, found to have incidental PE, pt s/p paracentesis today, pt has abdominal liposarcoma

## 2022-04-14 NOTE — PROGRESS NOTE ADULT - ASSESSMENT
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 Jama Roberts at [facility]) who presents to the hospital with poor PO intake and worsening abdominal swelling    EKG SR low voltage   Tele: NSR 70-90s    1) LE edema and Ascites  - ?2/2 abd liposarcoma  - echo TDS with grossly normal LV function and mild-mod MR  - s/p paracentesis 4/7. repeat paracentesis 4/14    2) PE  -CTA chest with Acute right lower lobar pulmonary embolus extending into the segmental branches  -on RA sating well denies CP or SOB  -c/w Lovenox   -echo TDS grossly normal RV function    3) CAD s/p stent  -s/p stent 20 years ago as per patient  -denies CP    4) DVT PPX  -lovenox

## 2022-04-14 NOTE — PROVIDER CONTACT NOTE (OTHER) - REASON
[address] with low blood pressure and desaturating on room air to 85%
APTT 134.2
[address] HR of 105, pt desaturating to 85% on room air. RN put patient on 6L NC and is just now saturating to 93%.

## 2022-04-14 NOTE — PROVIDER CONTACT NOTE (OTHER) - SITUATION
[address] HR of 105, pt desaturating to 85% on room air. RN put patient on 6L NC and is just now saturating to 93% with AM vital signs.
[address] desatting to 85% on room air, RN placed patient on oxygen and patient did not saturate over 90% until on 4L NC. pt is asymptomatic. ACP notified of low bp this PM
Pt. APTT 134.2

## 2022-04-14 NOTE — PROCEDURE NOTE - SPECIMEN OBTAINED
Fluid sent for chemistry/Fluid sent for cytology/Fluid sent for gram stain and culture
Fluid sent for chemistry/Fluid sent for cytology

## 2022-04-14 NOTE — PROVIDER CONTACT NOTE (OTHER) - RECOMMENDATIONS
ACP NancyZhane Costa has no further interventions at this time for the patient in regards to low blood pressure. ACP recommends to wean pt off of oxygen slowly, would like on 2L NC, ACP at bedside now
Follow Heparin gtt nomogram
ACP Igor recommended manual bp and rectal temperature-this revealed a fever of 100.5. ACP at the bedside, ordered . pt now saturating well on 2L NC after Igor came to bedside at approx 06:30

## 2022-04-14 NOTE — PROVIDER CONTACT NOTE (OTHER) - ASSESSMENT
Pt. resting comfortably in bed. Denies any pain or discomfort. No signs of bleeding
pt has been coughing this PM, DUONEB ordered and given. pt is asymptomatic of tachycardia and desaturating, pt denies SOB.
pt is asymptomatic, pt is laying down trying to sleep, pt denies pain, chest pain, or SOB

## 2022-04-15 NOTE — CONSULT NOTE ADULT - ASSESSMENT
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 Jama Roberts at [facility]) who presented with poor PO intake and worsening abdominal swelling.   After admission, lab work showed anemia Hb~7-8  (stable comparing to prior; Hb 7.5 in 1/2022), platelet wnl;    CT chest angio 4/5 showed Acute right lower lobar pulmonary embolus extending into the segmental branches. Small bilateral right greater than left pleural effusions.  CT abd/p showed BLADDER: A complex mixed cystic and calcified mass abuts the bladder. BOWEL: Moderate size hiatal hernia. No bowel obstruction. PERITONEUM: Large volume loculated ascites with worsening peritoneal disease. For example:  *  Left pelvic mass (series 2, image 88), measuring 21.4 x 19.8 x 29.6 cm, previously 17.5 x 9.2 x 20 cm  *  Anterior right upper quadrant mass (series 2, image 52), measuring 11.0 x 7.4 x 9.6 cm (2-52), previously 10.1 x 6.1 cm  *  Mixed solid and cystic right pelvic mass (series 2, image 80), measuring 12.4 x 14.4 x 17.8 cm (2-80), previously 8.8 x 7.8 cm  RETROPERITONEUM/LYMPH NODES: No lymphadenopathy.    He was eval by [facility] but not a good candidate per note; s/p paracentesis by IR and plan for placement of drain. Primary team has been consider reaching out to Dr. Jama Farley at [facility] to see if he has any recommendations to patient's management  He was treated with heaprin drip (4/4-4/9) then lovenox subQ for PE.     Onco consulted for thrombocytopenia plt 106 found on 4/12--> trending down to 47 (blue top 4/15 morning). Heparin PF4 Negative 4/15; pending serotonin release assay.        #Thrombocytopenia   #Anemia -ferritin >800; pending iron studies  -likely multifactorial; less likely HIT since heparin PF4 negative (which has a high negative predicative value) ; pending serotonin release assay to confirm  -check vitB12, folate, LDH, uric acid, haptoglobin, reticulocyte,   -will review blood smear   -No other thrombocytopenia induced meds were used after admission     #Liposarcoma   -primary team may request medical record and contact with Jama Weir at [facility] for further recc to pt's treatment of liposarcoma    70 yo M with PMHx of low BPs  HLD, DM2 (currently off meds), FADI not on CPAP, CAD s/p stent (1999, now off aspirin), and 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 Jama Roberts at [facility]) who presented with poor PO intake and worsening abdominal swelling.   After admission, lab work showed anemia Hb~7-8  (stable comparing to prior; Hb 7.5 in 1/2022), platelet wnl;    CT chest angio 4/5 showed Acute right lower lobar pulmonary embolus extending into the segmental branches. Small bilateral right greater than left pleural effusions.  CT abd/p showed BLADDER: A complex mixed cystic and calcified mass abuts the bladder. BOWEL: Moderate size hiatal hernia. No bowel obstruction. PERITONEUM: Large volume loculated ascites with worsening peritoneal disease. For example:  *  Left pelvic mass (series 2, image 88), measuring 21.4 x 19.8 x 29.6 cm, previously 17.5 x 9.2 x 20 cm  *  Anterior right upper quadrant mass (series 2, image 52), measuring 11.0 x 7.4 x 9.6 cm (2-52), previously 10.1 x 6.1 cm  *  Mixed solid and cystic right pelvic mass (series 2, image 80), measuring 12.4 x 14.4 x 17.8 cm (2-80), previously 8.8 x 7.8 cm  RETROPERITONEUM/LYMPH NODES: No lymphadenopathy.    He was eval by [facility] but not a good candidate per note; s/p paracentesis by IR and plan for placement of drain. Primary team has been consider reaching out to Dr. Jama Farley at [facility] to see if he has any recommendations to patient's management  He was treated with heaprin drip (4/4-4/9) then lovenox subQ for PE.     Onco consulted for thrombocytopenia plt 106 found on 4/12--> trending down to 47 (blue top 4/15 morning). Heparin PF4 Negative 4/15; pending serotonin release assay.        #Thrombocytopenia   #Anemia -ferritin >800; pending iron studies  -likely multifactorial; less likely HIT since heparin PF4 negative (which has a high negative predicative value) ; pending serotonin release assay to confirm  -check vitB12, folate, LDH, uric acid, haptoglobin, reticulocyte,   -will review blood smear   -No other thrombocytopenia induced meds were used after admission     #Liposarcoma   -pt reported that he started an experimental treatment (PO med one pill/day x 3 days every 2 wks) around 2/2022 by his oncologist Jama Long at [facility] (office [phone] or [phone]). Last dose about 3 wks ago.   -primary team may request medical record and contact with Jama Weir at [facility] for further recc to pt's treatment of liposarcoma    Discussed with attending Dr. 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 Jama Roberts at [facility]) who presented with poor PO intake and worsening abdominal swelling.   After admission, lab work showed anemia Hb~7-8  (stable comparing to prior; Hb 7.5 in 1/2022), platelet wnl;    CT chest angio 4/5 showed Acute right lower lobar pulmonary embolus extending into the segmental branches. Small bilateral right greater than left pleural effusions.  CT abd/p showed BLADDER: A complex mixed cystic and calcified mass abuts the bladder. BOWEL: Moderate size hiatal hernia. No bowel obstruction. PERITONEUM: Large volume loculated ascites with worsening peritoneal disease. For example:  *  Left pelvic mass (series 2, image 88), measuring 21.4 x 19.8 x 29.6 cm, previously 17.5 x 9.2 x 20 cm  *  Anterior right upper quadrant mass (series 2, image 52), measuring 11.0 x 7.4 x 9.6 cm (2-52), previously 10.1 x 6.1 cm  *  Mixed solid and cystic right pelvic mass (series 2, image 80), measuring 12.4 x 14.4 x 17.8 cm (2-80), previously 8.8 x 7.8 cm  RETROPERITONEUM/LYMPH NODES: No lymphadenopathy.    He was eval by [facility] but not a good candidate per note; s/p paracentesis by IR and plan for placement of drain. Primary team has been consider reaching out to Dr. Jama Farley at [facility] to see if he has any recommendations to patient's management  He was treated with heaprin drip (4/4-4/9) then lovenox subQ for PE.     Onco consulted for thrombocytopenia plt 106 found on 4/12--> trending down to 47 (blue top 4/15 morning). Heparin PF4 Negative 4/15; pending serotonin release assay.        #Thrombocytopenia   #Anemia -ferritin >800; pending iron studies  -likely multifactorial; less likely HIT since heparin PF4 negative (which has a high negative predicative value) ; pending serotonin release assay to confirm  -check vitB12, folate, LDH, uric acid, haptoglobin, reticulocyte,   -monitor coagulation panel   -Reviewed peripheral blood smear; mild anisocytosis of RBC, average platelet count 5/hpf for 10 hpf which estimate platelet count 50k. No increased schistocyte seen. no atypical cells seen.   -No other thrombocytopenia induced meds were used after admission   -will closely f/u platelet count daily; if plt >50k team may start DOAC with renal/age based dose.     #Liposarcoma   -pt reported that he started an experimental treatment (PO med one pill/day x 3 days every 2 wks) around 2/2022 by his oncologist Jama Long at [facility] (office [phone] or [phone]). Last dose about 3 wks ago. Onco team tried to reach Dr Roberts without success.   -primary team may request medical record and contact with Jama Weir at [facility] for further recc to pt's treatment of liposarcoma    Discussed with attending Dr. 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 Jama Roberts at [facility]) who presented with poor PO intake and worsening abdominal swelling.   After admission, lab work showed anemia Hb~7-8  (stable comparing to prior; Hb 7.5 in 1/2022), platelet wnl;    CT chest angio 4/5 showed Acute right lower lobar pulmonary embolus extending into the segmental branches. Small bilateral right greater than left pleural effusions.  CT abd/p showed BLADDER: A complex mixed cystic and calcified mass abuts the bladder. BOWEL: Moderate size hiatal hernia. No bowel obstruction. PERITONEUM: Large volume loculated ascites with worsening peritoneal disease. For example:  *  Left pelvic mass (series 2, image 88), measuring 21.4 x 19.8 x 29.6 cm, previously 17.5 x 9.2 x 20 cm  *  Anterior right upper quadrant mass (series 2, image 52), measuring 11.0 x 7.4 x 9.6 cm (2-52), previously 10.1 x 6.1 cm  *  Mixed solid and cystic right pelvic mass (series 2, image 80), measuring 12.4 x 14.4 x 17.8 cm (2-80), previously 8.8 x 7.8 cm  RETROPERITONEUM/LYMPH NODES: No lymphadenopathy.    He was eval by [facility] but not a good candidate per note; s/p paracentesis by IR and plan for placement of drain. Primary team has been consider reaching out to Dr. Jama Farley at [facility] to see if he has any recommendations to patient's management  He was treated with heaprin drip (4/4-4/9) then lovenox subQ for PE.     Onco consulted for thrombocytopenia plt 106 found on 4/12--> trending down to 47 (blue top 4/15 morning). Heparin PF4 Negative 4/15; pending serotonin release assay.        #Thrombocytopenia   #Anemia -ferritin >800; pending iron studies  -likely multifactorial; less likely HIT since heparin PF4 negative (which has a high negative predicative value) ; pending serotonin release assay to confirm  -check vitB12, folate, LDH, uric acid, haptoglobin, reticulocyte,   -monitor coagulation panel   -Reviewed peripheral blood smear; mild anisocytosis of RBC, few bite cells seen; average platelet count 5/hpf for 10 hpf which estimate platelet count 50k. No increased schistocyte seen/no e/o microangiopathy effects. no atypical cells seen.   -No other thrombocytopenia induced meds were used after admission   -will closely f/u platelet count daily; if plt >50k team may start DOAC with renal/age based dose.     #Liposarcoma   -pt reported that he started an experimental treatment (PO med one pill/day x 3 days every 2 wks) around 2/2022 by his oncologist Jama Long at [facility] (office [phone] or [phone]). Last dose about 3 wks ago. Onco team tried to reach Dr Roberts group which provide the info: Pt was treated with Milademetan 260mg day 1-3 started on 2/16/22 and then day 15-17. on 3/31 dose reduced to 200mg (unsure if pt ever finished).   -Milademetan was reported for causing pancytopenia. However it is unsure if it is the cause  in this case. Will communicate with Dr. Roberts's group for further clarification.   -primary team may request medical record and contact with Jama Weir at [facility] for further recc to pt's treatment of liposarcoma    Discussed with attending Dr. Joseph Ramirez PGY4   Hem& Onco fellow P [phone]

## 2022-04-15 NOTE — CONSULT NOTE ADULT - ATTENDING COMMENTS
Pt has liposarcoma on study drug milademetan. On admission, he had anemia which was slightly lower compared to his baseline and started on heparin followed by enoxaparin product for PE. He developed leucopenia which has resolved and then subsequently thrombocytopenia that started 4/12/2022 and has been continuing to down trend. Per history, pt feels  his appetite has improved after paracentesis and is eating more; during this admission has had paracentesis x 2: last one done 4/14/2022. On exam, he has ecchymoses over the BUE, no petechiae over BLE, periumbilical mass that pt states is postsurgical defect, distended abdomen, no pitting edema. Peripheral smear showing occasional schistocytes, few teardrops, bite cells, spherocytes along with neutrophil predominance, plts 5/ hpf. We reviewed with Dr Farley' on call fellow the effect from his study drug and awaiting call back to see if this delayed leucopenia and thrombocytopenia could be as a result of the mildademetan which had to be dose reduced. It has 60% incidence of thrombocytopenia and 50% anemia on the reported abstracts. We will recommend work up to evaluate with issues with synthesis: B12, folate, reticulocyte count and ask for evaluation of increased destruction: LDH, haptoglobin, Flavia (bite cells and spherocytes on the smear). The smear does not show any microangiopathic process but we would be ruling out autoimmune hemolysis/ decreased bone marrow function. 4T score is intermediate probability and HIT is negative with NARCISA pending. Ultimately, if platelet count continues to fall he would not be stable for discharge since he is currently off anticoagulation due to plt count less than 50K/microL. Will reassess plt count in am to see if improving enough to return on anticoagulation with close outpatient followup.
Mr. 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 Palliative consulted for sx management and complex decision making regarding goc.     > Symptom management as above.   > Patient is pending paracentesis tomorrow, which will hopefully relieve the discomfort he is experiencing.   > [place] building. Patient shared that he wishes he had more time as he recently purchased a house in [place] and a house in [facility] to spend more time with his family, however he is starting to accept that his cancer is advanced and not responding to DMT as he wished it would. He seems to be aware that his life expectancy may be limited as he stated "I have to get my affairs" organized. Emotional support provided.

## 2022-04-15 NOTE — PROGRESS NOTE ADULT - ASSESSMENT
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 Jama Roberts at [facility]) who presents to the hospital with poor PO intake and worsening abdominal swelling    EKG SR low voltage   Tele: NSR 70-90s    1) LE edema and Ascites  - ?2/2 abd liposarcoma  - echo TDS with grossly normal LV function and mild-mod MR  - s/p paracentesis 4/7. s/p repeat paracentesis      2) PE  -CTA chest with Acute right lower lobar pulmonary embolus extending into the segmental branches  -on RA sating well denies CP or SOB  -c/w Lovenox   -echo TDS grossly normal RV function    3) CAD s/p stent  -s/p stent 20 years ago as per patient  -denies CP    4) DVT PPX  -lovenox

## 2022-04-15 NOTE — CONSULT NOTE ADULT - SUBJECTIVE AND OBJECTIVE BOX
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 Jama Roberts at [facility]) who presents to the hospital with poor PO intake and worsening abdominal swelling. Said that for the past few weeks he has been having significant abd swelling with associated pressure in his abdomen and poor PO intake. Said that whenever he tries to eat something he invariably ends up vomiting the food due to his abdominal distension (emesis usually is food, sometimes bilious, no blood/coffee ground substances). Said that he has been having difficulty due to his abdominal distention but denies any chest pain, palpitations, or syncope. Also with a new onset cough sometimes with green sputum but no hemoptysis. Is having BMs but sometimes has diarrhea (last BM this AM was soft, no blood in BM). Has generalized weakness due to his reduced PO intake. Denies any recent fevers/chills/diaphoresis. Has noted b/l LE swelling but denies any pain to it, has been put on lasix for it but states that it does not seem to be helping the swelling. Said that he has known fluid in his abdomen, was sampled once but has never been drained before. Denies any other acute complaints.     On arrival to the ED, his vitals were T 98, P 95, BP 85/51 -> 93/54, RR 18, O2 sat 95%. His lab work showed anemia (improved from prior), low bicarb and elevated AG (likely 2/2 starvation ketosis), and nl pH/lactate. His imaging showed large volume ascites and worsening abdominal masses but unclear trend as patient currently follows at [facility] for his malignancy. He was also noted to have an incidental RLL PE and was started on a heparin gtt. he was admitted to medicine.  (04 Apr 2022 23:23)      PAST MEDICAL & SURGICAL HISTORY:  Intra-abdominal and pelvic swelling, mass and lump, unspecified site  Currently on experimental chemotherapy at [facility]    Hypertension  Now with low BPs    Diabetes mellitus  Fasting FS range from - per patient, off meds    Hypercholesteremia  off meds    CAD (coronary artery disease)  Off aspirin    Sleep apnea    History of cardiac cath  Pt reports 1 cardiac stent placed 1999    H/O sleep apnea  Per patient had Sleep Apnea surgery  in 1996- at [facility]- Was not retested for Sleep Apnea post surgery    History of colon resection  Dec 2019        Allergies    oxycodone (Vomiting)    Intolerances        MEDICATIONS  (STANDING):  albumin human 25% IVPB 50 milliLiter(s) IV Intermittent once  cefepime   IVPB 2000 milliGRAM(s) IV Intermittent every 8 hours  cefepime   IVPB      dextrose 5%. 1000 milliLiter(s) (100 mL/Hr) IV Continuous <Continuous>  dextrose 5%. 1000 milliLiter(s) (50 mL/Hr) IV Continuous <Continuous>  dextrose 50% Injectable 25 Gram(s) IV Push once  dextrose 50% Injectable 12.5 Gram(s) IV Push once  dextrose 50% Injectable 25 Gram(s) IV Push once  glucagon  Injectable 1 milliGRAM(s) IntraMuscular once  insulin lispro (ADMELOG) corrective regimen sliding scale   SubCutaneous three times a day before meals  insulin lispro (ADMELOG) corrective regimen sliding scale   SubCutaneous at bedtime  multivitamin 1 Tablet(s) Oral daily  pantoprazole    Tablet 40 milliGRAM(s) Oral two times a day  potassium phosphate / sodium phosphate Tablet (K-PHOS No. 2) 1 Tablet(s) Oral every 4 hours  senna 2 Tablet(s) Oral at bedtime  sodium chloride 3%  Inhalation 4 milliLiter(s) Inhalation every 12 hours    MEDICATIONS  (PRN):  acetaminophen     Tablet .. 650 milliGRAM(s) Oral every 6 hours PRN Temp greater or equal to 38C (100.4F), Mild Pain (1 - 3)  aluminum hydroxide/magnesium hydroxide/simethicone Suspension 30 milliLiter(s) Oral every 4 hours PRN Dyspepsia  benzonatate 100 milliGRAM(s) Oral three times a day PRN Cough  dextrose Oral Gel 15 Gram(s) Oral once PRN Blood Glucose LESS THAN 70 milliGRAM(s)/deciliter  melatonin 3 milliGRAM(s) Oral at bedtime PRN Insomnia  ondansetron Injectable 4 milliGRAM(s) IV Push every 8 hours PRN Nausea and/or Vomiting      FAMILY HISTORY:  FH: heart disease  Mother        SOCIAL HISTORY: No EtOH, no tobacco    REVIEW OF SYSTEMS:    CONSTITUTIONAL: No weakness, fevers or chills  EYES/ENT: No visual changes;  No vertigo or throat pain   NECK: No pain or stiffness  RESPIRATORY: No cough, wheezing, hemoptysis; No shortness of breath  CARDIOVASCULAR: No chest pain or palpitations  GASTROINTESTINAL: No abdominal or epigastric pain. No nausea, vomiting, or hematemesis; No diarrhea or constipation. No melena or hematochezia.  GENITOURINARY: No dysuria, frequency or hematuria  NEUROLOGICAL: No numbness or weakness  SKIN: No itching, burning, rashes, or lesions   All other review of systems is negative unless indicated above.        T(F): 97.4 (04-15-22 @ 05:12), Max: 98 (04-14-22 @ 14:16)  HR: 74 (04-15-22 @ 05:12)  BP: 90/42 (04-15-22 @ 05:12)  RR: 18 (04-15-22 @ 05:12)  SpO2: 98% (04-15-22 @ 05:12)  Wt(kg): --    GENERAL: NAD, well-developed  HEAD:  Atraumatic, Normocephalic  EYES: EOMI, PERRLA, conjunctiva and sclera clear  NECK: Supple, No JVD  CHEST/LUNG: Clear to auscultation bilaterally; No wheeze  HEART: Regular rate and rhythm; No murmurs, rubs, or gallops  ABDOMEN: Soft, Nontender, Nondistended; Bowel sounds present  EXTREMITIES:  2+ Peripheral Pulses, No clubbing, cyanosis, or edema  NEUROLOGY: non-focal  SKIN: No rashes or lesions                          6.5    4.26  )-----------( 59       ( 15 Apr 2022 06:56 )             21.1       04-15    134<L>  |  103  |  12  ----------------------------<  105<H>  3.5   |  21<L>  |  0.73    Ca    7.6<L>      15 Apr 2022 06:56  Phos  2.3     04-15  Mg     1.80     04-15    TPro  4.8<L>  /  Alb  2.1<L>  /  TBili  <0.2  /  DBili  x   /  AST  6   /  ALT  <5<L>  /  AlkPhos  52  04-15      Magnesium, Serum: 1.80 mg/dL (04-15 @ 06:56)  Phosphorus Level, Serum: 2.3 mg/dL (04-15 @ 06:56)      PT/INR - ( 14 Apr 2022 06:49 )   PT: 12.1 sec;   INR: 1.04 ratio         PTT - ( 14 Apr 2022 06:49 )  PTT:28.6 sec    Abdominal Fl ABDOMEN DRAINAGE  04-14 @ 12:27   Testing in progress  --    No polymorphonuclear leukocytes per low power field  No organisms seen per oil power field      .Body Fluid ASCITES  04-07 @ 16:33   Testing in progress  --  --      .Body Fluid ASCITES  04-07 @ 12:45   No growth at 5 days  --    polymorphonuclear leukocytes seen  No organisms seen per oil power field  by cytocentrifuge      .Blood Blood-Peripheral  04-04 @ 16:30   No Growth Final  --  --      .Blood Blood-Peripheral  04-04 @ 16:20   No Growth Final  --  --      .Blood Blood  01-24 @ 21:48   No Growth Final  --  --      Clean Catch Clean Catch (Midstream)  01-24 @ 21:32   No growth  --  --      .Blood Blood  01-24 @ 21:20   No Growth Final  --  --       HPI:  Mr. 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 Reported that for the past few weeks he has been having significant abd swelling with associated pressure in his abdomen and poor PO intake. he had intermittent vomiting of undigested food while eating due to abdominal distension (emesis sometimes bilious, no blood/coffee ground substances). denies chest pain, palpitations, syncope. Also had a new onset intermittent mild cough sometimes with green sputum but no hemoptysis. denied melena/hematochezia. Occasional  soft BMs as diarrhea (not watery). Endorsed generalized weakness due to his reduced PO intake. Denies fevers/chills/diaphoresis. He noted b/l LE swelling but denies any pain to it, has been put on lasix for it but states that it does not seem to be helping the swelling. Reported that he has known fluid in his abdomen, was sampled once but has never been drained before.     After admission, lab work showed anemia Hb~7-8  (stable comparing to prior; Hb 7.5 in 1/2022), platelet wnl;    CT chest angio 4/5 showed Acute right lower lobar pulmonary embolus extending into the segmental branches. Small bilateral right greater than left pleural effusions.  CT abd/p showed BLADDER: A complex mixed cystic and calcified mass abuts the bladder.BOWEL: Moderate size hiatal hernia. No bowel obstruction. PERITONEUM: Large volume loculated ascites with worsening peritoneal disease. For example:  *  Left pelvic mass (series 2, image 88), measuring 21.4 x 19.8 x 29.6 cm, previously 17.5 x 9.2 x 20 cm  *  Anterior right upper quadrant mass (series 2, image 52), measuring 11.0 x 7.4 x 9.6 cm (2-52), previously 10.1 x 6.1 cm  *  Mixed solid and cystic right pelvic mass (series 2, image 80), measuring 12.4 x 14.4 x 17.8 cm (2-80), previously 8.8 x 7.8 cm  RETROPERITONEUM/LYMPH NODES: No lymphadenopathy.    He was eval by [facility] but not a good candidate per note; s/p paracentesis by IR and plan for placement of drain. Primary team has been consider reaching out to Dr. Jama Farley at [facility] to see if he has any recommendations to patient's management  He was treated with heaprin drip (4/4-4/9) then lovenox subQ for PE.     Onco consulted for thrombocytopenia plt 106 found on 4/12--> trending down to 47 (blue top 4/15 morning). Heparin PF4 Negative 4/15; pending serotonin release assay.      PAST MEDICAL & SURGICAL HISTORY:  Intra-abdominal and pelvic swelling, mass and lump, unspecified site  Currently on experimental chemotherapy at [facility]    Hypertension  Now with low BPs    Diabetes mellitus  Fasting FS range from - per patient, off meds    Hypercholesteremia  off meds    CAD (coronary artery disease)  Off aspirin    Sleep apnea    History of cardiac cath  Pt reports 1 cardiac stent placed 1999    H/O sleep apnea  Per patient had Sleep Apnea surgery  in 1996- at [facility]- Was not retested for Sleep Apnea post surgery    History of colon resection  Dec 2019        Allergies    oxycodone (Vomiting)    Intolerances        MEDICATIONS  (STANDING):  albumin human 25% IVPB 50 milliLiter(s) IV Intermittent once  cefepime   IVPB 2000 milliGRAM(s) IV Intermittent every 8 hours  cefepime   IVPB      dextrose 5%. 1000 milliLiter(s) (100 mL/Hr) IV Continuous <Continuous>  dextrose 5%. 1000 milliLiter(s) (50 mL/Hr) IV Continuous <Continuous>  dextrose 50% Injectable 25 Gram(s) IV Push once  dextrose 50% Injectable 12.5 Gram(s) IV Push once  dextrose 50% Injectable 25 Gram(s) IV Push once  glucagon  Injectable 1 milliGRAM(s) IntraMuscular once  insulin lispro (ADMELOG) corrective regimen sliding scale   SubCutaneous three times a day before meals  insulin lispro (ADMELOG) corrective regimen sliding scale   SubCutaneous at bedtime  multivitamin 1 Tablet(s) Oral daily  pantoprazole    Tablet 40 milliGRAM(s) Oral two times a day  potassium phosphate / sodium phosphate Tablet (K-PHOS No. 2) 1 Tablet(s) Oral every 4 hours  senna 2 Tablet(s) Oral at bedtime  sodium chloride 3%  Inhalation 4 milliLiter(s) Inhalation every 12 hours    MEDICATIONS  (PRN):  acetaminophen     Tablet .. 650 milliGRAM(s) Oral every 6 hours PRN Temp greater or equal to 38C (100.4F), Mild Pain (1 - 3)  aluminum hydroxide/magnesium hydroxide/simethicone Suspension 30 milliLiter(s) Oral every 4 hours PRN Dyspepsia  benzonatate 100 milliGRAM(s) Oral three times a day PRN Cough  dextrose Oral Gel 15 Gram(s) Oral once PRN Blood Glucose LESS THAN 70 milliGRAM(s)/deciliter  melatonin 3 milliGRAM(s) Oral at bedtime PRN Insomnia  ondansetron Injectable 4 milliGRAM(s) IV Push every 8 hours PRN Nausea and/or Vomiting      FAMILY HISTORY:  FH: heart disease  Mother        SOCIAL HISTORY: No EtOH, no tobacco    REVIEW OF SYSTEMS:    CONSTITUTIONAL: No weakness, fevers or chills  EYES/ENT: No visual changes;  No vertigo or throat pain   NECK: No pain or stiffness  RESPIRATORY: No cough, wheezing, hemoptysis; No shortness of breath  CARDIOVASCULAR: No chest pain or palpitations  GASTROINTESTINAL: No abdominal or epigastric pain. No nausea, vomiting, or hematemesis; No diarrhea or constipation. No melena or hematochezia.  GENITOURINARY: No dysuria, frequency or hematuria  NEUROLOGICAL: No numbness or weakness  SKIN: No itching, burning, rashes, or lesions   All other review of systems is negative unless indicated above.        T(F): 97.4 (04-15-22 @ 05:12), Max: 98 (04-14-22 @ 14:16)  HR: 74 (04-15-22 @ 05:12)  BP: 90/42 (04-15-22 @ 05:12)  RR: 18 (04-15-22 @ 05:12)  SpO2: 98% (04-15-22 @ 05:12)  Wt(kg): --    GENERAL: NAD, well-developed  HEAD:  Atraumatic, Normocephalic  EYES: EOMI, PERRLA, conjunctiva and sclera clear  NECK: Supple, No JVD  CHEST/LUNG: Clear to auscultation bilaterally; No wheeze  HEART: Regular rate and rhythm; No murmurs, rubs, or gallops  ABDOMEN: Soft, Nontender, Nondistended; Bowel sounds present  EXTREMITIES:  2+ Peripheral Pulses, No clubbing, cyanosis, or edema  NEUROLOGY: non-focal  SKIN: No rashes or lesions                          6.5    4.26  )-----------( 59       ( 15 Apr 2022 06:56 )             21.1       04-15    134<L>  |  103  |  12  ----------------------------<  105<H>  3.5   |  21<L>  |  0.73    Ca    7.6<L>      15 Apr 2022 06:56  Phos  2.3     04-15  Mg     1.80     04-15    TPro  4.8<L>  /  Alb  2.1<L>  /  TBili  <0.2  /  DBili  x   /  AST  6   /  ALT  <5<L>  /  AlkPhos  52  04-15      Magnesium, Serum: 1.80 mg/dL (04-15 @ 06:56)  Phosphorus Level, Serum: 2.3 mg/dL (04-15 @ 06:56)      PT/INR - ( 14 Apr 2022 06:49 )   PT: 12.1 sec;   INR: 1.04 ratio         PTT - ( 14 Apr 2022 06:49 )  PTT:28.6 sec    Abdominal Fl ABDOMEN DRAINAGE  04-14 @ 12:27   Testing in progress  --    No polymorphonuclear leukocytes per low power field  No organisms seen per oil power field      .Body Fluid ASCITES  04-07 @ 16:33   Testing in progress  --  --      .Body Fluid ASCITES  04-07 @ 12:45   No growth at 5 days  --    polymorphonuclear leukocytes seen  No organisms seen per oil power field  by cytocentrifuge      .Blood Blood-Peripheral  04-04 @ 16:30   No Growth Final  --  --      .Blood Blood-Peripheral  04-04 @ 16:20   No Growth Final  --  --      .Blood Blood  01-24 @ 21:48   No Growth Final  --  --      Clean Catch Clean Catch (Midstream)  01-24 @ 21:32   No growth  --  --      .Blood Blood  01-24 @ 21:20   No Growth Final  --  --       HPI:  Mr. 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 Jama Roberts at [facility]) who presented with poor PO intake and worsening abdominal swelling. Reported that for the past few weeks he has been having significant abd swelling with associated pressure in his abdomen and poor PO intake. he had intermittent vomiting of undigested food while eating due to abdominal distension (emesis sometimes bilious, no blood/coffee ground substances). denies chest pain, palpitations, syncope. Also had a new onset intermittent mild cough sometimes with green sputum but no hemoptysis. denied melena/hematochezia. Occasional  soft BMs as diarrhea (not watery). Endorsed generalized weakness due to his reduced PO intake. Denies fevers/chills/diaphoresis. He noted b/l LE swelling but denies any pain to it, has been put on lasix for it but states that it does not seem to be helping the swelling. Reported that he has known fluid in his abdomen, was sampled once but has never been drained before.     After admission, lab work showed anemia Hb~7-8  (stable comparing to prior; Hb 7.5 in 1/2022), platelet wnl;    CT chest angio 4/5 showed Acute right lower lobar pulmonary embolus extending into the segmental branches. Small bilateral right greater than left pleural effusions.  CT abd/p showed BLADDER: A complex mixed cystic and calcified mass abuts the bladder.BOWEL: Moderate size hiatal hernia. No Retroperitoneal lymphadenopathy; No bowel obstruction. PERITONEUM: Large volume loculated ascites with worsening peritoneal disease. For example:  *  Left pelvic mass (series 2, image 88), measuring 21.4 x 19.8 x 29.6 cm, previously 17.5 x 9.2 x 20 cm  *  Anterior right upper quadrant mass (series 2, image 52), measuring 11.0 x 7.4 x 9.6 cm (2-52), previously 10.1 x 6.1 cm  *  Mixed solid and cystic right pelvic mass (series 2, image 80), measuring 12.4 x 14.4 x 17.8 cm (2-80), previously 8.8 x 7.8 cm    He was eval by [facility] but not a good candidate per note; s/p paracentesis by IR and plan for placement of drain. Primary team has been consider reaching out to Dr. Jama Farley at [facility] to see if he has any recommendations to patient's management  He was treated with heaprin drip (4/4-4/9) then lovenox subQ for PE.     [facility] consulted for thrombocytopenia plt 106 found on 4/12--> trending down to 47 (blue top 4/15 morning). Heparin PF4 Negative 4/15; pending serotonin release assay.      PAST MEDICAL & SURGICAL HISTORY:  Intra-abdominal and pelvic swelling, mass and lump, unspecified site  Currently on experimental chemotherapy at [facility]    Hypertension  Now with low BPs    Diabetes mellitus  Fasting FS range from - per patient, off meds    Hypercholesteremia  off meds    CAD (coronary artery disease)  Off aspirin    Sleep apnea    History of cardiac cath  Pt reports 1 cardiac stent placed 1999    H/O sleep apnea  Per patient had Sleep Apnea surgery  in 1996- at [facility]- Was not retested for Sleep Apnea post surgery    History of colon resection  Dec 2019        Allergies    oxycodone (Vomiting)    Intolerances        MEDICATIONS  (STANDING):  albumin human 25% IVPB 50 milliLiter(s) IV Intermittent once  cefepime   IVPB 2000 milliGRAM(s) IV Intermittent every 8 hours  cefepime   IVPB      dextrose 5%. 1000 milliLiter(s) (100 mL/Hr) IV Continuous <Continuous>  dextrose 5%. 1000 milliLiter(s) (50 mL/Hr) IV Continuous <Continuous>  dextrose 50% Injectable 25 Gram(s) IV Push once  dextrose 50% Injectable 12.5 Gram(s) IV Push once  dextrose 50% Injectable 25 Gram(s) IV Push once  glucagon  Injectable 1 milliGRAM(s) IntraMuscular once  insulin lispro (ADMELOG) corrective regimen sliding scale   SubCutaneous three times a day before meals  insulin lispro (ADMELOG) corrective regimen sliding scale   SubCutaneous at bedtime  multivitamin 1 Tablet(s) Oral daily  pantoprazole    Tablet 40 milliGRAM(s) Oral two times a day  potassium phosphate / sodium phosphate Tablet (K-PHOS No. 2) 1 Tablet(s) Oral every 4 hours  senna 2 Tablet(s) Oral at bedtime  sodium chloride 3%  Inhalation 4 milliLiter(s) Inhalation every 12 hours    MEDICATIONS  (PRN):  acetaminophen     Tablet .. 650 milliGRAM(s) Oral every 6 hours PRN Temp greater or equal to 38C (100.4F), Mild Pain (1 - 3)  aluminum hydroxide/magnesium hydroxide/simethicone Suspension 30 milliLiter(s) Oral every 4 hours PRN Dyspepsia  benzonatate 100 milliGRAM(s) Oral three times a day PRN Cough  dextrose Oral Gel 15 Gram(s) Oral once PRN Blood Glucose LESS THAN 70 milliGRAM(s)/deciliter  melatonin 3 milliGRAM(s) Oral at bedtime PRN Insomnia  ondansetron Injectable 4 milliGRAM(s) IV Push every 8 hours PRN Nausea and/or Vomiting      FAMILY HISTORY:  FH: heart disease  Mother        SOCIAL HISTORY: No EtOH, no tobacco    REVIEW OF SYSTEMS:    CONSTITUTIONAL: No weakness, fevers or chills  EYES/ENT: No visual changes;  No vertigo or throat pain   NECK: No pain or stiffness  RESPIRATORY: No cough, wheezing, hemoptysis; No shortness of breath  CARDIOVASCULAR: No chest pain or palpitations  GASTROINTESTINAL: No abdominal or epigastric pain. No nausea, vomiting, or hematemesis; No diarrhea or constipation. No melena or hematochezia.  GENITOURINARY: No dysuria, frequency or hematuria  NEUROLOGICAL: No numbness or weakness  SKIN: No itching, burning, rashes, or lesions   All other review of systems is negative unless indicated above.        T(F): 97.4 (04-15-22 @ 05:12), Max: 98 (04-14-22 @ 14:16)  HR: 74 (04-15-22 @ 05:12)  BP: 90/42 (04-15-22 @ 05:12)  RR: 18 (04-15-22 @ 05:12)  SpO2: 98% (04-15-22 @ 05:12)  Wt(kg): --    GENERAL: NAD, well-developed  HEAD:  Atraumatic, Normocephalic  EYES: EOMI, PERRLA, conjunctiva and sclera clear  NECK: Supple, No JVD  CHEST/LUNG: Clear to auscultation bilaterally; No wheeze  HEART: Regular rate and rhythm; No murmurs, rubs, or gallops  ABDOMEN: Soft, Nontender, Nondistended; Bowel sounds present  EXTREMITIES:  2+ Peripheral Pulses, No clubbing, cyanosis, or edema  NEUROLOGY: non-focal  SKIN: No rashes or lesions                          6.5    4.26  )-----------( 59       ( 15 Apr 2022 06:56 )             21.1       04-15    134<L>  |  103  |  12  ----------------------------<  105<H>  3.5   |  21<L>  |  0.73    Ca    7.6<L>      15 Apr 2022 06:56  Phos  2.3     04-15  Mg     1.80     04-15    TPro  4.8<L>  /  Alb  2.1<L>  /  TBili  <0.2  /  DBili  x   /  AST  6   /  ALT  <5<L>  /  AlkPhos  52  04-15      Magnesium, Serum: 1.80 mg/dL (04-15 @ 06:56)  Phosphorus Level, Serum: 2.3 mg/dL (04-15 @ 06:56)      PT/INR - ( 14 Apr 2022 06:49 )   PT: 12.1 sec;   INR: 1.04 ratio         PTT - ( 14 Apr 2022 06:49 )  PTT:28.6 sec    Abdominal Fl ABDOMEN DRAINAGE  04-14 @ 12:27   Testing in progress  --    No polymorphonuclear leukocytes per low power field  No organisms seen per oil power field      .Body Fluid ASCITES  04-07 @ 16:33   Testing in progress  --  --      .Body Fluid ASCITES  04-07 @ 12:45   No growth at 5 days  --    polymorphonuclear leukocytes seen  No organisms seen per oil power field  by cytocentrifuge      .Blood Blood-Peripheral  04-04 @ 16:30   No Growth Final  --  --      .Blood Blood-Peripheral  04-04 @ 16:20   No Growth Final  --  --      .Blood Blood  01-24 @ 21:48   No Growth Final  --  --      Clean Catch Clean Catch (Midstream)  01-24 @ 21:32   No growth  --  --      .Blood Blood  01-24 @ 21:20   No Growth Final  --  --       HPI:  Mr. 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 Jama Roberts at [facility]) who presented with poor PO intake and worsening abdominal swelling. Reported that for the past few weeks he has been having significant abd swelling with associated pressure in his abdomen and poor PO intake. he had intermittent vomiting of undigested food while eating due to abdominal distension (emesis sometimes bilious, no blood/coffee ground substances). denies chest pain, palpitations, syncope. Also had a new onset intermittent mild cough sometimes with green sputum but no hemoptysis. denied melena/hematochezia. Occasional  soft BMs as diarrhea (not watery). Endorsed generalized weakness due to his reduced PO intake. Denies fevers/chills/diaphoresis. He noted b/l LE swelling but denies any pain to it, has been put on lasix for it but states that it does not seem to be helping the swelling. Reported that he has known fluid in his abdomen, was sampled once but has never been drained before.     After admission, lab work showed anemia Hb~7-8  (stable comparing to prior; Hb 7.5 in 1/2022), platelet wnl;    CT chest angio 4/5 showed Acute right lower lobar pulmonary embolus extending into the segmental branches. Small bilateral right greater than left pleural effusions.  CT abd/p showed BLADDER: A complex mixed cystic and calcified mass abuts the bladder.BOWEL: Moderate size hiatal hernia. No Retroperitoneal lymphadenopathy; No bowel obstruction. PERITONEUM: Large volume loculated ascites with worsening peritoneal disease. For example:  *  Left pelvic mass (series 2, image 88), measuring 21.4 x 19.8 x 29.6 cm, previously 17.5 x 9.2 x 20 cm  *  Anterior right upper quadrant mass (series 2, image 52), measuring 11.0 x 7.4 x 9.6 cm (2-52), previously 10.1 x 6.1 cm  *  Mixed solid and cystic right pelvic mass (series 2, image 80), measuring 12.4 x 14.4 x 17.8 cm (2-80), previously 8.8 x 7.8 cm    He was eval by [facility] but not a good candidate per note; s/p paracentesis by IR and plan for placement of drain. Primary team has been consider reaching out to Dr. Jama Farley at [facility] to see if he has any recommendations to patient's management  He was treated with heparin drip (4/4-4/9) then lovenox subQ for PE.     Onco consulted for thrombocytopenia plt 106 found on 4/12--> trending down to 47 (blue top 4/15 morning). Heparin PF4 Negative 4/15; pending serotonin release assay.      PAST MEDICAL & SURGICAL HISTORY:  Intra-abdominal and pelvic swelling, mass and lump, unspecified site  Currently on experimental chemotherapy at [facility]    Hypertension  Now with low BPs    Diabetes mellitus  Fasting FS range from - per patient, off meds    Hypercholesteremia  off meds    CAD (coronary artery disease)  Off aspirin    Sleep apnea    History of cardiac cath  Pt reports 1 cardiac stent placed 1999    H/O sleep apnea  Per patient had Sleep Apnea surgery  in 1996- at [facility]- Was not retested for Sleep Apnea post surgery    History of colon resection  Dec 2019        Allergies    oxycodone (Vomiting)    Intolerances        MEDICATIONS  (STANDING):  albumin human 25% IVPB 50 milliLiter(s) IV Intermittent once  cefepime   IVPB 2000 milliGRAM(s) IV Intermittent every 8 hours  cefepime   IVPB      dextrose 5%. 1000 milliLiter(s) (100 mL/Hr) IV Continuous <Continuous>  dextrose 5%. 1000 milliLiter(s) (50 mL/Hr) IV Continuous <Continuous>  dextrose 50% Injectable 25 Gram(s) IV Push once  dextrose 50% Injectable 12.5 Gram(s) IV Push once  dextrose 50% Injectable 25 Gram(s) IV Push once  glucagon  Injectable 1 milliGRAM(s) IntraMuscular once  insulin lispro (ADMELOG) corrective regimen sliding scale   SubCutaneous three times a day before meals  insulin lispro (ADMELOG) corrective regimen sliding scale   SubCutaneous at bedtime  multivitamin 1 Tablet(s) Oral daily  pantoprazole    Tablet 40 milliGRAM(s) Oral two times a day  potassium phosphate / sodium phosphate Tablet (K-PHOS No. 2) 1 Tablet(s) Oral every 4 hours  senna 2 Tablet(s) Oral at bedtime  sodium chloride 3%  Inhalation 4 milliLiter(s) Inhalation every 12 hours    MEDICATIONS  (PRN):  acetaminophen     Tablet .. 650 milliGRAM(s) Oral every 6 hours PRN Temp greater or equal to 38C (100.4F), Mild Pain (1 - 3)  aluminum hydroxide/magnesium hydroxide/simethicone Suspension 30 milliLiter(s) Oral every 4 hours PRN Dyspepsia  benzonatate 100 milliGRAM(s) Oral three times a day PRN Cough  dextrose Oral Gel 15 Gram(s) Oral once PRN Blood Glucose LESS THAN 70 milliGRAM(s)/deciliter  melatonin 3 milliGRAM(s) Oral at bedtime PRN Insomnia  ondansetron Injectable 4 milliGRAM(s) IV Push every 8 hours PRN Nausea and/or Vomiting      FAMILY HISTORY:  FH: heart disease  Mother        SOCIAL HISTORY: No EtOH, no tobacco    REVIEW OF SYSTEMS:    CONSTITUTIONAL: No weakness, fevers or chills  EYES/ENT: No visual changes;  No vertigo or throat pain   NECK: No pain or stiffness  RESPIRATORY: No cough, wheezing, hemoptysis; No shortness of breath  CARDIOVASCULAR: No chest pain or palpitations  GASTROINTESTINAL: No abdominal or epigastric pain. No nausea, vomiting, or hematemesis; No diarrhea or constipation. No melena or hematochezia.  GENITOURINARY: No dysuria, frequency or hematuria  NEUROLOGICAL: No numbness or weakness  SKIN: No itching, burning, rashes, or lesions   All other review of systems is negative unless indicated above.        T(F): 97.4 (04-15-22 @ 05:12), Max: 98 (04-14-22 @ 14:16)  HR: 74 (04-15-22 @ 05:12)  BP: 90/42 (04-15-22 @ 05:12)  RR: 18 (04-15-22 @ 05:12)  SpO2: 98% (04-15-22 @ 05:12)  Wt(kg): --    GENERAL: NAD, well-developed  HEAD:  Atraumatic, Normocephalic  EYES: EOMI, PERRLA, conjunctiva and sclera clear  NECK: Supple, No JVD  CHEST/LUNG: Clear to auscultation bilaterally; No wheeze  HEART: Regular rate and rhythm; No murmurs, rubs, or gallops  ABDOMEN: Soft, Nontender, Nondistended; Bowel sounds present  EXTREMITIES:  2+ Peripheral Pulses, No clubbing, cyanosis, or edema  NEUROLOGY: non-focal  SKIN: No rashes or lesions                          6.5    4.26  )-----------( 59       ( 15 Apr 2022 06:56 )             21.1       04-15    134<L>  |  103  |  12  ----------------------------<  105<H>  3.5   |  21<L>  |  0.73    Ca    7.6<L>      15 Apr 2022 06:56  Phos  2.3     04-15  Mg     1.80     04-15    TPro  4.8<L>  /  Alb  2.1<L>  /  TBili  <0.2  /  DBili  x   /  AST  6   /  ALT  <5<L>  /  AlkPhos  52  04-15      Magnesium, Serum: 1.80 mg/dL (04-15 @ 06:56)  Phosphorus Level, Serum: 2.3 mg/dL (04-15 @ 06:56)      PT/INR - ( 14 Apr 2022 06:49 )   PT: 12.1 sec;   INR: 1.04 ratio         PTT - ( 14 Apr 2022 06:49 )  PTT:28.6 sec    Abdominal Fl ABDOMEN DRAINAGE  04-14 @ 12:27   Testing in progress  --    No polymorphonuclear leukocytes per low power field  No organisms seen per oil power field      .Body Fluid ASCITES  04-07 @ 16:33   Testing in progress  --  --      .Body Fluid ASCITES  04-07 @ 12:45   No growth at 5 days  --    polymorphonuclear leukocytes seen  No organisms seen per oil power field  by cytocentrifuge      .Blood Blood-Peripheral  04-04 @ 16:30   No Growth Final  --  --      .Blood Blood-Peripheral  04-04 @ 16:20   No Growth Final  --  --      .Blood Blood  01-24 @ 21:48   No Growth Final  --  --      Clean Catch Clean Catch (Midstream)  01-24 @ 21:32   No growth  --  --      .Blood Blood  01-24 @ 21:20   No Growth Final  --  --

## 2022-04-15 NOTE — CONSULT NOTE ADULT - CONSULT REASON
Thromobcytopenia; recently diagnosed PE; previous history of liposarcoma Thrombocytopenia; recently diagnosed PE; previous history of liposarcoma

## 2022-04-16 NOTE — PROGRESS NOTE ADULT - ASSESSMENT
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 s/p repeat paracentesis      2) PE  -CTA chest with Acute right lower lobar pulmonary embolus extending into the segmental branches  -on RA sating well denies CP or SOB  -c/w Lovenox   -echo TDS grossly normal RV function    3) CAD s/p stent  -s/p stent 20 years ago as per patient  -denies CP    4) DVT PPX  -lovenox

## 2022-04-16 NOTE — PROGRESS NOTE ADULT - ASSESSMENT
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 Jama Roberts at [facility]) who presented with poor PO intake and worsening abdominal swelling.   After admission, lab work showed anemia Hb~7-8  (stable comparing to prior; Hb 7.5 in 1/2022), platelet wnl;    CT chest angio 4/5 showed Acute right lower lobar pulmonary embolus extending into the segmental branches. Small bilateral right greater than left pleural effusions.  CT abd/p showed BLADDER: A complex mixed cystic and calcified mass abuts the bladder. BOWEL: Moderate size hiatal hernia. No bowel obstruction. PERITONEUM: Large volume loculated ascites with worsening peritoneal disease. For example:  *  Left pelvic mass (series 2, image 88), measuring 21.4 x 19.8 x 29.6 cm, previously 17.5 x 9.2 x 20 cm  *  Anterior right upper quadrant mass (series 2, image 52), measuring 11.0 x 7.4 x 9.6 cm (2-52), previously 10.1 x 6.1 cm  *  Mixed solid and cystic right pelvic mass (series 2, image 80), measuring 12.4 x 14.4 x 17.8 cm (2-80), previously 8.8 x 7.8 cm  RETROPERITONEUM/LYMPH NODES: No lymphadenopathy.    He was eval by [facility] but not a good candidate per note; s/p paracentesis by IR and plan for placement of drain. Onc team has been in touch with Dr. 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   Pt was treated with heparin drip (4/4-4/9) then lovenox subQ for PE. This was on hold pending HIT assays. Given improvement in plt count, knowledge that this is likely experimental-med induced and the fact that HIT assay was negative, Lovenox can be resumed. Will follow up on NARCISA. Chance of HIT is very small but if plt count drops or NARCISA comes back pos, please stop Lovenox and call heme consult.   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 Jama Roberts at [facility]) who presented with poor PO intake and worsening abdominal swelling.   After admission, lab work showed anemia Hb~7-8  (stable comparing to prior; Hb 7.5 in 1/2022), platelet wnl;    CT chest angio 4/5 showed Acute right lower lobar pulmonary embolus extending into the segmental branches. Small bilateral right greater than left pleural effusions.  CT abd/p showed BLADDER: A complex mixed cystic and calcified mass abuts the bladder. BOWEL: Moderate size hiatal hernia. No bowel obstruction. PERITONEUM: Large volume loculated ascites with worsening peritoneal disease. For example:  *  Left pelvic mass (series 2, image 88), measuring 21.4 x 19.8 x 29.6 cm, previously 17.5 x 9.2 x 20 cm  *  Anterior right upper quadrant mass (series 2, image 52), measuring 11.0 x 7.4 x 9.6 cm (2-52), previously 10.1 x 6.1 cm  *  Mixed solid and cystic right pelvic mass (series 2, image 80), measuring 12.4 x 14.4 x 17.8 cm (2-80), previously 8.8 x 7.8 cm  RETROPERITONEUM/LYMPH NODES: No lymphadenopathy.    He was eval by [facility] but not a good candidate per note; s/p paracentesis by IR and plan for placement of drain. Onc team has been in touch with Dr. Jama Farley at [facility] who confirmed that pancytopenia is a known AE of the experimental agent.   Pt was treated with heparin drip (4/4-4/9) then lovenox subQ for PE. This was on hold pending HIT assays. Given improvement in plt count, knowledge that this is likely experimental-med induced and the fact that HIT assay was negative, Lovenox can be resumed. Will follow up on NARCISA. Chance of HIT is very small but if plt count drops or NARCISA comes back pos, please stop Lovenox and call heme consult.   Discussed with primary team  From heme/onc perspective, if the pt has stable counts tomorrow, he can be discharged to follow up with Dr. Farley, his primary oncologist- dispo as per primary team. Pt expresses desire to go home to see his daughter and her family who have come from [place] to visit him after a long time.

## 2022-04-16 NOTE — CHART NOTE - NSCHARTNOTEFT_GEN_A_CORE
Lovenox full dose restarted today for positive PE. Can be discharged on Eliquis 5mg BID if need of oral anticoagulant. Discussed care with Dr Pereyra

## 2022-04-17 NOTE — PROGRESS NOTE ADULT - ASSESSMENT
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 s/p repeat paracentesis      2) PE  -CTA chest with Acute right lower lobar pulmonary embolus extending into the segmental branches  -on RA sating well denies CP or SOB  -echo TDS grossly normal RV function  -on lovenox however platelet count dropping, f/u heme/onc recs    3) CAD s/p stent  -s/p stent 20 years ago as per patient  -denies CP    4) DVT PPX  -lovenox

## 2022-04-17 NOTE — PROGRESS NOTE ADULT - TIME BILLING
-Anemia,-hgb-6.8 bEING Transfused  Thrombocytopenia-platelets-90.monitor Hem-onc-f/u  -Chronic cough-cxr-neg.trial of symbicort,PPI.Pulm f/u noted  -d/w SHANI,RN

## 2022-04-18 NOTE — CONSULT NOTE ADULT - ASSESSMENT
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  IR consulted for placement of peritoneal drain.  Last paracentesis was performed by ITR on 4/14 and approximately 3L were drained.     Allergies: oxycodone (Vomiting)    Medications (Abx/Cardiac/Anticoagulation/Blood Products)    cefepime   IVPB: 100 mL/Hr IV Intermittent (04-18 @ 06:20)  enoxaparin Injectable: 80 milliGRAM(s) SubCutaneous (04-17 @ 06:43)  enoxaparin Injectable: 80 milliGRAM(s) SubCutaneous (04-18 @ 06:19)  furosemide   Injectable: 20 milliGRAM(s) IV Push (04-17 @ 17:53)  furosemide   Injectable: 20 milliGRAM(s) IV Push (04-17 @ 23:08)    Data:    T(C): 36.7  HR: 71  BP: 100/56  RR: 18  SpO2: 100%    -WBC 3.43 / HgB 6.7 / Hct 21.7 / Plt 97  -Na 132 / Cl 101 / BUN 12 / Glucose 107  -K 3.2 / CO2 21 / Cr 0.72  -ALT 5 / Alk Phos 57 / T.Bili 0.4  -INR 1.05 / PTT 31.0      Radiology:     Assessment/Plan: 71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  IR consulted for placement of peritoneal drain.  Last paracentesis was performed by ITR on 4/14 and approximately 3L were drained. Patient now with recurrent moderate to large ascites.     -- IR will plan to perform   -- NPO at midnight  -- hold a.m. anticoagulation  -- please complete IR pre-procedure note  -- please maintain COVID PCR within 5 days of planned procedure   -- please place IR procedure request order under Dr. 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  IR consulted for placement of peritoneal drain.  Last paracentesis was performed by ITR on 4/14 and approximately 3L were drained.     Allergies: oxycodone (Vomiting)    Medications (Abx/Cardiac/Anticoagulation/Blood Products)    cefepime   IVPB: 100 mL/Hr IV Intermittent (04-18 @ 06:20)  enoxaparin Injectable: 80 milliGRAM(s) SubCutaneous (04-17 @ 06:43)  enoxaparin Injectable: 80 milliGRAM(s) SubCutaneous (04-18 @ 06:19)  furosemide   Injectable: 20 milliGRAM(s) IV Push (04-17 @ 17:53)  furosemide   Injectable: 20 milliGRAM(s) IV Push (04-17 @ 23:08)    Data:    T(C): 36.7  HR: 71  BP: 100/56  RR: 18  SpO2: 100%    -WBC 3.43 / HgB 6.7 / Hct 21.7 / Plt 97  -Na 132 / Cl 101 / BUN 12 / Glucose 107  -K 3.2 / CO2 21 / Cr 0.72  -ALT 5 / Alk Phos 57 / T.Bili 0.4  -INR 1.05 / PTT 31.0      Radiology: U/S 4/18/2022 reviewed    Assessment/Plan: 71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  IR consulted for placement of peritoneal drain.  Last paracentesis was performed by ITR on 4/14 and approximately 3L were drained. Patient now with recurrent moderate to large ascites.     -- IR will plan to perform placement of tunnelled drainage abdominal catheter tomorrow, 4/19/2022  -- NPO at midnight  -- hold a.m. anticoagulation  -- please complete IR pre-procedure note  -- please maintain COVID PCR within 5 days of planned procedure   -- please place IR procedure request order under Dr. Andres Martins

## 2022-04-18 NOTE — CONSULT NOTE ADULT - REASON FOR ADMISSION
Poor PO intake, Abd swelling

## 2022-04-18 NOTE — CONSULT NOTE ADULT - CONSULT REQUESTED DATE/TIME
13-Apr-2022 11:28
18-Apr-2022 13:17
05-Apr-2022 15:26
05-Apr-2022 15:37
15-Apr-2022 10:00
05-Apr-2022 13:00
06-Apr-2022 13:26
12-Apr-2022 18:19

## 2022-04-18 NOTE — PROVIDER CONTACT NOTE (CRITICAL VALUE NOTIFICATION) - SITUATION
Ptt>200
pt w Hgb of 6.8.  pt in stable condition.
Hgb 7 Hct 21 on VBG
hgb 6.5
Hgb 6.8, Hct 22.8, CBC platelet 89, blue top platelet 58. pt in stable condition.
pt on heparin drip, pt with APTT critical lab value of 134.4
pt on heparin drip, pt with APTT critical lab value of 129
pt with Hgb of 6.7. patient alert and orientedx4, denies any distress at this time. w  type and screen. ACP Antonia made aware.

## 2022-04-18 NOTE — PROVIDER CONTACT NOTE (CRITICAL VALUE NOTIFICATION) - NAME OF MD/NP/PA/DO NOTIFIED:
SHANI Aquino
Sho Knight
SHANI Knight
SHANI Acosta
So, Sarthak
Larry NI
SHANI Gonzales
SHANI Gonzales

## 2022-04-18 NOTE — CONSULT NOTE ADULT - PROVIDER SPECIALTY LIST ADULT
Cardiology
Intervent Radiology
Intervent Radiology
Palliative Care
Rad Onc
Heme/Onc
Intervent Radiology
Intervent Radiology

## 2022-04-18 NOTE — PROVIDER CONTACT NOTE (CRITICAL VALUE NOTIFICATION) - ASSESSMENT
Patient A&Ox4, no s/s of bleeding noted, fever noted overnight - antibiotics started; SpO2 dropped overnight, pt on 2L intermittently, no desatting noted. BP within patient's normal range, HR stable.
pt with Hgb of 6.7. patient alert and orientedx4, denies any distress at this time. w  type and screen. ACP Antonia made aware.
pt w Hgb of 6.8.  pt in stable condition.
Hgb 6.8, Hct 22.8, CBC platelet 89, blue top platelet 58
pt on heparin drip, pt with APTT critical lab value of 134.4
patient is A&Ox4 ,patient denies chest pain and SOB, no S/s of acute distress present.
pt on heparin drip, pt with APTT critical lab value of 129
Patient A&Ox4. Patient asymptomatic. No acute distress noted.

## 2022-04-18 NOTE — PROGRESS NOTE ADULT - ASSESSMENT
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 s/p repeat paracentesis      2) PE  -CTA chest with Acute right lower lobar pulmonary embolus extending into the segmental branches  -on RA sating well denies CP or SOB  -echo TDS grossly normal RV function  -on lovenox however platelet count dropping, f/u heme/onc recs    3) CAD s/p stent  -s/p stent 20 years ago as per patient  -denies CP    4) Anemia  -hemoglobin 6.7  -plan for PRBC transfusion  -monitor H/H     5)  DVT PPX  -lovenox

## 2022-04-18 NOTE — PROVIDER CONTACT NOTE (CRITICAL VALUE NOTIFICATION) - TEST AND RESULT REPORTED:
Hbg 6.7
Ptt>200
APTT 129
Hgb 6.8, Hct 22.8, CBC platelet 89, blue top platelet 58
Hgb 6.8
Hgb 7 Hct 21 on VBG
hgb 6.5
APTT 134.4

## 2022-04-18 NOTE — PROVIDER CONTACT NOTE (CRITICAL VALUE NOTIFICATION) - ACTION/TREATMENT ORDERED:
type and screen to be ordered and obtained, and blood transfusion to be given
Per provider will follow heparin nomogram. Safety maintained. Will continue to monitor.
pending order from team covering
ACP made aware
pending order from team covering
Continue to monitor, no transfusion at this time
as per acp crissy follow heparin nomogram protocol. stop drip for 60 minutes, then restart and decrease to 11/hr.
per ACP Dillon Acosta, follow nomogram. Pause infusion for 60 min and then decrease rate by 3

## 2022-04-18 NOTE — PROVIDER CONTACT NOTE (CRITICAL VALUE NOTIFICATION) - BACKGROUND
71 years old male PMXs of hypotension, dm2, cad, admitted for failure to thrive .
71 yrs old PMH CAD, DM, sleep apnea, HTN
71 yrs old male presenting with abdominal distention found ot have worsening abdominal masses.
pt admitted for ascites and abdominal distention. 72 y/o male, with failure to thrive, found to have incidental PE.
Abd Liposarcmo with Worsening abdominal masses and ascites; + PE
71 yrs old PMH CAD, DM, sleep apnea, HTN
pt admitted for ascites and abdominal distention. 72 y/o male, with failure to thrive, found to have incidental PE.
Patient admitted with PE.

## 2022-04-18 NOTE — PROVIDER CONTACT NOTE (CRITICAL VALUE NOTIFICATION) - RECOMMENDATIONS
Follow heparin gtt nomogram
as per acp crissy follow heparin nomogram protocol. stop drip for 60 minutes, then restart and decrease to 11/hr.
repeat CBC and possible blood transfusion
ACP made aware
Will follow provider orders,
type and screen to be ordered and obtained, and blood transfusion to be given
administer blood transfusion
Continue to monitor

## 2022-04-19 NOTE — PROGRESS NOTE ADULT - NS_MD_PANP_GEN_ALL_CORE

## 2022-04-19 NOTE — PROGRESS NOTE ADULT - NS ATTEND OPT1 GEN_ALL_CORE
I attest my time as attending is greater than 50% of the total combined time spent on qualifying patient care activities by the PA/NP and attending.

## 2022-04-19 NOTE — PROGRESS NOTE ADULT - ASSESSMENT
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 s/p repeat paracentesis      2) PE  -CTA chest with Acute right lower lobar pulmonary embolus extending into the segmental branches  -on RA sating well denies CP or SOB  -echo TDS grossly normal RV function  -on lovenox however platelet count dropping, f/u heme/onc recs    3) CAD s/p stent  -s/p stent 20 years ago as per patient  -denies CP    4) Anemia  -hemoglobin 6.7  -plan for PRBC transfusion  -monitor H/H     5)  DVT PPX  -lovenox   71M with history of Low BPs (usual range is 90-95/55-65), HLD (currently off meds), DM2 (currently off meds), FADI not on CPAP (s/p sleep apnea sx as per wife), CAD s/p stent (1999, now off aspirin for some time), and 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 Jama Roberts at [facility]) who presents to the hospital with poor PO intake and worsening abdominal swelling    EKG SR low voltage   Tele: NSR 70-90s    1) LE edema and Ascites  - ?2/2 abd liposarcoma  - echo TDS with grossly normal LV function and mild-mod MR  - s/p paracentesis 4/7. s/p repeat paracentesis      2) PE  -CTA chest with Acute right lower lobar pulmonary embolus extending into the segmental branches  -on RA sating well denies CP or SOB  -echo TDS grossly normal RV function  -on lovenox however platelet count dropping, f/u heme/onc recs    3) CAD s/p stent  -s/p stent 20 years ago as per patient  -denies CP    4) Anemia  -hemoglobin 9.3  -s/p PRBC transfusion  -monitor H/H     5)  DVT PPX  -lovenox

## 2022-04-19 NOTE — PROGRESS NOTE ADULT - ASSESSMENT
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 Jama Roberts at [facility]) who presented with poor PO intake and worsening abdominal swelling. After admission, lab work showed anemia Hb~7-8  (stable comparing to prior; Hb 7.5 in 1/2022), platelet wnl;  Onco consulted for thrombocytopenia plt 106 found on 4/12--> trending down to 47 (blue top 4/15 morning).      CT chest angio 4/5 showed Acute right lower lobar pulmonary embolus extending into the segmental branches. Small bilateral right greater than left pleural effusions.  CT abd/p showed BLADDER: A complex mixed cystic and calcified mass abuts the bladder. BOWEL: Moderate size hiatal hernia. No bowel obstruction. PERITONEUM: Large volume loculated ascites with worsening peritoneal disease. For example:  *  Left pelvic mass (series 2, image 88), measuring 21.4 x 19.8 x 29.6 cm, previously 17.5 x 9.2 x 20 cm  *  Anterior right upper quadrant mass (series 2, image 52), measuring 11.0 x 7.4 x 9.6 cm (2-52), previously 10.1 x 6.1 cm  *  Mixed solid and cystic right pelvic mass (series 2, image 80), measuring 12.4 x 14.4 x 17.8 cm (2-80), previously 8.8 x 7.8 cm  RETROPERITONEUM/LYMPH NODES: No lymphadenopathy.    He was eval by [facility] but not a good candidate per note; s/p paracentesis by IR and plan for placement of drain (scheduled today but cancelled; likely tomorrow) . He was treated with heaprin drip (4/4-4/9) then lovenox subQ for PE.       #Thrombocytopenia   #Anemia -ferritin >800; pending iron studies  -likely multifactorial; reticulocyte low which suggests inappropriate responses to anemia which likely due to bone marrow suppression vs tumor infiltration   -thrombocytopenia is not related to HIT since heparin PF4 negative (which has a high negative predicative value) ;  serotonin release assay negative; no e/o HIT; c/w lovenox for PE treatment; hold off lovenox before the procedure then restart after procedure   -platelet count trending up; currently >70k  -LOW vitB12, folate, Pt need supplements of Folic acid and VitB12  -LDH, uric acid, haptoglobin, reticulocyte wnl   -monitor coagulation panel and daily CBC  -pt need f/u his med onc Dr. Roberts at [facility] after d/c to closely monitor CBC, and may consider transfuse RBC if necessary. Primary team please communicate with his med onco office and make sure pt will be followed up after d/c.    -onc team already reviewed peripheral blood smear; mild anisocytosis of RBC, few bite cells seen; average platelet count 5/hpf for 10 hpf which estimate platelet count 50k. No increased schistocyte seen/no e/o microangiopathy effects. no atypical cells seen.   -No other thrombocytopenia induced meds were used after admission       #Liposarcoma with malignant ascites   -pt reported that he started an experimental treatment (PO med one pill/day x 3 days every 2 wks) around 2/2022 by his oncologist Jama Long at [facility] (office [phone] or [phone]). Last dose about 3 wks ago. Onco team tried to reach Dr Roberts group which provide the info: Pt was treated with Milademetan 260mg day 1-3 started on 2/16/22 and then day 15-17. on 3/31 dose reduced to 200mg (unsure if pt ever finished).   -Milademetan was reported for causing pancytopenia. Onco team communicated with Dr. Roberts's group, and they reported that this med has delayed effect on blood counts.   -paracentesis and ascites fluid drain per IR and primary team     -Pt will f/u his med oncologist Jama Weir at [facility] for further  treatment of liposarcoma    Discussed with attending Dr. Joseph Ramirez PGY4   [facility] Onco fellow P [phone]

## 2022-04-19 NOTE — CHART NOTE - NSCHARTNOTEFT_GEN_A_CORE
Source: Patient [x]    other [x] medical chart   Diet rx: Diet, Regular (04-05-22 @ 02:09) [Active]    Pt 72 yo male with abdominal liposarcoma and recurrent ascites; last paracentesis on 4/14; approximately 3L were drained - per chart review. Of not, Pt now with recurrent moderate to large ascites; plan -> IR to place tunnelled drainage abdominal catheter tomorrow; Pt to be NPO past midnight for the procedure.    At time of visit, Pt awake, alert, oriented. Pt's appetite remains poor, but "getting better" reported. RDN offered variety of PO supplements, but Pt declined them all. S/P Swallow Bedside Assessment Adult, SLP rec: Regular solids with thin liquids (4/13). No report of chewing or swallowing difficulty; no report of nausea, vomiting @ this time. +BM (4/19), diarrhea per flow sheet. Rec to add Lactobacillus Acidophilus (probiotic supplementation) to promote improved gut function. RDN answered concerns related to diet. RDN remains available, Pt made aware.      Pt's height: 68" (4/4)     IBW: 154#+/-10%    Pt's weights 81.2 kg (bed scale, at time of visit), 83.4 kg (4/4)  Pertinent Medications: Lovenox, Insulin (Lispro), K-phosphate, Multivitamin, Senna, Aluminum hydroxide/magnesium hydroxide/simethicone Suspension (PRN), Zofran (PRN), Protonix,    Pertinent Labs: (4/19) WBC 2.71 L, H/H 9.2/28.6 L, PLT 76 L, Na 134 L, Glu 100 H, phosphorus 2.0 L;     (4/18) Albumin 2.1 L;    (4/5) HbA1c 5.6%   Skin: per flow sheet, 1+ edema to r/ ankle, r/l foot; +dryness/ecchymosis    Estimated Needs: [x] no change since previous assessment  Previous Nutrition Diagnosis: [x] Malnutrition, severe    Nutrition Diagnosis is [x] ongoing      Nutrition Interventions/Recommendations:   1. Encourage & assist Pt with meals; Monitor PO diet tolerance;   2. Honor food preferences;  3. Add Lactobacillus Acidophilus (probiotic supplementation) to promote improved gut function;   4. Monitor labs, weekly weights, hydration status;

## 2022-04-19 NOTE — PROGRESS NOTE ADULT - NS ATTEND AMEND GEN_ALL_CORE FT

## 2022-04-19 NOTE — PROGRESS NOTE ADULT - NS ATTEND BILL GEN_ALL_CORE
Attending to bill

## 2022-04-19 NOTE — CHART NOTE - NSCHARTNOTEFT_GEN_A_CORE
PRE-INTERVENTIONAL RADIOLOGY PROCEDURE NOTE      Patient Age: 71 year old     Patient Gender: Male    Procedure: IR Placement of Tunnelled Drainage Catheter in the Abdomen, and Paracentesis of Ascites    Diagnosis/Indication: Abdominal Liposarcoma with Worsening Ascites requiring weekly drainage     Interventional Radiology Attending Physician: Dr Andres Martins    Ordering Attending Physician: Dr Toure    Pertinent Medical History: 71M with history of baseline hypotension, DM2 (currently off meds), FADI not on CPAP, CAD, and Abd Liposarcoma (On experimental chemo at [facility] with Dr. Jama Farley.) p/w worsening abdominal distension found to have worsening abdominal masses and ascites. Also found to have incidental PE.       Pertinent labs:                      9.2    2.71  )-----------( 76       ( 19 Apr 2022 06:54 )             28.6       04-19    134<L>  |  102  |  13  ----------------------------<  100<H>  3.6   |  22  |  0.69    Ca    7.9<L>      19 Apr 2022 06:54  Phos  2.0     04-19  Mg     1.70     04-19    TPro  5.2<L>  /  Alb  2.1<L>  /  TBili  0.4  /  DBili  x   /  AST  6   /  ALT  5   /  AlkPhos  57  04-18            Patient and Family Aware ? Yes

## 2022-04-20 NOTE — PROGRESS NOTE ADULT - NUTRITIONAL ASSESSMENT
This patient has been assessed with a concern for Malnutrition and has been determined to have a diagnosis/diagnoses of Severe protein-calorie malnutrition.    This patient is being managed with:   Diet Regular-  Entered: Apr 5 2022  2:08AM    
This patient has been assessed with a concern for Malnutrition and has been determined to have a diagnosis/diagnoses of Severe protein-calorie malnutrition.    This patient is being managed with:   Diet Regular-  Entered: Apr 20 2022 11:54AM    
This patient has been assessed with a concern for Malnutrition and has been determined to have a diagnosis/diagnoses of Severe protein-calorie malnutrition.    This patient is being managed with:   Diet Regular-  Entered: Apr 5 2022  2:08AM    
This patient has been assessed with a concern for Malnutrition and has been determined to have a diagnosis/diagnoses of Severe protein-calorie malnutrition.    This patient is being managed with:   Diet Regular-  Entered: Apr 5 2022  2:08AM    
This patient has been assessed with a concern for Malnutrition and has been determined to have a diagnosis/diagnoses of Severe protein-calorie malnutrition.    This patient is being managed with:   Diet NPO after Midnight-     NPO Start Date: 18-Apr-2022   NPO Start Time: 23:59  Entered: Apr 18 2022  2:44PM    Diet Regular-  Entered: Apr 5 2022  2:08AM    
This patient has been assessed with a concern for Malnutrition and has been determined to have a diagnosis/diagnoses of Severe protein-calorie malnutrition.    This patient is being managed with:   Diet Regular-  Entered: Apr 5 2022  2:08AM    
This patient has been assessed with a concern for Malnutrition and has been determined to have a diagnosis/diagnoses of Severe protein-calorie malnutrition.    This patient is being managed with:   Diet Regular-  Entered: Apr 5 2022  2:08AM      This patient has been assessed with a concern for Malnutrition and has been determined to have a diagnosis/diagnoses of Severe protein-calorie malnutrition.    This patient is being managed with:   Diet Regular-  Entered: Apr 5 2022  2:08AM

## 2022-04-20 NOTE — PROGRESS NOTE ADULT - PROBLEM SELECTOR PLAN 3
- Likely in setting of enlarging abd masses and ascites, lab work with mild elevated AG/low bicarb but nl pH and lactate, likely has some degree of starvation ketosis given his history  -nutrition f/u
- Likely in setting of enlarging abd masses and ascites, lab work with mild elevated AG/low bicarb but nl pH and lactate, likely has some degree of starvation ketosis given his history  -nutrition f/u
- Likely in setting of enlarging abd masses and ascites, lab work with mild elevated AG/low bicarb but nl pH and lactate, likely has some degree of starvation ketosis given his history  -nutrtion evalF
- Likely in setting of enlarging abd masses and ascites, lab work with mild elevated AG/low bicarb but nl pH and lactate, likely has some degree of starvation ketosis given his history  -nutrtion f/u
- Likely in setting of enlarging abd masses and ascites, lab work with mild elevated AG/low bicarb but nl pH and lactate, likely has some degree of starvation ketosis given his history  -nutrition f/u
- Likely in setting of enlarging abd masses and ascites, lab work with mild elevated AG/low bicarb but nl pH and lactate, likely has some degree of starvation ketosis given his history  -nutrtion eval
- Likely in setting of enlarging abd masses and ascites, lab work with mild elevated AG/low bicarb but nl pH and lactate, likely has some degree of starvation ketosis given his history  -nutrition f/u
- Likely in setting of enlarging abd masses and ascites, lab work with mild elevated AG/low bicarb but nl pH and lactate, likely has some degree of starvation ketosis given his history  -nutrition f/u
- Likely in setting of enlarging abd masses and ascites, lab work with mild elevated AG/low bicarb but nl pH and lactate, likely has some degree of starvation ketosis given his history  -nutrtion f/u
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 Now on experimental treatment at [facility] with Dr. Jama Farley. C/b ascites.  - s/p paracentesis with IR on 4/7   - Patient's goals are to return to [facility] for continued DMT
- Likely in setting of enlarging abd masses and ascites, lab work with mild elevated AG/low bicarb but nl pH and lactate, likely has some degree of starvation ketosis given his history  -nutrition f/u
- Likely in setting of enlarging abd masses and ascites, lab work with mild elevated AG/low bicarb but nl pH and lactate, likely has some degree of starvation ketosis given his history  -nutrtion eval
- Likely in setting of enlarging abd masses and ascites, lab work with mild elevated AG/low bicarb but nl pH and lactate, likely has some degree of starvation ketosis given his history  -nutrtion f/u
- Likely in setting of enlarging abd masses and ascites, lab work with mild elevated AG/low bicarb but nl pH and lactate, likely has some degree of starvation ketosis given his history  -nutrition f/u

## 2022-04-20 NOTE — CHART NOTE - NSCHARTNOTEFT_GEN_A_CORE
Of note was notified by RN of patient being tachycardic 110-106 on repeat. Patient has been persistent tachycardic since admission. As per attending note patient states she has been tachycardic for months. I advised RN to obtain EKG to ensure sinus tachycardia and rule out arrhythmias. Patient refused EKG at this time. RN made patient aware of purpose of testing however patient still refused. Will continue to monitor.

## 2022-04-20 NOTE — PROGRESS NOTE ADULT - PROBLEM SELECTOR PLAN 2
- Incidentally noted on CT A/P, CTA Chest obtained that showed the RLL PE with downstream extension, no large PE, also with b/l pleural effusions (small) but suspect this is 2/2 patient's abdominal distension  on lovenox which was held for ir procedure   restarted ac after onc clearance with close monitor of plts  drop in hb - transfuse with close monitor of hb / volume status - lasix after prbc  -
- Incidentally noted on CT A/P, CTA Chest obtained that showed the RLL PE with downstream extension, no large PE, also with b/l pleural effusions (small) but suspect this is 2/2 patient's abdominal distension  on lovenox  -
- Incidentally noted on CT A/P, CTA Chest obtained that showed the RLL PE with downstream extension, no large PE, also with b/l pleural effusions (small) but suspect this is 2/2 patient's abdominal distension  on lovenox which was held for ir procedure   restart ac after onc clearance given drop in platelets  -
- Incidentally noted on CT A/P, CTA Chest obtained that showed the RLL PE with downstream extension, no large PE, also with b/l pleural effusions (small) but suspect this is 2/2 patient's abdominal distension  on lovenox which was held for ir procedure   restarted ac after onc clearance with close monitor of plts  drop in hb - transfuse with close monitor of hb / volume status - lasix after prbc  -
- Incidentally noted on CT A/P, CTA Chest obtained that showed the RLL PE with downstream extension, no large PE, also with b/l pleural effusions (small) but suspect this is 2/2 patient's abdominal distension  on lovenox which was held for ir procedure   restart ac   -
New PE found on admission. Currently on heparin gtt   - Patient understands that heparin gtt is being transitioned to Lovenox of DOAC. States he knows how to inject Lovenox if needed.
- Incidentally noted on CT A/P, CTA Chest obtained that showed the RLL PE with downstream extension, no large PE, also with b/l pleural effusions (small) but suspect this is 2/2 patient's abdominal distension  - On hep gtt, will c/w for now  -
- Incidentally noted on CT A/P, CTA Chest obtained that showed the RLL PE with downstream extension, no large PE, also with b/l pleural effusions (small) but suspect this is 2/2 patient's abdominal distension  on lovenox which was held for ir procedure   restarted ac after onc clearance with close monitor of plts  drop in hb - transfuse with close monitor of hb / volume status - lasix after prbc  -
- Incidentally noted on CT A/P, CTA Chest obtained that showed the RLL PE with downstream extension, no large PE, also with b/l pleural effusions (small) but suspect this is 2/2 patient's abdominal distension  on lovenox  -
- Incidentally noted on CT A/P, CTA Chest obtained that showed the RLL PE with downstream extension, no large PE, also with b/l pleural effusions (small) but suspect this is 2/2 patient's abdominal distension  - On hep gtt, onc consult to eval   -
- Incidentally noted on CT A/P, CTA Chest obtained that showed the RLL PE with downstream extension, no large PE, also with b/l pleural effusions (small) but suspect this is 2/2 patient's abdominal distension  - On hep gtt, will c/w for now  -
- Incidentally noted on CT A/P, CTA Chest obtained that showed the RLL PE with downstream extension, no large PE, also with b/l pleural effusions (small) but suspect this is 2/2 patient's abdominal distension  on lovenox  -
- Incidentally noted on CT A/P, CTA Chest obtained that showed the RLL PE with downstream extension, no large PE, also with b/l pleural effusions (small) but suspect this is 2/2 patient's abdominal distension  on lovenox which was held for ir procedure   restarted ac after onc clearance with close monitor of plts  -
-ON Lovonex 80 mg sq q12.Pulm f/u noted.Pulm f/u noted
- Incidentally noted on CT A/P, CTA Chest obtained that showed the RLL PE with downstream extension, no large PE, also with b/l pleural effusions (small) but suspect this is 2/2 patient's abdominal distension  on lovenox  -
- Incidentally noted on CT A/P, CTA Chest obtained that showed the RLL PE with downstream extension, no large PE, also with b/l pleural effusions (small) but suspect this is 2/2 patient's abdominal distension  on lovenox which was held for ir procedure   restart ac after onc clearance given drop in platelets  -

## 2022-04-20 NOTE — PROGRESS NOTE ADULT - PROBLEM SELECTOR PROBLEM 4
Liposarcoma
Debility
Liposarcoma

## 2022-04-20 NOTE — PROGRESS NOTE ADULT - PROBLEM SELECTOR PLAN 1
- Patient with worsening abdominal distension here with imaging showing him with worsening abdominal masses and worsening loculated ascites, now large  - Abdomen soft and non-tender, Large L sided lass likely causing the hard sensation on the L abdomen  - Patient feels pressure 2/2 worsening distension, seems like he has been unable to tolerate PO intake 2/2 the worsening distension  - Would likely benefit from ascites drainage ->   - Also with worsening masses though imaging from [facility] not available here so unclear if the masses are definitely growing or not, spoke with wife about patient's candidacy for RT and she said that they were told that the patient was a poor candidate for RT due to the location of the masses, currently with suspicion of worsening masses and worsening patient symptoms consider calling Rad-Onc in AM to assess patient for RT (as per primary team), also consider reaching out to Dr. Jama Farley at [facility] to see if he has any recommendations to patient's management  - BCx x2 sent in ED but currently abdomen without TTP, no leukocytosis, no fevers, low suspicion for intra-abdominal infection, will hold off on abx for now    s/p paracentesis by IR  ultrasound of abd today  repeat abd sono -large fluid collection - IR to eval pt for palliative drain , as per IR , pt need atleast multiple paracentesis  prior to placement of drain  s/p tap
-Ascites s/p paracentesis by IR
- Patient with worsening abdominal distension here with imaging showing him with worsening abdominal masses and worsening loculated ascites, now large  - Abdomen soft and non-tender, Large L sided lass likely causing the hard sensation on the L abdomen  - Patient feels pressure 2/2 worsening distension, seems like he has been unable to tolerate PO intake 2/2 the worsening distension  - Would likely benefit from ascites drainage -> please call procedure team in AM to assess for paracentesis, if not feasible by them then he would need an IR eval  - Also with worsening masses though imaging from [facility] not available here so unclear if the masses are definitely growing or not, spoke with wife about patient's candidacy for RT and she said that they were told that the patient was a poor candidate for RT due to the location of the masses, currently with suspicion of worsening masses and worsening patient symptoms consider calling Rad-Onc in AM to assess patient for RT (as per primary team), also consider reaching out to Dr. Jama Farley at [facility] to see if he has any recommendations to patient's management  - BCx x2 sent in ED but currently abdomen without TTP, no leukocytosis, no fevers, low suspicion for intra-abdominal infection, will hold off on abx for now
- Patient with worsening abdominal distension here with imaging showing him with worsening abdominal masses and worsening loculated ascites, now large  - Abdomen soft and non-tender, Large L sided lass likely causing the hard sensation on the L abdomen  - Patient feels pressure 2/2 worsening distension, seems like he has been unable to tolerate PO intake 2/2 the worsening distension  - Would likely benefit from ascites drainage ->   - Also with worsening masses though imaging from [facility] not available here so unclear if the masses are definitely growing or not, spoke with wife about patient's candidacy for RT and she said that they were told that the patient was a poor candidate for RT due to the location of the masses, currently with suspicion of worsening masses and worsening patient symptoms consider calling Rad-Onc in AM to assess patient for RT (as per primary team), also consider reaching out to Dr. Jama Farley at [facility] to see if he has any recommendations to patient's management  - BCx x2 sent in ED but currently abdomen without TTP, no leukocytosis, no fevers, low suspicion for intra-abdominal infection, will hold off on abx for now    s/p paracentesis by IR
- Patient with worsening abdominal distension here with imaging showing him with worsening abdominal masses and worsening loculated ascites, now large  - Abdomen soft and non-tender, Large L sided lass likely causing the hard sensation on the L abdomen  - Patient feels pressure 2/2 worsening distension, seems like he has been unable to tolerate PO intake 2/2 the worsening distension  - Would likely benefit from ascites drainage ->   - Also with worsening masses though imaging from [facility] not available here so unclear if the masses are definitely growing or not, spoke with wife about patient's candidacy for RT and she said that they were told that the patient was a poor candidate for RT due to the location of the masses, currently with suspicion of worsening masses and worsening patient symptoms consider calling Rad-Onc in AM to assess patient for RT (as per primary team), also consider reaching out to Dr. Jama Farley at [facility] to see if he has any recommendations to patient's management  - BCx x2 sent in ED but currently abdomen without TTP, no leukocytosis, no fevers, low suspicion for intra-abdominal infection, will hold off on abx for now    s/p paracentesis by IR  repeat abd sono -large fluid collection - IR to eval pt for palliative drain , as per IR , pt need atleast multiple paracentesis  prior to placement of drain  s/p tap this morning
- Patient with worsening abdominal distension here with imaging showing him with worsening abdominal masses and worsening loculated ascites, now large  - Abdomen soft and non-tender, Large L sided lass likely causing the hard sensation on the L abdomen  - Patient feels pressure 2/2 worsening distension, seems like he has been unable to tolerate PO intake 2/2 the worsening distension  - Would likely benefit from ascites drainage ->   - Also with worsening masses though imaging from [facility] not available here so unclear if the masses are definitely growing or not, spoke with wife about patient's candidacy for RT and she said that they were told that the patient was a poor candidate for RT due to the location of the masses, currently with suspicion of worsening masses and worsening patient symptoms consider calling Rad-Onc in AM to assess patient for RT (as per primary team), also consider reaching out to Dr. 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
- Patient with worsening abdominal distension here with imaging showing him with worsening abdominal masses and worsening loculated ascites, now large  - Abdomen soft and non-tender, Large L sided lass likely causing the hard sensation on the L abdomen  - Patient feels pressure 2/2 worsening distension, seems like he has been unable to tolerate PO intake 2/2 the worsening distension  - Would likely benefit from ascites drainage ->   - Also with worsening masses though imaging from [facility] not available here so unclear if the masses are definitely growing or not, spoke with wife about patient's candidacy for RT and she said that they were told that the patient was a poor candidate for RT due to the location of the masses, currently with suspicion of worsening masses and worsening patient symptoms consider calling Rad-Onc in AM to assess patient for RT (as per primary team), also consider reaching out to Dr. Jama Farley at [facility] to see if he has any recommendations to patient's management  - BCx x2 sent in ED but currently abdomen without TTP, no leukocytosis, no fevers, low suspicion for intra-abdominal infection, will hold off on abx for now    s/p paracentesis by IR
- Patient with worsening abdominal distension here with imaging showing him with worsening abdominal masses and worsening loculated ascites, now large  - Abdomen soft and non-tender, Large L sided lass likely causing the hard sensation on the L abdomen  - Patient feels pressure 2/2 worsening distension, seems like he has been unable to tolerate PO intake 2/2 the worsening distension  - Would likely benefit from ascites drainage ->   - Also with worsening masses though imaging from [facility] not available here so unclear if the masses are definitely growing or not, spoke with wife about patient's candidacy for RT and she said that they were told that the patient was a poor candidate for RT due to the location of the masses, currently with suspicion of worsening masses and worsening patient symptoms consider calling Rad-Onc in AM to assess patient for RT (as per primary team), also consider reaching out to Dr. Jama Farley at [facility] to see if he has any recommendations to patient's management  - BCx x2 sent in ED but currently abdomen without TTP, no leukocytosis, no fevers, low suspicion for intra-abdominal infection, will hold off on abx for now    s/p paracentesis by IR  ultrasound of abd today  repeat abd sono -large fluid collection - IR to eval pt for palliative drain , as per IR , pt need atleast multiple paracentesis  prior to placement of drain  s/p tap
- Patient with worsening abdominal distension here with imaging showing him with worsening abdominal masses and worsening loculated ascites, now large  - Abdomen soft and non-tender, Large L sided lass likely causing the hard sensation on the L abdomen  - Patient feels pressure 2/2 worsening distension, seems like he has been unable to tolerate PO intake 2/2 the worsening distension  - Would likely benefit from ascites drainage ->   - Also with worsening masses though imaging from [facility] not available here so unclear if the masses are definitely growing or not, spoke with wife about patient's candidacy for RT and she said that they were told that the patient was a poor candidate for RT due to the location of the masses, currently with suspicion of worsening masses and worsening patient symptoms consider calling Rad-Onc in AM to assess patient for RT (as per primary team), also consider reaching out to Dr. Jama Farley at [facility] to see if he has any recommendations to patient's management  - BCx x2 sent in ED but currently abdomen without TTP, no leukocytosis, no fevers, low suspicion for intra-abdominal infection, will hold off on abx for now    s/p paracentesis by IR  repeat abd sono -large fluid collection - IR to eval pt for palliative drain , as per IR , pt need atleast multiple paracentesis  prior to placement of drain
Endorsed that zofran helped outpatient somewhat but the vomiting has been severe since his last chemo session.  - Zofran 4mg IV q8h PRN. No PRNs utilized in 24 hours.   - Please ensure patient is discharged on Zofran 4mg ODT q6hr prn
- Patient with worsening abdominal distension here with imaging showing him with worsening abdominal masses and worsening loculated ascites, now large  - Abdomen soft and non-tender, Large L sided lass likely causing the hard sensation on the L abdomen  - Patient feels pressure 2/2 worsening distension, seems like he has been unable to tolerate PO intake 2/2 the worsening distension  - Would likely benefit from ascites drainage ->   - Also with worsening masses though imaging from [facility] not available here so unclear if the masses are definitely growing or not, spoke with wife about patient's candidacy for RT and she said that they were told that the patient was a poor candidate for RT due to the location of the masses, currently with suspicion of worsening masses and worsening patient symptoms consider calling Rad-Onc in AM to assess patient for RT (as per primary team), also consider reaching out to Dr. Jama Farley at [facility] to see if he has any recommendations to patient's management  - BCx x2 sent in ED but currently abdomen without TTP, no leukocytosis, no fevers, low suspicion for intra-abdominal infection, will hold off on abx for now    s/p tap today by IR   WILL GIVE albumin   restart ac after discuss with IR
- Patient with worsening abdominal distension here with imaging showing him with worsening abdominal masses and worsening loculated ascites, now large  - Abdomen soft and non-tender, Large L sided lass likely causing the hard sensation on the L abdomen  - Patient feels pressure 2/2 worsening distension, seems like he has been unable to tolerate PO intake 2/2 the worsening distension  - Would likely benefit from ascites drainage ->   - Also with worsening masses though imaging from [facility] not available here so unclear if the masses are definitely growing or not, spoke with wife about patient's candidacy for RT and she said that they were told that the patient was a poor candidate for RT due to the location of the masses, currently with suspicion of worsening masses and worsening patient symptoms consider calling Rad-Onc in AM to assess patient for RT (as per primary team), also consider reaching out to Dr. Jama Farley at [facility] to see if he has any recommendations to patient's management  - BCx x2 sent in ED but currently abdomen without TTP, no leukocytosis, no fevers, low suspicion for intra-abdominal infection, will hold off on abx for now    s/p paracentesis by IR  repeat abd sono -large fluid collection - IR to eval pt for palliative drain , as per IR , pt need atleast multiple paracentesis  prior to placement of drain  s/p tap
- Patient with worsening abdominal distension here with imaging showing him with worsening abdominal masses and worsening loculated ascites, now large  - Abdomen soft and non-tender, Large L sided lass likely causing the hard sensation on the L abdomen  - Patient feels pressure 2/2 worsening distension, seems like he has been unable to tolerate PO intake 2/2 the worsening distension  - Would likely benefit from ascites drainage ->   - Also with worsening masses though imaging from [facility] not available here so unclear if the masses are definitely growing or not, spoke with wife about patient's candidacy for RT and she said that they were told that the patient was a poor candidate for RT due to the location of the masses, currently with suspicion of worsening masses and worsening patient symptoms consider calling Rad-Onc in AM to assess patient for RT (as per primary team), also consider reaching out to Dr. Jama Farley at [facility] to see if he has any recommendations to patient's management  - BCx x2 sent in ED but currently abdomen without TTP, no leukocytosis, no fevers, low suspicion for intra-abdominal infection, will hold off on abx for now    s/p paracentesis by IR  will repeat abd sono -
- Patient with worsening abdominal distension here with imaging showing him with worsening abdominal masses and worsening loculated ascites, now large  - Abdomen soft and non-tender, Large L sided lass likely causing the hard sensation on the L abdomen  - Patient feels pressure 2/2 worsening distension, seems like he has been unable to tolerate PO intake 2/2 the worsening distension  - Would likely benefit from ascites drainage ->   - Also with worsening masses though imaging from [facility] not available here so unclear if the masses are definitely growing or not, spoke with wife about patient's candidacy for RT and she said that they were told that the patient was a poor candidate for RT due to the location of the masses, currently with suspicion of worsening masses and worsening patient symptoms consider calling Rad-Onc in AM to assess patient for RT (as per primary team), also consider reaching out to Dr. Jama Farley at [facility] to see if he has any recommendations to patient's management  - BCx x2 sent in ED but currently abdomen without TTP, no leukocytosis, no fevers, low suspicion for intra-abdominal infection, will hold off on abx for now    s/p paracentesis by IR  repeat abd sono -large fluid collection - IR to eval pt for palliative drain
- Patient with worsening abdominal distension here with imaging showing him with worsening abdominal masses and worsening loculated ascites, now large  - Abdomen soft and non-tender, Large L sided lass likely causing the hard sensation on the L abdomen  - Patient feels pressure 2/2 worsening distension, seems like he has been unable to tolerate PO intake 2/2 the worsening distension  - Would likely benefit from ascites drainage ->   - Also with worsening masses though imaging from [facility] not available here so unclear if the masses are definitely growing or not, spoke with wife about patient's candidacy for RT and she said that they were told that the patient was a poor candidate for RT due to the location of the masses, currently with suspicion of worsening masses and worsening patient symptoms consider calling Rad-Onc in AM to assess patient for RT (as per primary team), also consider reaching out to Dr. Jama Farley at [facility] to see if he has any recommendations to patient's management  - BCx x2 sent in ED but currently abdomen without TTP, no leukocytosis, no fevers, low suspicion for intra-abdominal infection, will hold off on abx for now    s/p paracentesis by IR  repeat abd sono -large fluid collection - IR to eval pt for palliative drain , as per IR , pt need atleast multiple paracentesis  prior to placement of drain  s/p tap this morning
- Patient with worsening abdominal distension here with imaging showing him with worsening abdominal masses and worsening loculated ascites, now large  - Abdomen soft and non-tender, Large L sided lass likely causing the hard sensation on the L abdomen  - Patient feels pressure 2/2 worsening distension, seems like he has been unable to tolerate PO intake 2/2 the worsening distension  - Would likely benefit from ascites drainage ->   - Also with worsening masses though imaging from [facility] not available here so unclear if the masses are definitely growing or not, spoke with wife about patient's candidacy for RT and she said that they were told that the patient was a poor candidate for RT due to the location of the masses, currently with suspicion of worsening masses and worsening patient symptoms consider calling Rad-Onc in AM to assess patient for RT (as per primary team), also consider reaching out to Dr. Jama Farley at [facility] to see if he has any recommendations to patient's management  - BCx x2 sent in ED but currently abdomen without TTP, no leukocytosis, no fevers, low suspicion for intra-abdominal infection, will hold off on abx for now    s/p tap today by IR   WILL GIVE albumin   restart ac after discuss with IR

## 2022-04-20 NOTE — PROGRESS NOTE ADULT - PROBLEM SELECTOR PROBLEM 2
Pulmonary thromboembolism

## 2022-04-20 NOTE — PROGRESS NOTE ADULT - PROBLEM SELECTOR PROBLEM 6
CAD (coronary artery disease)

## 2022-04-20 NOTE — PROGRESS NOTE ADULT - PROBLEM SELECTOR PROBLEM 5
Type 2 diabetes mellitus
Encounter for palliative care
Type 2 diabetes mellitus

## 2022-04-20 NOTE — PROGRESS NOTE ADULT - PROBLEM SELECTOR PLAN 5
- iss
Thank you for allowing us to participate in your patient's care. Symptoms are improved. Palliative team signing off. Please page [phone] for any q's or c's.     Alondra Storey D.O.   Palliative Medicine
- iss

## 2022-04-20 NOTE — PROGRESS NOTE ADULT - ATTENDING COMMENTS
Pt seen at bedside: distended abdomen, normal respiratory effort, ecchymoses over the BUE, no pitting edema. 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 Currently s/p evaluation where tunneled catheter was not able to be done. He will follow up with Dr Farley for anemia. Reviewed distance and difficulty getting transfusions in [place]: given anemia and transfusions, he should have CBC check next week. Will also have referral sent for [facility] to have opinion with Dr Thorpe and pending evaluation at [facility] for liposarcoma (children live in [facility]).
Pt seen for liposarcoma: last treated on clinical trial medication end of March. He has been having pancytopenia: requiring RBC transfusion twice a week. Lying in bed, normal respiratory effort, abdomen distended, no rash, no BLE pitting edema.  We reviewed with him and his wife hematology work up negative for hemolysis or microangiopathic process or HIT. He has decreased BM function with low reticulocyte count in setting of anemia. We reviewed issues with Procrit given increased morbidity with pts with active cancer and would recommend transfusions as needed along with B12 support. Will continue to monitor plt count but has remained above 50 K/microL on AC. He will have abdominal pleurex placement and expect plts to downtrend after procedure. Questions answered to him and his wife's satisfaction.

## 2022-04-20 NOTE — PROGRESS NOTE ADULT - PROBLEM SELECTOR PROBLEM 3
Adult failure to thrive
Liposarcoma
Adult failure to thrive
Adult failure to thrive

## 2022-04-20 NOTE — PROGRESS NOTE ADULT - PROBLEM SELECTOR PLAN 4
- Follows with Dr. Jama Castillo at [facility], currently on experimental chemotherapy  - Consider rad-onc eval as mentioned above to see if patient is a candidate for RT given his worsening abd distension and inability to tolerate significant PO intake
- Follows with Dr. Jama Castillo at [facility], currently on experimental chemotherapy  - Consider rad-onc eval as mentioned above to see if patient is a candidate for RT given his worsening abd distension and inability to tolerate significant PO intake
Pt endorsing worsening debility over the past few weeks but especially over past few days.  - Supportive care.
- Follows with Dr. Jama Castillo at [facility], currently on experimental chemotherapy  - Consider rad-onc eval as mentioned above to see if patient is a candidate for RT given his worsening abd distension and inability to tolerate significant PO intake
- Follows with Dr. Jama Castillo at [facility], currently on experimental chemotherapy  - Consider rad-onc eval as mentioned above to see if patient is a candidate for RT given his worsening abd distension and inability to tolerate significant PO intake
- Follows with Dr. Jama Castillo at [facility], currently on experimental chemotherapy  - Consider rad-onc eval as mentioned above to see if patient is a candidate for RT given his worsening abd distension and inability to tolerate significant PO intake
- Follows with Dr. Jama Castillo at [facility], currently on experimental chemotherapy  - Consider rad-onc eval as mentioned above to see if patient is a candidate for RT given his worsening abd distension and inability to tolerate significant PO intake
- Follows with Dr. Jama Castillo at [facility], currently on experimental chemotherapy  - Consider rad-onc eval as mentioned above to see if patient is a candidate for RT given his worsening abd distension and inability to tolerate significant PO intake
- Follows with Dr. Jama Castillo at [facility], currently on experimental chemotherapy  - Consider rad-onc eval as mentioned above to see if patient is a candidate for RT given his worsening abd distension and inability to tolerate significant PO intake
- Follows with Dr. Jama Castillo at [facility], currently on experimental chemotherapy  - Consider rad-onc eval as mentioned above to see if patient is a candidate for RT given his worsening abd distension and inability to tolerate significant PO intake
- Follows with Dr. Jama Castillo at [facility], currently on experimental chemotherapy  - Consider rad-onc eval as mentioned above to see if patient is a candidate for RT given his worsening abd distension and inability to tolerate significant PO intake
- Follows with Dr. Jama Castillo at [facility], currently on experimental chemotherapy  - Consider rad-onc eval as mentioned above to see if patient is a candidate for RT given his worsening abd distension and inability to tolerate significant PO intake
- Follows with Dr. Jama Castillo at [facility], currently on experimental chemotherapy  - Consider rad-onc eval as mentioned above to see if patient is a candidate for RT given his worsening abd distension and inability to tolerate significant PO intake
- Follows with Dr. Jama Castillo at [facility], currently on experimental chemotherapy  - Consider rad-onc eval as mentioned above to see if patient is a candidate for RT given his worsening abd distension and inability to tolerate significant PO intake
- Follows with Dr. Jama Castillo at [facility], currently on experimental chemotherapy  - Consider rad-onc eval as mentioned above to see if patient is a candidate for RT given his worsening abd distension and inability to tolerate significant PO intake
- Follows with Dr. Jama Castillo at [facility], currently on experimental chemotherapy  - Consider rad-onc eval as mentioned above to see if patient is a candidate for RT given his worsening abd distension and inability to tolerate significant PO intake

## 2022-04-20 NOTE — PROGRESS NOTE ADULT - PROBLEM SELECTOR PROBLEM 1
Abdominal distension
Nausea & vomiting
Abdominal distension

## 2022-04-20 NOTE — PROGRESS NOTE ADULT - ASSESSMENT
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 Jama Roberts at [facility]) who presented with poor PO intake and worsening abdominal swelling. After admission, lab work showed anemia Hb~7-8  (stable comparing to prior; Hb 7.5 in 1/2022), platelet wnl;  Onco f/u for thrombocytopenia plt 106 found on 4/12--> trending down to 47 (blue top 4/15 morning) and  liposarcoma eval/treatment.      CT chest angio 4/5 showed Acute right lower lobar pulmonary embolus extending into the segmental branches. Small bilateral right greater than left pleural effusions.  CT abd/p showed BLADDER: A complex mixed cystic and calcified mass abuts the bladder. BOWEL: Moderate size hiatal hernia. No bowel obstruction. PERITONEUM: Large volume loculated ascites with worsening peritoneal disease. For example:  *  Left pelvic mass (series 2, image 88), measuring 21.4 x 19.8 x 29.6 cm, previously 17.5 x 9.2 x 20 cm  *  Anterior right upper quadrant mass (series 2, image 52), measuring 11.0 x 7.4 x 9.6 cm (2-52), previously 10.1 x 6.1 cm  *  Mixed solid and cystic right pelvic mass (series 2, image 80), measuring 12.4 x 14.4 x 17.8 cm (2-80), previously 8.8 x 7.8 cm  RETROPERITONEUM/LYMPH NODES: No lymphadenopathy.    He was eval by [facility] but not a good candidate per note; s/p paracentesis by IR and plan for placement of drain (scheduled today but cancelled; likely tomorrow) . He was treated with heaprin drip (4/4-4/9) then lovenox subQ for PE.       #Thrombocytopenia improved  #Anemia -ferritin >800; iron studies suggesting multifactorial with chronic inflammation picture.   -likely multifactorial; reticulocyte low which suggests inappropriate responses to anemia which likely due to bone marrow suppression vs tumor infiltration   -thrombocytopenia is not related to HIT since heparin PF4 negative (which has a high negative predicative value) ;  serotonin release assay negative; no e/o HIT; c/w lovenox for PE treatment; hold off lovenox before the procedure then restart after procedure   -platelet count trending up; currently >70k  -LOW vitB12, folate, Pt need supplements of Folic acid and VitB12  -LDH, uric acid, haptoglobin, reticulocyte wnl   -monitor coagulation panel and daily CBC  -pt will f/u his med onc Dr. Roberts at [facility] and potentially f/u at [facility] for the 2nd opinion for liposarcoma treatment. Onc team will set up the appointment at [facility] after d/c; staff will call pt and his family.    -Please make sure : after d/c, pt need  to closely monitor CBC, he need home blood draw for CBC next Monday 4/25 (may consider transfuse RBC if necessary). Primary team please communicate with his PCP office and make sure CBC result will be reviewed and  followed up after d/c.    -onc team already reviewed peripheral blood smear; mild anisocytosis of RBC, few bite cells seen; average platelet count 5/hpf for 10 hpf which estimate platelet count 50k. No increased schistocyte seen/no e/o microangiopathy effects. no atypical cells seen.   -No other thrombocytopenia induced meds were used after admission       #Liposarcoma with malignant ascites   -IR aborted tunneled drain cath placement for ascites today. Pt may need repeat paracentesis after d/c.   -pt reported that he started an experimental treatment (PO med one pill/day x 3 days every 2 wks) around 2/2022 by his oncologist Jama Long at [facility] (office [phone] or [phone]). Last dose about 3 wks ago. Onco team tried to reach Dr Roberts group which provide the info: Pt was treated with Milademetan 260mg day 1-3 started on 2/16/22 and then day 15-17. on 3/31 dose reduced to 200mg (unsure if pt ever finished).   -Milademetan was reported for causing pancytopenia. Onco team communicated with Dr. Roberts's group, and they reported that this med has delayed effect on blood counts.   -paracentesis and ascites fluid drain per IR and primary team     -Pt will f/u his med oncologist Jama Weir at [facility]; Onc team will set up appointment for 2nd opinion consult at [facility] for further  discussion of liposarcoma treatment.     Discussed with attending Dr. Joseph Ramirez PGY4   Hem& Onco fellow P [phone]

## 2022-04-20 NOTE — CHART NOTE - NSCHARTNOTEFT_GEN_A_CORE
Pt presented to IR for Tunneled Peritoneal Ascites Drainage Catheter insertion.  History of abdominal liposarcoma and refractory ascites.  Preliminary sonography performed demonstrating almost complete replacement of intraabdominal space with heterogeneous material compatible with spread of liposarcoma.  Few, scattered, small loculations of ascites identified which did not appear to communicate.  Additionally, no safe window for catheter placement Identified.  Procedure aborted.  Pt returned to floor in stable condition.  Discussed with MK Berkowitz at 1145am 4/20/2022

## 2022-04-20 NOTE — PROGRESS NOTE ADULT - ASSESSMENT
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 Jama Roberts at [facility]) who presents to the hospital with poor PO intake and worsening abdominal swelling    EKG SR low voltage   Tele: NSR 70-90s    1) LE edema and Ascites  - ?2/2 abd liposarcoma  - echo TDS with grossly normal LV function and mild-mod MR  - s/p paracentesis 4/7. s/p repeat paracentesis  , unable to get drain today as no good fluid pockets available     2) PE  -CTA chest with Acute right lower lobar pulmonary embolus extending into the segmental branches  -on RA sating well denies CP or SOB  -echo TDS grossly normal RV function  -on lovenox however platelet count dropping, f/u heme/onc recs    3) CAD s/p stent  -s/p stent 20 years ago as per patient  -denies CP    4) Anemia  -hemoglobin 10  -s/p PRBC transfusion  -monitor H/H     5)  DVT PPX  -lovenox

## 2022-04-21 PROBLEM — I10 ESSENTIAL (PRIMARY) HYPERTENSION: Chronic | Status: ACTIVE | Noted: 2019-12-05

## 2022-04-21 PROBLEM — E11.9 TYPE 2 DIABETES MELLITUS WITHOUT COMPLICATIONS: Chronic | Status: ACTIVE | Noted: 2019-12-05

## 2022-04-21 PROBLEM — E78.00 PURE HYPERCHOLESTEROLEMIA, UNSPECIFIED: Chronic | Status: ACTIVE | Noted: 2019-12-05

## 2022-04-21 PROBLEM — I25.10 ATHEROSCLEROTIC HEART DISEASE OF NATIVE CORONARY ARTERY WITHOUT ANGINA PECTORIS: Chronic | Status: ACTIVE | Noted: 2019-12-05

## 2022-04-21 NOTE — CHART NOTE - NSCHARTNOTESELECT_GEN_ALL_CORE
Event Note
Pre-IR/Event Note
Event Note
Follow-up/Nutrition Services
Follow-up/Nutrition Services
I-stop/Event Note
IR pre-procedure/Event Note
Interventional Radiology/Event Note
Pre-IR/Event Note
Procedure Team evaluation/Event Note
Oncology chart note

## 2022-04-21 NOTE — PROGRESS NOTE ADULT - SUBJECTIVE AND OBJECTIVE BOX
SUBJECTIVE / OVERNIGHT EVENTS:pt seen and examined    MEDICATIONS  (STANDING):  dextrose 5%. 1000 milliLiter(s) (100 mL/Hr) IV Continuous <Continuous>  dextrose 5%. 1000 milliLiter(s) (50 mL/Hr) IV Continuous <Continuous>  dextrose 50% Injectable 25 Gram(s) IV Push once  dextrose 50% Injectable 12.5 Gram(s) IV Push once  dextrose 50% Injectable 25 Gram(s) IV Push once  enoxaparin Injectable 120 milliGRAM(s) SubCutaneous every 24 hours  glucagon  Injectable 1 milliGRAM(s) IntraMuscular once  insulin lispro (ADMELOG) corrective regimen sliding scale   SubCutaneous three times a day before meals  insulin lispro (ADMELOG) corrective regimen sliding scale   SubCutaneous at bedtime  pantoprazole    Tablet 40 milliGRAM(s) Oral two times a day  senna 2 Tablet(s) Oral at bedtime  sodium chloride 3%  Inhalation 4 milliLiter(s) Inhalation every 12 hours    MEDICATIONS  (PRN):  acetaminophen     Tablet .. 650 milliGRAM(s) Oral every 6 hours PRN Temp greater or equal to 38C (100.4F), Mild Pain (1 - 3)  aluminum hydroxide/magnesium hydroxide/simethicone Suspension 30 milliLiter(s) Oral every 4 hours PRN Dyspepsia  benzonatate 100 milliGRAM(s) Oral three times a day PRN Cough  dextrose Oral Gel 15 Gram(s) Oral once PRN Blood Glucose LESS THAN 70 milliGRAM(s)/deciliter  HYDROmorphone  Injectable 0.2 milliGRAM(s) IV Push every 6 hours PRN moderate to severe pain  melatonin 3 milliGRAM(s) Oral at bedtime PRN Insomnia  ondansetron Injectable 4 milliGRAM(s) IV Push every 8 hours PRN Nausea and/or Vomiting    Vital Signs Last 24 Hrs  T(C): 37 (04-11-22 @ 16:38), Max: 37 (04-11-22 @ 16:38)  T(F): 98.6 (04-11-22 @ 16:38), Max: 98.6 (04-11-22 @ 16:38)  HR: 84 (04-11-22 @ 16:45) (76 - 90)  BP: 94/60 (04-11-22 @ 16:45) (87/66 - 94/60)  BP(mean): --  RR: 18 (04-11-22 @ 16:38) (18 - 18)  SpO2: 100% (04-11-22 @ 16:38) (97% - 100%)          PHYSICAL EXAM:  GENERAL: NAD  EYES: EOMI, PERRLA  NECK: Supple, No JVD  CHEST/LUNG: dec breath sounds at bases  HEART:  S1 , S2 +  ABDOMEN: soft , bs+, distension+  EXTREMITIES:  trace edema  NEUROLOGY:alert awake      LABS:  04-11    135  |  102  |  10  ----------------------------<  108<H>  3.6   |  24  |  0.75    Ca    7.4<L>      11 Apr 2022 06:25  Phos  1.8     04-11  Mg     1.70     04-11      Creatinine Trend: 0.75 <--, 0.76 <--, 0.77 <--, 0.84 <--, 0.90 <--, 0.97 <--, 0.91 <--                        7.6    1.79  )-----------( 159      ( 11 Apr 2022 06:25 )             25.3     Urine Studies:                  Consultant(s) Notes Reviewed:      Care Discussed with Consultants/Other Providers:  
  Max Coffey MD  Interventional Cardiology / Endovascular Specialist  [place] Office : [address] [place] [address] [place] [address]  Tel:   [place] Office : [address] [facility] [place] [address]  Tel: [phone]      Subjective/Overnight events: Patient lying in bed comfortably. No acute distress. Denies chest pain, SOB or palpitations  	  MEDICATIONS:  enoxaparin Injectable 80 milliGRAM(s) SubCutaneous every 12 hours    cefepime   IVPB 2000 milliGRAM(s) IV Intermittent every 8 hours  cefepime   IVPB        benzonatate 100 milliGRAM(s) Oral every 8 hours PRN  sodium chloride 3%  Inhalation 4 milliLiter(s) Inhalation every 12 hours    acetaminophen     Tablet .. 650 milliGRAM(s) Oral every 6 hours PRN  melatonin 3 milliGRAM(s) Oral at bedtime PRN  ondansetron Injectable 4 milliGRAM(s) IV Push every 8 hours PRN    aluminum hydroxide/magnesium hydroxide/simethicone Suspension 30 milliLiter(s) Oral every 4 hours PRN  pantoprazole    Tablet 40 milliGRAM(s) Oral two times a day  senna 2 Tablet(s) Oral at bedtime    dextrose 50% Injectable 25 Gram(s) IV Push once  dextrose 50% Injectable 12.5 Gram(s) IV Push once  dextrose 50% Injectable 25 Gram(s) IV Push once  dextrose Oral Gel 15 Gram(s) Oral once PRN  glucagon  Injectable 1 milliGRAM(s) IntraMuscular once  insulin lispro (ADMELOG) corrective regimen sliding scale   SubCutaneous three times a day before meals  insulin lispro (ADMELOG) corrective regimen sliding scale   SubCutaneous at bedtime    albumin human 25% IVPB 50 milliLiter(s) IV Intermittent once  dextrose 5%. 1000 milliLiter(s) IV Continuous <Continuous>  dextrose 5%. 1000 milliLiter(s) IV Continuous <Continuous>  multivitamin 1 Tablet(s) Oral daily  potassium phosphate / sodium phosphate Tablet (K-PHOS No. 2) 1 Tablet(s) Oral three times a day      PAST MEDICAL/SURGICAL HISTORY  PAST MEDICAL & SURGICAL HISTORY:  Intra-abdominal and pelvic swelling, mass and lump, unspecified site  Currently on experimental chemotherapy at [facility]    Hypertension  Now with low BPs    Diabetes mellitus  Fasting FS range from - per patient, off meds    Hypercholesteremia  off meds    CAD (coronary artery disease)  Off aspirin    Sleep apnea    History of cardiac cath  Pt reports 1 cardiac stent placed 1999    H/O sleep apnea  Per patient had Sleep Apnea surgery  in 1996- at [facility]- Was not retested for Sleep Apnea post surgery    History of colon resection  Dec 2019        SOCIAL HISTORY: Substance Use (street drugs): ( x ) never used  (  ) other:    FAMILY HISTORY:  FH: heart disease  Mother        EYES:   PERRLA   ENMT:   Moist mucous membranes, Good dentition, No lesions  Cardiovascular: Normal S1 S2, No JVD, No murmurs, No edema  Respiratory: Lungs clear to auscultation	  Gastrointestinal:  + ascites s/p paracentesis  Extremities: no edema    PHYSICAL EXAM:  T(C): 36.7 (04-19-22 @ 05:46), Max: 36.7 (04-19-22 @ 05:46)  HR: 78 (04-19-22 @ 08:42) (70 - 87)  BP: 96/55 (04-19-22 @ 05:46) (90/51 - 99/57)  RR: 18 (04-19-22 @ 05:46) (17 - 18)  SpO2: 99% (04-19-22 @ 05:46) (96% - 100%)  Wt(kg): --  I&O's Summary    18 Apr 2022 07:01  -  19 Apr 2022 07:00  --------------------------------------------------------  IN: 540 mL / OUT: 800 mL / NET: -260 mL    19 Apr 2022 07:01  -  19 Apr 2022 12:49  --------------------------------------------------------  IN: 240 mL / OUT: 0 mL / NET: 240 mL          GENERAL: NAD, well-groomed, well-developed  EYES: EOMI, PERRLA, conjunctiva and sclera clear  ENMT: No tonsillar erythema, exudates, or enlargement; Moist mucous membranes, Good dentition, No lesions  Cardiovascular: Normal S1 S2, No JVD, No murmurs, No edema  Respiratory: Lungs clear to auscultation	  Gastrointestinal:  Soft, Non-tender, + BS	  Extremities: Normal range of motion, No clubbing, cyanosis or edema  LYMPH: No lymphadenopathy noted  NERVOUS SYSTEM:  Alert & Oriented X3, Good concentration; Motor Strength 5/5 B/L upper and lower extremities; DTRs 2+ intact and symmetric                                    9.2    2.71  )-----------( 76       ( 19 Apr 2022 06:54 )             28.6     04-19    134<L>  |  102  |  13  ----------------------------<  100<H>  3.6   |  22  |  0.69    Ca    7.9<L>      19 Apr 2022 06:54  Phos  2.0     04-19  Mg     1.70     04-19    TPro  5.2<L>  /  Alb  2.1<L>  /  TBili  0.4  /  DBili  x   /  AST  6   /  ALT  5   /  AlkPhos  57  04-18    proBNP:   Lipid Profile:   HgA1c:   TSH:     Consultant(s) Notes Reviewed:  [x ] YES  [ ] NO    Care Discussed with Consultants/Other Providers [ x] YES  [ ] NO    Imaging Personally Reviewed independently:  [x] YES  [ ] NO    All labs, radiologic studies, vitals, orders and medications list reviewed. Patient is seen and examined at bedside. Case discussed with medical team.              
  Max Coffey MD  Interventional Cardiology / Endovascular Specialist  [place] Office : [address] [place] [address] [place] [address]  Tel:   [place] Office : [address] [facility] [place] [address]  Tel: [phone]      Subjective/Overnight events: Patient lying in bed comfortably. No acute distress. Denies chest pain, SOB or palpitations  	  MEDICATIONS:  enoxaparin Injectable 80 milliGRAM(s) SubCutaneous every 12 hours    cefepime   IVPB      cefepime   IVPB 2000 milliGRAM(s) IV Intermittent every 8 hours    benzonatate 100 milliGRAM(s) Oral every 8 hours PRN  sodium chloride 3%  Inhalation 4 milliLiter(s) Inhalation every 12 hours    acetaminophen     Tablet .. 650 milliGRAM(s) Oral every 6 hours PRN  melatonin 3 milliGRAM(s) Oral at bedtime PRN  ondansetron Injectable 4 milliGRAM(s) IV Push every 8 hours PRN    aluminum hydroxide/magnesium hydroxide/simethicone Suspension 30 milliLiter(s) Oral every 4 hours PRN  pantoprazole    Tablet 40 milliGRAM(s) Oral two times a day  senna 2 Tablet(s) Oral at bedtime    dextrose 50% Injectable 25 Gram(s) IV Push once  dextrose 50% Injectable 12.5 Gram(s) IV Push once  dextrose 50% Injectable 25 Gram(s) IV Push once  dextrose Oral Gel 15 Gram(s) Oral once PRN  glucagon  Injectable 1 milliGRAM(s) IntraMuscular once  insulin lispro (ADMELOG) corrective regimen sliding scale   SubCutaneous three times a day before meals  insulin lispro (ADMELOG) corrective regimen sliding scale   SubCutaneous at bedtime    albumin human 25% IVPB 50 milliLiter(s) IV Intermittent once  dextrose 5%. 1000 milliLiter(s) IV Continuous <Continuous>  dextrose 5%. 1000 milliLiter(s) IV Continuous <Continuous>  multivitamin 1 Tablet(s) Oral daily  potassium chloride    Tablet ER 40 milliEquivalent(s) Oral every 4 hours      PAST MEDICAL/SURGICAL HISTORY  PAST MEDICAL & SURGICAL HISTORY:  Intra-abdominal and pelvic swelling, mass and lump, unspecified site  Currently on experimental chemotherapy at [facility]    Hypertension  Now with low BPs    Diabetes mellitus  Fasting FS range from - per patient, off meds    Hypercholesteremia  off meds    CAD (coronary artery disease)  Off aspirin    Sleep apnea    History of cardiac cath  Pt reports 1 cardiac stent placed 1999    H/O sleep apnea  Per patient had Sleep Apnea surgery  in 1996- at [facility]- Was not retested for Sleep Apnea post surgery    History of colon resection  Dec 2019        SOCIAL HISTORY: Substance Use (street drugs): ( x ) never used  (  ) other:    FAMILY HISTORY:  FH: heart disease  Mother          PHYSICAL EXAM:  T(C): 36.7 (04-18-22 @ 06:00), Max: 36.7 (04-18-22 @ 06:00)  HR: 71 (04-18-22 @ 06:00) (71 - 92)  BP: 100/56 (04-18-22 @ 06:00) (90/53 - 106/65)  RR: 18 (04-18-22 @ 06:00) (17 - 19)  SpO2: 100% (04-18-22 @ 06:00) (98% - 100%)  Wt(kg): --  I&O's Summary    17 Apr 2022 07:01  -  18 Apr 2022 07:00  --------------------------------------------------------  IN: 740 mL / OUT: 1250 mL / NET: -510 mL          EYES:   PERRLA   ENMT:   Moist mucous membranes, Good dentition, No lesions  Cardiovascular: Normal S1 S2, No JVD, No murmurs, No edema  Respiratory: Lungs clear to auscultation	  Gastrointestinal:  + ascites s/p paracentesis  Extremities: no edema                                    6.7    3.43  )-----------( 97       ( 18 Apr 2022 06:56 )             21.7     04-18    132<L>  |  101  |  12  ----------------------------<  107<H>  3.2<L>   |  21<L>  |  0.72    Ca    8.1<L>      18 Apr 2022 06:56  Phos  2.5     04-18  Mg     1.60     04-18    TPro  5.2<L>  /  Alb  2.1<L>  /  TBili  0.4  /  DBili  x   /  AST  6   /  ALT  5   /  AlkPhos  57  04-18    proBNP:   Lipid Profile:   HgA1c:   TSH:     Consultant(s) Notes Reviewed:  [x ] YES  [ ] NO    Care Discussed with Consultants/Other Providers [ x] YES  [ ] NO    Imaging Personally Reviewed independently:  [x] YES  [ ] NO    All labs, radiologic studies, vitals, orders and medications list reviewed. Patient is seen and examined at bedside. Case discussed with medical team.              
  Max Coffey MD  Interventional Cardiology / Endovascular Specialist  [place] Office : [address] [place] [address] [place] [address]  Tel:   [place] Office : [address] [facility] [place] [address]  Tel: [phone]    Subjective/Overnight events: Patient lying in bed. No acute distress.   	  MEDICATIONS:  heparin   Injectable 6500 Unit(s) IV Push every 6 hours PRN  heparin   Injectable 3000 Unit(s) IV Push every 6 hours PRN  heparin  Infusion.  Unit(s)/Hr IV Continuous <Continuous>      benzonatate 100 milliGRAM(s) Oral three times a day PRN    acetaminophen     Tablet .. 650 milliGRAM(s) Oral every 6 hours PRN  HYDROmorphone  Injectable 0.2 milliGRAM(s) IV Push every 6 hours PRN  melatonin 3 milliGRAM(s) Oral at bedtime PRN  ondansetron Injectable 4 milliGRAM(s) IV Push every 8 hours PRN    aluminum hydroxide/magnesium hydroxide/simethicone Suspension 30 milliLiter(s) Oral every 4 hours PRN  pantoprazole    Tablet 40 milliGRAM(s) Oral two times a day  senna 2 Tablet(s) Oral at bedtime    dextrose 50% Injectable 25 Gram(s) IV Push once  dextrose 50% Injectable 12.5 Gram(s) IV Push once  dextrose 50% Injectable 25 Gram(s) IV Push once  dextrose Oral Gel 15 Gram(s) Oral once PRN  glucagon  Injectable 1 milliGRAM(s) IntraMuscular once  insulin lispro (ADMELOG) corrective regimen sliding scale   SubCutaneous three times a day before meals  insulin lispro (ADMELOG) corrective regimen sliding scale   SubCutaneous at bedtime    dextrose 5%. 1000 milliLiter(s) IV Continuous <Continuous>  dextrose 5%. 1000 milliLiter(s) IV Continuous <Continuous>  magnesium oxide 400 milliGRAM(s) Oral three times a day with meals      PAST MEDICAL/SURGICAL HISTORY  PAST MEDICAL & SURGICAL HISTORY:  Intra-abdominal and pelvic swelling, mass and lump, unspecified site  Currently on experimental chemotherapy at [facility]    Hypertension  Now with low BPs    Diabetes mellitus  Fasting FS range from - per patient, off meds    Hypercholesteremia  off meds    CAD (coronary artery disease)  Off aspirin    Sleep apnea    History of cardiac cath  Pt reports 1 cardiac stent placed 1999    H/O sleep apnea  Per patient had Sleep Apnea surgery  in 1996- at [facility]- Was not retested for Sleep Apnea post surgery    History of colon resection  Dec 2019        SOCIAL HISTORY: Substance Use (street drugs): ( x ) never used  (  ) other:    FAMILY HISTORY:  FH: heart disease  Mother        REVIEW OF SYSTEMS:  CONSTITUTIONAL: No fever, weight loss, or fatigue  EYES: No eye pain, visual disturbances, or discharge  ENMT:  No difficulty hearing, tinnitus, vertigo; No sinus or throat pain  BREASTS: No pain, masses, or nipple discharge  GASTROINTESTINAL: No abdominal or epigastric pain. No nausea, vomiting, or hematemesis; No diarrhea or constipation. No melena or hematochezia.  GENITOURINARY: No dysuria, frequency, hematuria, or incontinence  NEUROLOGICAL: No headaches, memory loss, loss of strength, numbness, or tremors  ENDOCRINE: No heat or cold intolerance; No hair loss  MUSCULOSKELETAL: No joint pain or swelling; No muscle, back, or extremity pain  PSYCHIATRIC: No depression, anxiety, mood swings, or difficulty sleeping  HEME/LYMPH: No easy bruising, or bleeding gums  All others negative    PHYSICAL EXAM:  T(C): 36.6 (04-09-22 @ 12:23), Max: 36.6 (04-09-22 @ 12:23)  HR: 82 (04-09-22 @ 12:23) (81 - 82)  BP: 97/60 (04-09-22 @ 12:23) (93/57 - 97/60)  RR: 18 (04-09-22 @ 12:23) (17 - 18)  SpO2: 97% (04-09-22 @ 12:23) (97% - 99%)  Wt(kg): --  I&O's Summary    08 Apr 2022 07:01  -  09 Apr 2022 07:00  --------------------------------------------------------  IN: 1154 mL / OUT: 1100 mL / NET: 54 mL    GENERAL: NAD  EYES: conjunctiva and sclera clear  ENMT: No tonsillar erythema, exudates, or enlargement  Cardiovascular: Normal S1 S2, No JVD, No murmurs, No edema  Respiratory: Lungs clear to auscultation	  Gastrointestinal:  distended 	  Extremities: 2+ edema B/L                               8.1    2.87  )-----------( 267      ( 09 Apr 2022 06:42 )             24.8     04-09    132<L>  |  99  |  14  ----------------------------<  113<H>  3.8   |  22  |  0.77    Ca    7.5<L>      09 Apr 2022 06:42  Phos  1.7     04-09  Mg     1.60     04-09    TPro  x   /  Alb  2.4<L>  /  TBili  x   /  DBili  x   /  AST  x   /  ALT  x   /  AlkPhos  x   04-09    proBNP:   Lipid Profile:   HgA1c:   TSH:     Consultant(s) Notes Reviewed:  [x ] YES  [ ] NO    Care Discussed with Consultants/Other Providers [ x] YES  [ ] NO    Imaging Personally Reviewed independently:  [x] YES  [ ] NO    All labs, radiologic studies, vitals, orders and medications list reviewed. Patient is seen and examined at bedside. Case discussed with medical team.              
  Max Coffey MD  Interventional Cardiology / Endovascular Specialist  [place] Office : [address] [place] [address] [place] [address]  Tel:   [place] Office : [address] [facility] [place] [address]  Tel: [phone]    Subjective/Overnight events: Patient lying in bed. No acute distress.   	  MEDICATIONS:  heparin   Injectable 6500 Unit(s) IV Push every 6 hours PRN  heparin   Injectable 3000 Unit(s) IV Push every 6 hours PRN  heparin  Infusion.  Unit(s)/Hr IV Continuous <Continuous>      benzonatate 100 milliGRAM(s) Oral three times a day PRN    acetaminophen     Tablet .. 650 milliGRAM(s) Oral every 6 hours PRN  HYDROmorphone  Injectable 0.2 milliGRAM(s) IV Push every 6 hours PRN  melatonin 3 milliGRAM(s) Oral at bedtime PRN  ondansetron Injectable 4 milliGRAM(s) IV Push every 8 hours PRN    aluminum hydroxide/magnesium hydroxide/simethicone Suspension 30 milliLiter(s) Oral every 4 hours PRN  pantoprazole    Tablet 40 milliGRAM(s) Oral two times a day  senna 2 Tablet(s) Oral at bedtime    dextrose 50% Injectable 25 Gram(s) IV Push once  dextrose 50% Injectable 12.5 Gram(s) IV Push once  dextrose 50% Injectable 25 Gram(s) IV Push once  dextrose Oral Gel 15 Gram(s) Oral once PRN  glucagon  Injectable 1 milliGRAM(s) IntraMuscular once  insulin lispro (ADMELOG) corrective regimen sliding scale   SubCutaneous three times a day before meals  insulin lispro (ADMELOG) corrective regimen sliding scale   SubCutaneous at bedtime    dextrose 5%. 1000 milliLiter(s) IV Continuous <Continuous>  dextrose 5%. 1000 milliLiter(s) IV Continuous <Continuous>      PAST MEDICAL/SURGICAL HISTORY  PAST MEDICAL & SURGICAL HISTORY:  Intra-abdominal and pelvic swelling, mass and lump, unspecified site  Currently on experimental chemotherapy at [facility]    Hypertension  Now with low BPs    Diabetes mellitus  Fasting FS range from - per patient, off meds    Hypercholesteremia  off meds    CAD (coronary artery disease)  Off aspirin    Sleep apnea    History of cardiac cath  Pt reports 1 cardiac stent placed 1999    H/O sleep apnea  Per patient had Sleep Apnea surgery  in 1996- at [facility]- Was not retested for Sleep Apnea post surgery    History of colon resection  Dec 2019        SOCIAL HISTORY: Substance Use (street drugs): ( x ) never used  (  ) other:    FAMILY HISTORY:  FH: heart disease  Mother        REVIEW OF SYSTEMS:  CONSTITUTIONAL: No fever, weight loss, or fatigue  EYES: No eye pain, visual disturbances, or discharge  ENMT:  No difficulty hearing, tinnitus, vertigo; No sinus or throat pain  BREASTS: No pain, masses, or nipple discharge  GASTROINTESTINAL: No abdominal or epigastric pain. No nausea, vomiting, or hematemesis; No diarrhea or constipation. No melena or hematochezia.  GENITOURINARY: No dysuria, frequency, hematuria, or incontinence  NEUROLOGICAL: No headaches, memory loss, loss of strength, numbness, or tremors  ENDOCRINE: No heat or cold intolerance; No hair loss  MUSCULOSKELETAL: No joint pain or swelling; No muscle, back, or extremity pain  PSYCHIATRIC: No depression, anxiety, mood swings, or difficulty sleeping  HEME/LYMPH: No easy bruising, or bleeding gums  All others negative    PHYSICAL EXAM:  T(C): 36.4 (04-08-22 @ 12:52), Max: 36.7 (04-07-22 @ 21:16)  HR: 76 (04-08-22 @ 12:52) (67 - 84)  BP: 107/68 (04-08-22 @ 12:52) (89/56 - 107/68)  RR: 17 (04-08-22 @ 12:52) (17 - 19)  SpO2: 97% (04-08-22 @ 12:52) (97% - 99%)  Wt(kg): --  I&O's Summary    07 Apr 2022 07:01  -  08 Apr 2022 07:00  --------------------------------------------------------  IN: 1000 mL / OUT: 4350 mL / NET: -3350 mL    GENERAL: NAD,  EYES: conjunctiva and sclera clear  ENMT: No tonsillar erythema, exudates, or enlargement  Cardiovascular: Normal S1 S2, No JVD, No murmurs, No edema  Respiratory: Lungs clear to auscultation	  Gastrointestinal:  distended and firm 	  Extremities: 2+ edema B/L                               7.5    2.56  )-----------( 271      ( 08 Apr 2022 05:52 )             24.0     04-08    133<L>  |  100  |  18  ----------------------------<  115<H>  4.0   |  22  |  0.84    Ca    7.2<L>      08 Apr 2022 05:52  Phos  1.9     04-08  Mg     1.60     04-08      proBNP:   Lipid Profile:   HgA1c:   TSH:     Consultant(s) Notes Reviewed:  [x ] YES  [ ] NO    Care Discussed with Consultants/Other Providers [ x] YES  [ ] NO    Imaging Personally Reviewed independently:  [x] YES  [ ] NO    All labs, radiologic studies, vitals, orders and medications list reviewed. Patient is seen and examined at bedside. Case discussed with medical team.              
HPI:  Mr. 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 Jama Roberts at [facility]) who presented with poor PO intake and worsening abdominal swelling. Reported that for the past few weeks he has been having significant abd swelling with associated pressure in his abdomen and poor PO intake. he had intermittent vomiting of undigested food while eating due to abdominal distension (emesis sometimes bilious, no blood/coffee ground substances). denies chest pain, palpitations, syncope. Also had a new onset intermittent mild cough sometimes with green sputum but no hemoptysis. denied melena/hematochezia. Occasional  soft BMs as diarrhea (not watery). Endorsed generalized weakness due to his reduced PO intake. Denies fevers/chills/diaphoresis. He noted b/l LE swelling but denies any pain to it, has been put on lasix for it but states that it does not seem to be helping the swelling. Reported that he has known fluid in his abdomen, was sampled once but has never been drained before.     After admission, lab work showed anemia Hb~7-8  (stable comparing to prior; Hb 7.5 in 1/2022), platelet wnl;    CT chest angio 4/5 showed Acute right lower lobar pulmonary embolus extending into the segmental branches. Small bilateral right greater than left pleural effusions.  CT abd/p showed BLADDER: A complex mixed cystic and calcified mass abuts the bladder.BOWEL: Moderate size hiatal hernia. No Retroperitoneal lymphadenopathy; No bowel obstruction. PERITONEUM: Large volume loculated ascites with worsening peritoneal disease. For example:  *  Left pelvic mass (series 2, image 88), measuring 21.4 x 19.8 x 29.6 cm, previously 17.5 x 9.2 x 20 cm  *  Anterior right upper quadrant mass (series 2, image 52), measuring 11.0 x 7.4 x 9.6 cm (2-52), previously 10.1 x 6.1 cm  *  Mixed solid and cystic right pelvic mass (series 2, image 80), measuring 12.4 x 14.4 x 17.8 cm (2-80), previously 8.8 x 7.8 cm    He was eval by [facility] but not a good candidate per note; s/p paracentesis by IR and plan for placement of drain. Primary team has been consider reaching out to Dr. Jama Farley at [facility] to see if he has any recommendations to patient's management  He was treated with heparin drip (4/4-4/9) then lovenox subQ for PE.     Oncology was consulted yesterday for thrombocytopenia plt 106 found on 4/12--> trending down to 47 (blue top 4/15 morning). Heparin PF4 Negative 4/15; pending serotonin release assay.      4/16/22 follow up:   Subjective/Overnight events: Pt is lying in bed comfortable not in distress  	  MEDICATIONS:    cefepime   IVPB      cefepime   IVPB 2000 milliGRAM(s) IV Intermittent every 8 hours    benzonatate 100 milliGRAM(s) Oral three times a day PRN  sodium chloride 3%  Inhalation 4 milliLiter(s) Inhalation every 12 hours    acetaminophen     Tablet .. 650 milliGRAM(s) Oral every 6 hours PRN  melatonin 3 milliGRAM(s) Oral at bedtime PRN  ondansetron Injectable 4 milliGRAM(s) IV Push every 8 hours PRN    aluminum hydroxide/magnesium hydroxide/simethicone Suspension 30 milliLiter(s) Oral every 4 hours PRN  pantoprazole    Tablet 40 milliGRAM(s) Oral two times a day  senna 2 Tablet(s) Oral at bedtime    dextrose 50% Injectable 25 Gram(s) IV Push once  dextrose 50% Injectable 12.5 Gram(s) IV Push once  dextrose 50% Injectable 25 Gram(s) IV Push once  dextrose Oral Gel 15 Gram(s) Oral once PRN  glucagon  Injectable 1 milliGRAM(s) IntraMuscular once  insulin lispro (ADMELOG) corrective regimen sliding scale   SubCutaneous three times a day before meals  insulin lispro (ADMELOG) corrective regimen sliding scale   SubCutaneous at bedtime    albumin human 25% IVPB 50 milliLiter(s) IV Intermittent once  dextrose 5%. 1000 milliLiter(s) IV Continuous <Continuous>  dextrose 5%. 1000 milliLiter(s) IV Continuous <Continuous>  multivitamin 1 Tablet(s) Oral daily  potassium phosphate / sodium phosphate Powder (PHOS-NaK) 1 Packet(s) Oral every 4 hours      PAST MEDICAL/SURGICAL HISTORY  PAST MEDICAL & SURGICAL HISTORY:  Intra-abdominal and pelvic swelling, mass and lump, unspecified site  Currently on experimental chemotherapy at [facility]    Hypertension  Now with low BPs    Diabetes mellitus  Fasting FS range from - per patient, off meds    Hypercholesteremia  off meds    CAD (coronary artery disease)  Off aspirin    Sleep apnea    History of cardiac cath  Pt reports 1 cardiac stent placed 1999    H/O sleep apnea  Per patient had Sleep Apnea surgery  in 1996- at [facility]- Was not retested for Sleep Apnea post surgery    History of colon resection  Dec 2019        SOCIAL HISTORY: Substance Use (street drugs): ( x ) never used  (  ) other:    FAMILY HISTORY:  FH: heart disease  Mother      PHYSICAL EXAM:  T(C): 36.6 (04-16-22 @ 12:20), Max: 36.6 (04-15-22 @ 23:15)  HR: 83 (04-16-22 @ 12:20) (60 - 83)  BP: 94/70 (04-16-22 @ 12:20) (94/70 - 99/58)  RR: 18 (04-16-22 @ 12:20) (18 - 18)  SpO2: 100% (04-16-22 @ 12:20) (99% - 100%)  Wt(kg): --  I&O's Summary    15 Apr 2022 07:01  -  16 Apr 2022 07:00  --------------------------------------------------------  IN: 300 mL / OUT: 450 mL / NET: -150 mL             EYES:   PERRLA   ENMT:   Moist mucous membranes, Good dentition, No lesions  Cardiovascular: Normal S1 S2, No JVD, No murmurs, No edema  Respiratory: Lungs clear to auscultation	  Gastrointestinal:  + ascites s/p paracentesis  Extremities: no edema                                        8.1    4.59  )-----------( 77       ( 16 Apr 2022 07:19 )             26.9     04-16    134<L>  |  102  |  14  ----------------------------<  109<H>  3.6   |  21<L>  |  0.68    Ca    7.9<L>      16 Apr 2022 07:19  Phos  2.4     04-16  Mg     1.80     04-16    TPro  5.5<L>  /  Alb  2.3<L>  /  TBili  0.2  /  DBili  x   /  AST  6   /  ALT  6   /  AlkPhos  63  04-16    proBNP:   Lipid Profile:   HgA1c:   TSH:       All labs, radiologic studies, vitals, orders and medications list reviewed. Patient is seen and examined at bedside. Case discussed with Dr. Ruiz who saw the pt yesterday  
INTERVAL HPI/OVERNIGHT EVENTS:  Patient S&E at bedside. No o/n events. Pt denied worsening abd pain, SOB, N/V/D.   Tunneled cath for ascites drain aborted by IR due to the risk overweighing the benefit. Reviewed the lab results with pt. Pt and his wife would like to request the second opinion at [facility] for his liposarcoma treatment. Discussed with patient and his wife (on the phone) that pt need f/u closely for CBC, and he may need transfusion if necessary as outpt. Onc team will refer pt the [facility] for the 2nd opinion consult.     VITAL SIGNS:  T(F): 100 (04-20-22 @ 12:32)  HR: 75 (04-20-22 @ 12:32)  BP: 103/64 (04-20-22 @ 12:32)  RR: 18 (04-20-22 @ 12:32)  SpO2: 100% (04-20-22 @ 12:32)  Wt(kg): --    PHYSICAL EXAM:    Constitutional: NAD; AAOX4   Eyes: EOMI, sclera non-icteric  Neck: supple  Respiratory: CTAB, no wheezes or crackles   Cardiovascular: RRR  Gastrointestinal: soft, NT; distended abd; shift dullness+;  + BS  Extremities: no cyanosis, clubbing or edema   Neurological: awake and alert      MEDICATIONS  (STANDING):  albumin human 25% IVPB 50 milliLiter(s) IV Intermittent once  budesonide  80 MICROgram(s)/formoterol 4.5 MICROgram(s) Inhaler 2 Puff(s) Inhalation two times a day  cefepime   IVPB      cefepime   IVPB 2000 milliGRAM(s) IV Intermittent every 8 hours  dextrose 5%. 1000 milliLiter(s) (100 mL/Hr) IV Continuous <Continuous>  dextrose 5%. 1000 milliLiter(s) (50 mL/Hr) IV Continuous <Continuous>  dextrose 50% Injectable 25 Gram(s) IV Push once  dextrose 50% Injectable 12.5 Gram(s) IV Push once  dextrose 50% Injectable 25 Gram(s) IV Push once  enoxaparin Injectable 80 milliGRAM(s) SubCutaneous every 12 hours  glucagon  Injectable 1 milliGRAM(s) IntraMuscular once  insulin lispro (ADMELOG) corrective regimen sliding scale   SubCutaneous three times a day before meals  insulin lispro (ADMELOG) corrective regimen sliding scale   SubCutaneous at bedtime  lactobacillus acidophilus 1 Tablet(s) Oral daily  multivitamin 1 Tablet(s) Oral daily  pantoprazole    Tablet 40 milliGRAM(s) Oral two times a day  senna 2 Tablet(s) Oral at bedtime  sodium chloride 3%  Inhalation 4 milliLiter(s) Inhalation every 12 hours    MEDICATIONS  (PRN):  acetaminophen     Tablet .. 650 milliGRAM(s) Oral every 6 hours PRN Temp greater or equal to 38C (100.4F), Mild Pain (1 - 3)  aluminum hydroxide/magnesium hydroxide/simethicone Suspension 30 milliLiter(s) Oral every 4 hours PRN Dyspepsia  benzonatate 100 milliGRAM(s) Oral every 8 hours PRN Cough  dextrose Oral Gel 15 Gram(s) Oral once PRN Blood Glucose LESS THAN 70 milliGRAM(s)/deciliter  melatonin 3 milliGRAM(s) Oral at bedtime PRN Insomnia  ondansetron Injectable 4 milliGRAM(s) IV Push every 8 hours PRN Nausea and/or Vomiting      Allergies    oxycodone (Vomiting)    Intolerances        LABS:                        10.0   2.67  )-----------( 77       ( 20 Apr 2022 06:28 )             32.0     04-20    137  |  104  |  14  ----------------------------<  101<H>  3.5   |  21<L>  |  0.71    Ca    7.8<L>      20 Apr 2022 06:28  Phos  2.1     04-20  Mg     1.60     04-20    TPro  5.1<L>  /  Alb  2.1<L>  /  TBili  0.3  /  DBili  x   /  AST  8   /  ALT  7   /  AlkPhos  59  04-20    PT/INR - ( 20 Apr 2022 06:28 )   PT: 12.5 sec;   INR: 1.08 ratio         PTT - ( 20 Apr 2022 06:28 )  PTT:31.1 sec      RADIOLOGY & ADDITIONAL TESTS:  Studies reviewed.  
Max Coffey MD  Interventional Cardiology / Advance Heart Failure and Cardiac Transplant Specialist  [place] Office : [phone] [place] [address] [place] [address]  Tel:   [place] Office : [address] [facility] [place] [address]  Tel: [phone]      Subjective/Overnight events: Pt is lying in bed comfortable not in distress  	  MEDICATIONS:  enoxaparin Injectable 120 milliGRAM(s) SubCutaneous every 24 hours      benzonatate 100 milliGRAM(s) Oral three times a day PRN  sodium chloride 3%  Inhalation 4 milliLiter(s) Inhalation every 12 hours    acetaminophen     Tablet .. 650 milliGRAM(s) Oral every 6 hours PRN  HYDROmorphone  Injectable 0.2 milliGRAM(s) IV Push every 6 hours PRN  melatonin 3 milliGRAM(s) Oral at bedtime PRN  ondansetron Injectable 4 milliGRAM(s) IV Push every 8 hours PRN    aluminum hydroxide/magnesium hydroxide/simethicone Suspension 30 milliLiter(s) Oral every 4 hours PRN  pantoprazole    Tablet 40 milliGRAM(s) Oral two times a day  senna 2 Tablet(s) Oral at bedtime    dextrose 50% Injectable 25 Gram(s) IV Push once  dextrose 50% Injectable 12.5 Gram(s) IV Push once  dextrose 50% Injectable 25 Gram(s) IV Push once  dextrose Oral Gel 15 Gram(s) Oral once PRN  glucagon  Injectable 1 milliGRAM(s) IntraMuscular once  insulin lispro (ADMELOG) corrective regimen sliding scale   SubCutaneous three times a day before meals  insulin lispro (ADMELOG) corrective regimen sliding scale   SubCutaneous at bedtime    dextrose 5%. 1000 milliLiter(s) IV Continuous <Continuous>  dextrose 5%. 1000 milliLiter(s) IV Continuous <Continuous>      PAST MEDICAL/SURGICAL HISTORY  PAST MEDICAL & SURGICAL HISTORY:  Intra-abdominal and pelvic swelling, mass and lump, unspecified site  Currently on experimental chemotherapy at [facility]    Hypertension  Now with low BPs    Diabetes mellitus  Fasting FS range from - per patient, off meds    Hypercholesteremia  off meds    CAD (coronary artery disease)  Off aspirin    Sleep apnea    History of cardiac cath  Pt reports 1 cardiac stent placed 1999    H/O sleep apnea  Per patient had Sleep Apnea surgery  in 1996- at [facility]- Was not retested for Sleep Apnea post surgery    History of colon resection  Dec 2019        SOCIAL HISTORY: Substance Use (street drugs): ( x ) never used  (  ) other:    FAMILY HISTORY:  FH: heart disease  Mother            PHYSICAL EXAM:  T(C): 36.4 (04-12-22 @ 11:45), Max: 37 (04-11-22 @ 16:38)  HR: 74 (04-12-22 @ 11:45) (74 - 89)  BP: 89/65 (04-12-22 @ 11:45) (87/66 - 109/58)  RR: 18 (04-12-22 @ 11:45) (17 - 18)  SpO2: 97% (04-12-22 @ 11:45) (96% - 100%)  Wt(kg): --  I&O's Summary    11 Apr 2022 07:01  -  12 Apr 2022 07:00  --------------------------------------------------------  IN: 1050 mL / OUT: 800 mL / NET: 250 mL    12 Apr 2022 07:01  -  12 Apr 2022 12:53  --------------------------------------------------------  IN: 400 mL / OUT: 300 mL / NET: 100 mL        GENERAL: NAD  EYES: conjunctiva and sclera clear  ENMT: No tonsillar erythema, exudates, or enlargement  Cardiovascular: Normal S1 S2, No JVD, No murmurs, No edema  Respiratory: Lungs clear to auscultation	  Gastrointestinal:  distended 	  Extremities: 2+ edema B/L                                 7.3    1.62  )-----------( 106      ( 12 Apr 2022 07:23 )             24.9     04-12    134<L>  |  100  |  11  ----------------------------<  105<H>  3.8   |  21<L>  |  0.75    Ca    7.7<L>      12 Apr 2022 07:23  Phos  1.5     04-12  Mg     1.60     04-12      proBNP:   Lipid Profile:   HgA1c:   TSH:     Consultant(s) Notes Reviewed:  [x ] YES  [ ] NO    Care Discussed with Consultants/Other Providers [ x] YES  [ ] NO    Imaging Personally Reviewed independently:  [x] YES  [ ] NO    All labs, radiologic studies, vitals, orders and medications list reviewed. Patient is seen and examined at bedside. Case discussed with medical team.        
PULMONARY PROGRESS NOTE    ADRIAN FUNES  MRN-[id number]    Patient is a 71y old  Male who presents with a chief complaint of Poor PO intake, Abd swelling (08 Apr 2022 15:07)      HPI:  -70 yo male remote history of cigar smoking, not on any pulm medications, HLD, DM, FADI not on CPAP, metastatic liposarcoma undergoing experimental therapy, found to have a PE now on heparin drip, being evaluated for cough  - seen at bedside on room air. no hemoptysis. no wheezing. no URI symptoms. states his cough is usually with eating/drinking. denies having an aspiration workup. never been on inhalers. no d yspnea with movement. no history of PE prior to this        ROS:   -    ACTIVE MEDICATION LIST:  MEDICATIONS  (STANDING):  dextrose 5%. 1000 milliLiter(s) (100 mL/Hr) IV Continuous <Continuous>  dextrose 5%. 1000 milliLiter(s) (50 mL/Hr) IV Continuous <Continuous>  dextrose 50% Injectable 25 Gram(s) IV Push once  dextrose 50% Injectable 12.5 Gram(s) IV Push once  dextrose 50% Injectable 25 Gram(s) IV Push once  glucagon  Injectable 1 milliGRAM(s) IntraMuscular once  heparin  Infusion.  Unit(s)/Hr (15 mL/Hr) IV Continuous <Continuous>  insulin lispro (ADMELOG) corrective regimen sliding scale   SubCutaneous three times a day before meals  insulin lispro (ADMELOG) corrective regimen sliding scale   SubCutaneous at bedtime  pantoprazole    Tablet 40 milliGRAM(s) Oral two times a day  senna 2 Tablet(s) Oral at bedtime    MEDICATIONS  (PRN):  acetaminophen     Tablet .. 650 milliGRAM(s) Oral every 6 hours PRN Temp greater or equal to 38C (100.4F), Mild Pain (1 - 3)  aluminum hydroxide/magnesium hydroxide/simethicone Suspension 30 milliLiter(s) Oral every 4 hours PRN Dyspepsia  benzonatate 100 milliGRAM(s) Oral three times a day PRN Cough  dextrose Oral Gel 15 Gram(s) Oral once PRN Blood Glucose LESS THAN 70 milliGRAM(s)/deciliter  heparin   Injectable 6500 Unit(s) IV Push every 6 hours PRN For aPTT less than 40  heparin   Injectable 3000 Unit(s) IV Push every 6 hours PRN For aPTT between 40 - 57  HYDROmorphone  Injectable 0.2 milliGRAM(s) IV Push every 6 hours PRN moderate to severe pain  melatonin 3 milliGRAM(s) Oral at bedtime PRN Insomnia  ondansetron Injectable 4 milliGRAM(s) IV Push every 8 hours PRN Nausea and/or Vomiting      EXAM:  Vital Signs Last 24 Hrs  T(C): 36.4 (08 Apr 2022 12:52), Max: 36.7 (07 Apr 2022 21:16)  T(F): 97.6 (08 Apr 2022 12:52), Max: 98.1 (08 Apr 2022 05:22)  HR: 76 (08 Apr 2022 12:52) (67 - 84)  BP: 107/68 (08 Apr 2022 12:52) (89/56 - 107/68)  BP(mean): --  RR: 17 (08 Apr 2022 12:52) (17 - 19)  SpO2: 97% (08 Apr 2022 12:52) (97% - 99%)    GENERAL: The patient is awake and alert in no apparent distress.     LUNGS: not labored. no wheezing. decrease at bases                               7.5    2.56  )-----------( 271      ( 08 Apr 2022 05:52 )             24.0       04-08    133<L>  |  100  |  18  ----------------------------<  115<H>  4.0   |  22  |  0.84    Ca    7.2<L>      08 Apr 2022 05:52  Phos  1.9     04-08  Mg     1.60     04-08       rad< from: CT Angio Chest PE Protocol w/ IV Cont (04.05.22 @ 01:16) >    ACC: [id number] EXAM:  CT ANGIO CHEST PULM ART [facility]                          PROCEDURE DATE:  04/05/2022          INTERPRETATION:  CLINICAL INFORMATION: Initially presented with abdominal   pain, failure to thrive, nausea and vomiting, history of metastatic   liposarcoma, pulmonary embolus seen on CT abdomen    COMPARISON: CT chest 1/12/2022, CT abdomen pelvis 4/4/2022    CONTRAST/COMPLICATIONS:  IV Contrast: Omnipaque 350  85 cc administered   15 cc discarded  Oral Contrast: NONE  Complications: None reported at time of study completion    PROCEDURE:  CT Angiography of the Chest.  Sagittal and coronal reformats were performed as well as 3D (MIP)   reconstructions.    FINDINGS:    PULMONARY ARTERIES: Acute pulmonary embolus extending from the right   lower lobar pulmonary artery extending into some of the segmental   branches.    LUNGS, PLEURA, AND AIRWAYS: No endobronchial lesion  Small bilateral   right greater than left pleural effusions with adjacent subsegmental   passive atelectasis.    MEDIASTINUM AND GARRY: No lymphadenopathy. Hiatal hernia.  VESSELS: Calcifications of the coronary arteries and the aorta.  HEART: Heart is normal in size. No pericardial effusion.  CHEST WALL AND LOWER NECK: Within normal limits.  VISUALIZED UPPER ABDOMEN: Moderate to large volume ascites. Cholelithiasis  BONES: Degenerative changes of the spine.    IMPRESSION:  Acute right lower lobar pulmonary embolus extending into the segmental   branches.  Small bilateral right greater than left pleuraleffusions.    --- End of Report ---          JEFF LÓPEZ MD; Resident Radiology  This document has been electronically signed.  BALJINDER WELLER MD; Attending Radiologist  This document has been electronically signed. Apr 5 2022 10:34AM    < end of copied text >  < from: Transthoracic Echocardiogram (04.05.22 @ 16:34) >  effusion. Bilateral pleural effusions.  ------------------------------------------------------------------------  CONCLUSIONS:  1. Mitral annular calcification, otherwise normal mitral  valve. Mild-moderate mitral regurgitation.  2. Endocardium not wellvisualized; grossly normal left  ventricular systolic function.  3. The right ventricle is not well visualized; grossly  normal right ventricular systolic function.  ------------------------------------------------------------------------  Confirmed on  4/5/2022 - 17:59:42 by Estevan Espinoza M.D.,  [facility], [facility]  ------------------------------------------------------------------------    < end of copied text >      PROBLEM LIST:  71y Male with HEALTH ISSUES - PROBLEM Dx:  Suspected deep vein thrombosis (DVT)    Pulmonary thromboembolism    Abdominal distension    Adult failure to thrive    Type 2 diabetes mellitus    CAD (coronary artery disease)    Liposarcoma    Need for prophylactic measure    Nausea &amp; vomiting    Debility    Advanced care planning/counseling discussion    Encounter for palliative care              RECS: multifactorial etiology to cough- PE? aspiration? atelectasis?   continue with AC for PE  suggest US dopplers for completion sake  speech/swallow eval to eval for any aspiration  NEBS BID with IS/acapaella   should use IS/acapella and mobilize to help with atelectasis  continue PPI  outpatient pfts      Please call with any questions over the weekend    Leidy Gonzalez DO  [facility] Pulmonary/Sleep Medicine  [phone]  
Patient is a 71y Male     Patient is a 71y old  Male who presents with a chief complaint of Poor PO intake, Abd swelling (08 Apr 2022 16:14)      HPI:  This is a 71M with history of Low BPs (usual range is 90-95/55-65), HLD (currently off meds), DM2 (currently off meds), FADI not on CPAP (s/p sleep apnea sx as per wife), CAD s/p stent (1999, now off aspirin for some time), and 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 Jama Roberts at [facility]) who presents to the hospital with poor PO intake and worsening abdominal swelling. Said that for the past few weeks he has been having significant abd swelling with associated pressure in his abdomen and poor PO intake. Said that whenever he tries to eat something he invariably ends up vomiting the food due to his abdominal distension (emesis usually is food, sometimes bilious, no blood/coffee ground substances). Said that he has been having difficulty due to his abdominal distention but denies any chest pain, palpitations, or syncope. Also with a new onset cough sometimes with green sputum but no hemoptysis. Is having BMs but sometimes has diarrhea (last BM this AM was soft, no blood in BM). Has generalized weakness due to his reduced PO intake. Denies any recent fevers/chills/diaphoresis. Has noted b/l LE swelling but denies any pain to it, has been put on lasix for it but states that it does not seem to be helping the swelling. Said that he has known fluid in his abdomen, was sampled once but has never been drained before. Denies any other acute complaints.     On arrival to the ED, his vitals were T 98, P 95, BP 85/51 -> 93/54, RR 18, O2 sat 95%. His lab work showed anemia (improved from prior), low bicarb and elevated AG (likely 2/2 starvation ketosis), and nl pH/lactate. His imaging showed large volume ascites and worsening abdominal masses but unclear trend as patient currently follows at [facility] for his malignancy. He was also noted to have an incidental RLL PE and was started on a heparin gtt. he was admitted to medicine.  (04 Apr 2022 23:23)      PAST MEDICAL & SURGICAL HISTORY:  Intra-abdominal and pelvic swelling, mass and lump, unspecified site  Currently on experimental chemotherapy at [facility]    Hypertension  Now with low BPs    Diabetes mellitus  Fasting FS range from - per patient, off meds    Hypercholesteremia  off meds    CAD (coronary artery disease)  Off aspirin    Sleep apnea    History of cardiac cath  Pt reports 1 cardiac stent placed 1999    H/O sleep apnea  Per patient had Sleep Apnea surgery  in 1996- at [facility]- Was not retested for Sleep Apnea post surgery    History of colon resection  Dec 2019        MEDICATIONS  (STANDING):  dextrose 5%. 1000 milliLiter(s) (100 mL/Hr) IV Continuous <Continuous>  dextrose 5%. 1000 milliLiter(s) (50 mL/Hr) IV Continuous <Continuous>  dextrose 50% Injectable 25 Gram(s) IV Push once  dextrose 50% Injectable 12.5 Gram(s) IV Push once  dextrose 50% Injectable 25 Gram(s) IV Push once  glucagon  Injectable 1 milliGRAM(s) IntraMuscular once  heparin  Infusion.  Unit(s)/Hr (15 mL/Hr) IV Continuous <Continuous>  insulin lispro (ADMELOG) corrective regimen sliding scale   SubCutaneous three times a day before meals  insulin lispro (ADMELOG) corrective regimen sliding scale   SubCutaneous at bedtime  pantoprazole    Tablet 40 milliGRAM(s) Oral two times a day  senna 2 Tablet(s) Oral at bedtime      Allergies    oxycodone (Vomiting)    Intolerances        SOCIAL HISTORY:  Denies ETOh,Smoking,     FAMILY HISTORY:  FH: heart disease  Mother        REVIEW OF SYSTEMS:    CONSTITUTIONAL: No weakness, fevers or chills  EYES/ENT: No visual changes;  No vertigo or throat pain   NECK: No pain or stiffness  RESPIRATORY: No cough, wheezing, hemoptysis; No shortness of breath  CARDIOVASCULAR: No chest pain or palpitations  GASTROINTESTINAL: No abdominal or epigastric pain. No nausea, vomiting, or hematemesis; No diarrhea or constipation. No melena or hematochezia.  GENITOURINARY: No dysuria, frequency or hematuria  NEUROLOGICAL: No numbness or weakness  SKIN: No itching, burning, rashes, or lesions   All other review of systems is negative unless indicated above.    VITAL:  T(C): , Max: 36.5 (04-09-22 @ 06:00)  T(F): , Max: 97.7 (04-09-22 @ 06:00)  HR: 81 (04-09-22 @ 06:00)  BP: 96/60 (04-09-22 @ 06:00)  BP(mean): --  RR: 17 (04-09-22 @ 06:00)  SpO2: 99% (04-09-22 @ 06:00)  Wt(kg): --    I and O's:    04-07 @ 07:01  -  04-08 @ 07:00  --------------------------------------------------------  IN: 1000 mL / OUT: 4350 mL / NET: -3350 mL    04-08 @ 07:01  -  04-09 @ 07:00  --------------------------------------------------------  IN: 1154 mL / OUT: 1100 mL / NET: 54 mL          PHYSICAL EXAM:    Constitutional: NAD  HEENT: PERRLA,   Neck: No JVD  Respiratory: CTA B/L  Cardiovascular: S1 and S2  Gastrointestinal: BS+, soft, NT/ND  Extremities: No peripheral edema  Neurological: A/O x 3, no focal deficits  Psychiatric: Normal mood, normal affect  : No Clark  Skin: No rashes  Access: Not applicable  Back: No CVA tenderness    LABS:                        8.1    2.87  )-----------( 267      ( 09 Apr 2022 06:42 )             24.8     04-09    132<L>  |  99  |  14  ----------------------------<  113<H>  3.8   |  22  |  0.77    Ca    7.5<L>      09 Apr 2022 06:42  Phos  1.7     04-09  Mg     1.60     04-09    TPro  x   /  Alb  2.4<L>  /  TBili  x   /  DBili  x   /  AST  x   /  ALT  x   /  AlkPhos  x   04-09          RADIOLOGY & ADDITIONAL STUDIES:                          
Patient is a 71y Male     Patient is a 71y old  Male who presents with a chief complaint of Poor PO intake, Abd swelling (10 Apr 2022 13:02)      HPI:  This is a 71M with history of Low BPs (usual range is 90-95/55-65), HLD (currently off meds), DM2 (currently off meds), FADI not on CPAP (s/p sleep apnea sx as per wife), CAD s/p stent (1999, now off aspirin for some time), and 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 Jama Roberts at [facility]) who presents to the hospital with poor PO intake and worsening abdominal swelling. Said that for the past few weeks he has been having significant abd swelling with associated pressure in his abdomen and poor PO intake. Said that whenever he tries to eat something he invariably ends up vomiting the food due to his abdominal distension (emesis usually is food, sometimes bilious, no blood/coffee ground substances). Said that he has been having difficulty due to his abdominal distention but denies any chest pain, palpitations, or syncope. Also with a new onset cough sometimes with green sputum but no hemoptysis. Is having BMs but sometimes has diarrhea (last BM this AM was soft, no blood in BM). Has generalized weakness due to his reduced PO intake. Denies any recent fevers/chills/diaphoresis. Has noted b/l LE swelling but denies any pain to it, has been put on lasix for it but states that it does not seem to be helping the swelling. Said that he has known fluid in his abdomen, was sampled once but has never been drained before. Denies any other acute complaints.     On arrival to the ED, his vitals were T 98, P 95, BP 85/51 -> 93/54, RR 18, O2 sat 95%. His lab work showed anemia (improved from prior), low bicarb and elevated AG (likely 2/2 starvation ketosis), and nl pH/lactate. His imaging showed large volume ascites and worsening abdominal masses but unclear trend as patient currently follows at [facility] for his malignancy. He was also noted to have an incidental RLL PE and was started on a heparin gtt. he was admitted to medicine.  (04 Apr 2022 23:23)      PAST MEDICAL & SURGICAL HISTORY:  Intra-abdominal and pelvic swelling, mass and lump, unspecified site  Currently on experimental chemotherapy at [facility]    Hypertension  Now with low BPs    Diabetes mellitus  Fasting FS range from - per patient, off meds    Hypercholesteremia  off meds    CAD (coronary artery disease)  Off aspirin    Sleep apnea    History of cardiac cath  Pt reports 1 cardiac stent placed 1999    H/O sleep apnea  Per patient had Sleep Apnea surgery  in 1996- at [facility]- Was not retested for Sleep Apnea post surgery    History of colon resection  Dec 2019        MEDICATIONS  (STANDING):  dextrose 5%. 1000 milliLiter(s) (100 mL/Hr) IV Continuous <Continuous>  dextrose 5%. 1000 milliLiter(s) (50 mL/Hr) IV Continuous <Continuous>  dextrose 50% Injectable 25 Gram(s) IV Push once  dextrose 50% Injectable 12.5 Gram(s) IV Push once  dextrose 50% Injectable 25 Gram(s) IV Push once  enoxaparin Injectable 120 milliGRAM(s) SubCutaneous every 24 hours  glucagon  Injectable 1 milliGRAM(s) IntraMuscular once  insulin lispro (ADMELOG) corrective regimen sliding scale   SubCutaneous three times a day before meals  insulin lispro (ADMELOG) corrective regimen sliding scale   SubCutaneous at bedtime  pantoprazole    Tablet 40 milliGRAM(s) Oral two times a day  senna 2 Tablet(s) Oral at bedtime  sodium chloride 3%  Inhalation 4 milliLiter(s) Inhalation every 12 hours      Allergies    oxycodone (Vomiting)    Intolerances        SOCIAL HISTORY:  Denies ETOh,Smoking,     FAMILY HISTORY:  FH: heart disease  Mother        REVIEW OF SYSTEMS:    CONSTITUTIONAL: No weakness, fevers or chills  EYES/ENT: No visual changes;  No vertigo or throat pain   NECK: No pain or stiffness  RESPIRATORY: No cough, wheezing, hemoptysis; No shortness of breath  CARDIOVASCULAR: No chest pain or palpitations  GASTROINTESTINAL: No abdominal or epigastric pain. No nausea, vomiting, or hematemesis; No diarrhea or constipation. No melena or hematochezia.  GENITOURINARY: No dysuria, frequency or hematuria  NEUROLOGICAL: No numbness or weakness  SKIN: No itching, burning, rashes, or lesions   All other review of systems is negative unless indicated above.    VITAL:  T(C): , Max: 36.9 (04-10-22 @ 20:59)  T(F): , Max: 98.5 (04-10-22 @ 20:59)  HR: 76 (04-11-22 @ 05:00)  BP: 94/54 (04-11-22 @ 05:00)  BP(mean): --  RR: 18 (04-11-22 @ 05:00)  SpO2: 100% (04-11-22 @ 05:00)  Wt(kg): --    I and O's:    04-09 @ 07:01  -  04-10 @ 07:00  --------------------------------------------------------  IN: 800 mL / OUT: 800 mL / NET: 0 mL    04-10 @ 07:01  -  04-11 @ 07:00  --------------------------------------------------------  IN: 300 mL / OUT: 300 mL / NET: 0 mL    04-11 @ 07:01  -  04-11 @ 08:32  --------------------------------------------------------  IN: 0 mL / OUT: 250 mL / NET: -250 mL          PHYSICAL EXAM:    Constitutional: NAD  HEENT: PERRLA,   Neck: No JVD  Respiratory: CTA B/L  Cardiovascular: S1 and S2  Gastrointestinal: BS+, soft, NT/ND  Extremities: No peripheral edema  Neurological: A/O x 3, no focal deficits  Psychiatric: Normal mood, normal affect  : No Clark  Skin: No rashes  Access: Not applicable  Back: No CVA tenderness    LABS:                        7.6    1.79  )-----------( 159      ( 11 Apr 2022 06:25 )             25.3     04-11    135  |  102  |  10  ----------------------------<  108<H>  3.6   |  24  |  0.75    Ca    7.4<L>      11 Apr 2022 06:25  Phos  1.8     04-11  Mg     1.70     04-11            RADIOLOGY & ADDITIONAL STUDIES:                          
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 Said that for the past few weeks he has been having significant abd swelling with associated pressure in his abdomen and poor PO intake. Said that whenever he tries to eat something he invariably ends up vomiting the food due to his abdominal distension (emesis usually is food, sometimes bilious, no blood/coffee ground substances). Said that he has been having difficulty due to his abdominal distention but denies any chest pain, palpitations, or syncope. Also with a new onset cough sometimes with green sputum but no hemoptysis. Is having BMs but sometimes has diarrhea (last BM this AM was soft, no blood in BM). Has generalized weakness due to his reduced PO intake. Denies any recent fevers/chills/diaphoresis. Has noted b/l LE swelling but denies any pain to it, has been put on lasix for it but states that it does not seem to be helping the swelling. Said that he has known fluid in his abdomen, was sampled once but has never been drained before. Denies any other acute complaints.     On arrival to the ED, his vitals were T 98, P 95, BP 85/51 -> 93/54, RR 18, O2 sat 95%. His lab work showed anemia (improved from prior), low bicarb and elevated AG (likely 2/2 starvation ketosis), and nl pH/lactate. His imaging showed large volume ascites and worsening abdominal masses but unclear trend as patient currently follows at [facility] for his malignancy. He was also noted to have an incidental RLL PE and was started on a heparin gtt. he was admitted to medicine.  (04 Apr 2022 23:23)      PAST MEDICAL & SURGICAL HISTORY:  Intra-abdominal and pelvic swelling, mass and lump, unspecified site  Currently on experimental chemotherapy at [facility]    Hypertension  Now with low BPs    Diabetes mellitus  Fasting FS range from - per patient, off meds    Hypercholesteremia  off meds    CAD (coronary artery disease)  Off aspirin    Sleep apnea    History of cardiac cath  Pt reports 1 cardiac stent placed 1999    H/O sleep apnea  Per patient had Sleep Apnea surgery  in 1996- at [facility]- Was not retested for Sleep Apnea post surgery    History of colon resection  Dec 2019        MEDICATIONS  (STANDING):  dextrose 5%. 1000 milliLiter(s) (100 mL/Hr) IV Continuous <Continuous>  dextrose 5%. 1000 milliLiter(s) (50 mL/Hr) IV Continuous <Continuous>  dextrose 50% Injectable 25 Gram(s) IV Push once  dextrose 50% Injectable 12.5 Gram(s) IV Push once  dextrose 50% Injectable 25 Gram(s) IV Push once  enoxaparin Injectable 120 milliGRAM(s) SubCutaneous every 24 hours  glucagon  Injectable 1 milliGRAM(s) IntraMuscular once  insulin lispro (ADMELOG) corrective regimen sliding scale   SubCutaneous three times a day before meals  insulin lispro (ADMELOG) corrective regimen sliding scale   SubCutaneous at bedtime  pantoprazole    Tablet 40 milliGRAM(s) Oral two times a day  senna 2 Tablet(s) Oral at bedtime  sodium chloride 3%  Inhalation 4 milliLiter(s) Inhalation every 12 hours      Allergies    oxycodone (Vomiting)    Intolerances        SOCIAL HISTORY:  Denies ETOh,Smoking,     FAMILY HISTORY:  FH: heart disease  Mother        REVIEW OF SYSTEMS:    CONSTITUTIONAL: No weakness, fevers or chills  EYES/ENT: No visual changes;  No vertigo or throat pain   NECK: No pain or stiffness  RESPIRATORY: No cough, wheezing, hemoptysis; No shortness of breath  CARDIOVASCULAR: No chest pain or palpitations  GASTROINTESTINAL: No abdominal or epigastric pain. No nausea, vomiting, or hematemesis; No diarrhea or constipation. No melena or hematochezia.  GENITOURINARY: No dysuria, frequency or hematuria  NEUROLOGICAL: No numbness or weakness  SKIN: No itching, burning, rashes, or lesions   All other review of systems is negative unless indicated above.    VITAL:  T(C): , Max: 37 (04-11-22 @ 16:38)  T(F): , Max: 98.6 (04-11-22 @ 16:38)  HR: 80 (04-12-22 @ 03:45)  BP: 90/55 (04-12-22 @ 03:45)  BP(mean): --  RR: 17 (04-12-22 @ 03:45)  SpO2: 99% (04-12-22 @ 03:45)  Wt(kg): --    I and O's:    04-09 @ 07:01  -  04-10 @ 07:00  --------------------------------------------------------  IN: 800 mL / OUT: 800 mL / NET: 0 mL    04-10 @ 07:01  -  04-11 @ 07:00  --------------------------------------------------------  IN: 300 mL / OUT: 300 mL / NET: 0 mL    04-11 @ 07:01  -  04-12 @ 06:33  --------------------------------------------------------  IN: 1050 mL / OUT: 800 mL / NET: 250 mL          PHYSICAL EXAM:    Constitutional: NAD  HEENT: PERRLA,   Neck: No JVD  Respiratory: CTA B/L  Cardiovascular: S1 and S2  Gastrointestinal: BS+, soft, NT/ND  Extremities: No peripheral edema  Neurological: A/O x 3, no focal deficits  Psychiatric: Normal mood, normal affect  : No Clark  Skin: No rashes  Access: Not applicable  Back: No CVA tenderness    LABS:                        7.6    1.79  )-----------( 159      ( 11 Apr 2022 06:25 )             25.3     04-11    135  |  102  |  10  ----------------------------<  108<H>  3.6   |  24  |  0.75    Ca    7.4<L>      11 Apr 2022 06:25  Phos  1.8     04-11  Mg     1.70     04-11            RADIOLOGY & ADDITIONAL STUDIES:                          
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 Said that for the past few weeks he has been having significant abd swelling with associated pressure in his abdomen and poor PO intake. Said that whenever he tries to eat something he invariably ends up vomiting the food due to his abdominal distension (emesis usually is food, sometimes bilious, no blood/coffee ground substances). Said that he has been having difficulty due to his abdominal distention but denies any chest pain, palpitations, or syncope. Also with a new onset cough sometimes with green sputum but no hemoptysis. Is having BMs but sometimes has diarrhea (last BM this AM was soft, no blood in BM). Has generalized weakness due to his reduced PO intake. Denies any recent fevers/chills/diaphoresis. Has noted b/l LE swelling but denies any pain to it, has been put on lasix for it but states that it does not seem to be helping the swelling. Said that he has known fluid in his abdomen, was sampled once but has never been drained before. Denies any other acute complaints.     On arrival to the ED, his vitals were T 98, P 95, BP 85/51 -> 93/54, RR 18, O2 sat 95%. His lab work showed anemia (improved from prior), low bicarb and elevated AG (likely 2/2 starvation ketosis), and nl pH/lactate. His imaging showed large volume ascites and worsening abdominal masses but unclear trend as patient currently follows at [facility] for his malignancy. He was also noted to have an incidental RLL PE and was started on a heparin gtt. he was admitted to medicine.  (04 Apr 2022 23:23)      PAST MEDICAL & SURGICAL HISTORY:  Intra-abdominal and pelvic swelling, mass and lump, unspecified site  Currently on experimental chemotherapy at [facility]    Hypertension  Now with low BPs    Diabetes mellitus  Fasting FS range from - per patient, off meds    Hypercholesteremia  off meds    CAD (coronary artery disease)  Off aspirin    Sleep apnea    History of cardiac cath  Pt reports 1 cardiac stent placed 1999    H/O sleep apnea  Per patient had Sleep Apnea surgery  in 1996- at [facility]- Was not retested for Sleep Apnea post surgery    History of colon resection  Dec 2019        MEDICATIONS  (STANDING):  albumin human 25% IVPB 50 milliLiter(s) IV Intermittent once  cefepime   IVPB      cefepime   IVPB 2000 milliGRAM(s) IV Intermittent every 8 hours  dextrose 5%. 1000 milliLiter(s) (100 mL/Hr) IV Continuous <Continuous>  dextrose 5%. 1000 milliLiter(s) (50 mL/Hr) IV Continuous <Continuous>  dextrose 50% Injectable 25 Gram(s) IV Push once  dextrose 50% Injectable 12.5 Gram(s) IV Push once  dextrose 50% Injectable 25 Gram(s) IV Push once  glucagon  Injectable 1 milliGRAM(s) IntraMuscular once  insulin lispro (ADMELOG) corrective regimen sliding scale   SubCutaneous three times a day before meals  insulin lispro (ADMELOG) corrective regimen sliding scale   SubCutaneous at bedtime  multivitamin 1 Tablet(s) Oral daily  pantoprazole    Tablet 40 milliGRAM(s) Oral two times a day  senna 2 Tablet(s) Oral at bedtime  sodium chloride 3%  Inhalation 4 milliLiter(s) Inhalation every 12 hours      Allergies    oxycodone (Vomiting)    Intolerances        SOCIAL HISTORY:  Denies ETOh,Smoking,     FAMILY HISTORY:  FH: heart disease  Mother        REVIEW OF SYSTEMS:    CONSTITUTIONAL: No weakness, fevers or chills  EYES/ENT: No visual changes;  No vertigo or throat pain   NECK: No pain or stiffness  RESPIRATORY: No cough, wheezing, hemoptysis; No shortness of breath  CARDIOVASCULAR: No chest pain or palpitations  GASTROINTESTINAL: No abdominal or epigastric pain. No nausea, vomiting, or hematemesis; No diarrhea or constipation. No melena or hematochezia.  GENITOURINARY: No dysuria, frequency or hematuria  NEUROLOGICAL: No numbness or weakness  SKIN: No itching, burning, rashes, or lesions   All other review of systems is negative unless indicated above.    VITAL:  T(C): , Max: 36.7 (04-14-22 @ 14:16)  T(F): , Max: 98 (04-14-22 @ 14:16)  HR: 74 (04-15-22 @ 05:12)  BP: 90/42 (04-15-22 @ 05:12)  BP(mean): --  RR: 18 (04-15-22 @ 05:12)  SpO2: 98% (04-15-22 @ 05:12)  Wt(kg): --    I and O's:    04-13 @ 07:01  -  04-14 @ 07:00  --------------------------------------------------------  IN: 450 mL / OUT: 900 mL / NET: -450 mL    04-14 @ 07:01  -  04-15 @ 07:00  --------------------------------------------------------  IN: 1020 mL / OUT: 1500 mL / NET: -480 mL          PHYSICAL EXAM:    Constitutional: NAD  HEENT: PERRLA,   Neck: No JVD  Respiratory: CTA B/L  Cardiovascular: S1 and S2  Gastrointestinal: BS+, soft, NT/ND  Extremities: No peripheral edema  Neurological: A/O x 3, no focal deficits  Psychiatric: Normal mood, normal affect  : No Clark  Skin: No rashes  Access: Not applicable  Back: No CVA tenderness    LABS:                        7.1    5.55  )-----------( 69       ( 14 Apr 2022 06:49 )             22.5     04-14    132<L>  |  99  |  11  ----------------------------<  131<H>  3.5   |  21<L>  |  0.72    Ca    7.5<L>      14 Apr 2022 06:49  Phos  2.3     04-14  Mg     1.60     04-14    TPro  5.4<L>  /  Alb  2.7<L>  /  TBili  0.3  /  DBili  x   /  AST  9   /  ALT  5   /  AlkPhos  58  04-14          RADIOLOGY & ADDITIONAL STUDIES:                          
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 Said that for the past few weeks he has been having significant abd swelling with associated pressure in his abdomen and poor PO intake. Said that whenever he tries to eat something he invariably ends up vomiting the food due to his abdominal distension (emesis usually is food, sometimes bilious, no blood/coffee ground substances). Said that he has been having difficulty due to his abdominal distention but denies any chest pain, palpitations, or syncope. Also with a new onset cough sometimes with green sputum but no hemoptysis. Is having BMs but sometimes has diarrhea (last BM this AM was soft, no blood in BM). Has generalized weakness due to his reduced PO intake. Denies any recent fevers/chills/diaphoresis. Has noted b/l LE swelling but denies any pain to it, has been put on lasix for it but states that it does not seem to be helping the swelling. Said that he has known fluid in his abdomen, was sampled once but has never been drained before. Denies any other acute complaints.     On arrival to the ED, his vitals were T 98, P 95, BP 85/51 -> 93/54, RR 18, O2 sat 95%. His lab work showed anemia (improved from prior), low bicarb and elevated AG (likely 2/2 starvation ketosis), and nl pH/lactate. His imaging showed large volume ascites and worsening abdominal masses but unclear trend as patient currently follows at [facility] for his malignancy. He was also noted to have an incidental RLL PE and was started on a heparin gtt. he was admitted to medicine.  (04 Apr 2022 23:23)      PAST MEDICAL & SURGICAL HISTORY:  Intra-abdominal and pelvic swelling, mass and lump, unspecified site  Currently on experimental chemotherapy at [facility]    Hypertension  Now with low BPs    Diabetes mellitus  Fasting FS range from - per patient, off meds    Hypercholesteremia  off meds    CAD (coronary artery disease)  Off aspirin    Sleep apnea    History of cardiac cath  Pt reports 1 cardiac stent placed 1999    H/O sleep apnea  Per patient had Sleep Apnea surgery  in 1996- at [facility]- Was not retested for Sleep Apnea post surgery    History of colon resection  Dec 2019        MEDICATIONS  (STANDING):  albumin human 25% IVPB 50 milliLiter(s) IV Intermittent once  cefepime   IVPB      cefepime   IVPB 2000 milliGRAM(s) IV Intermittent every 8 hours  dextrose 5%. 1000 milliLiter(s) (100 mL/Hr) IV Continuous <Continuous>  dextrose 5%. 1000 milliLiter(s) (50 mL/Hr) IV Continuous <Continuous>  dextrose 50% Injectable 25 Gram(s) IV Push once  dextrose 50% Injectable 12.5 Gram(s) IV Push once  dextrose 50% Injectable 25 Gram(s) IV Push once  glucagon  Injectable 1 milliGRAM(s) IntraMuscular once  insulin lispro (ADMELOG) corrective regimen sliding scale   SubCutaneous three times a day before meals  insulin lispro (ADMELOG) corrective regimen sliding scale   SubCutaneous at bedtime  multivitamin 1 Tablet(s) Oral daily  pantoprazole    Tablet 40 milliGRAM(s) Oral two times a day  potassium phosphate / sodium phosphate Powder (PHOS-NaK) 1 Packet(s) Oral every 4 hours  senna 2 Tablet(s) Oral at bedtime  sodium chloride 3%  Inhalation 4 milliLiter(s) Inhalation every 12 hours      Allergies    oxycodone (Vomiting)    Intolerances        SOCIAL HISTORY:  Denies ETOh,Smoking,     FAMILY HISTORY:  FH: heart disease  Mother        REVIEW OF SYSTEMS:    CONSTITUTIONAL: No weakness, fevers or chills  EYES/ENT: No visual changes;  No vertigo or throat pain   NECK: No pain or stiffness  RESPIRATORY: No cough, wheezing, hemoptysis; No shortness of breath  CARDIOVASCULAR: No chest pain or palpitations  GASTROINTESTINAL: No abdominal or epigastric pain. No nausea, vomiting, or hematemesis; No diarrhea or constipation. No melena or hematochezia.  GENITOURINARY: No dysuria, frequency or hematuria  NEUROLOGICAL: No numbness or weakness  SKIN: No itching, burning, rashes, or lesions   All other review of systems is negative unless indicated above.    VITAL:  T(C): , Max: 36.7 (04-17-22 @ 05:45)  T(F): , Max: 98 (04-17-22 @ 05:45)  HR: 85 (04-17-22 @ 05:45)  BP: 97/55 (04-17-22 @ 05:45)  BP(mean): --  RR: 20 (04-17-22 @ 05:45)  SpO2: 99% (04-17-22 @ 05:45)  Wt(kg): --    I and O's:    04-15 @ 07:01  -  04-16 @ 07:00  --------------------------------------------------------  IN: 300 mL / OUT: 450 mL / NET: -150 mL    04-16 @ 07:01  -  04-17 @ 07:00  --------------------------------------------------------  IN: 400 mL / OUT: 0 mL / NET: 400 mL          PHYSICAL EXAM:    Constitutional: NAD  HEENT: PERRLA,   Neck: No JVD  Respiratory: CTA B/L  Cardiovascular: S1 and S2  Gastrointestinal: BS+, soft, NT/ND  Extremities: No peripheral edema  Neurological: A/O x 3, no focal deficits  Psychiatric: Normal mood, normal affect  : No Clark  Skin: No rashes  Access: Not applicable  Back: No CVA tenderness    LABS:                        6.8    2.74  )-----------( 86       ( 17 Apr 2022 06:55 )             23.0     04-17    133<L>  |  103  |  12  ----------------------------<  98  3.5   |  21<L>  |  0.65    Ca    7.9<L>      17 Apr 2022 06:55  Phos  2.3     04-17  Mg     1.70     04-17    TPro  5.5<L>  /  Alb  2.3<L>  /  TBili  0.2  /  DBili  x   /  AST  6   /  ALT  6   /  AlkPhos  63  04-16          RADIOLOGY & ADDITIONAL STUDIES:                          
  Max Coffey MD  Interventional Cardiology / Endovascular Specialist  [place] Office : [phone] [place] [address] [place] [address]  Tel:   [place] Office : [address] [facility] [place] [address]  Tel: [phone]    Subjective/Overnight events: Patient lying in bed. No acute distress.   	  MEDICATIONS:  enoxaparin Injectable 120 milliGRAM(s) SubCutaneous every 24 hours      benzonatate 100 milliGRAM(s) Oral three times a day PRN  sodium chloride 3%  Inhalation 4 milliLiter(s) Inhalation every 12 hours    acetaminophen     Tablet .. 650 milliGRAM(s) Oral every 6 hours PRN  HYDROmorphone  Injectable 0.2 milliGRAM(s) IV Push every 6 hours PRN  melatonin 3 milliGRAM(s) Oral at bedtime PRN  ondansetron Injectable 4 milliGRAM(s) IV Push every 8 hours PRN    aluminum hydroxide/magnesium hydroxide/simethicone Suspension 30 milliLiter(s) Oral every 4 hours PRN  pantoprazole    Tablet 40 milliGRAM(s) Oral two times a day  senna 2 Tablet(s) Oral at bedtime    dextrose 50% Injectable 25 Gram(s) IV Push once  dextrose 50% Injectable 12.5 Gram(s) IV Push once  dextrose 50% Injectable 25 Gram(s) IV Push once  dextrose Oral Gel 15 Gram(s) Oral once PRN  glucagon  Injectable 1 milliGRAM(s) IntraMuscular once  insulin lispro (ADMELOG) corrective regimen sliding scale   SubCutaneous three times a day before meals  insulin lispro (ADMELOG) corrective regimen sliding scale   SubCutaneous at bedtime    dextrose 5%. 1000 milliLiter(s) IV Continuous <Continuous>  dextrose 5%. 1000 milliLiter(s) IV Continuous <Continuous>      PAST MEDICAL/SURGICAL HISTORY  PAST MEDICAL & SURGICAL HISTORY:  Intra-abdominal and pelvic swelling, mass and lump, unspecified site  Currently on experimental chemotherapy at [facility]    Hypertension  Now with low BPs    Diabetes mellitus  Fasting FS range from - per patient, off meds    Hypercholesteremia  off meds    CAD (coronary artery disease)  Off aspirin    Sleep apnea    History of cardiac cath  Pt reports 1 cardiac stent placed 1999    H/O sleep apnea  Per patient had Sleep Apnea surgery  in 1996- at [facility]- Was not retested for Sleep Apnea post surgery    History of colon resection  Dec 2019        SOCIAL HISTORY: Substance Use (street drugs): ( x ) never used  (  ) other:    FAMILY HISTORY:  FH: heart disease  Mother        REVIEW OF SYSTEMS:  CONSTITUTIONAL: No fever, weight loss, or fatigue  EYES: No eye pain, visual disturbances, or discharge  ENMT:  No difficulty hearing, tinnitus, vertigo; No sinus or throat pain  BREASTS: No pain, masses, or nipple discharge  GASTROINTESTINAL: No abdominal or epigastric pain. No nausea, vomiting, or hematemesis; No diarrhea or constipation. No melena or hematochezia.  GENITOURINARY: No dysuria, frequency, hematuria, or incontinence  NEUROLOGICAL: No headaches, memory loss, loss of strength, numbness, or tremors  ENDOCRINE: No heat or cold intolerance; No hair loss  MUSCULOSKELETAL: No joint pain or swelling; No muscle, back, or extremity pain  PSYCHIATRIC: No depression, anxiety, mood swings, or difficulty sleeping  HEME/LYMPH: No easy bruising, or bleeding gums  All others negative    PHYSICAL EXAM:  T(C): 36.3 (04-10-22 @ 06:02), Max: 36.6 (04-09-22 @ 18:58)  HR: 89 (04-10-22 @ 09:58) (78 - 96)  BP: 100/66 (04-10-22 @ 06:02) (96/58 - 100/66)  RR: 18 (04-10-22 @ 06:02) (18 - 18)  SpO2: 99% (04-10-22 @ 09:58) (98% - 100%)  Wt(kg): --  I&O's Summary    09 Apr 2022 07:01  -  10 Apr 2022 07:00  --------------------------------------------------------  IN: 800 mL / OUT: 800 mL / NET: 0 mL      GENERAL: NAD  EYES: conjunctiva and sclera clear  ENMT: No tonsillar erythema, exudates, or enlargement  Cardiovascular: Normal S1 S2, No JVD, No murmurs, No edema  Respiratory: Lungs clear to auscultation	  Gastrointestinal:  distended 	  Extremities: 2+ edema B/L                                 8.5    2.23  )-----------( 230      ( 10 Apr 2022 08:03 )             27.4     04-10    132<L>  |  98  |  11  ----------------------------<  112<H>  3.6   |  21<L>  |  0.76    Ca    7.5<L>      10 Apr 2022 08:03  Phos  2.2     04-10  Mg     1.60     04-10    TPro  x   /  Alb  2.4<L>  /  TBili  x   /  DBili  x   /  AST  x   /  ALT  x   /  AlkPhos  x   04-09    proBNP:   Lipid Profile:   HgA1c:   TSH:     Consultant(s) Notes Reviewed:  [x ] YES  [ ] NO    Care Discussed with Consultants/Other Providers [ x] YES  [ ] NO    Imaging Personally Reviewed independently:  [x] YES  [ ] NO    All labs, radiologic studies, vitals, orders and medications list reviewed. Patient is seen and examined at bedside. Case discussed with medical team.              
  Max Coffey MD  Interventional Cardiology / Endovascular Specialist  [place] Office : [phone] [place] [address] [place] [address]  Tel:   [place] Office : [address] [facility] [place] [address]  Tel: [phone]    Subjective/Overnight events: Patient lying in bed comfortably. No acute distress. Denies chest pain, SOB or palpitations  	  MEDICATIONS:  enoxaparin Injectable 80 milliGRAM(s) SubCutaneous every 12 hours    cefepime   IVPB      cefepime   IVPB 2000 milliGRAM(s) IV Intermittent every 8 hours    benzonatate 100 milliGRAM(s) Oral every 8 hours PRN  budesonide  80 MICROgram(s)/formoterol 4.5 MICROgram(s) Inhaler 2 Puff(s) Inhalation two times a day  sodium chloride 3%  Inhalation 4 milliLiter(s) Inhalation every 12 hours    acetaminophen     Tablet .. 650 milliGRAM(s) Oral every 6 hours PRN  melatonin 3 milliGRAM(s) Oral at bedtime PRN  ondansetron Injectable 4 milliGRAM(s) IV Push every 8 hours PRN    aluminum hydroxide/magnesium hydroxide/simethicone Suspension 30 milliLiter(s) Oral every 4 hours PRN  pantoprazole    Tablet 40 milliGRAM(s) Oral two times a day  senna 2 Tablet(s) Oral at bedtime    dextrose 50% Injectable 25 Gram(s) IV Push once  dextrose 50% Injectable 12.5 Gram(s) IV Push once  dextrose 50% Injectable 25 Gram(s) IV Push once  dextrose Oral Gel 15 Gram(s) Oral once PRN  glucagon  Injectable 1 milliGRAM(s) IntraMuscular once  insulin lispro (ADMELOG) corrective regimen sliding scale   SubCutaneous three times a day before meals  insulin lispro (ADMELOG) corrective regimen sliding scale   SubCutaneous at bedtime    albumin human 25% IVPB 50 milliLiter(s) IV Intermittent once  dextrose 5%. 1000 milliLiter(s) IV Continuous <Continuous>  dextrose 5%. 1000 milliLiter(s) IV Continuous <Continuous>  multivitamin 1 Tablet(s) Oral daily  potassium chloride    Tablet ER 20 milliEquivalent(s) Oral every 2 hours      PAST MEDICAL/SURGICAL HISTORY  PAST MEDICAL & SURGICAL HISTORY:  Intra-abdominal and pelvic swelling, mass and lump, unspecified site  Currently on experimental chemotherapy at [facility]    Hypertension  Now with low BPs    Diabetes mellitus  Fasting FS range from - per patient, off meds    Hypercholesteremia  off meds    CAD (coronary artery disease)  Off aspirin    Sleep apnea    History of cardiac cath  Pt reports 1 cardiac stent placed 1999    H/O sleep apnea  Per patient had Sleep Apnea surgery  in 1996- at [facility]- Was not retested for Sleep Apnea post surgery    History of colon resection  Dec 2019        SOCIAL HISTORY: Substance Use (street drugs): ( x ) never used  (  ) other:    FAMILY HISTORY:  FH: heart disease  Mother        REVIEW OF SYSTEMS:  CONSTITUTIONAL: No fever, weight loss, or fatigue  EYES: No eye pain, visual disturbances, or discharge  ENMT:  No difficulty hearing, tinnitus, vertigo; No sinus or throat pain  BREASTS: No pain, masses, or nipple discharge  GASTROINTESTINAL: No abdominal or epigastric pain. No nausea, vomiting, or hematemesis; No diarrhea or constipation. No melena or hematochezia.  GENITOURINARY: No dysuria, frequency, hematuria, or incontinence  NEUROLOGICAL: No headaches, memory loss, loss of strength, numbness, or tremors  ENDOCRINE: No heat or cold intolerance; No hair loss  MUSCULOSKELETAL: No joint pain or swelling; No muscle, back, or extremity pain  PSYCHIATRIC: No depression, anxiety, mood swings, or difficulty sleeping  HEME/LYMPH: No easy bruising, or bleeding gums  All others negative    PHYSICAL EXAM:  T(C): 36.7 (04-21-22 @ 12:53), Max: 36.7 (04-21-22 @ 12:53)  HR: 81 (04-21-22 @ 12:53) (71 - 87)  BP: 95/57 (04-21-22 @ 12:53) (90/60 - 95/57)  RR: 18 (04-21-22 @ 12:53) (18 - 18)  SpO2: 100% (04-21-22 @ 12:53) (91% - 100%)  Wt(kg): --  I&O's Summary    20 Apr 2022 07:01  -  21 Apr 2022 07:00  --------------------------------------------------------  IN: 760 mL / OUT: 850 mL / NET: -90 mL      EYES:   PERRLA   ENMT:   Moist mucous membranes, Good dentition, No lesions  Cardiovascular: Normal S1 S2, No JVD, No murmurs, No edema  Respiratory: Lungs clear to auscultation	  Gastrointestinal:  + ascites s/p paracentesis  Extremities: no edema                              8.7    1.97  )-----------( 63       ( 21 Apr 2022 07:34 )             28.1     04-21    134<L>  |  104  |  12  ----------------------------<  104<H>  3.4<L>   |  19<L>  |  0.68    Ca    8.1<L>      21 Apr 2022 07:34  Phos  1.9     04-21  Mg     1.60     04-21    TPro  5.2<L>  /  Alb  2.1<L>  /  TBili  0.3  /  DBili  x   /  AST  7   /  ALT  6   /  AlkPhos  65  04-21    proBNP:   Lipid Profile:   HgA1c:   TSH:     Consultant(s) Notes Reviewed:  [x ] YES  [ ] NO    Care Discussed with Consultants/Other Providers [ x] YES  [ ] NO    Imaging Personally Reviewed independently:  [x] YES  [ ] NO    All labs, radiologic studies, vitals, orders and medications list reviewed. Patient is seen and examined at bedside. Case discussed with medical team.              
  Max Coffey MD  Interventional Cardiology / Endovascular Specialist  [place] Office : [phone] [place] [address] [place] [address]  Tel:   [place] Office : [address] [facility] [place] [address]  Tel: [phone]    Subjective/Overnight events: Patient lying in bed comfortably. No acute distress. Denies chest pain, SOB or palpitations  	  MEDICATIONS:  enoxaparin Injectable 80 milliGRAM(s) SubCutaneous every 12 hours    cefepime   IVPB      cefepime   IVPB 2000 milliGRAM(s) IV Intermittent every 8 hours    benzonatate 100 milliGRAM(s) Oral every 8 hours PRN  budesonide  80 MICROgram(s)/formoterol 4.5 MICROgram(s) Inhaler 2 Puff(s) Inhalation two times a day  sodium chloride 3%  Inhalation 4 milliLiter(s) Inhalation every 12 hours    acetaminophen     Tablet .. 650 milliGRAM(s) Oral every 6 hours PRN  melatonin 3 milliGRAM(s) Oral at bedtime PRN  ondansetron Injectable 4 milliGRAM(s) IV Push every 8 hours PRN    aluminum hydroxide/magnesium hydroxide/simethicone Suspension 30 milliLiter(s) Oral every 4 hours PRN  pantoprazole    Tablet 40 milliGRAM(s) Oral two times a day  senna 2 Tablet(s) Oral at bedtime    dextrose 50% Injectable 25 Gram(s) IV Push once  dextrose 50% Injectable 12.5 Gram(s) IV Push once  dextrose 50% Injectable 25 Gram(s) IV Push once  dextrose Oral Gel 15 Gram(s) Oral once PRN  glucagon  Injectable 1 milliGRAM(s) IntraMuscular once  insulin lispro (ADMELOG) corrective regimen sliding scale   SubCutaneous three times a day before meals  insulin lispro (ADMELOG) corrective regimen sliding scale   SubCutaneous at bedtime    albumin human 25% IVPB 50 milliLiter(s) IV Intermittent once  dextrose 5%. 1000 milliLiter(s) IV Continuous <Continuous>  dextrose 5%. 1000 milliLiter(s) IV Continuous <Continuous>  multivitamin 1 Tablet(s) Oral daily      PAST MEDICAL/SURGICAL HISTORY  PAST MEDICAL & SURGICAL HISTORY:  Intra-abdominal and pelvic swelling, mass and lump, unspecified site  Currently on experimental chemotherapy at [facility]    Hypertension  Now with low BPs    Diabetes mellitus  Fasting FS range from - per patient, off meds    Hypercholesteremia  off meds    CAD (coronary artery disease)  Off aspirin    Sleep apnea    History of cardiac cath  Pt reports 1 cardiac stent placed 1999    H/O sleep apnea  Per patient had Sleep Apnea surgery  in 1996- at [facility]- Was not retested for Sleep Apnea post surgery    History of colon resection  Dec 2019        SOCIAL HISTORY: Substance Use (street drugs): ( x ) never used  (  ) other:    FAMILY HISTORY:  FH: heart disease  Mother        REVIEW OF SYSTEMS:  CONSTITUTIONAL: No fever, weight loss, or fatigue  EYES: No eye pain, visual disturbances, or discharge  ENMT:  No difficulty hearing, tinnitus, vertigo; No sinus or throat pain  BREASTS: No pain, masses, or nipple discharge  GASTROINTESTINAL: No abdominal or epigastric pain. No nausea, vomiting, or hematemesis; No diarrhea or constipation. No melena or hematochezia.  GENITOURINARY: No dysuria, frequency, hematuria, or incontinence  NEUROLOGICAL: No headaches, memory loss, loss of strength, numbness, or tremors  ENDOCRINE: No heat or cold intolerance; No hair loss  MUSCULOSKELETAL: No joint pain or swelling; No muscle, back, or extremity pain  PSYCHIATRIC: No depression, anxiety, mood swings, or difficulty sleeping  HEME/LYMPH: No easy bruising, or bleeding gums  All others negative    PHYSICAL EXAM:  T(C): 37.8 (04-20-22 @ 12:32), Max: 37.8 (04-20-22 @ 12:32)  HR: 75 (04-20-22 @ 12:32) (74 - 82)  BP: 103/64 (04-20-22 @ 12:32) (90/60 - 103/69)  RR: 18 (04-20-22 @ 12:32) (18 - 18)  SpO2: 100% (04-20-22 @ 12:32) (98% - 100%)  Wt(kg): --  I&O's Summary    19 Apr 2022 07:01  -  20 Apr 2022 07:00  --------------------------------------------------------  IN: 850 mL / OUT: 1000 mL / NET: -150 mL    20 Apr 2022 07:01  -  20 Apr 2022 15:54  --------------------------------------------------------  IN: 120 mL / OUT: 0 mL / NET: 120 mL      EYES:   PERRLA   ENMT:   Moist mucous membranes, Good dentition, No lesions  Cardiovascular: Normal S1 S2, No JVD, No murmurs, No edema  Respiratory: Lungs clear to auscultation	  Gastrointestinal:  + ascites s/p paracentesis  Extremities: no edema                              10.0   2.67  )-----------( 77       ( 20 Apr 2022 06:28 )             32.0     04-20    137  |  104  |  14  ----------------------------<  101<H>  3.5   |  21<L>  |  0.71    Ca    7.8<L>      20 Apr 2022 06:28  Phos  2.1     04-20  Mg     1.60     04-20    TPro  5.1<L>  /  Alb  2.1<L>  /  TBili  0.3  /  DBili  x   /  AST  8   /  ALT  7   /  AlkPhos  59  04-20    proBNP:   Lipid Profile:   HgA1c:   TSH:     Consultant(s) Notes Reviewed:  [x ] YES  [ ] NO    Care Discussed with Consultants/Other Providers [ x] YES  [ ] NO    Imaging Personally Reviewed independently:  [x] YES  [ ] NO    All labs, radiologic studies, vitals, orders and medications list reviewed. Patient is seen and examined at bedside. Case discussed with medical team.              
ADRIAN [id number]  71yMale  T(C): 36.3 (04-10-22 @ 06:02), Max: 36.6 (04-09-22 @ 12:23)  HR: 96 (04-10-22 @ 06:02) (78 - 96)  BP: 100/66 (04-10-22 @ 06:02) (96/58 - 100/66)  RR: 18 (04-10-22 @ 06:02) (18 - 18)  SpO2: 99% (04-10-22 @ 06:02) (97% - 100%)  Wt(kg): --  04-09 @ 07:01  -  04-10 @ 07:00  --------------------------------------------------------  IN: 800 mL / OUT: 800 mL / NET: 0 mL      normal cephalic atraumatic  s1s2   clear to ascultation bilaterally  soft, non tender, non distended no guarding or rebound  no clubbing cyanosis or edema    
PULMONARY PROGRESS NOTE    ADRIAN FUNES  MRN-[id number]    Patient is a 71y old  Male who presents with a chief complaint of Poor PO intake, Abd swelling (11 Apr 2022 11:21)      HPI:  -on room air  -dry cough ongoing  -getting lovenox injections for his PE    ROS:   -    ACTIVE MEDICATION LIST:  MEDICATIONS  (STANDING):  dextrose 5%. 1000 milliLiter(s) (100 mL/Hr) IV Continuous <Continuous>  dextrose 5%. 1000 milliLiter(s) (50 mL/Hr) IV Continuous <Continuous>  dextrose 50% Injectable 25 Gram(s) IV Push once  dextrose 50% Injectable 12.5 Gram(s) IV Push once  dextrose 50% Injectable 25 Gram(s) IV Push once  enoxaparin Injectable 120 milliGRAM(s) SubCutaneous every 24 hours  glucagon  Injectable 1 milliGRAM(s) IntraMuscular once  insulin lispro (ADMELOG) corrective regimen sliding scale   SubCutaneous three times a day before meals  insulin lispro (ADMELOG) corrective regimen sliding scale   SubCutaneous at bedtime  pantoprazole    Tablet 40 milliGRAM(s) Oral two times a day  senna 2 Tablet(s) Oral at bedtime  sodium chloride 3%  Inhalation 4 milliLiter(s) Inhalation every 12 hours    MEDICATIONS  (PRN):  acetaminophen     Tablet .. 650 milliGRAM(s) Oral every 6 hours PRN Temp greater or equal to 38C (100.4F), Mild Pain (1 - 3)  aluminum hydroxide/magnesium hydroxide/simethicone Suspension 30 milliLiter(s) Oral every 4 hours PRN Dyspepsia  benzonatate 100 milliGRAM(s) Oral three times a day PRN Cough  dextrose Oral Gel 15 Gram(s) Oral once PRN Blood Glucose LESS THAN 70 milliGRAM(s)/deciliter  HYDROmorphone  Injectable 0.2 milliGRAM(s) IV Push every 6 hours PRN moderate to severe pain  melatonin 3 milliGRAM(s) Oral at bedtime PRN Insomnia  ondansetron Injectable 4 milliGRAM(s) IV Push every 8 hours PRN Nausea and/or Vomiting      EXAM:  Vital Signs Last 24 Hrs  T(C): 36.4 (11 Apr 2022 12:44), Max: 36.9 (10 Apr 2022 20:59)  T(F): 97.6 (11 Apr 2022 12:44), Max: 98.5 (10 Apr 2022 20:59)  HR: 90 (11 Apr 2022 12:44) (76 - 90)  BP: 92/58 (11 Apr 2022 12:55) (81/55 - 94/54)  BP(mean): --  RR: 18 (11 Apr 2022 12:44) (18 - 18)  SpO2: 98% (11 Apr 2022 12:44) (96% - 100%)    GENERAL: The patient is awake and alert in no apparent distress.     LUNGS: C  respirations unlabored                7.6    1.79  )-----------( 159      ( 11 Apr 2022 06:25 )             25.3       04-11    135  |  102  |  10  ----------------------------<  108<H>  3.6   |  24  |  0.75    Ca    7.4<L>      11 Apr 2022 06:25  Phos  1.8     04-11  Mg     1.70     04-11       rad< from: US Duplex Venous Lower Ext Complete, Bilateral (04.05.22 @ 10:10) >    ACC: [id number] EXAM:  US DPLX LWR EXT VEINS COMPL BI                          PROCEDURE DATE:  04/05/2022          INTERPRETATION:  CLINICAL INFORMATION: Lower extremity edema, pulmonary   embolism    COMPARISON: None available.    TECHNIQUE: Duplexsonography of the BILATERAL LOWER extremity veins with   color and spectral Doppler, with and without compression.    FINDINGS:    Left greater than right lower extremity subcutaneous edema.    RIGHT:  Normal compressibility of the RIGHT common femoral, femoral and popliteal   veins.  Doppler examination shows normal spontaneous and phasic flow.  No RIGHT calf vein thrombosis is detected.    LEFT:  Normal compressibility of the LEFT common femoral, femoral and popliteal   veins.  Doppler examination shows normal spontaneous and phasic flow.  No LEFT calf vein thrombosis is detected.      IMPRESSION:  No evidence of deep venous thrombosis in either lower extremity.    Left greater than right lower extremity subcutaneous edema.            --- End of Report ---            CHADWICK KHALIL MD; Attending Radiologist  This document has been electronically signed. Apr 5 2022 10:16AM    < end of copied text >  < from: CT Angio Chest PE Protocol w/ IV Cont (04.05.22 @ 01:16) >    ACC: [id number] EXAM:  CT ANGIO CHEST PULM ART [facility]                          PROCEDURE DATE:  04/05/2022          INTERPRETATION:  CLINICAL INFORMATION: Initially presented with abdominal   pain, failure to thrive, nausea and vomiting, history of metastatic   liposarcoma, pulmonary embolus seen on CT abdomen    COMPARISON: CT chest 1/12/2022, CT abdomen pelvis 4/4/2022    CONTRAST/COMPLICATIONS:  IV Contrast: Omnipaque 350  85 cc administered   15 cc discarded  Oral Contrast: NONE  Complications: None reported at time of study completion    PROCEDURE:  CT Angiography of the Chest.  Sagittal and coronal reformats were performed as well as 3D (MIP)   reconstructions.    FINDINGS:    PULMONARY ARTERIES: Acute pulmonary embolus extending from the right   lower lobar pulmonary artery extending into some of the segmental   branches.    LUNGS, PLEURA, AND AIRWAYS: No endobronchial lesion  Small bilateral   right greater than left pleural effusions with adjacent subsegmental   passive atelectasis.    MEDIASTINUM AND GARRY: No lymphadenopathy. Hiatal hernia.  VESSELS: Calcifications of the coronary arteries and the aorta.  HEART: Heart is normal in size. No pericardial effusion.  CHEST WALL AND LOWER NECK: Within normal limits.  VISUALIZED UPPER ABDOMEN: Moderate to large volume ascites. Cholelithiasis  BONES: Degenerative changes of the spine.    IMPRESSION:  Acute right lower lobar pulmonary embolus extending into the segmental   branches.  Small bilateral right greater than left pleuraleffusions.    --- End of Report ---          JEFF LÓPEZ MD; Resident Radiology  This document has been electronically signed.  BALJINDER WELLER MD; Attending Radiologist  This document has been electronically signed. Apr 5 2022 10:34AM    < end of copied text >      PROBLEM LIST:  71y Male with HEALTH ISSUES - PROBLEM Dx:  Suspected deep vein thrombosis (DVT)    Pulmonary thromboembolism    Abdominal distension    Adult failure to thrive    Type 2 diabetes mellitus    CAD (coronary artery disease)    Liposarcoma    Need for prophylactic measure    Nausea &amp; vomiting    Debility    Advanced care planning/counseling discussion    Encounter for palliative care              RECS:   multifactorial etiology to cough- PE? aspiration? atelectasis?   continue with AC for PE  speech/swallow eval to eval for any aspiration  should use IS/acapella and oob/chair/PT to help with atelectasis  continue PPI  outpatient pfts      Please call with any questions.    Leidy Gonzalez, DO  [facility] Pulmonary/Sleep Medicine  [phone]  
PULMONARY PROGRESS NOTE    ADRIAN FUNES  [id number]    Patient is a 71y old  Male who presents with a chief complaint of Poor PO intake, Abd swelling (08 Apr 2022 15:07)      HPI:  in bed on room air  no complaints    ROS:   -    MEDICATIONS  (STANDING):  dextrose 5%. 1000 milliLiter(s) (100 mL/Hr) IV Continuous <Continuous>  dextrose 5%. 1000 milliLiter(s) (50 mL/Hr) IV Continuous <Continuous>  dextrose 50% Injectable 25 Gram(s) IV Push once  dextrose 50% Injectable 12.5 Gram(s) IV Push once  dextrose 50% Injectable 25 Gram(s) IV Push once  glucagon  Injectable 1 milliGRAM(s) IntraMuscular once  heparin  Infusion.  Unit(s)/Hr (15 mL/Hr) IV Continuous <Continuous>  insulin lispro (ADMELOG) corrective regimen sliding scale   SubCutaneous three times a day before meals  insulin lispro (ADMELOG) corrective regimen sliding scale   SubCutaneous at bedtime  magnesium oxide 400 milliGRAM(s) Oral three times a day with meals  pantoprazole    Tablet 40 milliGRAM(s) Oral two times a day  senna 2 Tablet(s) Oral at bedtime  sodium phosphate IVPB 30 milliMole(s) IV Intermittent once    MEDICATIONS  (PRN):  acetaminophen     Tablet .. 650 milliGRAM(s) Oral every 6 hours PRN Temp greater or equal to 38C (100.4F), Mild Pain (1 - 3)  aluminum hydroxide/magnesium hydroxide/simethicone Suspension 30 milliLiter(s) Oral every 4 hours PRN Dyspepsia  benzonatate 100 milliGRAM(s) Oral three times a day PRN Cough  dextrose Oral Gel 15 Gram(s) Oral once PRN Blood Glucose LESS THAN 70 milliGRAM(s)/deciliter  heparin   Injectable 6500 Unit(s) IV Push every 6 hours PRN For aPTT less than 40  heparin   Injectable 3000 Unit(s) IV Push every 6 hours PRN For aPTT between 40 - 57  HYDROmorphone  Injectable 0.2 milliGRAM(s) IV Push every 6 hours PRN moderate to severe pain  melatonin 3 milliGRAM(s) Oral at bedtime PRN Insomnia  ondansetron Injectable 4 milliGRAM(s) IV Push every 8 hours PRN Nausea and/or Vomiting    EXAM:  Vital Signs Last 24 Hrs  T(C): 36.5 (09 Apr 2022 06:00), Max: 36.5 (09 Apr 2022 06:00)  T(F): 97.7 (09 Apr 2022 06:00), Max: 97.7 (09 Apr 2022 06:00)  HR: 81 (09 Apr 2022 06:00) (76 - 82)  BP: 96/60 (09 Apr 2022 06:00) (93/57 - 107/68)  BP(mean): --  RR: 17 (09 Apr 2022 06:00) (17 - 18)  SpO2: 99% (09 Apr 2022 06:00) (97% - 99%)  GENERAL: The patient is awake and alert in no apparent distress.     LUNGS: not labored.       CBC Full  -  ( 09 Apr 2022 06:42 )  WBC Count : 2.87 K/uL  RBC Count : 2.79 M/uL  Hemoglobin : 8.1 g/dL  Hematocrit : 24.8 %  Platelet Count - Automated : 267 K/uL  Mean Cell Volume : 88.9 fL  Mean Cell Hemoglobin : 29.0 pg  Mean Cell Hemoglobin Concentration : 32.7 gm/dL  Auto Neutrophil # : x  Auto Lymphocyte # : x  Auto Monocyte # : x  Auto Eosinophil # : x  Auto Basophil # : x  Auto Neutrophil % : x  Auto Lymphocyte % : x  Auto Monocyte % : x  Auto Eosinophil % : x  Auto Basophil % : x    04-09    132<L>  |  99  |  14  ----------------------------<  113<H>  3.8   |  22  |  0.77    Ca    7.5<L>      09 Apr 2022 06:42  Phos  1.7     04-09  Mg     1.60     04-09    TPro  x   /  Alb  2.4<L>  /  TBili  x   /  DBili  x   /  AST  x   /  ALT  x   /  AlkPhos  x   04-09       rad< from: CT Angio Chest PE Protocol w/ IV Cont (04.05.22 @ 01:16) >    ACC: [id number] EXAM:  CT ANGIO CHEST PULM ART [facility]                          PROCEDURE DATE:  04/05/2022          INTERPRETATION:  CLINICAL INFORMATION: Initially presented with abdominal   pain, failure to thrive, nausea and vomiting, history of metastatic   liposarcoma, pulmonary embolus seen on CT abdomen    COMPARISON: CT chest 1/12/2022, CT abdomen pelvis 4/4/2022    CONTRAST/COMPLICATIONS:  IV Contrast: Omnipaque 350  85 cc administered   15 cc discarded  Oral Contrast: NONE  Complications: None reported at time of study completion    PROCEDURE:  CT Angiography of the Chest.  Sagittal and coronal reformats were performed as well as 3D (MIP)   reconstructions.    FINDINGS:    PULMONARY ARTERIES: Acute pulmonary embolus extending from the right   lower lobar pulmonary artery extending into some of the segmental   branches.    LUNGS, PLEURA, AND AIRWAYS: No endobronchial lesion  Small bilateral   right greater than left pleural effusions with adjacent subsegmental   passive atelectasis.    MEDIASTINUM AND GARRY: No lymphadenopathy. Hiatal hernia.  VESSELS: Calcifications of the coronary arteries and the aorta.  HEART: Heart is normal in size. No pericardial effusion.  CHEST WALL AND LOWER NECK: Within normal limits.  VISUALIZED UPPER ABDOMEN: Moderate to large volume ascites. Cholelithiasis  BONES: Degenerative changes of the spine.    IMPRESSION:  Acute right lower lobar pulmonary embolus extending into the segmental   branches.  Small bilateral right greater than left pleuraleffusions.    --- End of Report ---          JEFF LÓPEZ MD; Resident Radiology  This document has been electronically signed.  BALJINDER WELLER MD; Attending Radiologist  This document has been electronically signed. Apr 5 2022 10:34AM    < end of copied text >  < from: Transthoracic Echocardiogram (04.05.22 @ 16:34) >  effusion. Bilateral pleural effusions.  ------------------------------------------------------------------------  CONCLUSIONS:  1. Mitral annular calcification, otherwise normal mitral  valve. Mild-moderate mitral regurgitation.  2. Endocardium not wellvisualized; grossly normal left  ventricular systolic function.  3. The right ventricle is not well visualized; grossly  normal right ventricular systolic function.  ------------------------------------------------------------------------  Confirmed on  4/5/2022 - 17:59:42 by Estevan Espinoza M.D.,  [facility], [place]  ------------------------------------------------------------------------    < end of copied text >      PROBLEM LIST:  71y Male with HEALTH ISSUES - PROBLEM Dx:  Suspected deep vein thrombosis (DVT)    Pulmonary thromboembolism    Abdominal distension    Adult failure to thrive    Type 2 diabetes mellitus    CAD (coronary artery disease)    Liposarcoma    Need for prophylactic measure    Nausea &amp; vomiting    Debility    Advanced care planning/counseling discussion    Encounter for palliative care              RECS: multifactorial etiology to cough- PE? aspiration? atelectasis?   continue with AC for PE  speech/swallow eval to eval for any aspiration  NEBS BID with IS/acapaella   should use IS/acapella and mobilize to help with atelectasis  continue PPI  outpatient pfts      Please call with any questions over the weekend    Bibiana Bains MD  [facility] Pulmonary/Sleep Medicine  [phone]  
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 Jama Roberts at [facility]) who presents to the hospital with poor PO intake and worsening abdominal swelling. Said that for the past few weeks he has been having significant abd swelling with associated pressure in his abdomen and poor PO intake. Said that whenever he tries to eat something he invariably ends up vomiting the food due to his abdominal distension (emesis usually is food, sometimes bilious, no blood/coffee ground substances). Said that he has been having difficulty due to his abdominal distention but denies any chest pain, palpitations, or syncope. Also with a new onset cough sometimes with green sputum but no hemoptysis. Is having BMs but sometimes has diarrhea (last BM this AM was soft, no blood in BM). Has generalized weakness due to his reduced PO intake. Denies any recent fevers/chills/diaphoresis. Has noted b/l LE swelling but denies any pain to it, has been put on lasix for it but states that it does not seem to be helping the swelling. Said that he has known fluid in his abdomen, was sampled once but has never been drained before. Denies any other acute complaints.     On arrival to the ED, his vitals were T 98, P 95, BP 85/51 -> 93/54, RR 18, O2 sat 95%. His lab work showed anemia (improved from prior), low bicarb and elevated AG (likely 2/2 starvation ketosis), and nl pH/lactate. His imaging showed large volume ascites and worsening abdominal masses but unclear trend as patient currently follows at [facility] for his malignancy. He was also noted to have an incidental RLL PE and was started on a heparin gtt. he was admitted to medicine.  (04 Apr 2022 23:23)      PAST MEDICAL & SURGICAL HISTORY:  Intra-abdominal and pelvic swelling, mass and lump, unspecified site  Currently on experimental chemotherapy at [facility]    Hypertension  Now with low BPs    Diabetes mellitus  Fasting FS range from - per patient, off meds    Hypercholesteremia  off meds    CAD (coronary artery disease)  Off aspirin    Sleep apnea    History of cardiac cath  Pt reports 1 cardiac stent placed 1999    H/O sleep apnea  Per patient had Sleep Apnea surgery  in 1996- at [facility]- Was not retested for Sleep Apnea post surgery    History of colon resection  Dec 2019        MEDICATIONS  (STANDING):  albumin human 25% IVPB 50 milliLiter(s) IV Intermittent once  cefepime   IVPB      cefepime   IVPB 2000 milliGRAM(s) IV Intermittent every 8 hours  dextrose 5%. 1000 milliLiter(s) (100 mL/Hr) IV Continuous <Continuous>  dextrose 5%. 1000 milliLiter(s) (50 mL/Hr) IV Continuous <Continuous>  dextrose 50% Injectable 25 Gram(s) IV Push once  dextrose 50% Injectable 12.5 Gram(s) IV Push once  dextrose 50% Injectable 25 Gram(s) IV Push once  enoxaparin Injectable 80 milliGRAM(s) SubCutaneous every 12 hours  glucagon  Injectable 1 milliGRAM(s) IntraMuscular once  insulin lispro (ADMELOG) corrective regimen sliding scale   SubCutaneous three times a day before meals  insulin lispro (ADMELOG) corrective regimen sliding scale   SubCutaneous at bedtime  multivitamin 1 Tablet(s) Oral daily  pantoprazole    Tablet 40 milliGRAM(s) Oral two times a day  senna 2 Tablet(s) Oral at bedtime  sodium chloride 3%  Inhalation 4 milliLiter(s) Inhalation every 12 hours      Allergies    oxycodone (Vomiting)    Intolerances        SOCIAL HISTORY:  Denies ETOh,Smoking,     FAMILY HISTORY:  FH: heart disease  Mother        REVIEW OF SYSTEMS:    CONSTITUTIONAL: No weakness, fevers or chills  EYES/ENT: No visual changes;  No vertigo or throat pain   NECK: No pain or stiffness  RESPIRATORY: No cough, wheezing, hemoptysis; No shortness of breath  CARDIOVASCULAR: No chest pain or palpitations  GASTROINTESTINAL: No abdominal or epigastric pain. No nausea, vomiting, or hematemesis; No diarrhea or constipation. No melena or hematochezia.  GENITOURINARY: No dysuria, frequency or hematuria  NEUROLOGICAL: No numbness or weakness  SKIN: No itching, burning, rashes, or lesions   All other review of systems is negative unless indicated above.    VITAL:  T(C): , Max: 36.7 (04-18-22 @ 06:00)  T(F): , Max: 98 (04-18-22 @ 06:00)  HR: 71 (04-18-22 @ 06:00)  BP: 100/56 (04-18-22 @ 06:00)  BP(mean): --  RR: 18 (04-18-22 @ 06:00)  SpO2: 100% (04-18-22 @ 06:00)  Wt(kg): --    I and O's:    04-15 @ 07:01  -  04-16 @ 07:00  --------------------------------------------------------  IN: 300 mL / OUT: 450 mL / NET: -150 mL    04-16 @ 07:01  -  04-17 @ 07:00  --------------------------------------------------------  IN: 400 mL / OUT: 0 mL / NET: 400 mL    04-17 @ 07:01  -  04-18 @ 06:48  --------------------------------------------------------  IN: 740 mL / OUT: 1250 mL / NET: -510 mL          PHYSICAL EXAM:    Constitutional: NAD  HEENT: PERRLA,   Neck: No JVD  Respiratory: CTA B/L  Cardiovascular: S1 and S2  Gastrointestinal: BS+, soft, NT/ND  Extremities: No peripheral edema  Neurological: A/O x 3, no focal deficits  Psychiatric: Normal mood, normal affect  : No Clark  Skin: No rashes  Access: Not applicable  Back: No CVA tenderness    LABS:                        6.8    2.79  )-----------( 89       ( 17 Apr 2022 13:26 )             22.8     04-17    133<L>  |  103  |  12  ----------------------------<  98  3.5   |  21<L>  |  0.65    Ca    7.9<L>      17 Apr 2022 06:55  Phos  2.3     04-17  Mg     1.70     04-17    TPro  5.5<L>  /  Alb  2.3<L>  /  TBili  0.2  /  DBili  x   /  AST  6   /  ALT  6   /  AlkPhos  63  04-16          RADIOLOGY & ADDITIONAL STUDIES:                          
chart reviwed, full consult to follow
  Max Coffey MD  Interventional Cardiology / Endovascular Specialist  [place] Office : [address] [place] [address] [place] [address]  Tel:   [place] Office : [address] [facility] [place] [address]  Tel: [phone]      Subjective/Overnight events: Patient lying in bed comfortably. No acute distress.   	  MEDICATIONS:  heparin   Injectable 6500 Unit(s) IV Push every 6 hours PRN  heparin   Injectable 3000 Unit(s) IV Push every 6 hours PRN  heparin  Infusion.  Unit(s)/Hr IV Continuous <Continuous>        acetaminophen     Tablet .. 650 milliGRAM(s) Oral every 6 hours PRN  HYDROmorphone  Injectable 0.5 milliGRAM(s) IV Push every 6 hours PRN  melatonin 3 milliGRAM(s) Oral at bedtime PRN  ondansetron Injectable 4 milliGRAM(s) IV Push every 8 hours PRN    aluminum hydroxide/magnesium hydroxide/simethicone Suspension 30 milliLiter(s) Oral every 4 hours PRN  pantoprazole    Tablet 40 milliGRAM(s) Oral two times a day    dextrose 50% Injectable 25 Gram(s) IV Push once  dextrose 50% Injectable 12.5 Gram(s) IV Push once  dextrose 50% Injectable 25 Gram(s) IV Push once  dextrose Oral Gel 15 Gram(s) Oral once PRN  glucagon  Injectable 1 milliGRAM(s) IntraMuscular once  insulin lispro (ADMELOG) corrective regimen sliding scale   SubCutaneous three times a day before meals  insulin lispro (ADMELOG) corrective regimen sliding scale   SubCutaneous at bedtime    dextrose 5%. 1000 milliLiter(s) IV Continuous <Continuous>  dextrose 5%. 1000 milliLiter(s) IV Continuous <Continuous>      PAST MEDICAL/SURGICAL HISTORY  PAST MEDICAL & SURGICAL HISTORY:  Intra-abdominal and pelvic swelling, mass and lump, unspecified site  Currently on experimental chemotherapy at [facility]    Hypertension  Now with low BPs    Diabetes mellitus  Fasting FS range from - per patient, off meds    Hypercholesteremia  off meds    CAD (coronary artery disease)  Off aspirin    Sleep apnea    History of cardiac cath  Pt reports 1 cardiac stent placed 1999    H/O sleep apnea  Per patient had Sleep Apnea surgery  in 1996- at [facility]- Was not retested for Sleep Apnea post surgery    History of colon resection  Dec 2019        SOCIAL HISTORY: Substance Use (street drugs): ( x ) never used  (  ) other:    FAMILY HISTORY:  FH: heart disease  Mother          PHYSICAL EXAM:  T(C): 36.8 (04-06-22 @ 05:40), Max: 36.8 (04-06-22 @ 05:40)  HR: 82 (04-06-22 @ 05:40) (82 - 102)  BP: 96/59 (04-06-22 @ 05:40) (88/56 - 99/56)  RR: 18 (04-06-22 @ 05:40) (18 - 18)  SpO2: 98% (04-06-22 @ 05:40) (98% - 100%)  Wt(kg): --  I&O's Summary    05 Apr 2022 07:01  -  06 Apr 2022 07:00  --------------------------------------------------------  IN: 409 mL / OUT: 700 mL / NET: -291 mL        GENERAL: NAD,  EYES: conjunctiva and sclera clear  ENMT: No tonsillar erythema, exudates, or enlargement  Cardiovascular: Normal S1 S2, No JVD, No murmurs, No edema  Respiratory: Lungs clear to auscultation	  Gastrointestinal:  distended and firm 	  Extremities: 2+ edema B/L                                   7.6    3.70  )-----------( 394      ( 06 Apr 2022 04:38 )             23.3     04-06    133<L>  |  98  |  26<H>  ----------------------------<  133<H>  3.4<L>   |  21<L>  |  0.97    Ca    7.4<L>      06 Apr 2022 04:38  Phos  3.2     04-06  Mg     1.70     04-06    TPro  6.0  /  Alb  2.4<L>  /  TBili  0.3  /  DBili  x   /  AST  17  /  ALT  <5  /  AlkPhos  75  04-05    proBNP:   Lipid Profile:   HgA1c:   TSH:     Consultant(s) Notes Reviewed:  [x ] YES  [ ] NO    Care Discussed with Consultants/Other Providers [ x] YES  [ ] NO    Imaging Personally Reviewed independently:  [x] YES  [ ] NO    All labs, radiologic studies, vitals, orders and medications list reviewed. Patient is seen and examined at bedside. Case discussed with medical team.              
PULMONARY PROGRESS NOTE    ADRIAN FUNES  MRN-[id number]    Patient is a 71y old  Male who presents with a chief complaint of Poor PO intake, Abd swelling (08 Apr 2022 15:07)      HPI:  resting on room air    ROS:   -    MEDICATIONS  (STANDING):  dextrose 5%. 1000 milliLiter(s) (100 mL/Hr) IV Continuous <Continuous>  dextrose 5%. 1000 milliLiter(s) (50 mL/Hr) IV Continuous <Continuous>  dextrose 50% Injectable 25 Gram(s) IV Push once  dextrose 50% Injectable 12.5 Gram(s) IV Push once  dextrose 50% Injectable 25 Gram(s) IV Push once  enoxaparin Injectable 120 milliGRAM(s) SubCutaneous every 24 hours  glucagon  Injectable 1 milliGRAM(s) IntraMuscular once  insulin lispro (ADMELOG) corrective regimen sliding scale   SubCutaneous three times a day before meals  insulin lispro (ADMELOG) corrective regimen sliding scale   SubCutaneous at bedtime  pantoprazole    Tablet 40 milliGRAM(s) Oral two times a day  senna 2 Tablet(s) Oral at bedtime  sodium chloride 3%  Inhalation 4 milliLiter(s) Inhalation every 12 hours    MEDICATIONS  (PRN):  acetaminophen     Tablet .. 650 milliGRAM(s) Oral every 6 hours PRN Temp greater or equal to 38C (100.4F), Mild Pain (1 - 3)  aluminum hydroxide/magnesium hydroxide/simethicone Suspension 30 milliLiter(s) Oral every 4 hours PRN Dyspepsia  benzonatate 100 milliGRAM(s) Oral three times a day PRN Cough  dextrose Oral Gel 15 Gram(s) Oral once PRN Blood Glucose LESS THAN 70 milliGRAM(s)/deciliter  HYDROmorphone  Injectable 0.2 milliGRAM(s) IV Push every 6 hours PRN moderate to severe pain  melatonin 3 milliGRAM(s) Oral at bedtime PRN Insomnia  ondansetron Injectable 4 milliGRAM(s) IV Push every 8 hours PRN Nausea and/or Vomiting      EXAM:  Vital Signs Last 24 Hrs  T(C): 36.3 (10 Apr 2022 06:02), Max: 36.6 (09 Apr 2022 18:58)  T(F): 97.4 (10 Apr 2022 06:02), Max: 97.8 (09 Apr 2022 18:58)  HR: 89 (10 Apr 2022 09:58) (78 - 96)  BP: 100/66 (10 Apr 2022 06:02) (96/58 - 100/66)  BP(mean): --  RR: 18 (10 Apr 2022 06:02) (18 - 18)  SpO2: 99% (10 Apr 2022 09:58) (98% - 100%)  GENERAL: The patient is awake and alert in no apparent distress.     LUNGS: not labored.                           8.5    2.23  )-----------( 230      ( 10 Apr 2022 08:03 )             27.4   04-10    132<L>  |  98  |  11  ----------------------------<  112<H>  3.6   |  21<L>  |  0.76    Ca    7.5<L>      10 Apr 2022 08:03  Phos  2.2     04-10  Mg     1.60     04-10    TPro  x   /  Alb  2.4<L>  /  TBili  x   /  DBili  x   /  AST  x   /  ALT  x   /  AlkPhos  x   04-09         rad< from: CT Angio Chest PE Protocol w/ IV Cont (04.05.22 @ 01:16) >    ACC: [id number] EXAM:  CT ANGIO CHEST [facility]ULM [facility]                          PROCEDURE DATE:  04/05/2022          INTERPRETATION:  CLINICAL INFORMATION: Initially presented with abdominal   pain, failure to thrive, nausea and vomiting, history of metastatic   liposarcoma, pulmonary embolus seen on CT abdomen    COMPARISON: CT chest 1/12/2022, CT abdomen pelvis 4/4/2022    CONTRAST/COMPLICATIONS:  IV Contrast: Omnipaque 350  85 cc administered   15 cc discarded  Oral Contrast: NONE  Complications: None reported at time of study completion    PROCEDURE:  CT Angiography of the Chest.  Sagittal and coronal reformats were performed as well as 3D (MIP)   reconstructions.    FINDINGS:    PULMONARY ARTERIES: Acute pulmonary embolus extending from the right   lower lobar pulmonary artery extending into some of the segmental   branches.    LUNGS, PLEURA, AND AIRWAYS: No endobronchial lesion  Small bilateral   right greater than left pleural effusions with adjacent subsegmental   passive atelectasis.    MEDIASTINUM AND GARRY: No lymphadenopathy. Hiatal hernia.  VESSELS: Calcifications of the coronary arteries and the aorta.  HEART: Heart is normal in size. No pericardial effusion.  CHEST WALL AND LOWER NECK: Within normal limits.  VISUALIZED UPPER ABDOMEN: Moderate to large volume ascites. Cholelithiasis  BONES: Degenerative changes of the spine.    IMPRESSION:  Acute right lower lobar pulmonary embolus extending into the segmental   branches.  Small bilateral right greater than left pleuraleffusions.    --- End of Report ---          JEFF LÓPEZ MD; Resident Radiology  This document has been electronically signed.  BALJINDER WELLER MD; Attending Radiologist  This document has been electronically signed. Apr 5 2022 10:34AM    < end of copied text >  < from: Transthoracic Echocardiogram (04.05.22 @ 16:34) >  effusion. Bilateral pleural effusions.  ------------------------------------------------------------------------  CONCLUSIONS:  1. Mitral annular calcification, otherwise normal mitral  valve. Mild-moderate mitral regurgitation.  2. Endocardium not wellvisualized; grossly normal left  ventricular systolic function.  3. The right ventricle is not well visualized; grossly  normal right ventricular systolic function.  ------------------------------------------------------------------------  Confirmed on  4/5/2022 - 17:59:42 by Estevan Espinoza M.D.,  [facility], [place]  ------------------------------------------------------------------------    < end of copied text >      PROBLEM LIST:  71y Male with HEALTH ISSUES - PROBLEM Dx:  Suspected deep vein thrombosis (DVT)    Pulmonary thromboembolism    Abdominal distension    Adult failure to thrive    Type 2 diabetes mellitus    CAD (coronary artery disease)    Liposarcoma    Need for prophylactic measure    Nausea &amp; vomiting    Debility    Advanced care planning/counseling discussion    Encounter for palliative care              RECS: multifactorial etiology to cough- PE? aspiration? atelectasis?   continue with AC for PE  speech/swallow eval to eval for any aspiration  NEBS BID with IS/acapaella   should use IS/acapella and mobilize to help with atelectasis  continue PPI  outpatient pfts      Please call with any questions over the weekend    Bibiana Bains MD  [facility] Pulmonary/Sleep Medicine  [phone]  
PULMONARY PROGRESS NOTE    ADRIAN FUNES  MRN-[id number]    Patient is a 71y old  Male who presents with a chief complaint of Poor PO intake, Abd swelling (19 Apr 2022 16:58)      HPI:  -remains on room air  -dry cough over night predominant     ROS:   -    ACTIVE MEDICATION LIST:  MEDICATIONS  (STANDING):  albumin human 25% IVPB 50 milliLiter(s) IV Intermittent once  budesonide  80 MICROgram(s)/formoterol 4.5 MICROgram(s) Inhaler 2 Puff(s) Inhalation two times a day  cefepime   IVPB      cefepime   IVPB 2000 milliGRAM(s) IV Intermittent every 8 hours  dextrose 5%. 1000 milliLiter(s) (100 mL/Hr) IV Continuous <Continuous>  dextrose 5%. 1000 milliLiter(s) (50 mL/Hr) IV Continuous <Continuous>  dextrose 50% Injectable 25 Gram(s) IV Push once  dextrose 50% Injectable 12.5 Gram(s) IV Push once  dextrose 50% Injectable 25 Gram(s) IV Push once  glucagon  Injectable 1 milliGRAM(s) IntraMuscular once  insulin lispro (ADMELOG) corrective regimen sliding scale   SubCutaneous three times a day before meals  insulin lispro (ADMELOG) corrective regimen sliding scale   SubCutaneous at bedtime  lactobacillus acidophilus 1 Tablet(s) Oral daily  multivitamin 1 Tablet(s) Oral daily  pantoprazole    Tablet 40 milliGRAM(s) Oral two times a day  senna 2 Tablet(s) Oral at bedtime  sodium chloride 3%  Inhalation 4 milliLiter(s) Inhalation every 12 hours    MEDICATIONS  (PRN):  acetaminophen     Tablet .. 650 milliGRAM(s) Oral every 6 hours PRN Temp greater or equal to 38C (100.4F), Mild Pain (1 - 3)  aluminum hydroxide/magnesium hydroxide/simethicone Suspension 30 milliLiter(s) Oral every 4 hours PRN Dyspepsia  benzonatate 100 milliGRAM(s) Oral every 8 hours PRN Cough  dextrose Oral Gel 15 Gram(s) Oral once PRN Blood Glucose LESS THAN 70 milliGRAM(s)/deciliter  melatonin 3 milliGRAM(s) Oral at bedtime PRN Insomnia  ondansetron Injectable 4 milliGRAM(s) IV Push every 8 hours PRN Nausea and/or Vomiting      EXAM:  Vital Signs Last 24 Hrs  T(C): 36.7 (20 Apr 2022 06:00), Max: 36.7 (20 Apr 2022 06:00)  T(F): 98 (20 Apr 2022 06:00), Max: 98 (20 Apr 2022 06:00)  HR: 76 (20 Apr 2022 08:52) (67 - 82)  BP: 103/69 (20 Apr 2022 06:00) (90/60 - 103/69)  BP(mean): --  RR: 18 (20 Apr 2022 06:00) (18 - 18)  SpO2: 100% (20 Apr 2022 06:00) (98% - 100%)    GENERAL: The patient is awake and alert in no apparent distress.     LUNGS: C  respirations unlabored                             10.0   2.67  )-----------( 77       ( 20 Apr 2022 06:28 )             32.0       04-20    137  |  104  |  14  ----------------------------<  101<H>  3.5   |  21<L>  |  0.71    Ca    7.8<L>      20 Apr 2022 06:28  Phos  2.1     04-20  Mg     1.60     04-20    TPro  5.1<L>  /  Alb  2.1<L>  /  TBili  0.3  /  DBili  x   /  AST  8   /  ALT  7   /  AlkPhos  59  04-20     rad< from: Xray Chest 1 View- PORTABLE-Urgent (Xray Chest 1 View- PORTABLE-Urgent .) (04.14.22 @ 08:54) >    ACC: [id number] EXAM:  XR CHEST PORTABLE URGENT 1V                          PROCEDURE DATE:  04/14/2022          INTERPRETATION:  DATE OF STUDY: 4/14/22    PRIOR:1/24/22 plain chest. 4/5/22 CT angio of chest.    CLINICAL INDICATION: Pulmonary embolusseen on CT scan of abdomen.   Hypoxia.    TECHNIQUE: portable chest.    FINDINGS:  The cardiomediastinal silhouette is within normal limits.  Scattered atelectatic changes versus infection throughout right lung and   in lower left lung.  No sizable pleural effusion.  No pneumothorax.  Degenerative changes of the thoracic spine.    IMPRESSION:  Scattered atelectases versus focal infiltrates in both lungs as above.    --- End of Report ---          BALAJI BATISTA MD; Resident Radiology  This document has been electronically signed.  NIYA XIAO MD; Attending Radiologist  This document has been electronically signed. Apr 14 2022 12:58PM    < end of copied text >    PROBLEM LIST:  71y Male with HEALTH ISSUES - PROBLEM Dx:  Suspected deep vein thrombosis (DVT)    Pulmonary thromboembolism    Abdominal distension    Adult failure to thrive    Type 2 diabetes mellitus    CAD (coronary artery disease)    Liposarcoma    Need for prophylactic measure    Nausea &amp; vomiting    Debility    Advanced care planning/counseling discussion    Encounter for palliative care         RECS:  trial of low dose symbicort        Please call with any questions.    Leidy Gonzalez DO  [facility] Pulmonary/Sleep Medicine  [phone]  
PULMONARY PROGRESS NOTE    ADRIAN FUNES  MRN-[id number]    Patient is a 71y old  Male who presents with a chief complaint of Poor PO intake, Abd swelling (12 Apr 2022 06:33)      HPI:  -eating breakfast  - no other changes in cough    ROS:   -    ACTIVE MEDICATION LIST:  MEDICATIONS  (STANDING):  dextrose 5%. 1000 milliLiter(s) (100 mL/Hr) IV Continuous <Continuous>  dextrose 5%. 1000 milliLiter(s) (50 mL/Hr) IV Continuous <Continuous>  dextrose 50% Injectable 25 Gram(s) IV Push once  dextrose 50% Injectable 12.5 Gram(s) IV Push once  dextrose 50% Injectable 25 Gram(s) IV Push once  enoxaparin Injectable 120 milliGRAM(s) SubCutaneous every 24 hours  glucagon  Injectable 1 milliGRAM(s) IntraMuscular once  insulin lispro (ADMELOG) corrective regimen sliding scale   SubCutaneous three times a day before meals  insulin lispro (ADMELOG) corrective regimen sliding scale   SubCutaneous at bedtime  pantoprazole    Tablet 40 milliGRAM(s) Oral two times a day  senna 2 Tablet(s) Oral at bedtime  sodium chloride 3%  Inhalation 4 milliLiter(s) Inhalation every 12 hours    MEDICATIONS  (PRN):  acetaminophen     Tablet .. 650 milliGRAM(s) Oral every 6 hours PRN Temp greater or equal to 38C (100.4F), Mild Pain (1 - 3)  aluminum hydroxide/magnesium hydroxide/simethicone Suspension 30 milliLiter(s) Oral every 4 hours PRN Dyspepsia  benzonatate 100 milliGRAM(s) Oral three times a day PRN Cough  dextrose Oral Gel 15 Gram(s) Oral once PRN Blood Glucose LESS THAN 70 milliGRAM(s)/deciliter  HYDROmorphone  Injectable 0.2 milliGRAM(s) IV Push every 6 hours PRN moderate to severe pain  melatonin 3 milliGRAM(s) Oral at bedtime PRN Insomnia  ondansetron Injectable 4 milliGRAM(s) IV Push every 8 hours PRN Nausea and/or Vomiting      EXAM:  Vital Signs Last 24 Hrs  T(C): 36.8 (12 Apr 2022 07:45), Max: 37 (11 Apr 2022 16:38)  T(F): 98.2 (12 Apr 2022 07:45), Max: 98.6 (11 Apr 2022 16:38)  HR: 89 (12 Apr 2022 07:45) (80 - 90)  BP: 109/58 (12 Apr 2022 07:45) (87/66 - 109/58)  BP(mean): --  RR: 18 (12 Apr 2022 07:45) (17 - 18)  SpO2: 100% (12 Apr 2022 07:45) (96% - 100%)    GENERAL: The patient is awake and alert in no apparent distress.     LUNGS: Clear to auscultation without wheezing                        7.3    1.62  )-----------( 106      ( 12 Apr 2022 07:23 )             24.9       04-12    134<L>  |  100  |  11  ----------------------------<  105<H>  3.8   |  21<L>  |  0.75    Ca    7.7<L>      12 Apr 2022 07:23  Phos  1.5     04-12  Mg     1.60     04-12      < from: US Abdomen Limited (04.11.22 @ 16:14) >    ACC: [id number] EXAM:  US ABDOMEN LIMITED                          PROCEDURE DATE:  04/11/2022          INTERPRETATION:  CLINICAL INFORMATION: Evaluate for ascites. Known   abdominal mass    COMPARISON: 1/25/2022    Limited abdominal ultrasound is performed.    There is a large amount of ascites in the right upper quadrant. Smaller   amounts of fluid are seen elsewhere in the abdomen. Known large   intra-abdominal masses are again seen.    IMPRESSION: Large amount of ascites in the right upper quadrant    --- End of Report ---            NURY HERNANDEZ MD; Attending Radiologist  This document has been electronically signed. Apr 11 2022  4:31PM    < end of copied text >  < from: US Duplex Venous Lower Ext Complete, Bilateral (04.05.22 @ 10:10) >    ACC: [id number] EXAM:  US DPLX LWR EXT VEINS COMPL BI                          PROCEDURE DATE:  04/05/2022          INTERPRETATION:  CLINICAL INFORMATION: Lower extremity edema, pulmonary   embolism    COMPARISON: None available.    TECHNIQUE: Duplexsonography of the BILATERAL LOWER extremity veins with   color and spectral Doppler, with and without compression.    FINDINGS:    Left greater than right lower extremity subcutaneous edema.    RIGHT:  Normal compressibility of the RIGHT common femoral, femoral and popliteal   veins.  Doppler examination shows normal spontaneous and phasic flow.  No RIGHT calf vein thrombosis is detected.    LEFT:  Normal compressibility of the LEFT common femoral, femoral and popliteal   veins.  Doppler examination shows normal spontaneous and phasic flow.  No LEFT calf vein thrombosis is detected.      IMPRESSION:  No evidence of deep venous thrombosis in either lower extremity.    Left greater than right lower extremity subcutaneous edema.            --- End of Report ---            CHADWICK KHALIL MD; Attending Radiologist  This document has been electronically signed. Apr 5 2022 10:16AM    < end of copied text >  < from: CT Angio Chest PE Protocol w/ IV Cont (04.05.22 @ 01:16) >    ACC: [id number] EXAM:  CT ANGIO CHEST PULM ART [facility]                          PROCEDURE DATE:  04/05/2022          INTERPRETATION:  CLINICAL INFORMATION: Initially presented with abdominal   pain, failure to thrive, nausea and vomiting, history of metastatic   liposarcoma, pulmonary embolus seen on CT abdomen    COMPARISON: CT chest 1/12/2022, CT abdomen pelvis 4/4/2022    CONTRAST/COMPLICATIONS:  IV Contrast: Omnipaque 350  85 cc administered   15 cc discarded  Oral Contrast: NONE  Complications: None reported at time of study completion    PROCEDURE:  CT Angiography of the Chest.  Sagittal and coronal reformats were performed as well as 3D (MIP)   reconstructions.    FINDINGS:    PULMONARY ARTERIES: Acute pulmonary embolus extending from the right   lower lobar pulmonary artery extending into some of the segmental   branches.    LUNGS, PLEURA, AND AIRWAYS: No endobronchial lesion  Small bilateral   right greater than left pleural effusions with adjacent subsegmental   passive atelectasis.    MEDIASTINUM AND GARRY: No lymphadenopathy. Hiatal hernia.  VESSELS: Calcifications of the coronary arteries and the aorta.  HEART: Heart is normal in size. No pericardial effusion.  CHEST WALL AND LOWER NECK: Within normal limits.  VISUALIZED UPPER ABDOMEN: Moderate to large volume ascites. Cholelithiasis  BONES: Degenerative changes of the spine.    IMPRESSION:  Acute right lower lobar pulmonary embolus extending into the segmental   branches.  Small bilateral right greater than left pleuraleffusions.    --- End of Report ---          JEFF LÓPEZ MD; Resident Radiology  This document has been electronically signed.  BALJINDER WELLER MD; Attending Radiologist  This document has been electronically signed. Apr 5 2022 10:34AM    < end of copied text >  >>> <<<    PROBLEM LIST:  71y Male with HEALTH ISSUES - PROBLEM Dx:  Suspected deep vein thrombosis (DVT)    Pulmonary thromboembolism    Abdominal distension    Adult failure to thrive    Type 2 diabetes mellitus    CAD (coronary artery disease)    Liposarcoma    Need for prophylactic measure    Nausea &amp; vomiting    Debility    Advanced care planning/counseling discussion    Encounter for palliative care              RECS:     multifactorial etiology to cough- PE? aspiration? atelectasis?   continue with AC for PE  speech/swallow eval to eval for any aspiration  should use IS/acapella and oob/chair/PT to help with atelectasis  continue PPI    Please call with any questions.    Leidy Gonzalez DO  [facility] Pulmonary/Sleep Medicine  [phone]  
PULMONARY PROGRESS NOTE    ADRIAN FUNES  MRN-[id number]    Patient is a 71y old  Male who presents with a chief complaint of Poor PO intake, Abd swelling (17 Apr 2022 11:11)      HPI:  - on room air resting  -no change in cou gh- dry and occurs mostly at night and with food    ROS:   -    ACTIVE MEDICATION LIST:  MEDICATIONS  (STANDING):  albumin human 25% IVPB 50 milliLiter(s) IV Intermittent once  cefepime   IVPB      cefepime   IVPB 2000 milliGRAM(s) IV Intermittent every 8 hours  dextrose 5%. 1000 milliLiter(s) (100 mL/Hr) IV Continuous <Continuous>  dextrose 5%. 1000 milliLiter(s) (50 mL/Hr) IV Continuous <Continuous>  dextrose 50% Injectable 25 Gram(s) IV Push once  dextrose 50% Injectable 12.5 Gram(s) IV Push once  dextrose 50% Injectable 25 Gram(s) IV Push once  glucagon  Injectable 1 milliGRAM(s) IntraMuscular once  insulin lispro (ADMELOG) corrective regimen sliding scale   SubCutaneous three times a day before meals  insulin lispro (ADMELOG) corrective regimen sliding scale   SubCutaneous at bedtime  multivitamin 1 Tablet(s) Oral daily  pantoprazole    Tablet 40 milliGRAM(s) Oral two times a day  potassium phosphate / sodium phosphate Powder (PHOS-NaK) 1 Packet(s) Oral every 4 hours  senna 2 Tablet(s) Oral at bedtime  sodium chloride 3%  Inhalation 4 milliLiter(s) Inhalation every 12 hours    MEDICATIONS  (PRN):  acetaminophen     Tablet .. 650 milliGRAM(s) Oral every 6 hours PRN Temp greater or equal to 38C (100.4F), Mild Pain (1 - 3)  aluminum hydroxide/magnesium hydroxide/simethicone Suspension 30 milliLiter(s) Oral every 4 hours PRN Dyspepsia  benzonatate 100 milliGRAM(s) Oral every 8 hours PRN Cough  dextrose Oral Gel 15 Gram(s) Oral once PRN Blood Glucose LESS THAN 70 milliGRAM(s)/deciliter  melatonin 3 milliGRAM(s) Oral at bedtime PRN Insomnia  ondansetron Injectable 4 milliGRAM(s) IV Push every 8 hours PRN Nausea and/or Vomiting      EXAM:  Vital Signs Last 24 Hrs  T(C): 36.4 (17 Apr 2022 12:27), Max: 36.7 (17 Apr 2022 05:45)  T(F): 97.5 (17 Apr 2022 12:27), Max: 98 (17 Apr 2022 05:45)  HR: 79 (17 Apr 2022 12:27) (79 - 86)  BP: 98/56 (17 Apr 2022 12:27) (90/52 - 98/56)  BP(mean): --  RR: 19 (17 Apr 2022 12:27) (18 - 20)  SpO2: 98% (17 Apr 2022 12:27) (98% - 100%)    GENERAL: The patient is awake and alert in no apparent distress.     LUNGS: Clear to auscultation without wheezing, rales or rhonchi; respirations unlabored                               6.8    2.74  )-----------( 86       ( 17 Apr 2022 06:55 )             23.0       04-17    133<L>  |  103  |  12  ----------------------------<  98  3.5   |  21<L>  |  0.65    Ca    7.9<L>      17 Apr 2022 06:55  Phos  2.3     04-17  Mg     1.70     04-17    TPro  5.5<L>  /  Alb  2.3<L>  /  TBili  0.2  /  DBili  x   /  AST  6   /  ALT  6   /  AlkPhos  63  04-16   rad< from: Xray Chest 1 View- PORTABLE-Urgent (Xray Chest 1 View- PORTABLE-Urgent .) (04.14.22 @ 08:54) >    ACC: [id number] EXAM:  XR CHEST PORTABLE URGENT 1V                          PROCEDURE DATE:  04/14/2022          INTERPRETATION:  DATE OF STUDY: 4/14/22    PRIOR:1/24/22 plain chest. 4/5/22 CT angio of chest.    CLINICAL INDICATION: Pulmonary embolusseen on CT scan of abdomen.   Hypoxia.    TECHNIQUE: portable chest.    FINDINGS:  The cardiomediastinal silhouette is within normal limits.  Scattered atelectatic changes versus infection throughout right lung and   in lower left lung.  No sizable pleural effusion.  No pneumothorax.  Degenerative changes of the thoracic spine.    IMPRESSION:  Scattered atelectases versus focal infiltrates in both lungs as above.    --- End of Report ---          BALAJI BATISTA MD; Resident Radiology  This document has been electronically signed.  NIYA XIAO MD; Attending Radiologist  This document has been electr    < end of copied text >  < from: Transthoracic Echocardiogram (04.05.22 @ 16:34) >  ------------------------------------------------------------------------  CONCLUSIONS:  1. Mitral annular calcification, otherwise normal mitral  valve. Mild-moderate mitral regurgitation.  2. Endocardium not wellvisualized; grossly normal left  ventricular systolic function.  3. The right ventricle is not well visualized; grossly  normal right ventricular systolic function.  ------------------------------------------------------------------------  Confirmed on  4/5/2022 - 17:59:42 by Estevan Espinoza M.D.,  [facility], [facility]  ------------------------------------------------------------------------    < end of copied text >    PROBLEM LIST:  71y Male with HEALTH ISSUES - PROBLEM Dx:  Suspected deep vein thrombosis (DVT)    Pulmonary thromboembolism    Abdominal distension    Adult failure to thrive    Type 2 diabetes mellitus    CAD (coronary artery disease)    Liposarcoma    Need for prophylactic measure    Nausea &amp; vomiting    Debility    Advanced care planning/counseling discussion    Encounter for palliative care       RECS:  s/p speech eval- no aspiration noted   cough unchanged  can try symbicort 2puffs BID to see it help  already on PPI BID          Please call with any questions.    Leidy Gonzalez DO  [facility] Pulmonary/Sleep Medicine  [phone]  
PULMONARY PROGRESS NOTE    ADRIAN FUNES  MRN-[id number]    Patient is a 71y old  Male who presents with a chief complaint of Poor PO intake, Abd swelling (13 Apr 2022 11:27)      HPI:  -remains on room air  -    ROS:   -    ACTIVE MEDICATION LIST:  MEDICATIONS  (STANDING):  dextrose 5%. 1000 milliLiter(s) (100 mL/Hr) IV Continuous <Continuous>  dextrose 5%. 1000 milliLiter(s) (50 mL/Hr) IV Continuous <Continuous>  dextrose 50% Injectable 25 Gram(s) IV Push once  dextrose 50% Injectable 12.5 Gram(s) IV Push once  dextrose 50% Injectable 25 Gram(s) IV Push once  enoxaparin Injectable 120 milliGRAM(s) SubCutaneous every 24 hours  glucagon  Injectable 1 milliGRAM(s) IntraMuscular once  insulin lispro (ADMELOG) corrective regimen sliding scale   SubCutaneous three times a day before meals  insulin lispro (ADMELOG) corrective regimen sliding scale   SubCutaneous at bedtime  pantoprazole    Tablet 40 milliGRAM(s) Oral two times a day  potassium phosphate / sodium phosphate Tablet (K-PHOS No. 2) 1 Tablet(s) Oral every 4 hours  senna 2 Tablet(s) Oral at bedtime  sodium chloride 3%  Inhalation 4 milliLiter(s) Inhalation every 12 hours    MEDICATIONS  (PRN):  acetaminophen     Tablet .. 650 milliGRAM(s) Oral every 6 hours PRN Temp greater or equal to 38C (100.4F), Mild Pain (1 - 3)  aluminum hydroxide/magnesium hydroxide/simethicone Suspension 30 milliLiter(s) Oral every 4 hours PRN Dyspepsia  benzonatate 100 milliGRAM(s) Oral three times a day PRN Cough  dextrose Oral Gel 15 Gram(s) Oral once PRN Blood Glucose LESS THAN 70 milliGRAM(s)/deciliter  HYDROmorphone  Injectable 0.2 milliGRAM(s) IV Push every 6 hours PRN moderate to severe pain  melatonin 3 milliGRAM(s) Oral at bedtime PRN Insomnia  ondansetron Injectable 4 milliGRAM(s) IV Push every 8 hours PRN Nausea and/or Vomiting      EXAM:  Vital Signs Last 24 Hrs  T(C): 36.4 (13 Apr 2022 11:58), Max: 36.7 (13 Apr 2022 06:30)  T(F): 97.6 (13 Apr 2022 11:58), Max: 98 (13 Apr 2022 06:30)  HR: 84 (13 Apr 2022 11:58) (68 - 94)  BP: 95/47 (13 Apr 2022 11:58) (95/47 - 96/60)  BP(mean): --  RR: 18 (13 Apr 2022 11:58) (18 - 18)  SpO2: 100% (13 Apr 2022 11:58) (96% - 100%)    GENERAL: The patient is awake and alert in no apparent distress.     LUNGS:  respirations unlabored                           7.4    3.49  )-----------( 69       ( 13 Apr 2022 05:44 )             24.0       04-13    133<L>  |  101  |  11  ----------------------------<  107<H>  3.6   |  22  |  0.73    Ca    7.4<L>      13 Apr 2022 05:44  Phos  1.5     04-13  Mg     1.60     04-13       rad< from: US Duplex Venous Lower Ext Complete, Bilateral (04.05.22 @ 10:10) >  ACC: [id number] EXAM:  US DPLX LWR EXT VEINS COMPL BI                          PROCEDURE DATE:  04/05/2022          INTERPRETATION:  CLINICAL INFORMATION: Lower extremity edema, pulmonary   embolism    COMPARISON: None available.    TECHNIQUE: Duplexsonography of the BILATERAL LOWER extremity veins with   color and spectral Doppler, with and without compression.    FINDINGS:    Left greater than right lower extremity subcutaneous edema.    RIGHT:  Normal compressibility of the RIGHT common femoral, femoral and popliteal   veins.  Doppler examination shows normal spontaneous and phasic flow.  No RIGHT calf vein thrombosis is detected.    LEFT:  Normal compressibility of the LEFT common femoral, femoral and popliteal   veins.  Doppler examination shows normal spontaneous and phasic flow.  No LEFT calf vein thrombosis is detected.      IMPRESSION:  No evidence of deep venous thrombosis in either lower extremity.    Left greater than right lower extremity subcutaneous edema.            --- End of Report ---            CHADWICK KHALIL MD; Attending Radiologist  This document has been electronically signed. Apr 5 2022 10:16AM    < end of copied text >  < from: CT Angio Chest PE Protocol w/ IV Cont (04.05.22 @ 01:16) >    ACC: [id number] EXAM:  CT ANGIO CHEST PULM ART [facility]                          PROCEDURE DATE:  04/05/2022          INTERPRETATION:  CLINICAL INFORMATION: Initially presented with abdominal   pain, failure to thrive, nausea and vomiting, history of metastatic   liposarcoma, pulmonary embolus seen on CT abdomen    COMPARISON: CT chest 1/12/2022, CT abdomen pelvis 4/4/2022    CONTRAST/COMPLICATIONS:  IV Contrast: Omnipaque 350  85 cc administered   15 cc discarded  Oral Contrast: NONE  Complications: None reported at time of study completion    PROCEDURE:  CT Angiography of the Chest.  Sagittal and coronal reformats were performed as well as 3D (MIP)   reconstructions.    FINDINGS:    PULMONARY ARTERIES: Acute pulmonary embolus extending from the right   lower lobar pulmonary artery extending into some of the segmental   branches.    LUNGS, PLEURA, AND AIRWAYS: No endobronchial lesion  Small bilateral   right greater than left pleural effusions with adjacent subsegmental   passive atelectasis.    MEDIASTINUM AND GARRY: No lymphadenopathy. Hiatal hernia.  VESSELS: Calcifications of the coronary arteries and the aorta.  HEART: Heart is normal in size. No pericardial effusion.  CHEST WALL AND LOWER NECK: Within normal limits.  VISUALIZED UPPER ABDOMEN: Moderate to large volume ascites. Cholelithiasis  BONES: Degenerative changes of the spine.    IMPRESSION:  Acute right lower lobar pulmonary embolus extending into the segmental   branches.  Small bilateral right greater than left pleuraleffusions.    --- End of Report ---          JEFF LÓPEZ MD; Resident Radiology  This document has been electronically signed.  BALJINDER WELLER MD; Attending Radiologist  This document has been electronically signed. Apr 5 2022 10:34AM    < end of copied text >      PROBLEM LIST:  71y Male with HEALTH ISSUES - PROBLEM Dx:  Suspected deep vein thrombosis (DVT)    Pulmonary thromboembolism    Abdominal distension    Adult failure to thrive    Type 2 diabetes mellitus    CAD (coronary artery disease)    Liposarcoma    Need for prophylactic measure    Nausea &amp; vomiting    Debility    Advanced care planning/counseling discussion    Encounter for palliative care              RECS:     multifactorial etiology to cough- PE? aspiration? atelectasis?   continue with AC for PE  speech/swallow eval to eval for any aspiration  should use IS/acapella and oob/chair/PT to help with atelectasis  continue PPI        Please call with any questions.    Leidy Gonzalez,   [facility] Pulmonary/Sleep Medicine  [phone]  
pt was evaluated by hospitalist earlier during the day   labs vitals reviewed
    SUBJECTIVE / OVERNIGHT EVENTS:pt seen and examined    MEDICATIONS  (STANDING):  dextrose 5%. 1000 milliLiter(s) (100 mL/Hr) IV Continuous <Continuous>  dextrose 5%. 1000 milliLiter(s) (50 mL/Hr) IV Continuous <Continuous>  dextrose 50% Injectable 25 Gram(s) IV Push once  dextrose 50% Injectable 12.5 Gram(s) IV Push once  dextrose 50% Injectable 25 Gram(s) IV Push once  glucagon  Injectable 1 milliGRAM(s) IntraMuscular once  insulin lispro (ADMELOG) corrective regimen sliding scale   SubCutaneous three times a day before meals  insulin lispro (ADMELOG) corrective regimen sliding scale   SubCutaneous at bedtime  pantoprazole    Tablet 40 milliGRAM(s) Oral two times a day  senna 2 Tablet(s) Oral at bedtime  sodium chloride 3%  Inhalation 4 milliLiter(s) Inhalation every 12 hours    MEDICATIONS  (PRN):  acetaminophen     Tablet .. 650 milliGRAM(s) Oral every 6 hours PRN Temp greater or equal to 38C (100.4F), Mild Pain (1 - 3)  aluminum hydroxide/magnesium hydroxide/simethicone Suspension 30 milliLiter(s) Oral every 4 hours PRN Dyspepsia  benzonatate 100 milliGRAM(s) Oral three times a day PRN Cough  dextrose Oral Gel 15 Gram(s) Oral once PRN Blood Glucose LESS THAN 70 milliGRAM(s)/deciliter  HYDROmorphone  Injectable 0.2 milliGRAM(s) IV Push every 6 hours PRN moderate to severe pain  melatonin 3 milliGRAM(s) Oral at bedtime PRN Insomnia  ondansetron Injectable 4 milliGRAM(s) IV Push every 8 hours PRN Nausea and/or Vomiting    Vital Signs Last 24 Hrs  T(C): 36.4 (04-13-22 @ 11:58), Max: 36.7 (04-13-22 @ 06:30)  T(F): 97.6 (04-13-22 @ 11:58), Max: 98 (04-13-22 @ 06:30)  HR: 84 (04-13-22 @ 11:58) (68 - 94)  BP: 95/47 (04-13-22 @ 11:58) (95/47 - 96/60)  BP(mean): --  RR: 18 (04-13-22 @ 11:58) (18 - 18)  SpO2: 100% (04-13-22 @ 11:58) (96% - 100%)            PHYSICAL EXAM:  GENERAL: NAD  EYES: EOMI, PERRLA  NECK: Supple, No JVD  CHEST/LUNG: dec breath sounds at bases  HEART:  S1 , S2 +  ABDOMEN: soft , bs+, distension+  EXTREMITIES:  trace edema  NEUROLOGY:alert awake    LABS:  04-13    133<L>  |  101  |  11  ----------------------------<  107<H>  3.6   |  22  |  0.73    Ca    7.4<L>      13 Apr 2022 05:44  Phos  1.5     04-13  Mg     1.60     04-13      Creatinine Trend: 0.73 <--, 0.75 <--, 0.75 <--, 0.76 <--, 0.77 <--, 0.84 <--, 0.90 <--                        7.4    3.49  )-----------( 69       ( 13 Apr 2022 05:44 )             24.0     Urine Studies:                          
  Max Coffey MD  Interventional Cardiology / Endovascular Specialist  [place] Office : [phone] [place] [address] [place] [address]  Tel:   [place] Office : [phone] [facility] [place] [address]  Tel: [phone]      Subjective/Overnight events: Patient lying in bed. No acute distress.   	  MEDICATIONS:  heparin   Injectable 6500 Unit(s) IV Push every 6 hours PRN  heparin   Injectable 3000 Unit(s) IV Push every 6 hours PRN  heparin  Infusion.  Unit(s)/Hr IV Continuous <Continuous>        acetaminophen     Tablet .. 650 milliGRAM(s) Oral every 6 hours PRN  HYDROmorphone  Injectable 0.2 milliGRAM(s) IV Push every 6 hours PRN  melatonin 3 milliGRAM(s) Oral at bedtime PRN  ondansetron Injectable 4 milliGRAM(s) IV Push every 8 hours PRN    aluminum hydroxide/magnesium hydroxide/simethicone Suspension 30 milliLiter(s) Oral every 4 hours PRN  pantoprazole    Tablet 40 milliGRAM(s) Oral two times a day  senna 2 Tablet(s) Oral at bedtime    dextrose 50% Injectable 25 Gram(s) IV Push once  dextrose 50% Injectable 12.5 Gram(s) IV Push once  dextrose 50% Injectable 25 Gram(s) IV Push once  dextrose Oral Gel 15 Gram(s) Oral once PRN  glucagon  Injectable 1 milliGRAM(s) IntraMuscular once  insulin lispro (ADMELOG) corrective regimen sliding scale   SubCutaneous three times a day before meals  insulin lispro (ADMELOG) corrective regimen sliding scale   SubCutaneous at bedtime    dextrose 5%. 1000 milliLiter(s) IV Continuous <Continuous>  dextrose 5%. 1000 milliLiter(s) IV Continuous <Continuous>      PAST MEDICAL/SURGICAL HISTORY  PAST MEDICAL & SURGICAL HISTORY:  Intra-abdominal and pelvic swelling, mass and lump, unspecified site  Currently on experimental chemotherapy at [facility]    Hypertension  Now with low BPs    Diabetes mellitus  Fasting FS range from - per patient, off meds    Hypercholesteremia  off meds    CAD (coronary artery disease)  Off aspirin    Sleep apnea    History of cardiac cath  Pt reports 1 cardiac stent placed 1999    H/O sleep apnea  Per patient had Sleep Apnea surgery  in 1996- at [facility]- Was not retested for Sleep Apnea post surgery    History of colon resection  Dec 2019        SOCIAL HISTORY: Substance Use (street drugs): ( x ) never used  (  ) other:    FAMILY HISTORY:  FH: heart disease  Mother        PHYSICAL EXAM:  T(C): 36.4 (04-07-22 @ 06:00), Max: 36.9 (04-06-22 @ 22:15)  HR: 89 (04-07-22 @ 06:00) (81 - 89)  BP: 93/60 (04-07-22 @ 06:00) (92/54 - 93/60)  RR: 18 (04-07-22 @ 06:00) (17 - 18)  SpO2: 100% (04-07-22 @ 06:00) (100% - 100%)  Wt(kg): --  I&O's Summary    06 Apr 2022 07:01  -  07 Apr 2022 07:00  --------------------------------------------------------  IN: 938 mL / OUT: 880 mL / NET: 58 mL            GENERAL: NAD,  EYES: conjunctiva and sclera clear  ENMT: No tonsillar erythema, exudates, or enlargement  Cardiovascular: Normal S1 S2, No JVD, No murmurs, No edema  Respiratory: Lungs clear to auscultation	  Gastrointestinal:  distended and firm 	  Extremities: 2+ edema B/L                                     8.3    3.72  )-----------( 361      ( 07 Apr 2022 05:45 )             26.9     04-06    133<L>  |  98  |  26<H>  ----------------------------<  133<H>  3.4<L>   |  21<L>  |  0.97    Ca    7.4<L>      06 Apr 2022 04:38  Phos  3.2     04-06  Mg     1.70     04-06      proBNP:   Lipid Profile:   HgA1c:   TSH:     Consultant(s) Notes Reviewed:  [x ] YES  [ ] NO    Care Discussed with Consultants/Other Providers [ x] YES  [ ] NO    Imaging Personally Reviewed independently:  [x] YES  [ ] NO    All labs, radiologic studies, vitals, orders and medications list reviewed. Patient is seen and examined at bedside. Case discussed with medical team.              
INTERVAL HPI/OVERNIGHT EVENTS:  Patient S&E at bedside. No o/n events. Pt denied fever/chills, headache, N/V/D, dizziness.   During the encounter, onco attending discussed with patient and his wife (on the phone) about pt's current conditions, and explained in detail about the possible reasons for pancytopenia (Newly onset of thrombocytopenia is likely due to side effect of experimental chemo; low reticulocyte  suggesting bone marrow suppression vs tumor infiltration/effects of bone marrow). Recommended pt should f/u closely with his Med oncologist Jama Long Dr. at [facility] for repeat CBC and may consider transfusion if necessary.       VITAL SIGNS:  T(F): 97.4 (04-19-22 @ 12:50)  HR: 67 (04-19-22 @ 12:50)  BP: 101/63 (04-19-22 @ 12:50)  RR: 18 (04-19-22 @ 12:50)  SpO2: 99% (04-19-22 @ 12:50)  Wt(kg): --    PHYSICAL EXAM:    Constitutional: NAD; appears frail, cachectic   Eyes: EOMI, sclera non-icteric  Neck: supple  Respiratory: CTAB, no wheezes or crackles   Cardiovascular: RRR  Gastrointestinal: soft, NT; distended; shift dullness+; + BS  Extremities: no cyanosis, clubbing or edema   Neurological: awake and alert      MEDICATIONS  (STANDING):  albumin human 25% IVPB 50 milliLiter(s) IV Intermittent once  cefepime   IVPB 2000 milliGRAM(s) IV Intermittent every 8 hours  cefepime   IVPB      dextrose 5%. 1000 milliLiter(s) (100 mL/Hr) IV Continuous <Continuous>  dextrose 5%. 1000 milliLiter(s) (50 mL/Hr) IV Continuous <Continuous>  dextrose 50% Injectable 25 Gram(s) IV Push once  dextrose 50% Injectable 12.5 Gram(s) IV Push once  dextrose 50% Injectable 25 Gram(s) IV Push once  enoxaparin Injectable 80 milliGRAM(s) SubCutaneous once  glucagon  Injectable 1 milliGRAM(s) IntraMuscular once  insulin lispro (ADMELOG) corrective regimen sliding scale   SubCutaneous three times a day before meals  insulin lispro (ADMELOG) corrective regimen sliding scale   SubCutaneous at bedtime  lactobacillus acidophilus 1 Tablet(s) Oral daily  multivitamin 1 Tablet(s) Oral daily  pantoprazole    Tablet 40 milliGRAM(s) Oral two times a day  potassium phosphate / sodium phosphate Tablet (K-PHOS No. 2) 1 Tablet(s) Oral three times a day  senna 2 Tablet(s) Oral at bedtime  sodium chloride 3%  Inhalation 4 milliLiter(s) Inhalation every 12 hours    MEDICATIONS  (PRN):  acetaminophen     Tablet .. 650 milliGRAM(s) Oral every 6 hours PRN Temp greater or equal to 38C (100.4F), Mild Pain (1 - 3)  aluminum hydroxide/magnesium hydroxide/simethicone Suspension 30 milliLiter(s) Oral every 4 hours PRN Dyspepsia  benzonatate 100 milliGRAM(s) Oral every 8 hours PRN Cough  dextrose Oral Gel 15 Gram(s) Oral once PRN Blood Glucose LESS THAN 70 milliGRAM(s)/deciliter  melatonin 3 milliGRAM(s) Oral at bedtime PRN Insomnia  ondansetron Injectable 4 milliGRAM(s) IV Push every 8 hours PRN Nausea and/or Vomiting      Allergies    oxycodone (Vomiting)    Intolerances        LABS:                        9.2    2.71  )-----------( 76       ( 19 Apr 2022 06:54 )             28.6     04-19    134<L>  |  102  |  13  ----------------------------<  100<H>  3.6   |  22  |  0.69    Ca    7.9<L>      19 Apr 2022 06:54  Phos  2.0     04-19  Mg     1.70     04-19    TPro  5.2<L>  /  Alb  2.1<L>  /  TBili  0.4  /  DBili  x   /  AST  6   /  ALT  5   /  AlkPhos  57  04-18          RADIOLOGY & ADDITIONAL TESTS:  Studies reviewed.          
Max Coffey MD  Interventional Cardiology / Advance Heart Failure and Cardiac Transplant Specialist  [place] Office : [address] [place] [address] [place] [address]  Tel:   [place] Office : [address] [facility] [place] [address]  Tel: [phone]      Subjective/Overnight events: Pt is lying in bed comfortable not in distress  	  MEDICATIONS:  enoxaparin Injectable 120 milliGRAM(s) SubCutaneous every 24 hours      benzonatate 100 milliGRAM(s) Oral three times a day PRN  sodium chloride 3%  Inhalation 4 milliLiter(s) Inhalation every 12 hours    acetaminophen     Tablet .. 650 milliGRAM(s) Oral every 6 hours PRN  HYDROmorphone  Injectable 0.2 milliGRAM(s) IV Push every 6 hours PRN  melatonin 3 milliGRAM(s) Oral at bedtime PRN  ondansetron Injectable 4 milliGRAM(s) IV Push every 8 hours PRN    aluminum hydroxide/magnesium hydroxide/simethicone Suspension 30 milliLiter(s) Oral every 4 hours PRN  pantoprazole    Tablet 40 milliGRAM(s) Oral two times a day  senna 2 Tablet(s) Oral at bedtime    dextrose 50% Injectable 25 Gram(s) IV Push once  dextrose 50% Injectable 12.5 Gram(s) IV Push once  dextrose 50% Injectable 25 Gram(s) IV Push once  dextrose Oral Gel 15 Gram(s) Oral once PRN  glucagon  Injectable 1 milliGRAM(s) IntraMuscular once  insulin lispro (ADMELOG) corrective regimen sliding scale   SubCutaneous three times a day before meals  insulin lispro (ADMELOG) corrective regimen sliding scale   SubCutaneous at bedtime    dextrose 5%. 1000 milliLiter(s) IV Continuous <Continuous>  dextrose 5%. 1000 milliLiter(s) IV Continuous <Continuous>      PAST MEDICAL/SURGICAL HISTORY  PAST MEDICAL & SURGICAL HISTORY:  Intra-abdominal and pelvic swelling, mass and lump, unspecified site  Currently on experimental chemotherapy at [facility]    Hypertension  Now with low BPs    Diabetes mellitus  Fasting FS range from - per patient, off meds    Hypercholesteremia  off meds    CAD (coronary artery disease)  Off aspirin    Sleep apnea    History of cardiac cath  Pt reports 1 cardiac stent placed 1999    H/O sleep apnea  Per patient had Sleep Apnea surgery  in 1996- at [facility]- Was not retested for Sleep Apnea post surgery    History of colon resection  Dec 2019        SOCIAL HISTORY: Substance Use (street drugs): ( x ) never used  (  ) other:    FAMILY HISTORY:  FH: heart disease  Mother          PHYSICAL EXAM:  T(C): 36.4 (04-11-22 @ 09:11), Max: 36.9 (04-10-22 @ 20:59)  HR: 86 (04-11-22 @ 09:15) (76 - 86)  BP: 93/54 (04-11-22 @ 09:11) (81/55 - 94/54)  RR: 18 (04-11-22 @ 09:11) (18 - 18)  SpO2: 98% (04-11-22 @ 09:15) (96% - 100%)  Wt(kg): --  I&O's Summary    10 Apr 2022 07:01  -  11 Apr 2022 07:00  --------------------------------------------------------  IN: 300 mL / OUT: 300 mL / NET: 0 mL    11 Apr 2022 07:01  -  11 Apr 2022 11:21  --------------------------------------------------------  IN: 0 mL / OUT: 250 mL / NET: -250 mL          GENERAL: NAD  EYES: conjunctiva and sclera clear  ENMT: No tonsillar erythema, exudates, or enlargement  Cardiovascular: Normal S1 S2, No JVD, No murmurs, No edema  Respiratory: Lungs clear to auscultation	  Gastrointestinal:  distended 	  Extremities: 2+ edema B/L                                   7.6    1.79  )-----------( 159      ( 11 Apr 2022 06:25 )             25.3     04-11    135  |  102  |  10  ----------------------------<  108<H>  3.6   |  24  |  0.75    Ca    7.4<L>      11 Apr 2022 06:25  Phos  1.8     04-11  Mg     1.70     04-11      proBNP:   Lipid Profile:   HgA1c:   TSH:     Consultant(s) Notes Reviewed:  [x ] YES  [ ] NO    Care Discussed with Consultants/Other Providers [ x] YES  [ ] NO    Imaging Personally Reviewed independently:  [x] YES  [ ] NO    All labs, radiologic studies, vitals, orders and medications list reviewed. Patient is seen and examined at bedside. Case discussed with medical team.        
Max Coffey MD  Interventional Cardiology / Advance Heart Failure and Cardiac Transplant Specialist  [place] Office : [address] [place] [address] [place] [address]  Tel:   [place] Office : [address] [facility] [place] [address]  Tel: [phone]      Subjective/Overnight events: Pt is lying in bed comfortable not in distress  	  MEDICATIONS:  enoxaparin Injectable 120 milliGRAM(s) SubCutaneous every 24 hours      benzonatate 100 milliGRAM(s) Oral three times a day PRN  sodium chloride 3%  Inhalation 4 milliLiter(s) Inhalation every 12 hours    acetaminophen     Tablet .. 650 milliGRAM(s) Oral every 6 hours PRN  HYDROmorphone  Injectable 0.2 milliGRAM(s) IV Push every 6 hours PRN  melatonin 3 milliGRAM(s) Oral at bedtime PRN  ondansetron Injectable 4 milliGRAM(s) IV Push every 8 hours PRN    aluminum hydroxide/magnesium hydroxide/simethicone Suspension 30 milliLiter(s) Oral every 4 hours PRN  pantoprazole    Tablet 40 milliGRAM(s) Oral two times a day  senna 2 Tablet(s) Oral at bedtime    dextrose 50% Injectable 25 Gram(s) IV Push once  dextrose 50% Injectable 12.5 Gram(s) IV Push once  dextrose 50% Injectable 25 Gram(s) IV Push once  dextrose Oral Gel 15 Gram(s) Oral once PRN  glucagon  Injectable 1 milliGRAM(s) IntraMuscular once  insulin lispro (ADMELOG) corrective regimen sliding scale   SubCutaneous three times a day before meals  insulin lispro (ADMELOG) corrective regimen sliding scale   SubCutaneous at bedtime    dextrose 5%. 1000 milliLiter(s) IV Continuous <Continuous>  dextrose 5%. 1000 milliLiter(s) IV Continuous <Continuous>  potassium phosphate / sodium phosphate Tablet (K-PHOS No. 2) 1 Tablet(s) Oral every 4 hours      PAST MEDICAL/SURGICAL HISTORY  PAST MEDICAL & SURGICAL HISTORY:  Intra-abdominal and pelvic swelling, mass and lump, unspecified site  Currently on experimental chemotherapy at [facility]    Hypertension  Now with low BPs    Diabetes mellitus  Fasting FS range from - per patient, off meds    Hypercholesteremia  off meds    CAD (coronary artery disease)  Off aspirin    Sleep apnea    History of cardiac cath  Pt reports 1 cardiac stent placed 1999    H/O sleep apnea  Per patient had Sleep Apnea surgery  in 1996- at [facility]- Was not retested for Sleep Apnea post surgery    History of colon resection  Dec 2019        SOCIAL HISTORY: Substance Use (street drugs): ( x ) never used  (  ) other:    FAMILY HISTORY:  FH: heart disease  Mother          PHYSICAL EXAM:  T(C): 36.4 (04-13-22 @ 11:58), Max: 36.7 (04-13-22 @ 06:30)  HR: 84 (04-13-22 @ 11:58) (68 - 94)  BP: 95/47 (04-13-22 @ 11:58) (95/47 - 96/60)  RR: 18 (04-13-22 @ 11:58) (18 - 18)  SpO2: 100% (04-13-22 @ 11:58) (96% - 100%)  Wt(kg): --  I&O's Summary    12 Apr 2022 07:01  -  13 Apr 2022 07:00  --------------------------------------------------------  IN: 400 mL / OUT: 300 mL / NET: 100 mL          GENERAL: NAD  EYES: conjunctiva and sclera clear  ENMT: No tonsillar erythema, exudates, or enlargement  Cardiovascular: Normal S1 S2, No JVD, No murmurs, No edema  Respiratory: Lungs clear to auscultation	  Gastrointestinal:  distended 	  Extremities: 2+ edema B/L                                         7.4    3.49  )-----------( 69       ( 13 Apr 2022 05:44 )             24.0     04-13    133<L>  |  101  |  11  ----------------------------<  107<H>  3.6   |  22  |  0.73    Ca    7.4<L>      13 Apr 2022 05:44  Phos  1.5     04-13  Mg     1.60     04-13      proBNP:   Lipid Profile:   HgA1c:   TSH:     Consultant(s) Notes Reviewed:  [x ] YES  [ ] NO    Care Discussed with Consultants/Other Providers [ x] YES  [ ] NO    Imaging Personally Reviewed independently:  [x] YES  [ ] NO    All labs, radiologic studies, vitals, orders and medications list reviewed. Patient is seen and examined at bedside. Case discussed with medical team.        
Max Coffey MD  Interventional Cardiology / Advance Heart Failure and Cardiac Transplant Specialist  [place] Office : [address] [place] [address] [place] [address]  Tel:   [place] Office : [address] [facility] [place] [address]  Tel: [phone]      Subjective/Overnight events: Pt is lying in bed comfortable not in distress  	  MEDICATIONS:    cefepime   IVPB      cefepime   IVPB 2000 milliGRAM(s) IV Intermittent every 8 hours    benzonatate 100 milliGRAM(s) Oral every 8 hours PRN  sodium chloride 3%  Inhalation 4 milliLiter(s) Inhalation every 12 hours    acetaminophen     Tablet .. 650 milliGRAM(s) Oral every 6 hours PRN  melatonin 3 milliGRAM(s) Oral at bedtime PRN  ondansetron Injectable 4 milliGRAM(s) IV Push every 8 hours PRN    aluminum hydroxide/magnesium hydroxide/simethicone Suspension 30 milliLiter(s) Oral every 4 hours PRN  pantoprazole    Tablet 40 milliGRAM(s) Oral two times a day  senna 2 Tablet(s) Oral at bedtime    dextrose 50% Injectable 25 Gram(s) IV Push once  dextrose 50% Injectable 12.5 Gram(s) IV Push once  dextrose 50% Injectable 25 Gram(s) IV Push once  dextrose Oral Gel 15 Gram(s) Oral once PRN  glucagon  Injectable 1 milliGRAM(s) IntraMuscular once  insulin lispro (ADMELOG) corrective regimen sliding scale   SubCutaneous three times a day before meals  insulin lispro (ADMELOG) corrective regimen sliding scale   SubCutaneous at bedtime    albumin human 25% IVPB 50 milliLiter(s) IV Intermittent once  dextrose 5%. 1000 milliLiter(s) IV Continuous <Continuous>  dextrose 5%. 1000 milliLiter(s) IV Continuous <Continuous>  multivitamin 1 Tablet(s) Oral daily  potassium phosphate / sodium phosphate Powder (PHOS-NaK) 1 Packet(s) Oral every 4 hours      PAST MEDICAL/SURGICAL HISTORY  PAST MEDICAL & SURGICAL HISTORY:  Intra-abdominal and pelvic swelling, mass and lump, unspecified site  Currently on experimental chemotherapy at [facility]    Hypertension  Now with low BPs    Diabetes mellitus  Fasting FS range from - per patient, off meds    Hypercholesteremia  off meds    CAD (coronary artery disease)  Off aspirin    Sleep apnea    History of cardiac cath  Pt reports 1 cardiac stent placed 1999    H/O sleep apnea  Per patient had Sleep Apnea surgery  in 1996- at [facility]- Was not retested for Sleep Apnea post surgery    History of colon resection  Dec 2019        SOCIAL HISTORY: Substance Use (street drugs): ( x ) never used  (  ) other:    FAMILY HISTORY:  FH: heart disease  Mother            PHYSICAL EXAM:  T(C): 36.7 (04-17-22 @ 05:45), Max: 36.7 (04-17-22 @ 05:45)  HR: 85 (04-17-22 @ 05:45) (81 - 86)  BP: 97/55 (04-17-22 @ 05:45) (90/52 - 97/55)  RR: 20 (04-17-22 @ 05:45) (18 - 20)  SpO2: 99% (04-17-22 @ 05:45) (98% - 100%)  Wt(kg): --  I&O's Summary    16 Apr 2022 07:01  -  17 Apr 2022 07:00  --------------------------------------------------------  IN: 400 mL / OUT: 0 mL / NET: 400 mL        EYES:   PERRLA   ENMT:   Moist mucous membranes, Good dentition, No lesions  Cardiovascular: Normal S1 S2, No JVD, No murmurs, No edema  Respiratory: Lungs clear to auscultation	  Gastrointestinal:  + ascites s/p paracentesis  Extremities: no edema                                        6.8    2.74  )-----------( 86       ( 17 Apr 2022 06:55 )             23.0     04-17    133<L>  |  103  |  12  ----------------------------<  98  3.5   |  21<L>  |  0.65    Ca    7.9<L>      17 Apr 2022 06:55  Phos  2.3     04-17  Mg     1.70     04-17    TPro  5.5<L>  /  Alb  2.3<L>  /  TBili  0.2  /  DBili  x   /  AST  6   /  ALT  6   /  AlkPhos  63  04-16    proBNP:   Lipid Profile:   HgA1c:   TSH:     Consultant(s) Notes Reviewed:  [x ] YES  [ ] NO    Care Discussed with Consultants/Other Providers [ x] YES  [ ] NO    Imaging Personally Reviewed independently:  [x] YES  [ ] NO    All labs, radiologic studies, vitals, orders and medications list reviewed. Patient is seen and examined at bedside. Case discussed with medical team.        
Max Coffey MD  Interventional Cardiology / Advance Heart Failure and Cardiac Transplant Specialist  [place] Office : [address] [place] [address] [place] [address]  Tel:   [place] Office : [address] [facility] [place] [address]  Tel: [phone]       Pt is lying in bed comfortable not in distress, no chest pains some SOB no palpitations , s/p paracentesis  	  MEDICATIONS:    cefepime   IVPB      cefepime   IVPB 2000 milliGRAM(s) IV Intermittent every 8 hours    benzonatate 100 milliGRAM(s) Oral three times a day PRN  sodium chloride 3%  Inhalation 4 milliLiter(s) Inhalation every 12 hours    acetaminophen     Tablet .. 650 milliGRAM(s) Oral every 6 hours PRN  melatonin 3 milliGRAM(s) Oral at bedtime PRN  ondansetron Injectable 4 milliGRAM(s) IV Push every 8 hours PRN    aluminum hydroxide/magnesium hydroxide/simethicone Suspension 30 milliLiter(s) Oral every 4 hours PRN  pantoprazole    Tablet 40 milliGRAM(s) Oral two times a day  senna 2 Tablet(s) Oral at bedtime    dextrose 50% Injectable 25 Gram(s) IV Push once  dextrose 50% Injectable 12.5 Gram(s) IV Push once  dextrose 50% Injectable 25 Gram(s) IV Push once  dextrose Oral Gel 15 Gram(s) Oral once PRN  glucagon  Injectable 1 milliGRAM(s) IntraMuscular once  insulin lispro (ADMELOG) corrective regimen sliding scale   SubCutaneous three times a day before meals  insulin lispro (ADMELOG) corrective regimen sliding scale   SubCutaneous at bedtime    albumin human 25% IVPB 50 milliLiter(s) IV Intermittent once  dextrose 5%. 1000 milliLiter(s) IV Continuous <Continuous>  dextrose 5%. 1000 milliLiter(s) IV Continuous <Continuous>  multivitamin 1 Tablet(s) Oral daily      PAST MEDICAL/SURGICAL HISTORY  PAST MEDICAL & SURGICAL HISTORY:  Intra-abdominal and pelvic swelling, mass and lump, unspecified site  Currently on experimental chemotherapy at [facility]    Hypertension  Now with low BPs    Diabetes mellitus  Fasting FS range from - per patient, off meds    Hypercholesteremia  off meds    CAD (coronary artery disease)  Off aspirin    Sleep apnea    History of cardiac cath  Pt reports 1 cardiac stent placed 1999    H/O sleep apnea  Per patient had Sleep Apnea surgery  in 1996- at [facility]- Was not retested for Sleep Apnea post surgery    History of colon resection  Dec 2019        SOCIAL HISTORY: Substance Use (street drugs): ( x ) never used  (  ) other:    FAMILY HISTORY:  FH: heart disease  Mother         PHYSICAL EXAM:  T(C): 36.3 (04-15-22 @ 12:15), Max: 36.7 (04-14-22 @ 20:50)  HR: 75 (04-15-22 @ 12:15) (71 - 81)  BP: 96/48 (04-15-22 @ 12:15) (85/53 - 96/48)  RR: 18 (04-15-22 @ 12:15) (18 - 18)  SpO2: 98% (04-15-22 @ 12:15) (95% - 100%)  Wt(kg): --  I&O's Summary    14 Apr 2022 07:01  -  15 Apr 2022 07:00  --------------------------------------------------------  IN: 1020 mL / OUT: 1500 mL / NET: -480 mL             EYES:   PERRLA   ENMT:   Moist mucous membranes, Good dentition, No lesions  Cardiovascular: Normal S1 S2, No JVD, No murmurs, No edema  Respiratory: Lungs clear to auscultation	  Gastrointestinal:  + ascites s/p paracentesis  Extremities: no edema                                    7.9    3.83  )-----------( 70       ( 15 Apr 2022 11:19 )             25.4     04-15    134<L>  |  103  |  12  ----------------------------<  105<H>  3.5   |  21<L>  |  0.73    Ca    7.6<L>      15 Apr 2022 06:56  Phos  2.3     04-15  Mg     1.80     04-15    TPro  4.8<L>  /  Alb  2.1<L>  /  TBili  <0.2  /  DBili  x   /  AST  6   /  ALT  <5<L>  /  AlkPhos  52  04-15    proBNP:   Lipid Profile:   HgA1c:   TSH:     Consultant(s) Notes Reviewed:  [x ] YES  [ ] NO    Care Discussed with Consultants/Other Providers [ x] YES  [ ] NO    Imaging Personally Reviewed independently:  [x] YES  [ ] NO    All labs, radiologic studies, vitals, orders and medications list reviewed. Patient is seen and examined at bedside. Case discussed with medical team.        
Max Coffey MD  Interventional Cardiology / Advance Heart Failure and Cardiac Transplant Specialist  [place] Office : [address] [place] [address] [place] [address]  Tel:   [place] Office : [address] [facility] [place] [address]  Tel: [phone]      Subjective/Overnight events: Pt is lying in bed comfortable not in distress  	  MEDICATIONS:    cefepime   IVPB      cefepime   IVPB 2000 milliGRAM(s) IV Intermittent every 8 hours    benzonatate 100 milliGRAM(s) Oral three times a day PRN  sodium chloride 3%  Inhalation 4 milliLiter(s) Inhalation every 12 hours    acetaminophen     Tablet .. 650 milliGRAM(s) Oral every 6 hours PRN  melatonin 3 milliGRAM(s) Oral at bedtime PRN  ondansetron Injectable 4 milliGRAM(s) IV Push every 8 hours PRN    aluminum hydroxide/magnesium hydroxide/simethicone Suspension 30 milliLiter(s) Oral every 4 hours PRN  pantoprazole    Tablet 40 milliGRAM(s) Oral two times a day  senna 2 Tablet(s) Oral at bedtime    dextrose 50% Injectable 25 Gram(s) IV Push once  dextrose 50% Injectable 12.5 Gram(s) IV Push once  dextrose 50% Injectable 25 Gram(s) IV Push once  dextrose Oral Gel 15 Gram(s) Oral once PRN  glucagon  Injectable 1 milliGRAM(s) IntraMuscular once  insulin lispro (ADMELOG) corrective regimen sliding scale   SubCutaneous three times a day before meals  insulin lispro (ADMELOG) corrective regimen sliding scale   SubCutaneous at bedtime    albumin human 25% IVPB 50 milliLiter(s) IV Intermittent once  dextrose 5%. 1000 milliLiter(s) IV Continuous <Continuous>  dextrose 5%. 1000 milliLiter(s) IV Continuous <Continuous>  multivitamin 1 Tablet(s) Oral daily  potassium phosphate / sodium phosphate Powder (PHOS-NaK) 1 Packet(s) Oral every 4 hours      PAST MEDICAL/SURGICAL HISTORY  PAST MEDICAL & SURGICAL HISTORY:  Intra-abdominal and pelvic swelling, mass and lump, unspecified site  Currently on experimental chemotherapy at [facility]    Hypertension  Now with low BPs    Diabetes mellitus  Fasting FS range from - per patient, off meds    Hypercholesteremia  off meds    CAD (coronary artery disease)  Off aspirin    Sleep apnea    History of cardiac cath  Pt reports 1 cardiac stent placed 1999    H/O sleep apnea  Per patient had Sleep Apnea surgery  in 1996- at [facility]- Was not retested for Sleep Apnea post surgery    History of colon resection  Dec 2019        SOCIAL HISTORY: Substance Use (street drugs): ( x ) never used  (  ) other:    FAMILY HISTORY:  FH: heart disease  Mother      PHYSICAL EXAM:  T(C): 36.6 (04-16-22 @ 12:20), Max: 36.6 (04-15-22 @ 23:15)  HR: 83 (04-16-22 @ 12:20) (60 - 83)  BP: 94/70 (04-16-22 @ 12:20) (94/70 - 99/58)  RR: 18 (04-16-22 @ 12:20) (18 - 18)  SpO2: 100% (04-16-22 @ 12:20) (99% - 100%)  Wt(kg): --  I&O's Summary    15 Apr 2022 07:01  -  16 Apr 2022 07:00  --------------------------------------------------------  IN: 300 mL / OUT: 450 mL / NET: -150 mL             EYES:   PERRLA   ENMT:   Moist mucous membranes, Good dentition, No lesions  Cardiovascular: Normal S1 S2, No JVD, No murmurs, No edema  Respiratory: Lungs clear to auscultation	  Gastrointestinal:  + ascites s/p paracentesis  Extremities: no edema                                        8.1    4.59  )-----------( 77       ( 16 Apr 2022 07:19 )             26.9     04-16    134<L>  |  102  |  14  ----------------------------<  109<H>  3.6   |  21<L>  |  0.68    Ca    7.9<L>      16 Apr 2022 07:19  Phos  2.4     04-16  Mg     1.80     04-16    TPro  5.5<L>  /  Alb  2.3<L>  /  TBili  0.2  /  DBili  x   /  AST  6   /  ALT  6   /  AlkPhos  63  04-16    proBNP:   Lipid Profile:   HgA1c:   TSH:     Consultant(s) Notes Reviewed:  [x ] YES  [ ] NO    Care Discussed with Consultants/Other Providers [ x] YES  [ ] NO    Imaging Personally Reviewed independently:  [x] YES  [ ] NO    All labs, radiologic studies, vitals, orders and medications list reviewed. Patient is seen and examined at bedside. Case discussed with medical team.        
Max Coffey MD  Interventional Cardiology / Advance Heart Failure and Cardiac Transplant Specialist  [place] Office : [address] [place] [address] [place] [address]  Tel:   [place] Office : [address] [facility] [place] [address]  Tel: [phone]      Subjective/Overnight events: Pt is lying in bed comfortable not in distress, no chest pains no SOB no palpitations. s/p paracentesis doing well   	  MEDICATIONS:  enoxaparin Injectable 120 milliGRAM(s) SubCutaneous every 24 hours    cefepime   IVPB      cefepime   IVPB 2000 milliGRAM(s) IV Intermittent every 8 hours    benzonatate 100 milliGRAM(s) Oral three times a day PRN  sodium chloride 3%  Inhalation 4 milliLiter(s) Inhalation every 12 hours    acetaminophen     Tablet .. 650 milliGRAM(s) Oral every 6 hours PRN  melatonin 3 milliGRAM(s) Oral at bedtime PRN  ondansetron Injectable 4 milliGRAM(s) IV Push every 8 hours PRN    aluminum hydroxide/magnesium hydroxide/simethicone Suspension 30 milliLiter(s) Oral every 4 hours PRN  pantoprazole    Tablet 40 milliGRAM(s) Oral two times a day  senna 2 Tablet(s) Oral at bedtime    dextrose 50% Injectable 25 Gram(s) IV Push once  dextrose 50% Injectable 12.5 Gram(s) IV Push once  dextrose 50% Injectable 25 Gram(s) IV Push once  dextrose Oral Gel 15 Gram(s) Oral once PRN  glucagon  Injectable 1 milliGRAM(s) IntraMuscular once  insulin lispro (ADMELOG) corrective regimen sliding scale   SubCutaneous three times a day before meals  insulin lispro (ADMELOG) corrective regimen sliding scale   SubCutaneous at bedtime    albumin human 25% IVPB 50 milliLiter(s) IV Intermittent once  dextrose 5%. 1000 milliLiter(s) IV Continuous <Continuous>  dextrose 5%. 1000 milliLiter(s) IV Continuous <Continuous>  multivitamin 1 Tablet(s) Oral daily      PAST MEDICAL/SURGICAL HISTORY  PAST MEDICAL & SURGICAL HISTORY:  Intra-abdominal and pelvic swelling, mass and lump, unspecified site  Currently on experimental chemotherapy at [facility]    Hypertension  Now with low BPs    Diabetes mellitus  Fasting FS range from - per patient, off meds    Hypercholesteremia  off meds    CAD (coronary artery disease)  Off aspirin    Sleep apnea    History of cardiac cath  Pt reports 1 cardiac stent placed 1999    H/O sleep apnea  Per patient had Sleep Apnea surgery  in 1996- at [facility]- Was not retested for Sleep Apnea post surgery    History of colon resection  Dec 2019        SOCIAL HISTORY: Substance Use (street drugs): ( x ) never used  (  ) other:    FAMILY HISTORY:  FH: heart disease  Mother          PHYSICAL EXAM:  T(C): 36.7 (04-14-22 @ 14:16), Max: 38.1 (04-14-22 @ 06:35)  HR: 81 (04-14-22 @ 14:16) (81 - 105)  BP: 81/47 (04-14-22 @ 14:16) (81/47 - 100/50)  RR: 18 (04-14-22 @ 14:16) (18 - 20)  SpO2: 100% (04-14-22 @ 14:16) (94% - 100%)  Wt(kg): --  I&O's Summary    13 Apr 2022 07:01  -  14 Apr 2022 07:00  --------------------------------------------------------  IN: 450 mL / OUT: 900 mL / NET: -450 mL            GENERAL: NAD  EYES: conjunctiva and sclera clear  ENMT: No tonsillar erythema, exudates, or enlargement  Cardiovascular: Normal S1 S2, No JVD, No murmurs, No edema  Respiratory: Lungs clear to auscultation	  Gastrointestinal:  distended 	  Extremities: 2+ edema B/L                                     7.1    5.55  )-----------( 69       ( 14 Apr 2022 06:49 )             22.5     04-14    132<L>  |  99  |  11  ----------------------------<  131<H>  3.5   |  21<L>  |  0.72    Ca    7.5<L>      14 Apr 2022 06:49  Phos  2.3     04-14  Mg     1.60     04-14    TPro  5.4<L>  /  Alb  2.7<L>  /  TBili  0.3  /  DBili  x   /  AST  9   /  ALT  5   /  AlkPhos  58  04-14    proBNP:   Lipid Profile:   HgA1c:   TSH:     Consultant(s) Notes Reviewed:  [x ] YES  [ ] NO    Care Discussed with Consultants/Other Providers [ x] YES  [ ] NO    Imaging Personally Reviewed independently:  [x] YES  [ ] NO    All labs, radiologic studies, vitals, orders and medications list reviewed. Patient is seen and examined at bedside. Case discussed with medical team.        
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 Said that for the past few weeks he has been having significant abd swelling with associated pressure in his abdomen and poor PO intake. Said that whenever he tries to eat something he invariably ends up vomiting the food due to his abdominal distension (emesis usually is food, sometimes bilious, no blood/coffee ground substances). Said that he has been having difficulty due to his abdominal distention but denies any chest pain, palpitations, or syncope. Also with a new onset cough sometimes with green sputum but no hemoptysis. Is having BMs but sometimes has diarrhea (last BM this AM was soft, no blood in BM). Has generalized weakness due to his reduced PO intake. Denies any recent fevers/chills/diaphoresis. Has noted b/l LE swelling but denies any pain to it, has been put on lasix for it but states that it does not seem to be helping the swelling. Said that he has known fluid in his abdomen, was sampled once but has never been drained before. Denies any other acute complaints.     On arrival to the ED, his vitals were T 98, P 95, BP 85/51 -> 93/54, RR 18, O2 sat 95%. His lab work showed anemia (improved from prior), low bicarb and elevated AG (likely 2/2 starvation ketosis), and nl pH/lactate. His imaging showed large volume ascites and worsening abdominal masses but unclear trend as patient currently follows at [facility] for his malignancy. He was also noted to have an incidental RLL PE and was started on a heparin gtt. he was admitted to medicine.  (04 Apr 2022 23:23)      PAST MEDICAL & SURGICAL HISTORY:  Intra-abdominal and pelvic swelling, mass and lump, unspecified site  Currently on experimental chemotherapy at [facility]    Hypertension  Now with low BPs    Diabetes mellitus  Fasting FS range from - per patient, off meds    Hypercholesteremia  off meds    CAD (coronary artery disease)  Off aspirin    Sleep apnea    History of cardiac cath  Pt reports 1 cardiac stent placed 1999    H/O sleep apnea  Per patient had Sleep Apnea surgery  in 1996- at [facility]- Was not retested for Sleep Apnea post surgery    History of colon resection  Dec 2019        MEDICATIONS  (STANDING):  dextrose 5%. 1000 milliLiter(s) (100 mL/Hr) IV Continuous <Continuous>  dextrose 5%. 1000 milliLiter(s) (50 mL/Hr) IV Continuous <Continuous>  dextrose 50% Injectable 25 Gram(s) IV Push once  dextrose 50% Injectable 12.5 Gram(s) IV Push once  dextrose 50% Injectable 25 Gram(s) IV Push once  glucagon  Injectable 1 milliGRAM(s) IntraMuscular once  insulin lispro (ADMELOG) corrective regimen sliding scale   SubCutaneous three times a day before meals  insulin lispro (ADMELOG) corrective regimen sliding scale   SubCutaneous at bedtime  multivitamin 1 Tablet(s) Oral daily  pantoprazole    Tablet 40 milliGRAM(s) Oral two times a day  senna 2 Tablet(s) Oral at bedtime  sodium chloride 3%  Inhalation 4 milliLiter(s) Inhalation every 12 hours      Allergies    oxycodone (Vomiting)    Intolerances        SOCIAL HISTORY:  Denies ETOh,Smoking,     FAMILY HISTORY:  FH: heart disease  Mother        REVIEW OF SYSTEMS:    CONSTITUTIONAL: No weakness, fevers or chills  EYES/ENT: No visual changes;  No vertigo or throat pain   NECK: No pain or stiffness  RESPIRATORY: No cough, wheezing, hemoptysis; No shortness of breath  CARDIOVASCULAR: No chest pain or palpitations  GASTROINTESTINAL: No abdominal or epigastric pain. No nausea, vomiting, or hematemesis; No diarrhea or constipation. No melena or hematochezia.  GENITOURINARY: No dysuria, frequency or hematuria  NEUROLOGICAL: No numbness or weakness  SKIN: No itching, burning, rashes, or lesions   All other review of systems is negative unless indicated above.    VITAL:  T(C): , Max: 36.7 (04-13-22 @ 06:30)  T(F): , Max: 98 (04-13-22 @ 06:30)  HR: 93 (04-13-22 @ 22:01)  BP: 94/50 (04-13-22 @ 20:47)  BP(mean): --  RR: 18 (04-13-22 @ 20:47)  SpO2: 97% (04-13-22 @ 22:01)  Wt(kg): --    I and O's:    04-11 @ 07:01  -  04-12 @ 07:00  --------------------------------------------------------  IN: 1050 mL / OUT: 800 mL / NET: 250 mL    04-12 @ 07:01  -  04-13 @ 07:00  --------------------------------------------------------  IN: 400 mL / OUT: 300 mL / NET: 100 mL    04-13 @ 07:01  -  04-14 @ 06:13  --------------------------------------------------------  IN: 300 mL / OUT: 500 mL / NET: -200 mL          PHYSICAL EXAM:    Constitutional: NAD  HEENT: PERRLA,   Neck: No JVD  Respiratory: CTA B/L  Cardiovascular: S1 and S2  Gastrointestinal: BS+, soft, NT/ND  Extremities: No peripheral edema  Neurological: A/O x 3, no focal deficits  Psychiatric: Normal mood, normal affect  : No Clark  Skin: No rashes  Access: Not applicable  Back: No CVA tenderness    LABS:                        7.4    3.49  )-----------( 69       ( 13 Apr 2022 05:44 )             24.0     04-13    133<L>  |  101  |  11  ----------------------------<  107<H>  3.6   |  22  |  0.73    Ca    7.4<L>      13 Apr 2022 05:44  Phos  1.5     04-13  Mg     1.60     04-13            RADIOLOGY & ADDITIONAL STUDIES:                          
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 Said that for the past few weeks he has been having significant abd swelling with associated pressure in his abdomen and poor PO intake. Said that whenever he tries to eat something he invariably ends up vomiting the food due to his abdominal distension (emesis usually is food, sometimes bilious, no blood/coffee ground substances). Said that he has been having difficulty due to his abdominal distention but denies any chest pain, palpitations, or syncope. Also with a new onset cough sometimes with green sputum but no hemoptysis. Is having BMs but sometimes has diarrhea (last BM this AM was soft, no blood in BM). Has generalized weakness due to his reduced PO intake. Denies any recent fevers/chills/diaphoresis. Has noted b/l LE swelling but denies any pain to it, has been put on lasix for it but states that it does not seem to be helping the swelling. Said that he has known fluid in his abdomen, was sampled once but has never been drained before. Denies any other acute complaints.     On arrival to the ED, his vitals were T 98, P 95, BP 85/51 -> 93/54, RR 18, O2 sat 95%. His lab work showed anemia (improved from prior), low bicarb and elevated AG (likely 2/2 starvation ketosis), and nl pH/lactate. His imaging showed large volume ascites and worsening abdominal masses but unclear trend as patient currently follows at [facility] for his malignancy. He was also noted to have an incidental RLL PE and was started on a heparin gtt. he was admitted to medicine.  (04 Apr 2022 23:23)      PAST MEDICAL & SURGICAL HISTORY:  Intra-abdominal and pelvic swelling, mass and lump, unspecified site  Currently on experimental chemotherapy at [facility]    Hypertension  Now with low BPs    Diabetes mellitus  Fasting FS range from - per patient, off meds    Hypercholesteremia  off meds    CAD (coronary artery disease)  Off aspirin    Sleep apnea    History of cardiac cath  Pt reports 1 cardiac stent placed 1999    H/O sleep apnea  Per patient had Sleep Apnea surgery  in 1996- at [facility]- Was not retested for Sleep Apnea post surgery    History of colon resection  Dec 2019        MEDICATIONS  (STANDING):  albumin human 25% IVPB 50 milliLiter(s) IV Intermittent once  cefepime   IVPB      cefepime   IVPB 2000 milliGRAM(s) IV Intermittent every 8 hours  dextrose 5%. 1000 milliLiter(s) (100 mL/Hr) IV Continuous <Continuous>  dextrose 5%. 1000 milliLiter(s) (50 mL/Hr) IV Continuous <Continuous>  dextrose 50% Injectable 25 Gram(s) IV Push once  dextrose 50% Injectable 12.5 Gram(s) IV Push once  dextrose 50% Injectable 25 Gram(s) IV Push once  glucagon  Injectable 1 milliGRAM(s) IntraMuscular once  insulin lispro (ADMELOG) corrective regimen sliding scale   SubCutaneous three times a day before meals  insulin lispro (ADMELOG) corrective regimen sliding scale   SubCutaneous at bedtime  multivitamin 1 Tablet(s) Oral daily  pantoprazole    Tablet 40 milliGRAM(s) Oral two times a day  potassium phosphate / sodium phosphate Powder (PHOS-NaK) 1 Packet(s) Oral every 4 hours  senna 2 Tablet(s) Oral at bedtime  sodium chloride 3%  Inhalation 4 milliLiter(s) Inhalation every 12 hours      Allergies    oxycodone (Vomiting)    Intolerances        SOCIAL HISTORY:  Denies ETOh,Smoking,     FAMILY HISTORY:  FH: heart disease  Mother        REVIEW OF SYSTEMS:    CONSTITUTIONAL: No weakness, fevers or chills  EYES/ENT: No visual changes;  No vertigo or throat pain   NECK: No pain or stiffness  RESPIRATORY: No cough, wheezing, hemoptysis; No shortness of breath  CARDIOVASCULAR: No chest pain or palpitations  GASTROINTESTINAL: No abdominal or epigastric pain. No nausea, vomiting, or hematemesis; No diarrhea or constipation. No melena or hematochezia.  GENITOURINARY: No dysuria, frequency or hematuria  NEUROLOGICAL: No numbness or weakness  SKIN: No itching, burning, rashes, or lesions   All other review of systems is negative unless indicated above.    VITAL:  T(C): , Max: 36.6 (04-15-22 @ 23:15)  T(F): , Max: 97.8 (04-15-22 @ 23:15)  HR: 63 (04-16-22 @ 05:15)  BP: 95/56 (04-16-22 @ 05:15)  BP(mean): --  RR: 18 (04-16-22 @ 05:15)  SpO2: 99% (04-16-22 @ 05:15)  Wt(kg): --    I and O's:    04-14 @ 07:01  -  04-15 @ 07:00  --------------------------------------------------------  IN: 1020 mL / OUT: 1500 mL / NET: -480 mL    04-15 @ 07:01  -  04-16 @ 07:00  --------------------------------------------------------  IN: 300 mL / OUT: 450 mL / NET: -150 mL          PHYSICAL EXAM:    Constitutional: NAD  HEENT: PERRLA,   Neck: No JVD  Respiratory: CTA B/L  Cardiovascular: S1 and S2  Gastrointestinal: BS+, soft, NT/ND  Extremities: No peripheral edema  Neurological: A/O x 3, no focal deficits  Psychiatric: Normal mood, normal affect  : No Clark  Skin: No rashes  Access: Not applicable  Back: No CVA tenderness    LABS:                        8.1    4.59  )-----------( 77       ( 16 Apr 2022 07:19 )             26.9     04-16    134<L>  |  102  |  14  ----------------------------<  109<H>  3.6   |  21<L>  |  0.68    Ca    7.9<L>      16 Apr 2022 07:19  Phos  2.4     04-16  Mg     1.80     04-16    TPro  5.5<L>  /  Alb  2.3<L>  /  TBili  0.2  /  DBili  x   /  AST  6   /  ALT  6   /  AlkPhos  63  04-16          RADIOLOGY & ADDITIONAL STUDIES:                          
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 Said that for the past few weeks he has been having significant abd swelling with associated pressure in his abdomen and poor PO intake. Said that whenever he tries to eat something he invariably ends up vomiting the food due to his abdominal distension (emesis usually is food, sometimes bilious, no blood/coffee ground substances). Said that he has been having difficulty due to his abdominal distention but denies any chest pain, palpitations, or syncope. Also with a new onset cough sometimes with green sputum but no hemoptysis. Is having BMs but sometimes has diarrhea (last BM this AM was soft, no blood in BM). Has generalized weakness due to his reduced PO intake. Denies any recent fevers/chills/diaphoresis. Has noted b/l LE swelling but denies any pain to it, has been put on lasix for it but states that it does not seem to be helping the swelling. Said that he has known fluid in his abdomen, was sampled once but has never been drained before. Denies any other acute complaints.     On arrival to the ED, his vitals were T 98, P 95, BP 85/51 -> 93/54, RR 18, O2 sat 95%. His lab work showed anemia (improved from prior), low bicarb and elevated AG (likely 2/2 starvation ketosis), and nl pH/lactate. His imaging showed large volume ascites and worsening abdominal masses but unclear trend as patient currently follows at [facility] for his malignancy. He was also noted to have an incidental RLL PE and was started on a heparin gtt. he was admitted to medicine.  (04 Apr 2022 23:23)      PAST MEDICAL & SURGICAL HISTORY:  Intra-abdominal and pelvic swelling, mass and lump, unspecified site  Currently on experimental chemotherapy at [facility]    Hypertension  Now with low BPs    Diabetes mellitus  Fasting FS range from - per patient, off meds    Hypercholesteremia  off meds    CAD (coronary artery disease)  Off aspirin    Sleep apnea    History of cardiac cath  Pt reports 1 cardiac stent placed 1999    H/O sleep apnea  Per patient had Sleep Apnea surgery  in 1996- at [facility]- Was not retested for Sleep Apnea post surgery    History of colon resection  Dec 2019        MEDICATIONS  (STANDING):  dextrose 5%. 1000 milliLiter(s) (100 mL/Hr) IV Continuous <Continuous>  dextrose 5%. 1000 milliLiter(s) (50 mL/Hr) IV Continuous <Continuous>  dextrose 50% Injectable 25 Gram(s) IV Push once  dextrose 50% Injectable 12.5 Gram(s) IV Push once  dextrose 50% Injectable 25 Gram(s) IV Push once  enoxaparin Injectable 120 milliGRAM(s) SubCutaneous every 24 hours  glucagon  Injectable 1 milliGRAM(s) IntraMuscular once  insulin lispro (ADMELOG) corrective regimen sliding scale   SubCutaneous three times a day before meals  insulin lispro (ADMELOG) corrective regimen sliding scale   SubCutaneous at bedtime  pantoprazole    Tablet 40 milliGRAM(s) Oral two times a day  senna 2 Tablet(s) Oral at bedtime  sodium chloride 3%  Inhalation 4 milliLiter(s) Inhalation every 12 hours      Allergies    oxycodone (Vomiting)    Intolerances        SOCIAL HISTORY:  Denies ETOh,Smoking,     FAMILY HISTORY:  FH: heart disease  Mother        REVIEW OF SYSTEMS:    CONSTITUTIONAL: No weakness, fevers or chills  EYES/ENT: No visual changes;  No vertigo or throat pain   NECK: No pain or stiffness  RESPIRATORY: No cough, wheezing, hemoptysis; No shortness of breath  CARDIOVASCULAR: No chest pain or palpitations  GASTROINTESTINAL: No abdominal or epigastric pain. No nausea, vomiting, or hematemesis; No diarrhea or constipation. No melena or hematochezia.  GENITOURINARY: No dysuria, frequency or hematuria  NEUROLOGICAL: No numbness or weakness  SKIN: No itching, burning, rashes, or lesions   All other review of systems is negative unless indicated above.    VITAL:  T(C): , Max: 36.8 (04-12-22 @ 07:45)  T(F): , Max: 98.2 (04-12-22 @ 07:45)  HR: 80 (04-13-22 @ 06:30)  BP: 95/60 (04-13-22 @ 06:30)  BP(mean): --  RR: 18 (04-13-22 @ 06:30)  SpO2: 98% (04-13-22 @ 06:30)  Wt(kg): --    I and O's:    04-10 @ 07:01  -  04-11 @ 07:00  --------------------------------------------------------  IN: 300 mL / OUT: 300 mL / NET: 0 mL    04-11 @ 07:01  -  04-12 @ 07:00  --------------------------------------------------------  IN: 1050 mL / OUT: 800 mL / NET: 250 mL    04-12 @ 07:01  -  04-13 @ 06:50  --------------------------------------------------------  IN: 400 mL / OUT: 300 mL / NET: 100 mL          PHYSICAL EXAM:    Constitutional: NAD  HEENT: PERRLA,   Neck: No JVD  Respiratory: CTA B/L  Cardiovascular: S1 and S2  Gastrointestinal: BS+, soft, NT/ND  Extremities: No peripheral edema  Neurological: A/O x 3, no focal deficits  Psychiatric: Normal mood, normal affect  : No Clark  Skin: No rashes  Access: Not applicable  Back: No CVA tenderness    LABS:                        7.4    3.49  )-----------( x        ( 13 Apr 2022 05:44 )             24.0     04-13    133<L>  |  101  |  11  ----------------------------<  107<H>  3.6   |  22  |  0.73    Ca    7.4<L>      13 Apr 2022 05:44  Phos  1.5     04-13  Mg     1.60     04-13            RADIOLOGY & ADDITIONAL STUDIES:                          
    SUBJECTIVE / OVERNIGHT EVENTS:pt seen and examined    MEDICATIONS  (STANDING):  dextrose 5%. 1000 milliLiter(s) (100 mL/Hr) IV Continuous <Continuous>  dextrose 5%. 1000 milliLiter(s) (50 mL/Hr) IV Continuous <Continuous>  dextrose 50% Injectable 25 Gram(s) IV Push once  dextrose 50% Injectable 12.5 Gram(s) IV Push once  dextrose 50% Injectable 25 Gram(s) IV Push once  enoxaparin Injectable 120 milliGRAM(s) SubCutaneous every 24 hours  glucagon  Injectable 1 milliGRAM(s) IntraMuscular once  insulin lispro (ADMELOG) corrective regimen sliding scale   SubCutaneous three times a day before meals  insulin lispro (ADMELOG) corrective regimen sliding scale   SubCutaneous at bedtime  pantoprazole    Tablet 40 milliGRAM(s) Oral two times a day  senna 2 Tablet(s) Oral at bedtime  sodium chloride 3%  Inhalation 4 milliLiter(s) Inhalation every 12 hours    MEDICATIONS  (PRN):  acetaminophen     Tablet .. 650 milliGRAM(s) Oral every 6 hours PRN Temp greater or equal to 38C (100.4F), Mild Pain (1 - 3)  aluminum hydroxide/magnesium hydroxide/simethicone Suspension 30 milliLiter(s) Oral every 4 hours PRN Dyspepsia  benzonatate 100 milliGRAM(s) Oral three times a day PRN Cough  dextrose Oral Gel 15 Gram(s) Oral once PRN Blood Glucose LESS THAN 70 milliGRAM(s)/deciliter  HYDROmorphone  Injectable 0.2 milliGRAM(s) IV Push every 6 hours PRN moderate to severe pain  melatonin 3 milliGRAM(s) Oral at bedtime PRN Insomnia  ondansetron Injectable 4 milliGRAM(s) IV Push every 8 hours PRN Nausea and/or Vomiting    Vital Signs Last 24 Hrs  T(C): 36.3 (04-12-22 @ 20:08), Max: 36.8 (04-11-22 @ 23:45)  T(F): 97.3 (04-12-22 @ 20:08), Max: 98.2 (04-11-22 @ 23:45)  HR: 94 (04-12-22 @ 20:08) (74 - 94)  BP: 96/60 (04-12-22 @ 20:08) (89/65 - 109/58)  BP(mean): --  RR: 18 (04-12-22 @ 20:08) (17 - 18)  SpO2: 100% (04-12-22 @ 20:08) (97% - 100%)          PHYSICAL EXAM:  GENERAL: NAD  EYES: EOMI, PERRLA  NECK: Supple, No JVD  CHEST/LUNG: dec breath sounds at bases  HEART:  S1 , S2 +  ABDOMEN: soft , bs+, distension+  EXTREMITIES:  trace edema  NEUROLOGY:alert awake      LABS:  04-12    134<L>  |  100  |  11  ----------------------------<  105<H>  3.8   |  21<L>  |  0.75    Ca    7.7<L>      12 Apr 2022 07:23  Phos  1.5     04-12  Mg     1.60     04-12      Creatinine Trend: 0.75 <--, 0.75 <--, 0.76 <--, 0.77 <--, 0.84 <--, 0.90 <--, 0.97 <--                        7.3    1.62  )-----------( 106      ( 12 Apr 2022 07:23 )             24.9     Urine Studies:                
    SUBJECTIVE / OVERNIGHT EVENTS:pt seen and examined    MEDICATIONS  (STANDING):  albumin human 25% IVPB 50 milliLiter(s) IV Intermittent once  cefepime   IVPB 2000 milliGRAM(s) IV Intermittent every 8 hours  cefepime   IVPB      dextrose 5%. 1000 milliLiter(s) (50 mL/Hr) IV Continuous <Continuous>  dextrose 5%. 1000 milliLiter(s) (100 mL/Hr) IV Continuous <Continuous>  dextrose 50% Injectable 25 Gram(s) IV Push once  dextrose 50% Injectable 12.5 Gram(s) IV Push once  dextrose 50% Injectable 25 Gram(s) IV Push once  enoxaparin Injectable 80 milliGRAM(s) SubCutaneous every 12 hours  glucagon  Injectable 1 milliGRAM(s) IntraMuscular once  insulin lispro (ADMELOG) corrective regimen sliding scale   SubCutaneous at bedtime  insulin lispro (ADMELOG) corrective regimen sliding scale   SubCutaneous three times a day before meals  multivitamin 1 Tablet(s) Oral daily  pantoprazole    Tablet 40 milliGRAM(s) Oral two times a day  senna 2 Tablet(s) Oral at bedtime  sodium chloride 3%  Inhalation 4 milliLiter(s) Inhalation every 12 hours    MEDICATIONS  (PRN):  acetaminophen     Tablet .. 650 milliGRAM(s) Oral every 6 hours PRN Temp greater or equal to 38C (100.4F), Mild Pain (1 - 3)  aluminum hydroxide/magnesium hydroxide/simethicone Suspension 30 milliLiter(s) Oral every 4 hours PRN Dyspepsia  benzonatate 100 milliGRAM(s) Oral every 8 hours PRN Cough  dextrose Oral Gel 15 Gram(s) Oral once PRN Blood Glucose LESS THAN 70 milliGRAM(s)/deciliter  melatonin 3 milliGRAM(s) Oral at bedtime PRN Insomnia  ondansetron Injectable 4 milliGRAM(s) IV Push every 8 hours PRN Nausea and/or Vomiting    Vital Signs Last 24 Hrs  T(C): 36.4 (04-18-22 @ 19:15), Max: 36.7 (04-18-22 @ 06:00)  T(F): 97.5 (04-18-22 @ 19:15), Max: 98 (04-18-22 @ 06:00)  HR: 84 (04-18-22 @ 19:15) (70 - 87)  BP: 97/60 (04-18-22 @ 19:15) (90/51 - 102/57)  BP(mean): --  RR: 18 (04-18-22 @ 19:15) (17 - 18)  SpO2: 100% (04-18-22 @ 19:15) (96% - 100%)                PHYSICAL EXAM:  GENERAL: NAD  EYES: EOMI, PERRLA  NECK: Supple, No JVD  CHEST/LUNG: dec breath sounds at bases  HEART:  S1 , S2 +  ABDOMEN: soft , bs+, distension+  EXTREMITIES:  trace edema  NEUROLOGY:alert awake    LABS:  04-19    134<L>  |  102  |  13  ----------------------------<  100<H>  3.6   |  22  |  0.69    Ca    7.9<L>      19 Apr 2022 06:54  Phos  2.0     04-19  Mg     1.70     04-19    TPro  5.2<L>  /  Alb  2.1<L>  /  TBili  0.4  /  DBili      /  AST  6   /  ALT  5   /  AlkPhos  57  04-18    Creatinine Trend: 0.69 <--, 0.72 <--, 0.65 <--, 0.68 <--, 0.73 <--, 0.72 <--, 0.73 <--                        9.2    2.71  )-----------( 76       ( 19 Apr 2022 06:54 )             28.6     Urine Studies:            LIVER FUNCTIONS - ( 18 Apr 2022 06:56 )  Alb: 2.1 g/dL / Pro: 5.2 g/dL / ALK PHOS: 57 U/L / ALT: 5 U/L / AST: 6 U/L / GGT: x                 LIVER FUNCTIONS - ( 18 Apr 2022 06:56 )  Alb: 2.1 g/dL / Pro: 5.2 g/dL / ALK PHOS: 57 U/L / ALT: 5 U/L / AST: 6 U/L / GGT: x             PTT - ( 15 Apr 2022 11:19 )  PTT:31.0 sec     PTT - ( 14 Apr 2022 06:49 )  PTT:28.6 sec      
[facility]  Alondra Storey Palliative Care Attending  Contact Info: Page [phone] (including Nights/Weekends), message on [employer] Teams (Alondra Storey), or leave  at Palliative Office [phone] (non-urgent)     SUBJECTIVE AND OBJECTIVE: Patient seen this morning, tolerated paracentesis well and was able to increase PO intake. He is having a dry cough, and was agreeable to having cough drops ordered.     INTERVAL HPI/OVERNIGHT EVENTS:  > 4/8: s/p paracentesis with 3.8L removed.     DNR on chart:   Allergies    oxycodone (Vomiting)    Intolerances    MEDICATIONS  (STANDING):  dextrose 5%. 1000 milliLiter(s) (100 mL/Hr) IV Continuous <Continuous>  dextrose 5%. 1000 milliLiter(s) (50 mL/Hr) IV Continuous <Continuous>  dextrose 50% Injectable 25 Gram(s) IV Push once  dextrose 50% Injectable 12.5 Gram(s) IV Push once  dextrose 50% Injectable 25 Gram(s) IV Push once  glucagon  Injectable 1 milliGRAM(s) IntraMuscular once  heparin  Infusion.  Unit(s)/Hr (15 mL/Hr) IV Continuous <Continuous>  insulin lispro (ADMELOG) corrective regimen sliding scale   SubCutaneous three times a day before meals  insulin lispro (ADMELOG) corrective regimen sliding scale   SubCutaneous at bedtime  pantoprazole    Tablet 40 milliGRAM(s) Oral two times a day  senna 2 Tablet(s) Oral at bedtime    MEDICATIONS  (PRN):  acetaminophen     Tablet .. 650 milliGRAM(s) Oral every 6 hours PRN Temp greater or equal to 38C (100.4F), Mild Pain (1 - 3)  aluminum hydroxide/magnesium hydroxide/simethicone Suspension 30 milliLiter(s) Oral every 4 hours PRN Dyspepsia  benzonatate 100 milliGRAM(s) Oral three times a day PRN Cough  dextrose Oral Gel 15 Gram(s) Oral once PRN Blood Glucose LESS THAN 70 milliGRAM(s)/deciliter  heparin   Injectable 6500 Unit(s) IV Push every 6 hours PRN For aPTT less than 40  heparin   Injectable 3000 Unit(s) IV Push every 6 hours PRN For aPTT between 40 - 57  HYDROmorphone  Injectable 0.2 milliGRAM(s) IV Push every 6 hours PRN moderate to severe pain  melatonin 3 milliGRAM(s) Oral at bedtime PRN Insomnia  ondansetron Injectable 4 milliGRAM(s) IV Push every 8 hours PRN Nausea and/or Vomiting      ITEMS UNCHECKED ARE NOT PRESENT    PRESENT SYMPTOMS: [ ]Unable to obtain due to poor mentation   Source if other than patient:  [ ]Family   [ ]Team     Pain:  [ ]yes [x ]no  QOL impact -   Location -                    Aggravating factors -  Quality -  Radiation -  Timing-  Severity (0-10 scale):  Minimal acceptable level (0-10 scale):     Dyspnea:                           [ ]Mild [ ]Moderate [ ]Severe  Anxiety:                             [ ]Mild [ ]Moderate [ ]Severe  Fatigue:                             [ ]Mild [ ]Moderate [ ]Severe  Nausea:                             [ ]Mild [ ]Moderate [ ]Severe  Loss of appetite:              [ ]Mild [ ]Moderate [ ]Severe  Constipation:                    [ ]Mild [ ]Moderate [ ]Severe    PAIN AD Score:	  [email or web address].[email or web address]/[email or web address] (Ctrl + left click to view)    Other Symptoms: cough   [ ]All other review of systems negative     Palliative Performance Status Version 2:  70       %      http://npcrc.org/files/news/palliative_performance_scale_ppsv2.pdf  PHYSICAL EXAM:  Vital Signs Last 24 Hrs  T(C): 36.7 (08 Apr 2022 05:22), Max: 36.7 (07 Apr 2022 21:16)  T(F): 98.1 (08 Apr 2022 05:22), Max: 98.1 (08 Apr 2022 05:22)  HR: 75 (08 Apr 2022 05:22) (67 - 84)  BP: 92/55 (08 Apr 2022 05:22) (89/56 - 103/62)  BP(mean): --  RR: 17 (08 Apr 2022 05:22) (17 - 19)  SpO2: 98% (08 Apr 2022 05:22) (97% - 99%) I&O's Summary    07 Apr 2022 07:01  -  08 Apr 2022 07:00  --------------------------------------------------------  IN: 1000 mL / OUT: 4350 mL / NET: -3350 mL       GENERAL:  [X]Alert  [X]Oriented x3   [ ]Lethargic  [ ]Cachexia  [ ]Unarousable  [x ]Verbal  [ ]Non-Verbal  Behavioral:   [ ] Anxiety  [ ] Delirium [ ] Agitation [ ] Other  HEENT:  [X]Normal   [ ]Dry mouth   [ ]ET Tube/Trach  [ ]Oral lesions  PULMONARY:   [X]Clear [ ]Tachypnea  [ ]Audible excessive secretions   [ ]Rhonchi        [ ]Right [ ]Left [ ]Bilateral  [ ]Crackles        [ ]Right [ ]Left [ ]Bilateral  [ ]Wheezing     [ ]Right [ ]Left [ ]Bilateral  [ ]Diminished breath sounds [ ]right [ ]left [ ]bilateral  CARDIOVASCULAR:    [X]Regular [ ]Irregular [ ]Tachy  [ ]Gui [ ]Murmur [ ]Other  GASTROINTESTINAL: hernia noted   [ ]Soft  [X]Distended   [ ]+BS  [ ]Non tender [ ]Tender  [ ]PEG [ ]OGT/ NGT  Last BM: 4/7  GENITOURINARY:  [X]Normal [ ] Incontinent   [ ]Oliguria/Anuria   [ ]Clark  MUSCULOSKELETAL:   [ ]Normal   [X]Weakness  [ ]Bed/Wheelchair bound [ ]Edema  NEUROLOGIC:  [ ]No focal deficits  [ ]Cognitive impairment  [ ]Dysphagia [ ]Dysarthria [ ]Paresis [ ]Other   SKIN:   [ ]Normal    [ ]Rash  [ ]Pressure ulcer(s)       Present on admission [ ]y [ ]n    CRITICAL CARE:  [ ] Shock Present  [ ]Septic [ ]Cardiogenic [ ]Neurologic [ ]Hypovolemic  [ ]  Vasopressors [ ]  Inotropes   [ ]Respiratory failure present [ ]Mechanical ventilation [ ]Non-invasive ventilatory support [ ]High flow     [ ]Acute  [ ]Chronic [ ]Hypoxic  [ ]Hypercarbic [ ]Other  [ ]Other organ failure     LABS:                        7.5    2.56  )-----------( 271      ( 08 Apr 2022 05:52 )             24.0   04-08    133<L>  |  100  |  18  ----------------------------<  115<H>  4.0   |  22  |  0.84    Ca    7.2<L>      08 Apr 2022 05:52  Phos  1.9     04-08  Mg     1.60     04-08    PTT - ( 08 Apr 2022 10:24 )  PTT:64.8 sec      RADIOLOGY & ADDITIONAL STUDIES: n/a    Protein Calorie Malnutrition Present: [ ]mild [ ]moderate [ x]severe [ ]underweight [ ]morbid obesity  https://www.andeal.org/vault/2440/web/files/ONC/Table_Clinical%20Characteristics%20to%20Document%20Malnutrition-White%20JV%20et%20al%400566.pdf    Height (cm): 172.7 (04-04-22 @ 13:44), 172.7 (01-24-22 @ 19:11)  Weight (kg): 83.4 (04-04-22 @ 21:37), 78.2 (01-25-22 @ 01:55)  BMI (kg/m2): 28 (04-04-22 @ 21:37), 26.2 (04-04-22 @ 13:44), 26.2 (01-25-22 @ 01:55)    [ ]PPSV2 < or = 30%  [ ]significant weight loss [ ]poor nutritional intake [ ]anasarca    [ ]Artificial Nutrition    REFERRALS:   [ ]Chaplaincy  [ ]Hospice  [ ]Child Life  [ ]Social Work  [x ]Case management [ ]Holistic Therapy     
    SUBJECTIVE / OVERNIGHT EVENTS:pt seen and examined    MEDICATIONS  (STANDING):  albumin human 25% IVPB 50 milliLiter(s) IV Intermittent once  cefepime   IVPB 2000 milliGRAM(s) IV Intermittent every 8 hours  cefepime   IVPB      dextrose 5%. 1000 milliLiter(s) (100 mL/Hr) IV Continuous <Continuous>  dextrose 5%. 1000 milliLiter(s) (50 mL/Hr) IV Continuous <Continuous>  dextrose 50% Injectable 25 Gram(s) IV Push once  dextrose 50% Injectable 12.5 Gram(s) IV Push once  dextrose 50% Injectable 25 Gram(s) IV Push once  enoxaparin Injectable 80 milliGRAM(s) SubCutaneous every 12 hours  glucagon  Injectable 1 milliGRAM(s) IntraMuscular once  insulin lispro (ADMELOG) corrective regimen sliding scale   SubCutaneous three times a day before meals  insulin lispro (ADMELOG) corrective regimen sliding scale   SubCutaneous at bedtime  multivitamin 1 Tablet(s) Oral daily  pantoprazole    Tablet 40 milliGRAM(s) Oral two times a day  senna 2 Tablet(s) Oral at bedtime  sodium chloride 3%  Inhalation 4 milliLiter(s) Inhalation every 12 hours    MEDICATIONS  (PRN):  acetaminophen     Tablet .. 650 milliGRAM(s) Oral every 6 hours PRN Temp greater or equal to 38C (100.4F), Mild Pain (1 - 3)  aluminum hydroxide/magnesium hydroxide/simethicone Suspension 30 milliLiter(s) Oral every 4 hours PRN Dyspepsia  benzonatate 100 milliGRAM(s) Oral every 8 hours PRN Cough  dextrose Oral Gel 15 Gram(s) Oral once PRN Blood Glucose LESS THAN 70 milliGRAM(s)/deciliter  melatonin 3 milliGRAM(s) Oral at bedtime PRN Insomnia  ondansetron Injectable 4 milliGRAM(s) IV Push every 8 hours PRN Nausea and/or Vomiting    Vital Signs Last 24 Hrs  T(C): 36.5 (04-16-22 @ 20:06), Max: 36.6 (04-16-22 @ 05:15)  T(F): 97.7 (04-16-22 @ 20:06), Max: 97.9 (04-16-22 @ 17:45)  HR: 81 (04-16-22 @ 20:06) (63 - 86)  BP: 92/44 (04-16-22 @ 20:06) (90/52 - 95/56)  BP(mean): --  RR: 20 (04-16-22 @ 20:06) (18 - 20)  SpO2: 98% (04-16-22 @ 20:06) (98% - 100%)                PHYSICAL EXAM:  GENERAL: NAD  EYES: EOMI, PERRLA  NECK: Supple, No JVD  CHEST/LUNG: dec breath sounds at bases  HEART:  S1 , S2 +  ABDOMEN: soft , bs+, distension+  EXTREMITIES:  trace edema  NEUROLOGY:alert awake    LABS:  04-16    134<L>  |  102  |  14  ----------------------------<  109<H>  3.6   |  21<L>  |  0.68    Ca    7.9<L>      16 Apr 2022 07:19  Phos  2.4     04-16  Mg     1.80     04-16    TPro  5.5<L>  /  Alb  2.3<L>  /  TBili  0.2  /  DBili      /  AST  6   /  ALT  6   /  AlkPhos  63  04-16    Creatinine Trend: 0.68 <--, 0.73 <--, 0.72 <--, 0.73 <--, 0.75 <--, 0.75 <--, 0.76 <--                        8.1    4.59  )-----------( 77       ( 16 Apr 2022 07:19 )             26.9     Urine Studies:            LIVER FUNCTIONS - ( 16 Apr 2022 07:19 )  Alb: 2.3 g/dL / Pro: 5.5 g/dL / ALK PHOS: 63 U/L / ALT: 6 U/L / AST: 6 U/L / GGT: x           PT/INR - ( 15 Apr 2022 11:19 )   PT: 12.2 sec;   INR: 1.05 ratio         PTT - ( 15 Apr 2022 11:19 )  PTT:31.0 sec     PTT - ( 14 Apr 2022 06:49 )  PTT:28.6 sec      
    SUBJECTIVE / OVERNIGHT EVENTS:pt seen and examined    MEDICATIONS  (STANDING):  albumin human 25% IVPB 50 milliLiter(s) IV Intermittent once  cefepime   IVPB 2000 milliGRAM(s) IV Intermittent every 8 hours  cefepime   IVPB      dextrose 5%. 1000 milliLiter(s) (50 mL/Hr) IV Continuous <Continuous>  dextrose 5%. 1000 milliLiter(s) (100 mL/Hr) IV Continuous <Continuous>  dextrose 50% Injectable 25 Gram(s) IV Push once  dextrose 50% Injectable 12.5 Gram(s) IV Push once  dextrose 50% Injectable 25 Gram(s) IV Push once  enoxaparin Injectable 80 milliGRAM(s) SubCutaneous every 12 hours  glucagon  Injectable 1 milliGRAM(s) IntraMuscular once  insulin lispro (ADMELOG) corrective regimen sliding scale   SubCutaneous at bedtime  insulin lispro (ADMELOG) corrective regimen sliding scale   SubCutaneous three times a day before meals  multivitamin 1 Tablet(s) Oral daily  pantoprazole    Tablet 40 milliGRAM(s) Oral two times a day  senna 2 Tablet(s) Oral at bedtime  sodium chloride 3%  Inhalation 4 milliLiter(s) Inhalation every 12 hours    MEDICATIONS  (PRN):  acetaminophen     Tablet .. 650 milliGRAM(s) Oral every 6 hours PRN Temp greater or equal to 38C (100.4F), Mild Pain (1 - 3)  aluminum hydroxide/magnesium hydroxide/simethicone Suspension 30 milliLiter(s) Oral every 4 hours PRN Dyspepsia  benzonatate 100 milliGRAM(s) Oral every 8 hours PRN Cough  dextrose Oral Gel 15 Gram(s) Oral once PRN Blood Glucose LESS THAN 70 milliGRAM(s)/deciliter  melatonin 3 milliGRAM(s) Oral at bedtime PRN Insomnia  ondansetron Injectable 4 milliGRAM(s) IV Push every 8 hours PRN Nausea and/or Vomiting    Vital Signs Last 24 Hrs  T(C): 36.4 (04-18-22 @ 19:15), Max: 36.7 (04-18-22 @ 06:00)  T(F): 97.5 (04-18-22 @ 19:15), Max: 98 (04-18-22 @ 06:00)  HR: 84 (04-18-22 @ 19:15) (70 - 87)  BP: 97/60 (04-18-22 @ 19:15) (90/51 - 102/57)  BP(mean): --  RR: 18 (04-18-22 @ 19:15) (17 - 18)  SpO2: 100% (04-18-22 @ 19:15) (96% - 100%)                PHYSICAL EXAM:  GENERAL: NAD  EYES: EOMI, PERRLA  NECK: Supple, No JVD  CHEST/LUNG: dec breath sounds at bases  HEART:  S1 , S2 +  ABDOMEN: soft , bs+, distension+  EXTREMITIES:  trace edema  NEUROLOGY:alert awake    LABS:  04-18    132<L>  |  101  |  12  ----------------------------<  107<H>  3.2<L>   |  21<L>  |  0.72    Ca    8.1<L>      18 Apr 2022 06:56  Phos  2.5     04-18  Mg     1.60     04-18    TPro  5.2<L>  /  Alb  2.1<L>  /  TBili  0.4  /  DBili      /  AST  6   /  ALT  5   /  AlkPhos  57  04-18    Creatinine Trend: 0.72 <--, 0.65 <--, 0.68 <--, 0.73 <--, 0.72 <--, 0.73 <--, 0.75 <--                        6.7    3.43  )-----------( 97       ( 18 Apr 2022 06:56 )             21.7     Urine Studies:            LIVER FUNCTIONS - ( 18 Apr 2022 06:56 )  Alb: 2.1 g/dL / Pro: 5.2 g/dL / ALK PHOS: 57 U/L / ALT: 5 U/L / AST: 6 U/L / GGT: x             PTT - ( 15 Apr 2022 11:19 )  PTT:31.0 sec     PTT - ( 14 Apr 2022 06:49 )  PTT:28.6 sec      
ADRIAN FUNES  71y  Male      Patient is a 71y old  Male who presents with a chief complaint of Poor PO intake, Abd swelling (17 Apr 2022 13:25)  Patient was seen and examined.chart reviewed.covering .c/o chronic cough.no flame,no sob,seen by pulmonary  low H/H-being transfused.no abd.pain    REVIEW OF SYSTEMS:  CONSTITUTIONAL: No fever  no cp,no sob.  no active  GI Bleeding;     INTERVAL HPI/OVERNIGHT EVENTS:  T(C): 36.4 (04-17-22 @ 17:38), Max: 36.7 (04-17-22 @ 05:45)  HR: 92 (04-17-22 @ 17:38) (75 - 92)  BP: 106/65 (04-17-22 @ 17:38) (90/53 - 106/65)  RR: 18 (04-17-22 @ 17:38) (17 - 20)  SpO2: 100% (04-17-22 @ 17:38) (98% - 100%)  Wt(kg): --  I&O's Summary    16 Apr 2022 07:01  -  17 Apr 2022 07:00  --------------------------------------------------------  IN: 400 mL / OUT: 0 mL / NET: 400 mL      T(C): 36.4 (04-17-22 @ 17:38), Max: 36.7 (04-17-22 @ 05:45)  HR: 92 (04-17-22 @ 17:38) (75 - 92)  BP: 106/65 (04-17-22 @ 17:38) (90/53 - 106/65)  RR: 18 (04-17-22 @ 17:38) (17 - 20)  SpO2: 100% (04-17-22 @ 17:38) (98% - 100%)  Wt(kg): --Vital Signs Last 24 Hrs  T(C): 36.4 (17 Apr 2022 17:38), Max: 36.7 (17 Apr 2022 05:45)  T(F): 97.5 (17 Apr 2022 17:38), Max: 98 (17 Apr 2022 05:45)  HR: 92 (17 Apr 2022 17:38) (75 - 92)  BP: 106/65 (17 Apr 2022 17:38) (90/53 - 106/65)  BP(mean): --  RR: 18 (17 Apr 2022 17:38) (17 - 20)  SpO2: 100% (17 Apr 2022 17:38) (98% - 100%)    LABS:                        6.8    2.79  )-----------( 89       ( 17 Apr 2022 13:26 )             22.8     04-17    133<L>  |  103  |  12  ----------------------------<  98  3.5   |  21<L>  |  0.65    Ca    7.9<L>      17 Apr 2022 06:55  Phos  2.3     04-17  Mg     1.70     04-17    TPro  5.5<L>  /  Alb  2.3<L>  /  TBili  0.2  /  DBili  x   /  AST  6   /  ALT  6   /  AlkPhos  63  04-16        CAPILLARY BLOOD GLUCOSE      POCT Blood Glucose.: 106 mg/dL (17 Apr 2022 17:07)  POCT Blood Glucose.: 138 mg/dL (17 Apr 2022 12:01)  POCT Blood Glucose.: 124 mg/dL (17 Apr 2022 07:51)  POCT Blood Glucose.: 144 mg/dL (16 Apr 2022 20:47)            PAST MEDICAL & SURGICAL HISTORY:  Intra-abdominal and pelvic swelling, mass and lump, unspecified site  Currently on experimental chemotherapy at [facility]    Hypertension  Now with low BPs    Diabetes mellitus  Fasting FS range from - per patient, off meds    Hypercholesteremia  off meds    CAD (coronary artery disease)  Off aspirin    Sleep apnea    History of cardiac cath  Pt reports 1 cardiac stent placed 1999    H/O sleep apnea  Per patient had Sleep Apnea surgery  in 1996- at [facility]- Was not retested for Sleep Apnea post surgery    History of colon resection  Dec 2019        MEDICATIONS  (STANDING):  albumin human 25% IVPB 50 milliLiter(s) IV Intermittent once  cefepime   IVPB      cefepime   IVPB 2000 milliGRAM(s) IV Intermittent every 8 hours  dextrose 5%. 1000 milliLiter(s) (50 mL/Hr) IV Continuous <Continuous>  dextrose 5%. 1000 milliLiter(s) (100 mL/Hr) IV Continuous <Continuous>  dextrose 50% Injectable 25 Gram(s) IV Push once  dextrose 50% Injectable 12.5 Gram(s) IV Push once  dextrose 50% Injectable 25 Gram(s) IV Push once  enoxaparin Injectable 80 milliGRAM(s) SubCutaneous every 12 hours  furosemide   Injectable 20 milliGRAM(s) IV Push once  glucagon  Injectable 1 milliGRAM(s) IntraMuscular once  insulin lispro (ADMELOG) corrective regimen sliding scale   SubCutaneous three times a day before meals  insulin lispro (ADMELOG) corrective regimen sliding scale   SubCutaneous at bedtime  multivitamin 1 Tablet(s) Oral daily  pantoprazole    Tablet 40 milliGRAM(s) Oral two times a day  senna 2 Tablet(s) Oral at bedtime  sodium chloride 3%  Inhalation 4 milliLiter(s) Inhalation every 12 hours    MEDICATIONS  (PRN):  acetaminophen     Tablet .. 650 milliGRAM(s) Oral every 6 hours PRN Temp greater or equal to 38C (100.4F), Mild Pain (1 - 3)  aluminum hydroxide/magnesium hydroxide/simethicone Suspension 30 milliLiter(s) Oral every 4 hours PRN Dyspepsia  benzonatate 100 milliGRAM(s) Oral every 8 hours PRN Cough  dextrose Oral Gel 15 Gram(s) Oral once PRN Blood Glucose LESS THAN 70 milliGRAM(s)/deciliter  melatonin 3 milliGRAM(s) Oral at bedtime PRN Insomnia  ondansetron Injectable 4 milliGRAM(s) IV Push every 8 hours PRN Nausea and/or Vomiting        RADIOLOGY & ADDITIONAL TESTS:    Imaging Personally Reviewed:  [ ] YES  [ ] NO    Consultant(s) Notes Reviewed:  [ x] YES  [ ] NO    PHYSICAL EXAM:  GENERAL: Alert and awake lying in bed in no distress  HEAD:  Atraumatic, Normocephalic  EYES: EOMI, JOAQUINA, conjunctiva and sclera clear  NECK: Supple, No JVD, Normal thyroid  NERVOUS SYSTEM:  Alert & Oriented X3, Motor and sensory systems are intact,   CHEST/LUNG: Bilateral clear breath sounds, no rhochi, no wheezing, no crepitations,  HEART: Regular rate and rhythm; No murmurs, rubs, or gallops  ABDOMEN: Soft, Nontender, Nondistended; Bowel sounds present  EXTREMITIES:   Peripheral Pulses are palpable, no  edema        Care Discussed with Consultants/Other Providers [ ] YES  [ ] NO      Code Status: [] Full Code [] DNR [] DNI [] Goals of Care:   Disposition: [] ICU [] Stroke Unit [] RCU []PCU []Floor [] Discharge Home         VERÓNICA [email or web address].FACP    
    SUBJECTIVE / OVERNIGHT EVENTS:pt seen and examined    MEDICATIONS  (STANDING):  dextrose 5%. 1000 milliLiter(s) (50 mL/Hr) IV Continuous <Continuous>  dextrose 5%. 1000 milliLiter(s) (100 mL/Hr) IV Continuous <Continuous>  dextrose 50% Injectable 25 Gram(s) IV Push once  dextrose 50% Injectable 12.5 Gram(s) IV Push once  dextrose 50% Injectable 25 Gram(s) IV Push once  glucagon  Injectable 1 milliGRAM(s) IntraMuscular once  heparin  Infusion.  Unit(s)/Hr (15 mL/Hr) IV Continuous <Continuous>  insulin lispro (ADMELOG) corrective regimen sliding scale   SubCutaneous three times a day before meals  insulin lispro (ADMELOG) corrective regimen sliding scale   SubCutaneous at bedtime  pantoprazole    Tablet 40 milliGRAM(s) Oral two times a day  potassium chloride    Tablet ER 40 milliEquivalent(s) Oral every 4 hours  senna 2 Tablet(s) Oral at bedtime    MEDICATIONS  (PRN):  acetaminophen     Tablet .. 650 milliGRAM(s) Oral every 6 hours PRN Temp greater or equal to 38C (100.4F), Mild Pain (1 - 3)  aluminum hydroxide/magnesium hydroxide/simethicone Suspension 30 milliLiter(s) Oral every 4 hours PRN Dyspepsia  dextrose Oral Gel 15 Gram(s) Oral once PRN Blood Glucose LESS THAN 70 milliGRAM(s)/deciliter  heparin   Injectable 6500 Unit(s) IV Push every 6 hours PRN For aPTT less than 40  heparin   Injectable 3000 Unit(s) IV Push every 6 hours PRN For aPTT between 40 - 57  HYDROmorphone  Injectable 0.2 milliGRAM(s) IV Push every 6 hours PRN moderate to severe pain  melatonin 3 milliGRAM(s) Oral at bedtime PRN Insomnia  ondansetron Injectable 4 milliGRAM(s) IV Push every 8 hours PRN Nausea and/or Vomiting    T(C): 36.9 (04-06-22 @ 22:15), Max: 36.9 (04-06-22 @ 22:15)  HR: 88 (04-06-22 @ 22:15) (78 - 90)  BP: 92/58 (04-06-22 @ 22:15) (92/54 - 97/58)  RR: 17 (04-06-22 @ 22:15) (17 - 18)  SpO2: 100% (04-06-22 @ 22:15) (98% - 100%)    CAPILLARY BLOOD GLUCOSE      POCT Blood Glucose.: 118 mg/dL (06 Apr 2022 20:55)  POCT Blood Glucose.: 175 mg/dL (06 Apr 2022 16:55)  POCT Blood Glucose.: 161 mg/dL (06 Apr 2022 11:49)  POCT Blood Glucose.: 121 mg/dL (06 Apr 2022 07:38)    I&O's Summary    05 Apr 2022 07:01  -  06 Apr 2022 07:00  --------------------------------------------------------  IN: 409 mL / OUT: 700 mL / NET: -291 mL    06 Apr 2022 07:01  -  07 Apr 2022 00:25  --------------------------------------------------------  IN: 702 mL / OUT: 500 mL / NET: 202 mL      PHYSICAL EXAM:  GENERAL: NAD  EYES: EOMI, PERRLA  NECK: Supple, No JVD  CHEST/LUNG: dec breath sounds at bases  HEART:  S1 , S2 +  ABDOMEN: soft , bs+  EXTREMITIES:  trace edema  NEUROLOGY:alert awake      LABS:                        7.6    3.70  )-----------( 394      ( 06 Apr 2022 04:38 )             23.3     04-06    133<L>  |  98  |  26<H>  ----------------------------<  133<H>  3.4<L>   |  21<L>  |  0.97    Ca    7.4<L>      06 Apr 2022 04:38  Phos  3.2     04-06  Mg     1.70     04-06    TPro  6.0  /  Alb  2.4<L>  /  TBili  0.3  /  DBili  x   /  AST  17  /  ALT  <5  /  AlkPhos  75  04-05    PTT - ( 06 Apr 2022 23:24 )  PTT:129.0 sec  CARDIAC MARKERS ( 05 Apr 2022 06:04 )  x     / x     / 63 U/L / x     / 2.9 ng/mL          RADIOLOGY & ADDITIONAL TESTS:    Imaging Personally Reviewed:    Consultant(s) Notes Reviewed:      Care Discussed with Consultants/Other Providers:  
    SUBJECTIVE / OVERNIGHT EVENTS:pt seen and examined    MEDICATIONS  (STANDING):  albumin human 25% IVPB 50 milliLiter(s) IV Intermittent once  cefepime   IVPB      cefepime   IVPB 2000 milliGRAM(s) IV Intermittent every 8 hours  dextrose 5%. 1000 milliLiter(s) (100 mL/Hr) IV Continuous <Continuous>  dextrose 5%. 1000 milliLiter(s) (50 mL/Hr) IV Continuous <Continuous>  dextrose 50% Injectable 25 Gram(s) IV Push once  dextrose 50% Injectable 12.5 Gram(s) IV Push once  dextrose 50% Injectable 25 Gram(s) IV Push once  glucagon  Injectable 1 milliGRAM(s) IntraMuscular once  insulin lispro (ADMELOG) corrective regimen sliding scale   SubCutaneous three times a day before meals  insulin lispro (ADMELOG) corrective regimen sliding scale   SubCutaneous at bedtime  multivitamin 1 Tablet(s) Oral daily  pantoprazole    Tablet 40 milliGRAM(s) Oral two times a day  senna 2 Tablet(s) Oral at bedtime  sodium chloride 3%  Inhalation 4 milliLiter(s) Inhalation every 12 hours    MEDICATIONS  (PRN):  acetaminophen     Tablet .. 650 milliGRAM(s) Oral every 6 hours PRN Temp greater or equal to 38C (100.4F), Mild Pain (1 - 3)  aluminum hydroxide/magnesium hydroxide/simethicone Suspension 30 milliLiter(s) Oral every 4 hours PRN Dyspepsia  benzonatate 100 milliGRAM(s) Oral three times a day PRN Cough  dextrose Oral Gel 15 Gram(s) Oral once PRN Blood Glucose LESS THAN 70 milliGRAM(s)/deciliter  melatonin 3 milliGRAM(s) Oral at bedtime PRN Insomnia  ondansetron Injectable 4 milliGRAM(s) IV Push every 8 hours PRN Nausea and/or Vomiting    Vital Signs Last 24 Hrs  T(C): 36.3 (04-15-22 @ 12:15), Max: 36.7 (04-14-22 @ 20:50)  T(F): 97.3 (04-15-22 @ 12:15), Max: 98 (04-14-22 @ 20:50)  HR: 75 (04-15-22 @ 12:15) (71 - 81)  BP: 96/48 (04-15-22 @ 12:15) (85/53 - 96/48)  BP(mean): --  RR: 18 (04-15-22 @ 12:15) (18 - 18)  SpO2: 98% (04-15-22 @ 12:15) (95% - 98%)                PHYSICAL EXAM:  GENERAL: NAD  EYES: EOMI, PERRLA  NECK: Supple, No JVD  CHEST/LUNG: dec breath sounds at bases  HEART:  S1 , S2 +  ABDOMEN: soft , bs+, distension+  EXTREMITIES:  trace edema  NEUROLOGY:alert awake    LABS:  04-15    134<L>  |  103  |  12  ----------------------------<  105<H>  3.5   |  21<L>  |  0.73    Ca    7.6<L>      15 Apr 2022 06:56  Phos  2.3     04-15  Mg     1.80     04-15    TPro  4.8<L>  /  Alb  2.1<L>  /  TBili  <0.2  /  DBili      /  AST  6   /  ALT  <5<L>  /  AlkPhos  52  04-15    Creatinine Trend: 0.73 <--, 0.72 <--, 0.73 <--, 0.75 <--, 0.75 <--, 0.76 <--, 0.77 <--                        7.9    3.83  )-----------( 70       ( 15 Apr 2022 11:19 )             25.4     Urine Studies:            LIVER FUNCTIONS - ( 15 Apr 2022 06:56 )  Alb: 2.1 g/dL / Pro: 4.8 g/dL / ALK PHOS: 52 U/L / ALT: <5 U/L / AST: 6 U/L / GGT: x           PT/INR - ( 15 Apr 2022 11:19 )   PT: 12.2 sec;   INR: 1.05 ratio         PTT - ( 15 Apr 2022 11:19 )  PTT:31.0 sec     PTT - ( 14 Apr 2022 06:49 )  PTT:28.6 sec      
    SUBJECTIVE / OVERNIGHT EVENTS:pt seen and examined    MEDICATIONS  (STANDING):  dextrose 5%. 1000 milliLiter(s) (100 mL/Hr) IV Continuous <Continuous>  dextrose 5%. 1000 milliLiter(s) (50 mL/Hr) IV Continuous <Continuous>  dextrose 50% Injectable 25 Gram(s) IV Push once  dextrose 50% Injectable 12.5 Gram(s) IV Push once  dextrose 50% Injectable 25 Gram(s) IV Push once  glucagon  Injectable 1 milliGRAM(s) IntraMuscular once  heparin  Infusion.  Unit(s)/Hr (15 mL/Hr) IV Continuous <Continuous>  insulin lispro (ADMELOG) corrective regimen sliding scale   SubCutaneous three times a day before meals  insulin lispro (ADMELOG) corrective regimen sliding scale   SubCutaneous at bedtime  pantoprazole    Tablet 40 milliGRAM(s) Oral two times a day  senna 2 Tablet(s) Oral at bedtime    MEDICATIONS  (PRN):  acetaminophen     Tablet .. 650 milliGRAM(s) Oral every 6 hours PRN Temp greater or equal to 38C (100.4F), Mild Pain (1 - 3)  aluminum hydroxide/magnesium hydroxide/simethicone Suspension 30 milliLiter(s) Oral every 4 hours PRN Dyspepsia  benzonatate 100 milliGRAM(s) Oral three times a day PRN Cough  dextrose Oral Gel 15 Gram(s) Oral once PRN Blood Glucose LESS THAN 70 milliGRAM(s)/deciliter  heparin   Injectable 6500 Unit(s) IV Push every 6 hours PRN For aPTT less than 40  heparin   Injectable 3000 Unit(s) IV Push every 6 hours PRN For aPTT between 40 - 57  HYDROmorphone  Injectable 0.2 milliGRAM(s) IV Push every 6 hours PRN moderate to severe pain  melatonin 3 milliGRAM(s) Oral at bedtime PRN Insomnia  ondansetron Injectable 4 milliGRAM(s) IV Push every 8 hours PRN Nausea and/or Vomiting    Vital Signs Last 24 Hrs  T(C): 36.2 (04-08-22 @ 20:35), Max: 36.7 (04-08-22 @ 05:22)  T(F): 97.2 (04-08-22 @ 20:35), Max: 98.1 (04-08-22 @ 05:22)  HR: 82 (04-08-22 @ 20:35) (75 - 82)  BP: 93/57 (04-08-22 @ 20:35) (92/55 - 107/68)  BP(mean): --  RR: 18 (04-08-22 @ 20:35) (17 - 18)  SpO2: 99% (04-08-22 @ 20:35) (97% - 99%)        PHYSICAL EXAM:  GENERAL: NAD  EYES: EOMI, PERRLA  NECK: Supple, No JVD  CHEST/LUNG: dec breath sounds at bases  HEART:  S1 , S2 +  ABDOMEN: soft , bs+  EXTREMITIES:  trace edema  NEUROLOGY:alert awake    LABS:  04-08    133<L>  |  100  |  18  ----------------------------<  115<H>  4.0   |  22  |  0.84    Ca    7.2<L>      08 Apr 2022 05:52  Phos  1.9     04-08  Mg     1.60     04-08      Creatinine Trend: 0.84 <--, 0.90 <--, 0.97 <--, 0.91 <--, 1.01 <--                        8.3    3.22  )-----------( 276      ( 08 Apr 2022 17:33 )             27.0     Urine Studies:              PTT - ( 08 Apr 2022 17:33 )  PTT:38.0 sec        RADIOLOGY & ADDITIONAL TESTS:    Imaging Personally Reviewed:    Consultant(s) Notes Reviewed:      Care Discussed with Consultants/Other Providers:  
    SUBJECTIVE / OVERNIGHT EVENTS:pt seen and examined    MEDICATIONS  (STANDING):  dextrose 5%. 1000 milliLiter(s) (100 mL/Hr) IV Continuous <Continuous>  dextrose 5%. 1000 milliLiter(s) (50 mL/Hr) IV Continuous <Continuous>  dextrose 50% Injectable 25 Gram(s) IV Push once  dextrose 50% Injectable 12.5 Gram(s) IV Push once  dextrose 50% Injectable 25 Gram(s) IV Push once  enoxaparin Injectable 120 milliGRAM(s) SubCutaneous every 24 hours  glucagon  Injectable 1 milliGRAM(s) IntraMuscular once  insulin lispro (ADMELOG) corrective regimen sliding scale   SubCutaneous three times a day before meals  insulin lispro (ADMELOG) corrective regimen sliding scale   SubCutaneous at bedtime  pantoprazole    Tablet 40 milliGRAM(s) Oral two times a day  senna 2 Tablet(s) Oral at bedtime  sodium chloride 3%  Inhalation 4 milliLiter(s) Inhalation every 12 hours    MEDICATIONS  (PRN):  acetaminophen     Tablet .. 650 milliGRAM(s) Oral every 6 hours PRN Temp greater or equal to 38C (100.4F), Mild Pain (1 - 3)  aluminum hydroxide/magnesium hydroxide/simethicone Suspension 30 milliLiter(s) Oral every 4 hours PRN Dyspepsia  benzonatate 100 milliGRAM(s) Oral three times a day PRN Cough  dextrose Oral Gel 15 Gram(s) Oral once PRN Blood Glucose LESS THAN 70 milliGRAM(s)/deciliter  HYDROmorphone  Injectable 0.2 milliGRAM(s) IV Push every 6 hours PRN moderate to severe pain  melatonin 3 milliGRAM(s) Oral at bedtime PRN Insomnia  ondansetron Injectable 4 milliGRAM(s) IV Push every 8 hours PRN Nausea and/or Vomiting    Vital Signs Last 24 Hrs  T(C): 36.9 (04-10-22 @ 20:59), Max: 36.9 (04-10-22 @ 20:59)  T(F): 98.5 (04-10-22 @ 20:59), Max: 98.5 (04-10-22 @ 20:59)  HR: 78 (04-10-22 @ 20:59) (78 - 96)  BP: 92/54 (04-10-22 @ 20:59) (81/55 - 100/66)  BP(mean): --  RR: 18 (04-10-22 @ 20:59) (18 - 18)  SpO2: 97% (04-10-22 @ 20:59) (96% - 99%)        PHYSICAL EXAM:  GENERAL: NAD  EYES: EOMI, PERRLA  NECK: Supple, No JVD  CHEST/LUNG: dec breath sounds at bases  HEART:  S1 , S2 +  ABDOMEN: soft , bs+  EXTREMITIES:  trace edema  NEUROLOGY:alert awake      LABS:  04-10    132<L>  |  98  |  11  ----------------------------<  112<H>  3.6   |  21<L>  |  0.76    Ca    7.5<L>      10 Apr 2022 08:03  Phos  2.2     04-10  Mg     1.60     04-10    TPro      /  Alb  2.4<L>  /  TBili      /  DBili      /  AST      /  ALT      /  AlkPhos      04-09    Creatinine Trend: 0.76 <--, 0.77 <--, 0.84 <--, 0.90 <--, 0.97 <--, 0.91 <--, 1.01 <--                        8.5    2.23  )-----------( 230      ( 10 Apr 2022 08:03 )             27.4     Urine Studies:            LIVER FUNCTIONS - ( 09 Apr 2022 06:42 )  Alb: 2.4 g/dL / Pro: x     / ALK PHOS: x     / ALT: x     / AST: x     / GGT: x           PTT - ( 09 Apr 2022 08:43 )  PTT:103.2 sec  jaimee Personally Reviewed:    Consultant(s) Notes Reviewed:      Care Discussed with Consultants/Other Providers:  
    SUBJECTIVE / OVERNIGHT EVENTS:pt seen and examined    MEDICATIONS  (STANDING):  albumin human 25% IVPB 50 milliLiter(s) IV Intermittent once  cefepime   IVPB      cefepime   IVPB 2000 milliGRAM(s) IV Intermittent every 8 hours  dextrose 5%. 1000 milliLiter(s) (100 mL/Hr) IV Continuous <Continuous>  dextrose 5%. 1000 milliLiter(s) (50 mL/Hr) IV Continuous <Continuous>  dextrose 50% Injectable 25 Gram(s) IV Push once  dextrose 50% Injectable 12.5 Gram(s) IV Push once  dextrose 50% Injectable 25 Gram(s) IV Push once  glucagon  Injectable 1 milliGRAM(s) IntraMuscular once  insulin lispro (ADMELOG) corrective regimen sliding scale   SubCutaneous three times a day before meals  insulin lispro (ADMELOG) corrective regimen sliding scale   SubCutaneous at bedtime  multivitamin 1 Tablet(s) Oral daily  pantoprazole    Tablet 40 milliGRAM(s) Oral two times a day  senna 2 Tablet(s) Oral at bedtime  sodium chloride 3%  Inhalation 4 milliLiter(s) Inhalation every 12 hours    MEDICATIONS  (PRN):  acetaminophen     Tablet .. 650 milliGRAM(s) Oral every 6 hours PRN Temp greater or equal to 38C (100.4F), Mild Pain (1 - 3)  aluminum hydroxide/magnesium hydroxide/simethicone Suspension 30 milliLiter(s) Oral every 4 hours PRN Dyspepsia  benzonatate 100 milliGRAM(s) Oral three times a day PRN Cough  dextrose Oral Gel 15 Gram(s) Oral once PRN Blood Glucose LESS THAN 70 milliGRAM(s)/deciliter  melatonin 3 milliGRAM(s) Oral at bedtime PRN Insomnia  ondansetron Injectable 4 milliGRAM(s) IV Push every 8 hours PRN Nausea and/or Vomiting    Vital Signs Last 24 Hrs  T(C): 36.7 (04-14-22 @ 20:50), Max: 38.1 (04-14-22 @ 06:35)  T(F): 98 (04-14-22 @ 20:50), Max: 100.5 (04-14-22 @ 06:35)  HR: 77 (04-14-22 @ 20:55) (71 - 105)  BP: 85/53 (04-14-22 @ 20:57) (81/47 - 100/50)  BP(mean): --  RR: 18 (04-14-22 @ 20:50) (18 - 20)  SpO2: 95% (04-14-22 @ 20:55) (94% - 100%)              PHYSICAL EXAM:  GENERAL: NAD  EYES: EOMI, PERRLA  NECK: Supple, No JVD  CHEST/LUNG: dec breath sounds at bases  HEART:  S1 , S2 +  ABDOMEN: soft , bs+, distension+  EXTREMITIES:  trace edema  NEUROLOGY:alert awake    LABS:  04-14    132<L>  |  99  |  11  ----------------------------<  131<H>  3.5   |  21<L>  |  0.72    Ca    7.5<L>      14 Apr 2022 06:49  Phos  2.3     04-14  Mg     1.60     04-14    TPro  5.4<L>  /  Alb  2.7<L>  /  TBili  0.3  /  DBili      /  AST  9   /  ALT  5   /  AlkPhos  58  04-14    Creatinine Trend: 0.72 <--, 0.73 <--, 0.75 <--, 0.75 <--, 0.76 <--, 0.77 <--, 0.84 <--                        7.1    5.55  )-----------( 69       ( 14 Apr 2022 06:49 )             22.5     Urine Studies:            LIVER FUNCTIONS - ( 14 Apr 2022 06:49 )  Alb: 2.7 g/dL / Pro: 5.4 g/dL / ALK PHOS: 58 U/L / ALT: 5 U/L / AST: 9 U/L / GGT: x           PT/INR - ( 14 Apr 2022 06:49 )   PT: 12.1 sec;   INR: 1.04 ratio         PTT - ( 14 Apr 2022 06:49 )  PTT:28.6 sec      
    SUBJECTIVE / OVERNIGHT EVENTS:pt seen and examined    MEDICATIONS  (STANDING):  albumin human 25% IVPB 50 milliLiter(s) IV Intermittent once  budesonide  80 MICROgram(s)/formoterol 4.5 MICROgram(s) Inhaler 2 Puff(s) Inhalation two times a day  cefepime   IVPB      cefepime   IVPB 2000 milliGRAM(s) IV Intermittent every 8 hours  dextrose 5%. 1000 milliLiter(s) (100 mL/Hr) IV Continuous <Continuous>  dextrose 5%. 1000 milliLiter(s) (50 mL/Hr) IV Continuous <Continuous>  dextrose 50% Injectable 25 Gram(s) IV Push once  dextrose 50% Injectable 12.5 Gram(s) IV Push once  dextrose 50% Injectable 25 Gram(s) IV Push once  enoxaparin Injectable 80 milliGRAM(s) SubCutaneous every 12 hours  glucagon  Injectable 1 milliGRAM(s) IntraMuscular once  insulin lispro (ADMELOG) corrective regimen sliding scale   SubCutaneous three times a day before meals  insulin lispro (ADMELOG) corrective regimen sliding scale   SubCutaneous at bedtime  lactobacillus acidophilus 1 Tablet(s) Oral daily  multivitamin 1 Tablet(s) Oral daily  pantoprazole    Tablet 40 milliGRAM(s) Oral two times a day  senna 2 Tablet(s) Oral at bedtime  sodium chloride 3%  Inhalation 4 milliLiter(s) Inhalation every 12 hours    MEDICATIONS  (PRN):  acetaminophen     Tablet .. 650 milliGRAM(s) Oral every 6 hours PRN Temp greater or equal to 38C (100.4F), Mild Pain (1 - 3)  aluminum hydroxide/magnesium hydroxide/simethicone Suspension 30 milliLiter(s) Oral every 4 hours PRN Dyspepsia  benzonatate 100 milliGRAM(s) Oral every 8 hours PRN Cough  dextrose Oral Gel 15 Gram(s) Oral once PRN Blood Glucose LESS THAN 70 milliGRAM(s)/deciliter  melatonin 3 milliGRAM(s) Oral at bedtime PRN Insomnia  ondansetron Injectable 4 milliGRAM(s) IV Push every 8 hours PRN Nausea and/or Vomiting    Vital Signs Last 24 Hrs  T(C): 37.8 (04-20-22 @ 12:32), Max: 37.8 (04-20-22 @ 12:32)  T(F): 100 (04-20-22 @ 12:32), Max: 100 (04-20-22 @ 12:32)  HR: 87 (04-20-22 @ 20:16) (75 - 87)  BP: 103/64 (04-20-22 @ 12:32) (103/64 - 103/69)  BP(mean): --  RR: 18 (04-20-22 @ 12:32) (18 - 18)  SpO2: 91% (04-20-22 @ 20:16) (91% - 100%)            PHYSICAL EXAM:  GENERAL: NAD  EYES: EOMI, PERRLA  NECK: Supple, No JVD  CHEST/LUNG: dec breath sounds at bases  HEART:  S1 , S2 +  ABDOMEN: soft , bs+, distension+  EXTREMITIES:  trace edema  NEUROLOGY:alert awake    LABS:  04-20    137  |  104  |  14  ----------------------------<  101<H>  3.5   |  21<L>  |  0.71    Ca    7.8<L>      20 Apr 2022 06:28  Phos  2.1     04-20  Mg     1.60     04-20    TPro  5.1<L>  /  Alb  2.1<L>  /  TBili  0.3  /  DBili      /  AST  8   /  ALT  7   /  AlkPhos  59  04-20    Creatinine Trend: 0.71 <--, 0.69 <--, 0.72 <--, 0.65 <--, 0.68 <--, 0.73 <--, 0.72 <--                        10.0   2.67  )-----------( 77       ( 20 Apr 2022 06:28 )             32.0     Urine Studies:            LIVER FUNCTIONS - ( 20 Apr 2022 06:28 )  Alb: 2.1 g/dL / Pro: 5.1 g/dL / ALK PHOS: 59 U/L / ALT: 7 U/L / AST: 8 U/L / GGT: x           PT/INR - ( 20 Apr 2022 06:28 )   PT: 12.5 sec;   INR: 1.08 ratio         PTT - ( 20 Apr 2022 06:28 )  PTT:31.1 sec        LIVER FUNCTIONS - ( 18 Apr 2022 06:56 )  Alb: 2.1 g/dL / Pro: 5.2 g/dL / ALK PHOS: 57 U/L / ALT: 5 U/L / AST: 6 U/L / GGT: x                 LIVER FUNCTIONS - ( 18 Apr 2022 06:56 )  Alb: 2.1 g/dL / Pro: 5.2 g/dL / ALK PHOS: 57 U/L / ALT: 5 U/L / AST: 6 U/L / GGT: x             PTT - ( 15 Apr 2022 11:19 )  PTT:31.0 sec     PTT - ( 14 Apr 2022 06:49 )  PTT:28.6 sec      
    SUBJECTIVE / OVERNIGHT EVENTS:pt seen and examined    MEDICATIONS  (STANDING):  dextrose 5%. 1000 milliLiter(s) (100 mL/Hr) IV Continuous <Continuous>  dextrose 5%. 1000 milliLiter(s) (50 mL/Hr) IV Continuous <Continuous>  dextrose 50% Injectable 25 Gram(s) IV Push once  dextrose 50% Injectable 12.5 Gram(s) IV Push once  dextrose 50% Injectable 25 Gram(s) IV Push once  enoxaparin Injectable 120 milliGRAM(s) SubCutaneous every 24 hours  glucagon  Injectable 1 milliGRAM(s) IntraMuscular once  insulin lispro (ADMELOG) corrective regimen sliding scale   SubCutaneous at bedtime  insulin lispro (ADMELOG) corrective regimen sliding scale   SubCutaneous three times a day before meals  magnesium oxide 400 milliGRAM(s) Oral three times a day with meals  pantoprazole    Tablet 40 milliGRAM(s) Oral two times a day  senna 2 Tablet(s) Oral at bedtime  sodium chloride 3%  Inhalation 4 milliLiter(s) Inhalation every 12 hours    MEDICATIONS  (PRN):  acetaminophen     Tablet .. 650 milliGRAM(s) Oral every 6 hours PRN Temp greater or equal to 38C (100.4F), Mild Pain (1 - 3)  aluminum hydroxide/magnesium hydroxide/simethicone Suspension 30 milliLiter(s) Oral every 4 hours PRN Dyspepsia  benzonatate 100 milliGRAM(s) Oral three times a day PRN Cough  dextrose Oral Gel 15 Gram(s) Oral once PRN Blood Glucose LESS THAN 70 milliGRAM(s)/deciliter  HYDROmorphone  Injectable 0.2 milliGRAM(s) IV Push every 6 hours PRN moderate to severe pain  melatonin 3 milliGRAM(s) Oral at bedtime PRN Insomnia  ondansetron Injectable 4 milliGRAM(s) IV Push every 8 hours PRN Nausea and/or Vomiting    Vital Signs Last 24 Hrs  T(C): 36.1 (04-09-22 @ 21:07), Max: 36.6 (04-09-22 @ 12:23)  T(F): 97 (04-09-22 @ 21:07), Max: 97.9 (04-09-22 @ 12:23)  HR: 89 (04-09-22 @ 21:07) (78 - 89)  BP: 96/58 (04-09-22 @ 21:07) (96/58 - 97/61)  BP(mean): --  RR: 18 (04-09-22 @ 21:07) (17 - 18)  SpO2: 98% (04-09-22 @ 21:07) (97% - 100%)        PHYSICAL EXAM:  GENERAL: NAD  EYES: EOMI, PERRLA  NECK: Supple, No JVD  CHEST/LUNG: dec breath sounds at bases  HEART:  S1 , S2 +  ABDOMEN: soft , bs+  EXTREMITIES:  trace edema  NEUROLOGY:alert awake    LABS:  04-09    132<L>  |  99  |  14  ----------------------------<  113<H>  3.8   |  22  |  0.77    Ca    7.5<L>      09 Apr 2022 06:42  Phos  1.7     04-09  Mg     1.60     04-09    TPro      /  Alb  2.4<L>  /  TBili      /  DBili      /  AST      /  ALT      /  AlkPhos      04-09    Creatinine Trend: 0.77 <--, 0.84 <--, 0.90 <--, 0.97 <--, 0.91 <--, 1.01 <--                        8.1    2.87  )-----------( 267      ( 09 Apr 2022 06:42 )             24.8     Urine Studies:            LIVER FUNCTIONS - ( 09 Apr 2022 06:42 )  Alb: 2.4 g/dL / Pro: x     / ALK PHOS: x     / ALT: x     / AST: x     / GGT: x           PTT - ( 09 Apr 2022 08:43 )  PTT:103.2 sec  jaimee Personally Reviewed:    Consultant(s) Notes Reviewed:      Care Discussed with Consultants/Other Providers:  
    SUBJECTIVE / OVERNIGHT EVENTS:pt seen and examined    MEDICATIONS  (STANDING):  dextrose 5%. 1000 milliLiter(s) (50 mL/Hr) IV Continuous <Continuous>  dextrose 5%. 1000 milliLiter(s) (100 mL/Hr) IV Continuous <Continuous>  dextrose 50% Injectable 25 Gram(s) IV Push once  dextrose 50% Injectable 12.5 Gram(s) IV Push once  dextrose 50% Injectable 25 Gram(s) IV Push once  glucagon  Injectable 1 milliGRAM(s) IntraMuscular once  heparin  Infusion.  Unit(s)/Hr (15 mL/Hr) IV Continuous <Continuous>  insulin lispro (ADMELOG) corrective regimen sliding scale   SubCutaneous three times a day before meals  insulin lispro (ADMELOG) corrective regimen sliding scale   SubCutaneous at bedtime  pantoprazole    Tablet 40 milliGRAM(s) Oral two times a day  senna 2 Tablet(s) Oral at bedtime    MEDICATIONS  (PRN):  acetaminophen     Tablet .. 650 milliGRAM(s) Oral every 6 hours PRN Temp greater or equal to 38C (100.4F), Mild Pain (1 - 3)  aluminum hydroxide/magnesium hydroxide/simethicone Suspension 30 milliLiter(s) Oral every 4 hours PRN Dyspepsia  dextrose Oral Gel 15 Gram(s) Oral once PRN Blood Glucose LESS THAN 70 milliGRAM(s)/deciliter  heparin   Injectable 6500 Unit(s) IV Push every 6 hours PRN For aPTT less than 40  heparin   Injectable 3000 Unit(s) IV Push every 6 hours PRN For aPTT between 40 - 57  HYDROmorphone  Injectable 0.2 milliGRAM(s) IV Push every 6 hours PRN moderate to severe pain  melatonin 3 milliGRAM(s) Oral at bedtime PRN Insomnia  ondansetron Injectable 4 milliGRAM(s) IV Push every 8 hours PRN Nausea and/or Vomiting    Vital Signs Last 24 Hrs  T(C): 36.7 (04-07-22 @ 21:16), Max: 36.7 (04-07-22 @ 21:16)  T(F): 98 (04-07-22 @ 21:16), Max: 98 (04-07-22 @ 21:16)  HR: 84 (04-07-22 @ 21:16) (65 - 89)  BP: 89/56 (04-07-22 @ 21:16) (89/52 - 103/62)  BP(mean): --  RR: 18 (04-07-22 @ 21:16) (18 - 19)  SpO2: 97% (04-07-22 @ 21:16) (97% - 100%)      PHYSICAL EXAM:  GENERAL: NAD  EYES: EOMI, PERRLA  NECK: Supple, No JVD  CHEST/LUNG: dec breath sounds at bases  HEART:  S1 , S2 +  ABDOMEN: soft , bs+  EXTREMITIES:  trace edema  NEUROLOGY:alert awake      LABS:  04-07    133<L>  |  98  |  23  ----------------------------<  134<H>  4.3   |  21<L>  |  0.90    Ca    8.1<L>      07 Apr 2022 14:57  Phos  2.3     04-07  Mg     1.70     04-07      Creatinine Trend: 0.90 <--, 0.97 <--, 0.91 <--, 1.01 <--                        8.3    3.72  )-----------( 361      ( 07 Apr 2022 05:45 )             26.9     Urine Studies:              PTT - ( 07 Apr 2022 14:57 )  PTT:28.9 sec        RADIOLOGY & ADDITIONAL TESTS:    Imaging Personally Reviewed:    Consultant(s) Notes Reviewed:      Care Discussed with Consultants/Other Providers:

## 2022-04-21 NOTE — PROGRESS NOTE ADULT - REASON FOR ADMISSION
Poor PO intake, Abd swelling

## 2022-04-21 NOTE — CHART NOTE - NSCHARTNOTEFT_GEN_A_CORE
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 Jama Roberts at [facility]) who presented with poor PO intake and worsening abdominal swelling. lab showed pancytopenia. s/p paracentesis by IR after admission; however, the scheduled abd drain placement was aborted on 4/20 since there is no safe window for the placement of cath per IR.  He was treated with heaprin drip (4/4-4/9) then lovenox subQ for newly finding of PE.     Pt will be discharged back to home today.   -I Reviewed lab with pt and his wife (on the phone); Pt will have an appointment with Dr. Thorpe at [facility] next Friday for the 2nd opinion of Liposarcoma treatment. He will f/u at his GI Dr. Chance at office next Monday for CBC with diff to monitor counts.   Pt agrees to continue with lovenox subQ at home for AC per primary team PA.   --pt will also f/u his med onc Dr. Roberts at [facility]  for liposarcoma treatment.  -please contact with onc team if have more questions.      Discussed with attending Dr. Joseph Ramirez PGY4   Hem& Onco fellow P [phone]

## 2022-04-21 NOTE — DISCHARGE NOTE NURSING/CASE MANAGEMENT/SOCIAL WORK - PATIENT PORTAL LINK FT
You can access the FollowMyHealth Patient Portal offered by [facility] by registering at the following website: [email or web address]. By joining "[employer]’s FollowMyHealth portal, you will also be able to view your health information using other applications (apps) compatible with our system.

## 2022-04-21 NOTE — PROGRESS NOTE ADULT - ASSESSMENT
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 Jama Roberts at [facility]) who presents to the hospital with poor PO intake and worsening abdominal swelling    EKG SR low voltage   Tele: NSR 70-90s    1) LE edema and Ascites  - ?2/2 abd liposarcoma  - echo TDS with grossly normal LV function and mild-mod MR  - s/p paracentesis 4/7. s/p repeat paracentesis  , unable to get drain today as no good fluid pockets available     2) PE  -CTA chest with Acute right lower lobar pulmonary embolus extending into the segmental branches  -on RA sating well denies CP or SOB  -echo TDS grossly normal RV function  -on lovenox however platelet count dropping, f/u heme/onc recs    3) CAD s/p stent  -s/p stent 20 years ago as per patient  -denies CP    4) Anemia  -hemoglobin 8.7  -s/p PRBC transfusion  -monitor H/H     5)  DVT PPX  -lovenox

## 2022-04-21 NOTE — DISCHARGE NOTE NURSING/CASE MANAGEMENT/SOCIAL WORK - NSDCPEFALRISK_GEN_ALL_CORE
For information on Fall & Injury Prevention, visit: [email or web address] OR  [email or web address] OR  https://www.cdc.gov/steadi/patient.html

## 2022-04-21 NOTE — PROGRESS NOTE ADULT - PROVIDER SPECIALTY LIST ADULT
Cardiology
Cardiology
Gastroenterology
Internal Medicine
Pulmonology
Cardiology
Internal Medicine
Pulmonology
Pulmonology
Cardiology
Gastroenterology
Heme/Onc
Internal Medicine
Internal Medicine
Pulmonology
Cardiology
Gastroenterology
Internal Medicine
Internal Medicine
Palliative Care
Internal Medicine

## 2022-04-26 NOTE — ED ADULT NURSE REASSESSMENT NOTE - NS ED NURSE REASSESS COMMENT FT1
Pt co pain to left upper arm iv where blood was infusing, pt co pain to site, swelling noted iv d/cd ,new iv placed by attending and blood re started , Pt tolerating transfusion well no adverse reactions noted. Pt awaiting transfer to [facility]

## 2022-04-26 NOTE — H&P ADULT - PROBLEM SELECTOR PLAN 1
Hgb 5.8, unclear baseline as patient required several transfusions during previous admission, likely 7-8  -no clear source of bleeding noted  -will transfuse 2u PRBCs  -monitor CBC, transfuse if Hgb<7  -thrombocytopenia worsening to 20k, transfuse if <10k or <20k with fever  -oncology team emailed  -check LDH, haptoglobin, reticulocyte count to r/o hemolysis; check ferritin level  -will start ferrous sulfate while awaiting anemia workup  -check FOBT  -hold AC at this time Hgb 5.8, unclear baseline as patient required several transfusions during previous admission, likely 7-8  -no clear source of bleeding noted  -will transfuse 2u PRBCs  -monitor CBC, transfuse if Hgb<7  -thrombocytopenia worsening to 20k, transfuse if <10k or <20k with fever  -oncology team emailed  -check LDH, haptoglobin, reticulocyte count to r/o hemolysis; check ferritin level  -will start ferrous sulfate while awaiting anemia workup  -noted to have folate deficiency during previous admission, does not appear he is on folic acid- will start 1mg daily and recheck level in AM  -check FOBT  -hold AC at this time

## 2022-04-26 NOTE — ED PROVIDER NOTE - ATTENDING CONTRIBUTION TO CARE
I performed a face to face evaluation of this patient and performed a full history and physical examination on the patient.  I agree with the resident's history, physical examination, and plan of the patient.  Pt with liposarcoma with anemia and low platelets, no dark stool or blood, belly with large mass, pale skin and pt cachetic- for labs, and admission, spoke with Dr. Toure I performed a face to face evaluation of this patient and performed a full history and physical examination on the patient.  I agree with the resident's history, physical examination, and plan of the patient.  Pt with liposarcoma with anemia and low platelets, no dark stool or blood, belly with large mass, pale skin and pt cachetic- for labs, and admission, spoke with Dr. Toure who will accept the admission I performed a face to face evaluation of this patient and performed a full history and physical examination on the patient.  I agree with the resident's history, physical examination, and plan of the patient.  Pt with liposarcoma with anemia and low platelets, no dark stool or blood, belly with large mass, pale skin and pt cachetic- for labs, and admission, spoke with Dr. Toure who will accept the admission.  Pt is hypotensive, and with severe anemia, will need multiple transfusions.  Pt with very advanced cancer, ill appearing- no overt active bleeding, consider platelets- defer to inpatient team.  IVF given while awaiting blood as pt considerable hypotensive, likely less po intake.

## 2022-04-26 NOTE — ED PROVIDER NOTE - NSICDXPASTMEDICALHX_GEN_ALL_CORE_FT
PAST MEDICAL HISTORY:  CAD (coronary artery disease) Off aspirin    Diabetes mellitus Fasting FS range from - per patient, off meds    Hypercholesteremia off meds    Hypertension Now with low BPs    Intra-abdominal and pelvic swelling, mass and lump, unspecified site Currently on experimental chemotherapy at [facility]    Sleep apnea

## 2022-04-26 NOTE — H&P ADULT - NSICDXPASTSURGICALHX_GEN_ALL_CORE_FT
PAST SURGICAL HISTORY:  H/O sleep apnea Per patient had Sleep Apnea surgery  in 1996- at [facility]- Was not retested for Sleep Apnea post surgery    History of cardiac cath Pt reports 1 cardiac stent placed 1999    History of colon resection Dec 2019

## 2022-04-26 NOTE — H&P ADULT - NSICDXPASTMEDICALHX_GEN_ALL_CORE_FT
PAST MEDICAL HISTORY:  CAD (coronary artery disease) Off aspirin    Diabetes mellitus Fasting FS range from - per patient, off meds    Hypercholesteremia off meds    Hypertension Now with low BPs    Liposarcoma     Pulmonary embolism     Sleep apnea

## 2022-04-26 NOTE — H&P ADULT - PROBLEM SELECTOR PLAN 4
Patient with pitting edema in b/l lower extremities on exam  -suspect due to extrinsic compression of venous system from ascites and progression of malignancy  -US Duplex lower extremities earlier this month negative for DVT; patient has been compliant with Lovenox at home, lower suspicion for new DVT  -TTE from previous admission reviewed- difficult study but grossly normal LV systolic function noted  -albumin notably low at 2.6 which may also be contributing to accumulation of fluid  -elevate extremities

## 2022-04-26 NOTE — H&P ADULT - ASSESSMENT
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

## 2022-04-26 NOTE — ED ADULT NURSE NOTE - CHIEF COMPLAINT QUOTE
Pt AOX4 sent by Dr. Joshi [phone] due to low Hg (7.6) and Platelets (33); pt is Hem-Onc pt Hx of Liposarcoma of abdomen surgery 2020; Hx of PE x 3 weeks ago on Lovenox till last night; last chemo 3 weeks ago; pt states he feels fatigued and SOB on exertion; admit to Dr Toure (hospitalist); pt ambulates with walker at home

## 2022-04-26 NOTE — ED PROVIDER NOTE - OBJECTIVE STATEMENT
Pt sent in reportedly by Dr. Joshi, and to be admitted to Dr. Toure.  Pt was on Lovenox recently for PE's but found to have low platelets and holding currently.  Pt with h/o liposarcoma on an experimental PO chemotherapy agent.  Pt denies cp, bleeding, dark stool, blood in stool or headache + weakness and sob more with walking

## 2022-04-26 NOTE — ED ADULT NURSE NOTE - NSICDXPASTSURGICALHX_GEN_ALL_CORE_FT
PAST SURGICAL HISTORY:  H/O sleep apnea Per patient had Sleep Apnea surgery  in 1996- at [facility]- Was not retested for Sleep Apnea post surgery    History of cardiac cath Pt reports 1 cardiac stent placed 1999    History of colon resection Dec 2019

## 2022-04-26 NOTE — ED ADULT NURSE NOTE - NSICDXPASTMEDICALHX_GEN_ALL_CORE_FT
PAST MEDICAL HISTORY:  CAD (coronary artery disease) Off aspirin    Diabetes mellitus Fasting FS range from - per patient, off meds    Hypercholesteremia off meds    Hypertension Now with low BPs    Intra-abdominal and pelvic swelling, mass and lump, unspecified site Currently on experimental chemotherapy at [facility]    Sleep apnea

## 2022-04-26 NOTE — ED ADULT TRIAGE NOTE - CHIEF COMPLAINT QUOTE
Pt AOX4 sent by Dr. Joshi [phone] due to low Hg (7.6) and Platelets (33); pt is Hem-Onc pt Hx of Liposarcoma of abdomen surgery 2020; Hx of PE x 3 weeks ago on Lovenox till last night; last chemo 3 weeks ago; pt states he feels fatigued and SOB on exertion; admit to Dr Toure (hospitalist); pt ambulates with walker at home

## 2022-04-26 NOTE — H&P ADULT - PROBLEM SELECTOR PLAN 3
Previously on experimental chemotherapy outpatient; was due to see Dr. 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

## 2022-04-26 NOTE — ED PROVIDER NOTE - NSICDXPASTSURGICALHX_GEN_ALL_CORE_FT
PAST SURGICAL HISTORY:  H/O sleep apnea Per patient had Sleep Apnea surgery  in 1996- at [facility]- Was not retested for Sleep Apnea post surgery    History of cardiac cath Pt reports 1 cardiac stent placed 1999    History of colon resection Dec 2019

## 2022-04-26 NOTE — ED ADULT NURSE NOTE - OBJECTIVE STATEMENT
Pt awake and alert x 4 arrives with co weakness . Pt with large abdomen . pt denies pain no co sob. Pt with low b/p per wife this is normal for pt. Pt denies dizziness or chest pain. iv placed fluids hung awaiting for blood to arrive from blood bank.

## 2022-04-26 NOTE — ED PROVIDER NOTE - PROGRESS NOTE DETAILS
German PGY3 - Received critical notification from lab stating Hgb of 5.8.  Will consent and begin pRBC transfusion.

## 2022-04-26 NOTE — H&P ADULT - NSHPLABSRESULTS_GEN_ALL_CORE
5.8    2.76  )-----------( 20       ( 26 Apr 2022 13:10 )             18.5     04-26    132<L>  |  100  |  16  ----------------------------<  165<H>  4.2   |  21<L>  |  0.85    Ca    8.1<L>      26 Apr 2022 13:10    TPro  5.4<L>  /  Alb  2.6<L>  /  TBili  0.3  /  DBili  x   /  AST  14  /  ALT  5   /  AlkPhos  58  04-26    PT/INR - ( 26 Apr 2022 13:10 )   PT: 12.1 sec;   INR: 1.04 ratio         PTT - ( 26 Apr 2022 13:10 )  PTT:27.4 sec    EKG personally reviewed: TOMAS

## 2022-04-26 NOTE — H&P ADULT - PROBLEM SELECTOR PLAN 5
A1c 5.6%  Continue with FS qac and qHS  Low dose ISS  Holding home oral diabetic medications   Consistent carb diet A1c 5.6%, diet controlled  Will hold off on checking FS as DM is well controlled and FS during previous admission were within goal  Consistent carb diet

## 2022-04-26 NOTE — H&P ADULT - HISTORY OF PRESENT ILLNESS
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 Patient recently discharged on 4/21 on Lovenox for acute PE, which he was taking up until yesterday morning. He denies having melena, blood in his stools, hematuria or hematemesis. He has noticed that his abdomen has been slowly increasing in size again and this causes him to have early satiety and some abdominal discomfort, but not pain. He also states that he had 3 episodes of loose stool this morning. Lower leg swelling has been slowly worsening as well.

## 2022-04-26 NOTE — H&P ADULT - PROBLEM SELECTOR PLAN 7
DVT ppx: Chemical DVT ppx on hold 2/2 anemia and thrombocytopenia  Diet: Consistent carb with Glucerna  Dispo: Pending PT eval; may be hospice appropriate

## 2022-04-26 NOTE — ED PROVIDER NOTE - CLINICAL SUMMARY MEDICAL DECISION MAKING FREE TEXT BOX
Pt with liposarcoma with anemia and low platelets, no dark stool or blood, belly with large mass, pale skin and pt cachetic- for labs, and admission, spoke with Dr. Toure

## 2022-04-26 NOTE — H&P ADULT - PROBLEM SELECTOR PLAN 2
Acute PE recently diagnosed during previous admission earlier this month  -hold Lovenox for now given worsening anemia and thrombocytopenia  -currently saturating 100% on room air  -may need to consider IVC filter in the future if pt unable to tolerate AC  -continue to monitor pulse ox

## 2022-04-27 NOTE — PHYSICAL THERAPY INITIAL EVALUATION ADULT - ADDITIONAL COMMENTS
Pt reports that he lives in a private house with his wife with 4 steps to enter; no handrails; bedroom/bathroom is on the first floor. Prior to hospital admission pt required assistance with ambulation from his wife. Pt owns a rolling walker and single axis cane if he needs. Pt denies any recent falls and was getting home PT.    Pt left comfortable in bed, NAD, all lines intact, all precautions maintained, with call bell in reach, bed alarm on, and RN aware of PT evaluation.

## 2022-04-27 NOTE — PHYSICAL THERAPY INITIAL EVALUATION ADULT - PATIENT PROFILE REVIEW, REHAB EVAL
ACTIVITY: Increase as Tolerated; spoke with RN Nicolas Goldberg prior to PT evaluation--> Pt OK for PT consult/OOB activity./yes

## 2022-04-27 NOTE — CONSULT NOTE ADULT - ASSESSMENT
complete note to follow  > patient deferred to wife. 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 Palliative consulted for goc in setting of advanced malignancy.

## 2022-04-27 NOTE — CONSULT NOTE ADULT - SUBJECTIVE AND OBJECTIVE BOX
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 Patient recently discharged on 4/21 on Lovenox for acute PE, which he was taking up until yesterday morning. He denies having melena, blood in his stools, hematuria or hematemesis. He has noticed that his abdomen has been slowly increasing in size again and this causes him to have early satiety and some abdominal discomfort, but not pain. He also states that he had 3 episodes of loose stool this morning. Lower leg swelling has been slowly worsening as well. (26 Apr 2022 17:04)      PAST MEDICAL & SURGICAL HISTORY:  Hypertension  Now with low BPs    Diabetes mellitus  Fasting FS range from - per patient, off meds    Hypercholesteremia  off meds    CAD (coronary artery disease)  Off aspirin    Sleep apnea    Liposarcoma    Pulmonary embolism    History of cardiac cath  Pt reports 1 cardiac stent placed 1999    H/O sleep apnea  Per patient had Sleep Apnea surgery  in 1996- at [facility]- Was not retested for Sleep Apnea post surgery    History of colon resection  Dec 2019        Allergies    oxycodone (Vomiting)    Intolerances        MEDICATIONS  (STANDING):  budesonide  80 MICROgram(s)/formoterol 4.5 MICROgram(s) Inhaler 2 Puff(s) Inhalation two times a day  chlorhexidine 2% Cloths 1 Application(s) Topical daily  ferrous    sulfate 325 milliGRAM(s) Oral daily  folic acid 1 milliGRAM(s) Oral daily  lactobacillus acidophilus 1 Tablet(s) Oral daily  multivitamin 1 Tablet(s) Oral daily  pantoprazole    Tablet 40 milliGRAM(s) Oral before breakfast    MEDICATIONS  (PRN):  acetaminophen     Tablet .. 650 milliGRAM(s) Oral every 6 hours PRN Temp greater or equal to 38C (100.4F), Mild Pain (1 - 3)  benzonatate 100 milliGRAM(s) Oral every 8 hours PRN Cough  HYDROmorphone   Tablet 1 milliGRAM(s) Oral every 4 hours PRN Severe Pain (7 - 10)  melatonin 3 milliGRAM(s) Oral at bedtime PRN Insomnia  ondansetron Injectable 4 milliGRAM(s) IV Push every 8 hours PRN Nausea and/or Vomiting      FAMILY HISTORY:  FH: heart disease  Mother        SOCIAL HISTORY: No EtOH, no tobacco    REVIEW OF SYSTEMS:    CONSTITUTIONAL: No weakness, fevers or chills  EYES/ENT: No visual changes;  No vertigo or throat pain   NECK: No pain or stiffness  RESPIRATORY: No cough, wheezing, hemoptysis; No shortness of breath  CARDIOVASCULAR: No chest pain or palpitations  GASTROINTESTINAL: No abdominal or epigastric pain. No nausea, vomiting, or hematemesis; No diarrhea or constipation. No melena or hematochezia.  GENITOURINARY: No dysuria, frequency or hematuria  NEUROLOGICAL: No numbness or weakness  SKIN: No itching, burning, rashes, or lesions   All other review of systems is negative unless indicated above.    Height (cm): 175.3 (04-27 @ 01:10)  Weight (kg): 81.9 (04-27 @ 01:10)  BMI (kg/m2): 26.7 (04-27 @ 01:10)  BSA (m2): 1.98 (04-27 @ 01:10)    T(F): 97.8 (04-27-22 @ 01:10), Max: 98.3 (04-26-22 @ 11:39)  HR: 90 (04-27-22 @ 01:10)  BP: 97/56 (04-27-22 @ 01:10)  RR: 20 (04-27-22 @ 01:10)  SpO2: 97% (04-27-22 @ 01:10)  Wt(kg): --    GENERAL: NAD, well-developed  HEAD:  Atraumatic, Normocephalic  EYES: EOMI, PERRLA, conjunctiva and sclera clear  NECK: Supple, No JVD  CHEST/LUNG: Clear to auscultation bilaterally; No wheeze  HEART: Regular rate and rhythm; No murmurs, rubs, or gallops  ABDOMEN: Soft, Nontender, Nondistended; Bowel sounds present  EXTREMITIES:  2+ Peripheral Pulses, No clubbing, cyanosis, or edema  NEUROLOGY: non-focal  SKIN: No rashes or lesions                          8.1    2.08  )-----------( 13       ( 27 Apr 2022 07:48 )             24.7       04-27    136  |  105  |  15  ----------------------------<  114<H>  3.9   |  19<L>  |  0.66    Ca    7.8<L>      27 Apr 2022 07:14    TPro  4.6<L>  /  Alb  2.2<L>  /  TBili  0.9  /  DBili  x   /  AST  9   /  ALT  <5  /  AlkPhos  57  04-27      Lactate Dehydrogenase, Serum: 254 U/L (04-26 @ 13:12)      PT/INR - ( 26 Apr 2022 13:10 )   PT: 12.1 sec;   INR: 1.04 ratio         PTT - ( 26 Apr 2022 13:10 )  PTT:27.4 sec    Abdominal Fl DRAINAGE  04-14 @ 12:27   No growth  --  --      Abdominal Fl ABDOMEN DRAINAGE  04-14 @ 10:50   No growth at 5 days  --    No polymorphonuclear leukocytes per low power field  No organisms seen per oil power field      .Blood Blood  04-14 @ 08:02   No Growth Final  --  --      .Blood Blood  04-14 @ 08:00   No Growth Final  --  --      .Body Fluid ASCITES  04-07 @ 16:33   No growth  --  --      .Body Fluid ASCITES  04-07 @ 12:45   No growth at 5 days  --    polymorphonuclear leukocytes seen  No organisms seen per oil power field  by cytocentrifuge      .Blood Blood-Peripheral  04-04 @ 16:30   No Growth Final  --  --      .Blood Blood-Peripheral  04-04 @ 16:20   No Growth Final  --  --       HPI:  71 year old male with a 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 Patient recently discharged on 4/21 on Lovenox for acute PE, which he was taking up until yesterday morning. He denies having melena, blood in his stools, hematuria or hematemesis. He has noticed that his abdomen has been slowly increasing in size again and this causes him to have early satiety and some abdominal discomfort, but not pain. Reported having bowel movements. Lower leg swelling has been slowly worsening as well.     Onco history    Abd Liposarcoma diagnosed about 1 yr ago and s/p chemo; recently on an experimental Chemo with Dr. Jama Roberts at [facility]; last week was treated at [facility] for increased ascites and pancytopenia. He was found PE on CT chest angio 4/5 and started on lovenox upon discharge.   -CT chest angio 4/5 showed Acute right lower lobar pulmonary embolus extending into the segmental branches. Small bilateral right greater than left pleural effusions.  -CT abd/p showed BLADDER: A complex mixed cystic and calcified mass abuts the bladder. BOWEL: Moderate size hiatal hernia. No bowel obstruction. PERITONEUM: Large volume loculated ascites with worsening peritoneal disease. For example:  * Left pelvic mass (series 2, image 88), measuring 21.4 x 19.8 x 29.6 cm, previously 17.5 x 9.2 x 20 cm * Anterior right upper quadrant mass (series 2, image 52), measuring 11.0 x  7.4 x 9.6 cm (2-52), previously 10.1 x 6.1 cm * Mixed solid and cystic right pelvic mass (series 2, image 80), measuring 12.4 x 14.4 x 17.8 cm (2-80), previously 8.8 x 7.8 cm RETROPERITONEUM/LYMPH NODES: No lymphadenopathy.    He was eval by [facility] last week but not a good candidate for peritoneal drain cath placement. he had paracentesis by IR. He will have an appointment with med onc Dr. Thorpe at [facility] this Friday 4/29 for the 2nd opinion.           PAST MEDICAL & SURGICAL HISTORY:  Hypertension  Now with low BPs    Diabetes mellitus  Fasting FS range from - per patient, off meds    Hypercholesteremia  off meds    CAD (coronary artery disease)  Off aspirin    Sleep apnea    Liposarcoma    Pulmonary embolism    History of cardiac cath  Pt reports 1 cardiac stent placed 1999    H/O sleep apnea  Per patient had Sleep Apnea surgery  in 1996- at [facility]- Was not retested for Sleep Apnea post surgery    History of colon resection  Dec 2019        Allergies    oxycodone (Vomiting)    Intolerances        MEDICATIONS  (STANDING):  budesonide  80 MICROgram(s)/formoterol 4.5 MICROgram(s) Inhaler 2 Puff(s) Inhalation two times a day  chlorhexidine 2% Cloths 1 Application(s) Topical daily  ferrous    sulfate 325 milliGRAM(s) Oral daily  folic acid 1 milliGRAM(s) Oral daily  lactobacillus acidophilus 1 Tablet(s) Oral daily  multivitamin 1 Tablet(s) Oral daily  pantoprazole    Tablet 40 milliGRAM(s) Oral before breakfast    MEDICATIONS  (PRN):  acetaminophen     Tablet .. 650 milliGRAM(s) Oral every 6 hours PRN Temp greater or equal to 38C (100.4F), Mild Pain (1 - 3)  benzonatate 100 milliGRAM(s) Oral every 8 hours PRN Cough  HYDROmorphone   Tablet 1 milliGRAM(s) Oral every 4 hours PRN Severe Pain (7 - 10)  melatonin 3 milliGRAM(s) Oral at bedtime PRN Insomnia  ondansetron Injectable 4 milliGRAM(s) IV Push every 8 hours PRN Nausea and/or Vomiting      FAMILY HISTORY:  FH: heart disease  Mother        SOCIAL HISTORY: No EtOH, no tobacco    REVIEW OF SYSTEMS:    CONSTITUTIONAL: reported generalized weakness, denied fevers or chills  EYES/ENT: No visual changes;  No vertigo or throat pain   NECK: No pain or stiffness  RESPIRATORY: No cough, wheezing, hemoptysis; No shortness of breath  CARDIOVASCULAR: No chest pain or palpitations  GASTROINTESTINAL: No abdominal or epigastric pain. reported mild nausea, denied vomiting, or hematemesis; No diarrhea or constipation. No melena or hematochezia.  GENITOURINARY: No dysuria, frequency or hematuria  NEUROLOGICAL: No numbness or weakness  SKIN: No itching, burning, rashes, or lesions       Height (cm): 175.3 (04-27 @ 01:10)  Weight (kg): 81.9 (04-27 @ 01:10)  BMI (kg/m2): 26.7 (04-27 @ 01:10)  BSA (m2): 1.98 (04-27 @ 01:10)    T(F): 97.8 (04-27-22 @ 01:10), Max: 98.3 (04-26-22 @ 11:39)  HR: 90 (04-27-22 @ 01:10)  BP: 97/56 (04-27-22 @ 01:10)  RR: 20 (04-27-22 @ 01:10)  SpO2: 97% (04-27-22 @ 01:10)  Wt(kg): --    GENERAL: NAD, well-developed; frail   HEAD:  Atraumatic, Normocephalic  EYES: EOMI, PERRLA, conjunctiva and sclera clear  NECK: Supple, No JVD  CHEST/LUNG: Clear to auscultation bilaterally; No wheeze  HEART: Regular rate and rhythm; No murmurs, rubs, or gallops  ABDOMEN: Soft, Nontender, distended; umbilical hernia seen; Bowel sounds present  EXTREMITIES:  2+ Peripheral Pulses, No clubbing, cyanosis, or edema  NEUROLOGY: non-focal  SKIN: No rashes or lesions                          8.1    2.08  )-----------( 13       ( 27 Apr 2022 07:48 )             24.7       04-27    136  |  105  |  15  ----------------------------<  114<H>  3.9   |  19<L>  |  0.66    Ca    7.8<L>      27 Apr 2022 07:14    TPro  4.6<L>  /  Alb  2.2<L>  /  TBili  0.9  /  DBili  x   /  AST  9   /  ALT  <5  /  AlkPhos  57  04-27      Lactate Dehydrogenase, Serum: 254 U/L (04-26 @ 13:12)      PT/INR - ( 26 Apr 2022 13:10 )   PT: 12.1 sec;   INR: 1.04 ratio         PTT - ( 26 Apr 2022 13:10 )  PTT:27.4 sec    Abdominal Fl DRAINAGE  04-14 @ 12:27   No growth  --  --      Abdominal Fl ABDOMEN DRAINAGE  04-14 @ 10:50   No growth at 5 days  --    No polymorphonuclear leukocytes per low power field  No organisms seen per oil power field      .Blood Blood  04-14 @ 08:02   No Growth Final  --  --      .Blood Blood  04-14 @ 08:00   No Growth Final  --  --      .Body Fluid ASCITES  04-07 @ 16:33   No growth  --  --      .Body Fluid ASCITES  04-07 @ 12:45   No growth at 5 days  --    polymorphonuclear leukocytes seen  No organisms seen per oil power field  by cytocentrifuge      .Blood Blood-Peripheral  04-04 @ 16:30   No Growth Final  --  --      .Blood Blood-Peripheral  04-04 @ 16:20   No Growth Final  --  --

## 2022-04-27 NOTE — CONSULT NOTE ADULT - PROBLEM SELECTOR RECOMMENDATION 3
Patient was on clinical trial with Dr. Farley at [facility] (last dose on 3/31)   Patient also had 2nd opinion at [facility] with Dr. Thorpe on 4/29   Awaiting inpatient heme/onc recs

## 2022-04-27 NOTE — CONSULT NOTE ADULT - PROBLEM SELECTOR RECOMMENDATION 9
> US Abdomen:  Large volume ascites with internal septations. Re-demonstration of known large abdominal and pelvic mass.  > Pt reports plan is for paracentesis tomorrow  > Pain management with 0.2mg IV dilaudid q4h prn severe pain, 1mg PO dilaudid q4h prn moderate pain.   > bowel regimen while on opioids

## 2022-04-27 NOTE — CONSULT NOTE ADULT - PROBLEM SELECTOR RECOMMENDATION 4
Patient states he has living will and deferred further discussion to his wife, Karina. Spoke to his wife today who confirmed that patient has living will. Discussed advance directives including cpr and mechanical ventilation, however Karina shared that she wanted to review the living will as it was completed many years ago. Encouraged her to bring copy of living will in for hospital records.

## 2022-04-27 NOTE — PHYSICAL THERAPY INITIAL EVALUATION ADULT - GAIT DISTANCE, PT EVAL
pt deferred further ambulation 2/2 wanting to rest as he will be getting a platelet transfusion shortly/5 feet

## 2022-04-27 NOTE — CONSULT NOTE ADULT - ATTENDING COMMENTS
Pt seen examined and d/w felow. Agree with A/P as above H/o liposarcoma s/p chemo as above at [facility]. Recent admit fro PE - and ascites. had drainage and started on lovenox. Readmitted for worsening anemia and with thrombocytopenia and abd distension. . PE sclerae anicteric Lungs clear Heart RRR S1S2 abd distended, NABS soft. NT , no hsm masses. A/P Liposarcoma - no inpt therapy planned. Needs therapeutic paracentesis - will try  pre procedure plts and then restart Lovenox if plts >50 K.

## 2022-04-27 NOTE — CONSULT NOTE ADULT - PROBLEM SELECTOR RECOMMENDATION 5
Thank you for allowing us to participate in your patient's care. We will continue to follow with you. Please page [phone] for any q's or c's.     Alondra Storey D.O.   Palliative Medicine

## 2022-04-27 NOTE — CONSULT NOTE ADULT - ASSESSMENT
72 yo M with PMHx of low BPs HLD, DM2 (currently off meds), FADI not on CPAP, CAD s/p stent (1999, now off aspirin), recent PE on lovenox,  and 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 Jama Roberts at  [facility]) who presented with low platelet count, poor PO intake and worsening abdominal distension.  After admission, lab work showed anemia Hb5.8  and plt 20. Onc team consulted for liposarcoma eval/treatment.    -CT chest angio 4/5 showed Acute right lower lobar pulmonary embolus extending into the segmental branches. Small bilateral right greater than left pleural effusions.  CT abd/p showed BLADDER: A complex mixed cystic and calcified mass abuts the bladder. BOWEL: Moderate size hiatal hernia. No bowel obstruction. PERITONEUM:  Large volume loculated ascites with worsening peritoneal disease. For example:  * Left pelvic mass (series 2, image 88), measuring 21.4 x 19.8 x 29.6 cm,  previously 17.5 x 9.2 x 20 cm  * Anterior right upper quadrant mass (series 2, image 52), measuring 11.0 x  7.4 x 9.6 cm (2-52), previously 10.1 x 6.1 cm  * Mixed solid and cystic right pelvic mass (series 2, image 80), measuring  12.4 x 14.4 x 17.8 cm (2-80), previously 8.8 x 7.8 cm  RETROPERITONEUM/LYMPH NODES: No lymphadenopathy.    During the last admission (Last week) he was eval by [facility] but not a good candidate for peritoneal drain cath placement; s/p paracentesis by IR.      #Thrombocytopenia  worsening etiology including tumor infiltrating to BM vs med side effects   #Anemia w/u done last admission  -ferritin >800; iron studies suggesting multifactorial with chronic inflammation picture.  -likely multifactorial; reticulocyte low which suggests inappropriate responses to anemia which likely due to bone marrow suppression vs tumor infiltration  -thrombocytopenia is not related to HIT since heparin PF4 negative  ; serotonin release assay negative last week; no e/o HIT; hold off lovenox for PE treatment in view of low plt count;   -transfuse PRBC if Hb<7; transfuse plt if  Plt <15k if pt febrile; <50k if active bleeding;   -LOW vitB12, folate, Pt need supplements of Folic acid and VitB12  -LDH, uric acid, haptoglobin,   -monitor coagulation panel and daily CBC  [place] [facility] [place] treatment. Onc team will set up the appointment at [facility] [place]; staff will call pt and his family.  --last week onc team reviewed peripheral blood smear; mild anisocytosis of RBC, few bite cells seen; average platelet count 5/hpf for 10 hpf which estimate platelet count 50k. No increased schistocyte seen/no e/o microangiopathy  effects. no atypical cells seen.        #Liposarcoma with malignant ascites  -IR aborted tunneled drain cath placement for ascites. Pt may need repeat paracentesis by IR;   -pt reported that he started an experimental treatment (PO med one pill/day x 3 days every 2 wks) around 2/2022 by his oncologist Jama Long at [facility] (office [phone] or [phone]). Last dose about 4 wks ago. Onco team  tried to reach Dr Roberts group which provide the info during the last admission : Pt was treated with Milademetan 260mg day 1-3 started on 2/16/22 and then day 15-17. on 3/31 dose reduced to 200mg (unsure if pt ever finished).  -Milademetan was reported for causing pancytopenia. Onco team communicated with Dr. Roberts's group, and they reported that this med has delayed effect on blood counts.  -palliative care consult for further discussion of GOC   -Pt can f/u his med oncologist Jama Weir at [facility]; Alternatively, [place] can [place] [facility] Onc team will set up appointment for 2nd opinion consult at [facility] for further discussion of liposarcoma treatment.    Discussed with attending Dr.Raptis Neel Ramirez PGY4  Hem& Onco fellow P [phone]

## 2022-04-27 NOTE — CONSULT NOTE ADULT - SUBJECTIVE AND OBJECTIVE BOX
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 Patient recently discharged on 4/21 on Lovenox for acute PE, which he was taking up until yesterday morning. He denies having melena, blood in his stools, hematuria or hematemesis. He has noticed that his abdomen has been slowly increasing in size again and this causes him to have early satiety and some abdominal discomfort, but not pain. He also states that he had 3 episodes of loose stool this morning. Lower leg swelling has been slowly worsening as well. (26 Apr 2022 17:04)    PERTINENT PM/SXH:   Intra-abdominal and pelvic swelling, mass and lump, unspecified site    Hypertension    Diabetes mellitus    Hypercholesteremia    CAD (coronary artery disease)    Sleep apnea    Liposarcoma    Pulmonary embolism      History of cardiac cath    H/O sleep apnea    History of colon resection      FAMILY HISTORY:  FH: heart disease  Mother      ITEMS NOT CHECKED ARE NOT PRESENT    SOCIAL HISTORY:   Significant other/partner[ ]  Children[ ]  Temple/Spirituality:  Substance hx:  [ ]   Tobacco hx:  [ ]   Alcohol hx: [ ]   Home Opioid hx:  [ ] I-Stop Reference No:  Living Situation: [ ]Home  [ ]Long term care  [ ]Rehab [ ]Other    ADVANCE DIRECTIVES:    DNR  MOLST  [ ]  Living Will  [ ]   DECISION MAKER(s):  [ ] Health Care Proxy(s)  [ ] Surrogate(s)  [ ] Guardian           Name(s): Phone Number(s):    BASELINE (I)ADL(s) (prior to admission):  [place]: [ ]Total  [ ] Moderate [ ]Dependent    Allergies    oxycodone (Vomiting)    Intolerances    MEDICATIONS  (STANDING):  budesonide  80 MICROgram(s)/formoterol 4.5 MICROgram(s) Inhaler 2 Puff(s) Inhalation two times a day  chlorhexidine 2% Cloths 1 Application(s) Topical daily  ferrous    sulfate 325 milliGRAM(s) Oral daily  folic acid 1 milliGRAM(s) Oral daily  lactobacillus acidophilus 1 Tablet(s) Oral daily  multivitamin 1 Tablet(s) Oral daily  pantoprazole    Tablet 40 milliGRAM(s) Oral before breakfast    MEDICATIONS  (PRN):  acetaminophen     Tablet .. 650 milliGRAM(s) Oral every 6 hours PRN Temp greater or equal to 38C (100.4F), Mild Pain (1 - 3)  benzonatate 100 milliGRAM(s) Oral every 8 hours PRN Cough  HYDROmorphone   Tablet 1 milliGRAM(s) Oral every 4 hours PRN Severe Pain (7 - 10)  melatonin 3 milliGRAM(s) Oral at bedtime PRN Insomnia  ondansetron Injectable 4 milliGRAM(s) IV Push every 8 hours PRN Nausea and/or Vomiting    PRESENT SYMPTOMS: [ ]Unable to obtain due to poor mentation   Source if other than patient:  [ ]Family   [ ]Team     Pain: [ ]yes [ ]no  QOL impact -   Location -                    Aggravating factors -  Quality -  Radiation -  Timing-  Severity (0-10 scale):  Minimal acceptable level (0-10 scale):     PAIN AD Score:     [email or web address].pdf (press ctrl +  left click to view)    Dyspnea:                           [ ]Mild [ ]Moderate [ ]Severe  Anxiety:                             [ ]Mild [ ]Moderate [ ]Severe  Fatigue:                             [ ]Mild [ ]Moderate [ ]Severe  Nausea:                             [ ]Mild [ ]Moderate [ ]Severe  Loss of appetite:              [ ]Mild [ ]Moderate [ ]Severe  Constipation:                    [ ]Mild [ ]Moderate [ ]Severe    Other Symptoms:  [ ]All other review of systems negative     Palliative Performance Status Version 2:         %    http://npcrc.org/files/news/palliative_performance_scale_ppsv2.pdf  PHYSICAL EXAM:  Vital Signs Last 24 Hrs  T(C): 36.9 (27 Apr 2022 11:07), Max: 36.9 (27 Apr 2022 11:07)  T(F): 98.4 (27 Apr 2022 11:07), Max: 98.4 (27 Apr 2022 11:07)  HR: 79 (27 Apr 2022 11:07) (79 - 90)  BP: 98/56 (27 Apr 2022 11:07) (89/61 - 106/53)  BP(mean): --  RR: 18 (27 Apr 2022 11:07) (18 - 20)  SpO2: 99% (27 Apr 2022 11:07) (97% - 100%) I&O's Summary    GENERAL:  [ ]Alert  [ ]Oriented x   [ ]Lethargic  [ ]Cachexia  [ ]Unarousable  [ ]Verbal  [ ]Non-Verbal  Behavioral:   [ ] Anxiety  [ ] Delirium [ ] Agitation [ ] Other  HEENT:  [ ]Normal   [ ]Dry mouth   [ ]ET Tube/Trach  [ ]Oral lesions  PULMONARY:   [ ]Clear [ ]Tachypnea  [ ]Audible excessive secretions   [ ]Rhonchi        [ ]Right [ ]Left [ ]Bilateral  [ ]Crackles        [ ]Right [ ]Left [ ]Bilateral  [ ]Wheezing     [ ]Right [ ]Left [ ]Bilatera  [ ]Diminished breath sounds [ ]right [ ]left [ ]bilateral  CARDIOVASCULAR:    [ ]Regular [ ]Irregular [ ]Tachy  [ ]Gui [ ]Murmur [ ]Other  GASTROINTESTINAL:  [ ]Soft  [ ]Distended   [ ]+BS  [ ]Non tender [ ]Tender  [ ]PEG [ ]OGT/ NGT  Last BM:   GENITOURINARY:  [ ]Normal [ ] Incontinent   [ ]Oliguria/Anuria   [ ]Clark  MUSCULOSKELETAL:   [ ]Normal   [ ]Weakness  [ ]Bed/Wheelchair bound [ ]Edema  NEUROLOGIC:   [ ]No focal deficits  [ ]Cognitive impairment  [ ]Dysphagia [ ]Dysarthria [ ]Paresis [ ]Other   SKIN:   [ ]Normal    [ ]Rash  [ ]Pressure ulcer(s)       Present on admission [ ]y [ ]n    CRITICAL CARE:  [ ] Shock Present  [ ]Septic [ ]Cardiogenic [ ]Neurologic [ ]Hypovolemic  [ ]  Vasopressors [ ]  Inotropes   [ ]Respiratory failure present [ ]Mechanical ventilation [ ]Non-invasive ventilatory support [ ]High flow  [ ]Acute  [ ]Chronic [ ]Hypoxic  [ ]Hypercarbic [ ]Other  [ ]Other organ failure     LABS:                        8.1    2.08  )-----------( 13       ( 27 Apr 2022 07:48 )             24.7   04-27    136  |  105  |  15  ----------------------------<  114<H>  3.9   |  19<L>  |  0.66    Ca    7.8<L>      27 Apr 2022 07:14    TPro  4.6<L>  /  Alb  2.2<L>  /  TBili  0.9  /  DBili  x   /  AST  9   /  ALT  <5  /  AlkPhos  57  04-27  PT/INR - ( 26 Apr 2022 13:10 )   PT: 12.1 sec;   INR: 1.04 ratio         PTT - ( 26 Apr 2022 13:10 )  PTT:27.4 sec      RADIOLOGY & ADDITIONAL STUDIES:    PROTEIN CALORIE MALNUTRITION PRESENT: [ ]mild [ ]moderate [ ]severe [ ]underweight [ ]morbid obesity  https://www.andeal.org/vault/2440/web/files/ONC/Table_Clinical%20Characteristics%20to%20Document%20Malnutrition-White%20JV%20et%20al%202012.pdf    Height (cm): 175.3 (04-27-22 @ 01:10), 172.7 (04-04-22 @ 13:44), 172.7 (01-24-22 @ 19:11)  Weight (kg): 81.9 (04-27-22 @ 01:10), 83.4 (04-04-22 @ 21:37), 78.2 (01-25-22 @ 01:55)  BMI (kg/m2): 26.7 (04-27-22 @ 01:10), 28 (04-04-22 @ 21:37), 26.2 (04-04-22 @ 13:44)    [ ]PPSV2 < or = to 30% [ ]significant weight loss  [ ]poor nutritional intake  [ ]anasarca      [ ]Artificial Nutrition      REFERRALS:   [ ]Chaplaincy  [ ]Hospice  [ ]Child Life  [ ]Social Work  [ ]Case management [ ]Holistic Therapy      [facility] and Palliative [facility]  Alondra Storey, Palliative Care Attending  Contact Info: Page [phone] (including Nights/Weekends), message on Microsoft Teams (Alondra Storey), or leave  at Palliative Office [phone] (non-urgent)     HPI:  71 year old male with a 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 Patient recently discharged on 4/21 on Lovenox for acute PE, which he was taking up until yesterday morning. He denies having melena, blood in his stools, hematuria or hematemesis. He has noticed that his abdomen has been slowly increasing in size again and this causes him to have early satiety and some abdominal discomfort, but not pain. He also states that he had 3 episodes of loose stool this morning. Lower leg swelling has been slowly worsening as well. (26 Apr 2022 17:04)    PERTINENT PM/SXH:   Intra-abdominal and pelvic swelling, mass and lump, unspecified site    Hypertension    Diabetes mellitus    Hypercholesteremia    CAD (coronary artery disease)    Sleep apnea    Liposarcoma    Pulmonary embolism      History of cardiac cath    H/O sleep apnea    History of colon resection      FAMILY HISTORY:  FH: heart disease  Mother      ITEMS NOT CHECKED ARE NOT PRESENT    SOCIAL HISTORY:   Significant other/partner[x ]  Children[ x]  Pentecostal/Spirituality:  Substance hx:  [ ]   Tobacco hx:  [ ]   Alcohol hx: [ ]   Home Opioid hx:  [ ] I-Stop Reference No:  This report was requested by: Alondra Storey | Reference #: [id number]    Others' Prescriptions  Patient Name: Mckay LujanBirth Date: 1950  [facility] [address]  [place], [place] [address]T[place]: Male  Rx Written	Rx Dispensed	Drug	Quantity	Days Supply	Prescriber Name	Prescriber Yeimi #	Payment Method	Dispenser  01/30/2022	02/03/2022	hydromorphone 2 mg tablet	28	7	[facility]	[id number]	Medicare	[id number]  01/24/2022	01/30/2022	tramadol hcl 50 mg tablet	21	7	Jailene Engel [id number]	Medicare	[id number]  01/30/2022	01/30/2022	hydromorphone 2 mg tablet	28	4	[facility]	[id number]	Medicare	[id number]    Living Situation: [ ]Home  [ ]Long term care  [ ]Rehab [ ]Other    ADVANCE DIRECTIVES:    MOLST  [ ]  Living Will  [ ]   DECISION MAKER(s):   [x ] Health Care Proxy(s)  [ ] Surrogate(s)  [ ] Guardian           Name(s): Karina Lujan (wife) Phone Number(s): [phone]    BASELINE (I)ADL(s) (prior to admission): independent of ADLs   [place]: [x ]Total  [ ] Moderate [ ]Dependent    Allergies    oxycodone (Vomiting)    Intolerances    MEDICATIONS  (STANDING):  budesonide  80 MICROgram(s)/formoterol 4.5 MICROgram(s) Inhaler 2 Puff(s) Inhalation two times a day  chlorhexidine 2% Cloths 1 Application(s) Topical daily  ferrous    sulfate 325 milliGRAM(s) Oral daily  folic acid 1 milliGRAM(s) Oral daily  lactobacillus acidophilus 1 Tablet(s) Oral daily  multivitamin 1 Tablet(s) Oral daily  pantoprazole    Tablet 40 milliGRAM(s) Oral before breakfast    MEDICATIONS  (PRN):  acetaminophen     Tablet .. 650 milliGRAM(s) Oral every 6 hours PRN Temp greater or equal to 38C (100.4F), Mild Pain (1 - 3)  benzonatate 100 milliGRAM(s) Oral every 8 hours PRN Cough  HYDROmorphone   Tablet 1 milliGRAM(s) Oral every 4 hours PRN Severe Pain (7 - 10)  melatonin 3 milliGRAM(s) Oral at bedtime PRN Insomnia  ondansetron Injectable 4 milliGRAM(s) IV Push every 8 hours PRN Nausea and/or Vomiting    PRESENT SYMPTOMS: [ ]Unable to obtain due to poor mentation   Source if other than patient:  [ ]Family   [ ]Team     Pain: [ x]yes [ ]no  QOL impact -   Location -  abdominal distention                   Aggravating factors - ascites   Quality -  Radiation -  Timing-  Severity (0-10 scale):  Minimal acceptable level (0-10 scale):     PAIN AD Score:     http://geriatrictoolkit.[place].[email or web address]/cog/painad.pdf (press ctrl +  left click to view)    Dyspnea:                           [ ]Mild [ ]Moderate [ ]Severe  Anxiety:                             [ ]Mild [ ]Moderate [ ]Severe  Fatigue:                             [ ]Mild [ ]Moderate [ ]Severe  Nausea:                             [ ]Mild [ ]Moderate [ ]Severe  Loss of appetite:              [ ]Mild [ ]Moderate [ ]Severe  Constipation:                    [ ]Mild [ ]Moderate [ ]Severe    Other Symptoms:  [ ]All other review of systems negative     Palliative Performance Status Version 2:    60     %    http://[facility].org/files/news/palliative_performance_scale_ppsv2.pdf  PHYSICAL EXAM:  Vital Signs Last 24 Hrs  T(C): 36.9 (27 Apr 2022 11:07), Max: 36.9 (27 Apr 2022 11:07)  T(F): 98.4 (27 Apr 2022 11:07), Max: 98.4 (27 Apr 2022 11:07)  HR: 79 (27 Apr 2022 11:07) (79 - 90)  BP: 98/56 (27 Apr 2022 11:07) (89/61 - 106/53)  BP(mean): --  RR: 18 (27 Apr 2022 11:07) (18 - 20)  SpO2: 99% (27 Apr 2022 11:07) (97% - 100%) I&O's Summary    GENERAL:  [X]Alert  [X]Oriented x3   [ ]Lethargic  [ ]Cachexia  [ ]Unarousable  [x ]Verbal  [ ]Non-Verbal  Behavioral:   [ ] Anxiety  [ ] Delirium [ ] Agitation [ ] Other  HEENT:  [X]Normal   [ ]Dry mouth   [ ]ET Tube/Trach  [ ]Oral lesions  PULMONARY:   [X]Clear [ ]Tachypnea  [ ]Audible excessive secretions   [ ]Rhonchi        [ ]Right [ ]Left [ ]Bilateral  [ ]Crackles        [ ]Right [ ]Left [ ]Bilateral  [ ]Wheezing     [ ]Right [ ]Left [ ]Bilateral  [ ]Diminished breath sounds [ ]right [ ]left [ ]bilateral  CARDIOVASCULAR:    [X]Regular [ ]Irregular [ ]Tachy  [ ]Gui [ ]Murmur [ ]Other  GASTROINTESTINAL: hernia noted and abdominal distention   [ ]Soft  [X]Distended   [ ]+BS  [ ]Non tender [ ]Tender  [ ]PEG [ ]OGT/ NGT  Last BM: ?  GENITOURINARY:  [X]Normal [ ] Incontinent   [ ]Oliguria/Anuria   [ ]Clark  MUSCULOSKELETAL:   [ ]Normal   [X]Weakness  [ ]Bed/Wheelchair bound [ ]Edema  NEUROLOGIC:  [ ]No focal deficits  [ ]Cognitive impairment  [ ]Dysphagia [ ]Dysarthria [ ]Paresis [ ]Other   SKIN:   [ ]Normal    [ ]Rash  [ ]Pressure ulcer(s)       Present on admission [ ]y [ ]n    CRITICAL CARE:  [ ] Shock Present  [ ]Septic [ ]Cardiogenic [ ]Neurologic [ ]Hypovolemic  [ ]  Vasopressors [ ]  Inotropes   [ ]Respiratory failure present [ ]Mechanical ventilation [ ]Non-invasive ventilatory support [ ]High flow     [ ]Acute  [ ]Chronic [ ]Hypoxic  [ ]Hypercarbic [ ]Other  [ ]Other organ failure     LABS:                        8.1    2.08  )-----------( 13       ( 27 Apr 2022 07:48 )             24.7   04-27    136  |  105  |  15  ----------------------------<  114<H>  3.9   |  19<L>  |  0.66    Ca    7.8<L>      27 Apr 2022 07:14    TPro  4.6<L>  /  Alb  2.2<L>  /  TBili  0.9  /  DBili  x   /  AST  9   /  ALT  <5  /  AlkPhos  57  04-27  PT/INR - ( 26 Apr 2022 13:10 )   PT: 12.1 sec;   INR: 1.04 ratio         PTT - ( 26 Apr 2022 13:10 )  PTT:27.4 sec      RADIOLOGY & ADDITIONAL STUDIES: < from: US Abdomen Limited (04.27.22 @ 08:47) >  IMPRESSION:  *  Large volume ascites with internal septations.  *  Redemonstration of known large abdominal and pelvic mass.    < end of copied text >      PROTEIN CALORIE MALNUTRITION PRESENT: [ ]mild [ ]moderate [ ]severe [ ]underweight [ ]morbid obesity  https://www.andeal.org/vault/9890/web/files/ONC/Table_Clinical%20Characteristics%20to%20Document%20Malnutrition-White%20JV%20et%20al%202012.pdf    Height (cm): 175.3 (04-27-22 @ 01:10), 172.7 (04-04-22 @ 13:44), 172.7 (01-24-22 @ 19:11)  Weight (kg): 81.9 (04-27-22 @ 01:10), 83.4 (04-04-22 @ 21:37), 78.2 (01-25-22 @ 01:55)  BMI (kg/m2): 26.7 (04-27-22 @ 01:10), 28 (04-04-22 @ 21:37), 26.2 (04-04-22 @ 13:44)    [ ]PPSV2 < or = to 30% [ ]significant weight loss  [ ]poor nutritional intake  [ ]anasarca      [ ]Artificial Nutrition      REFERRALS:   [ ]Chaplaincy  [ ]Hospice  [ ]Child Life  [ ]Social Work  [ ]Case management [ ]Holistic Therapy

## 2022-04-27 NOTE — PATIENT PROFILE ADULT - FALL HARM RISK - RISK INTERVENTIONS
Assistance OOB with selected safe patient handling equipment/Assistance with ambulation/Communicate Fall Risk and Risk Factors to all staff, patient, and family/Discuss with provider need for PT consult/Monitor gait and stability/Reinforce activity limits and safety measures with patient and family/Visual Cue: Yellow wristband/Bed in lowest position, wheels locked, appropriate side rails in place/Call bell, personal items and telephone in reach/Instruct patient to call for assistance before getting out of bed or chair/Non-slip footwear when patient is out of bed/[place] to call system/Physically safe environment - no spills, clutter or unnecessary equipment/Purposeful Proactive Rounding/Room/bathroom lighting operational, light cord in reach

## 2022-04-27 NOTE — PHYSICAL THERAPY INITIAL EVALUATION ADULT - DISCHARGE DISPOSITION, PT EVAL
to optimize safety and return to prior level of function; pt appears close to baseline./home w/ home PT

## 2022-04-28 NOTE — DIETITIAN INITIAL EVALUATION ADULT - PERTINENT LABORATORY DATA
04-28    133<L>  |  101  |  16  ----------------------------<  126<H>  3.7   |  22  |  0.69    Ca    8.3<L>      28 Apr 2022 02:40  Phos  2.9     04-28  Mg     1.60     04-28    TPro  5.5<L>  /  Alb  2.7<L>  /  TBili  0.6  /  DBili  x   /  AST  9   /  ALT  6   /  AlkPhos  69  04-28  A1C with Estimated Average Glucose Result: 5.6 % (04-05-22 @ 06:04)

## 2022-04-28 NOTE — DIETITIAN INITIAL EVALUATION ADULT - ADD RECOMMEND
1). Monitor weights, labs, BM's, skin integrity, p.o. intake and edema. 2). Follow pt as per protocol.

## 2022-04-28 NOTE — DIETITIAN INITIAL EVALUATION ADULT - NS FNS DIET ORDER
Diet, Regular:   Consistent Carbohydrate {Evening Snack} (CSTCHOSN)  Kosher  Supplement Feeding Modality:  Oral  Glucerna Shake Cans or Servings Per Day:  1       Frequency:  Two Times a day (04-27-22 @ 10:57)

## 2022-04-28 NOTE — DIETITIAN INITIAL EVALUATION ADULT - OTHER INFO
72 y/o male with malignant liposarcoma admitted with dx anemia in neoplastic disease. Pt with another [facility] admission recently and was nutritionally assessed on 4/6/22. Pt was unavailable at time of multiple visits. Spoke with RN who said that pt has been eating "ok." Nursing flow sheet identified oral intake of 50-75%. No GI distress with last BM 4/27. Diet order appropriate at this time. Encourage and monitor oral intake, especially of nutrition supplement. RDN services to remain available as needed.

## 2022-04-28 NOTE — DIETITIAN INITIAL EVALUATION ADULT - NSICDXPASTSURGICALHX_GEN_ALL_CORE_FT
PAST SURGICAL HISTORY:  H/O sleep apnea Per patient had Sleep Apnea surgery  in 1996- at [facility]- Was not retested for Sleep Apnea post surgery    History of cardiac cath Pt reports 1 cardiac stent placed 1999    History of colon resection Dec 2019

## 2022-04-28 NOTE — DIETITIAN INITIAL EVALUATION ADULT - PERTINENT MEDS FT
MEDICATIONS  (STANDING):  budesonide  80 MICROgram(s)/formoterol 4.5 MICROgram(s) Inhaler 2 Puff(s) Inhalation two times a day  chlorhexidine 2% Cloths 1 Application(s) Topical daily  ferrous    sulfate 325 milliGRAM(s) Oral daily  folic acid 1 milliGRAM(s) Oral daily  lactobacillus acidophilus 1 Tablet(s) Oral daily  multivitamin 1 Tablet(s) Oral daily  pantoprazole    Tablet 40 milliGRAM(s) Oral before breakfast    MEDICATIONS  (PRN):  acetaminophen     Tablet .. 650 milliGRAM(s) Oral every 6 hours PRN Temp greater or equal to 38C (100.4F), Mild Pain (1 - 3)  benzonatate 100 milliGRAM(s) Oral every 8 hours PRN Cough  HYDROmorphone   Tablet 1 milliGRAM(s) Oral every 4 hours PRN Moderate Pain (4 - 6)  HYDROmorphone  Injectable 0.2 milliGRAM(s) IV Push every 4 hours PRN Severe Pain (7 - 10)  melatonin 3 milliGRAM(s) Oral at bedtime PRN Insomnia  ondansetron Injectable 4 milliGRAM(s) IV Push every 8 hours PRN Nausea and/or Vomiting

## 2022-04-29 NOTE — DISCHARGE NOTE PROVIDER - NSDCFUADDAPPT_GEN_ALL_CORE_FT
Follow up with your primary care provider in 1-2 weeks of discharge. You will need repeat blood work on discharge to monitor your blood counts. You will need repeat blood work on discharge to monitor your blood counts.

## 2022-04-29 NOTE — PROCEDURE NOTE - NSINFORMCONSENT_GEN_A_CORE
Addended by: LORENZO DOOLEY on: 8/13/2019 09:10 AM     Modules accepted: Orders     Benefits, risks, and possible complications of procedure explained to patient/caregiver who verbalized understanding and gave written consent.

## 2022-04-29 NOTE — DISCHARGE NOTE PROVIDER - HOSPITAL COURSE
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    Hospital course:     Anemia in neoplastic disease.   -On admission Hgb 5.8, unclear baseline as patient required several transfusions during previous admission  -no clear source of bleeding noted. FOBT negative  -oncology consulted: started ferrous sulfate   -noted to have folate deficiency during previous admission, does not appear he is on folic acid- started folic acid   -B12 supplement  -GI consulted: conservative mgmt   -AC held ____   -During hospitalization s/p 3 u prbc and 2 u plts    Pulmonary embolism.   -Acute PE recently diagnosed during previous admission earlier this month  -held Lovenox on admission given worsening anemia and thrombocytopenia    Liposarcoma.   -Previously on experimental chemotherapy outpatient; was due to see Dr. 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 Pt interested in treatment / pending 2nd opinion     Lower extremity edema.   -Patient with pitting edema in b/l lower extremities on exam  -suspect due to extrinsic compression of venous system from ascites and progression of malignancy  -US Duplex lower extremities earlier this month negative for DVT; patient has been compliant with Lovenox at home, lower suspicion for new DVT  -TTE from previous admission reviewed- difficult study but grossly normal LV systolic function noted  -albumin notably low at 2.6 which may also be contributing to accumulation of fluid  -Supportive care --> elevate extremities.    Type 2 diabetes mellitus.   -A1c 5.6%, diet controlled  -Will hold off on checking FS as DM is well controlled and FS during previous admission were within goal  -Consistent carb diet.    Pt medically stable for DC on ____ as per discussion with Dr. Toure.  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    Anemia in neoplastic disease.   - on admission Hgb 5.8, unclear baseline as patient required several transfusions during previous admission  - no clear source of bleeding noted; FOBT negative  - s/p 3 u prbc and 2 u plts during hospitalization  - oncology consulted: started ferrous sulfate   - noted to have folate deficiency during previous admission, does not appear he is on folic acid; c/w folic acid 1mg daily  - c/w B12 supplement  - GI consulted, recommended conservative management  - AC held ____     Pulmonary embolism  - acute PE recently diagnosed during previous admission earlier this month  - held Lovenox on admission given worsening anemia and thrombocytopenia    Liposarcoma  - previously on experimental chemotherapy outpatient; was due to see Dr. 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   - patient with pitting edema in b/l lower extremities on exam  - suspect due to extrinsic compression of venous system from ascites and progression of malignancy  - US Duplex lower extremities earlier this month negative for DVT; patient has been compliant with Lovenox at home, lower suspicion for new DVT  - TTE from previous admission reviewed - difficult study but grossly normal LV systolic function noted  - albumin notably low at 2.6 which may also be contributing to accumulation of fluid  - c/w supportive care --> elevate extremities    Type 2 diabetes mellitus.   - A1c 5.6%, diet controlled  - no FS monitoring during admission as DM is well controlled and FS during previous admission were within goal  - consistent carb diet    Hyperlipidemia  - not on any home meds  - lipid panel wnl except low HDL    Case discussed with  ___ on ___. Patient is medically stable and optimized for discharge as per attending. All medications were reviewed and prescriptions were sent to a mutually agreed upon pharmacy. Discharge plan reviewed with patient and/or family.    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    Anemia in neoplastic disease   - on admission Hgb 5.8, unclear baseline as patient required several transfusions during previous admission  - no clear source of bleeding noted; FOBT negative  - s/p 3 u prbc and 2 u plts during hospitalization  - oncology consulted: started ferrous sulfate   - noted to have folate deficiency during previous admission, does not appear he is on folic acid; c/w folic acid 1mg daily  - c/w B12 supplement  - GI consulted, recommended conservative management  - AC held ____     Pulmonary embolism  - acute PE recently diagnosed during previous admission earlier this month  - held Lovenox on admission given worsening anemia and thrombocytopenia    Liposarcoma  - previously on experimental chemotherapy outpatient; was due to [place] 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  - oncology team consulted: pending outpatient appointment [place] [facility] for 2nd opinion regarding treatment  - patient with at least moderate ascites on exam, had 2 paracenteses done previous admission, IR unable to find safe window for abdominal pleurx placement at that time  - US abdomen: large volume ascites   - s/p bedside paracentesis on 4/29 with 2.2 L removed   - palliative consulted: Dilaudid PRN for pain control; pt interested in treatment/pending 2nd opinion     Lower extremity edema  - patient with pitting edema in b/l lower extremities on exam  - suspect due to extrinsic compression of venous system from ascites and progression of malignancy  - US Duplex lower extremities earlier this month negative for DVT; patient has been compliant with Lovenox at home, lower suspicion for new DVT  - TTE from previous admission reviewed - difficult study but grossly normal LV systolic function noted  - albumin notably low at 2.6 which may also be contributing to accumulation of fluid  - c/w supportive care --> elevate extremities    Type 2 diabetes mellitus  - A1c 5.6%, diet controlled  - no FS monitoring during admission as DM is well controlled and FS during previous admission were within goal  - consistent carb diet    Hyperlipidemia  - not on any home meds  - lipid panel wnl except low HDL    Case discussed with Dr. Toure on ___. Patient is medically stable and optimized for discharge as per attending. All medications were reviewed and prescriptions were sent to a mutually agreed upon pharmacy. Discharge plan reviewed with patient and/or family.    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    Anemia in neoplastic disease   - on admission Hgb 5.8, unclear baseline as patient required several transfusions during previous admission  - no clear source of bleeding noted; FOBT negative  - s/p 4 u prbc and 3 u plts during hospitalization  - oncology consulted: started ferrous sulfate   - noted to have folate deficiency during previous admission, does not appear he is on folic acid; c/w folic acid 1mg daily  - c/w B12 supplement  - GI consulted, recommended conservative management  - AC held secondary to low plts (confirmed with Oncology on 5/3, continue to hold upon DC in setting of thrombocytoepnia)    Pulmonary embolism  - acute PE recently diagnosed during previous admission earlier this month  - held Lovenox on admission given worsening anemia and thrombocytopenia  - per onc: continue to hold on discharge given thrombocytopenia     Liposarcoma  - previously on experimental chemotherapy outpatient; was due to see Dr. 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  - oncology team consulted: initially pending outpatient appointment @ [facility] for 2nd opinion regarding treatment --> however per onc, given pt already has received experimental treatment unlikely a candidate for further treatment options at [facility] ___________________  - patient with at least moderate ascites on exam, had 2 paracenteses done previous admission, IR unable to find safe window for abdominal pleurx placement at that time  - US abdomen: large volume ascites   - s/p bedside paracentesis on 4/29 with 2.2 L removed   - IR consulted for another paracentesis: however no safe window secondary to tumor burden   - palliative consulted: Dilaudid PRN for pain control; pt interested in treatment/pending 2nd opinion     Lower extremity edema  - patient with pitting edema in b/l lower extremities on exam  - suspect due to extrinsic compression of venous system from ascites and progression of malignancy  - US Duplex lower extremities earlier this month negative for DVT; patient has been compliant with Lovenox at home, lower suspicion for new DVT  - TTE from previous admission reviewed - difficult study but grossly normal LV systolic function noted  - albumin notably low at 2.6 which may also be contributing to accumulation of fluid  - c/w supportive care --> elevate extremities    Type 2 diabetes mellitus  - A1c 5.6%, diet controlled  - no FS monitoring during admission as DM is well controlled and FS during previous admission were within goal  - consistent carb diet    Hyperlipidemia  - not on any home meds  - lipid panel wnl except low HDL    Case discussed with Dr. Toure on ___. Patient is medically stable and optimized for discharge as per attending. All medications were reviewed and prescriptions were sent to a mutually agreed upon pharmacy. Discharge plan reviewed with patient and/or family.    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    Anemia in neoplastic disease   - on admission Hgb 5.8, unclear baseline as patient required several transfusions during previous admission  - no clear source of bleeding noted; FOBT negative  - s/p 4 u prbc and 3 u plts during hospitalization  - oncology consulted: started ferrous sulfate   - noted to have folate deficiency during previous admission, does not appear he is on folic acid; c/w folic acid 1mg daily  - c/w B12 supplement  - GI consulted, recommended conservative management  - AC held secondary to low plts (confirmed with Oncology on 5/3, continue to hold upon DC in setting of thrombocytoepnia)    Pulmonary embolism  - acute PE recently diagnosed during previous admission earlier this month  - held Lovenox on admission given worsening anemia and thrombocytopenia  - per onc: continue to hold on discharge given thrombocytopenia     Liposarcoma  - previously on experimental chemotherapy outpatient; was due to see Dr. 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  - oncology team consulted: initially pending outpatient appointment @ [facility] for 2nd opinion regarding treatment --> however per onc, given pt already has received experimental treatment unlikely a candidate for further treatment options at [facility] ___________________  - patient with at least moderate ascites on exam, had 2 paracenteses done previous admission, IR unable to find safe window for abdominal pleurx placement at that time  - US abdomen: large volume ascites   - s/p bedside paracentesis on 4/29 with 2.2 L removed   - IR consulted for another paracentesis: however no safe window secondary to tumor burden   - palliative consulted: Dilaudid PRN for pain control; pt interested in treatment/pending 2nd opinion     Lower extremity edema  - patient with pitting edema in b/l lower extremities on exam  - suspect due to extrinsic compression of venous system from ascites and progression of malignancy  - US Duplex lower extremities earlier this month negative for DVT; patient has been compliant with Lovenox at home, lower suspicion for new DVT  - TTE from previous admission reviewed - difficult study but grossly normal LV systolic function noted  - albumin notably low at 2.6 which may also be contributing to accumulation of fluid  - c/w supportive care --> elevate extremities    Type 2 diabetes mellitus  - A1c 5.6%, diet controlled  - no FS monitoring during admission as DM is well controlled and FS during previous admission were within goal  - consistent carb diet    Hyperlipidemia  - not on any home meds  - lipid panel wnl except low HDL    Case discussed with Dr. Toure on ___. Patient is medically stable and optimized for discharge as per attending. All medications were reviewed and prescriptions were sent to a mutually agreed upon pharmacy. Discharge plan reviewed with patient and/or family.    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    Anemia in neoplastic disease   - on admission Hgb 5.8, unclear baseline as patient required several transfusions during previous admission  - no clear source of bleeding noted; FOBT negative  - s/p 4 u prbc and 3 u plts during hospitalization  - oncology consulted: started ferrous sulfate   - noted to have folate deficiency during previous admission, does not appear he is on folic acid; c/w folic acid 1mg daily  - c/w B12 supplement  - GI consulted, recommended conservative management  - AC held secondary to low plts (confirmed with Oncology on 5/3, continue to hold upon DC in setting of thrombocytoepnia)    Pulmonary embolism  - acute PE recently diagnosed during previous admission earlier this month  - held Lovenox on admission given worsening anemia and thrombocytopenia  - per onc: continue to hold on discharge given thrombocytopenia     Liposarcoma  - previously on experimental chemotherapy outpatient; was due to see Dr. 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  - oncology team consulted: initially pending outpatient appointment @ [facility] for 2nd opinion regarding treatment --> however per onc, given pt already has received experimental treatment unlikely a candidate for further treatment options at [facility] ___________________  - patient with at least moderate ascites on exam, had 2 paracenteses done previous admission, IR unable to find safe window for abdominal pleurx placement at that time  - US abdomen: large volume ascites   - s/p bedside paracentesis on 4/29 with 2.2 L removed   - IR consulted for another paracentesis: however no safe window secondary to tumor burden   - palliative consulted: Dilaudid PRN for pain control; pt interested in treatment/pending 2nd opinion     Lower extremity edema  - patient with pitting edema in b/l lower extremities on exam  - suspect due to extrinsic compression of venous system from ascites and progression of malignancy  - US Duplex lower extremities earlier this month negative for DVT; patient has been compliant with Lovenox at home, lower suspicion for new DVT  - TTE from previous admission reviewed - difficult study but grossly normal LV systolic function noted  - albumin notably low at 2.6 which may also be contributing to accumulation of fluid  - c/w supportive care --> elevate extremities    Type 2 diabetes mellitus  - A1c 5.6%, diet controlled  - no FS monitoring during admission as DM is well controlled and FS during previous admission were within goal  - consistent carb diet    Hyperlipidemia  - not on any home meds  - lipid panel wnl except low HDL    Case discussed with Dr. Toure on 5/4/22. Patient is medically stable and optimized for discharge as per attending. All medications were reviewed and prescriptions were sent to a mutually agreed upon pharmacy. Discharge plan reviewed with patient and/or family.

## 2022-04-29 NOTE — DISCHARGE NOTE PROVIDER - NSFOLLOWUPCLINICS_GEN_ALL_ED_FT
[facility]  Hematology/Oncology  [address] [id number]  Phone: [phone]  Fax:

## 2022-04-29 NOTE — DISCHARGE NOTE NURSING/CASE MANAGEMENT/SOCIAL WORK - NSDCPEFALRISK_GEN_ALL_CORE
For information on Fall & Injury Prevention, visit: [email or web address] OR  [email or web address] OR  https://www.cdc.gov/steadi/patient.html

## 2022-04-29 NOTE — DISCHARGE NOTE NURSING/CASE MANAGEMENT/SOCIAL WORK - PATIENT PORTAL LINK FT
You can access the FollowMyHealth Patient Portal offered by [facility] by registering at the following website: [email or web address]. By joining [employer]’s FollowMyHealth portal, you will also be able to view your health information using other applications (apps) compatible with our system.

## 2022-04-29 NOTE — DISCHARGE NOTE PROVIDER - CARE PROVIDER_API CALL
Carolyn Joshi  INTERNAL MEDICINE  [address], [address]  [place] [address]  Phone: [phone]  Fax: [phone]  Follow Up Time:    Carolyn Joshi  INTERNAL MEDICINE  [address] [place] [address], [address]  [place] [address]  Phone: [phone]  Fax: [phone]  Follow Up Time:     Will Thorpe)  Hematology; Medical Oncology  [address] [place] [address]  Phone: [phone]  Fax: [phone]  Follow Up Time:

## 2022-04-29 NOTE — DISCHARGE NOTE PROVIDER - NSDCMRMEDTOKEN_GEN_ALL_CORE_FT
acetaminophen 325 mg oral tablet: 2 tab(s) orally every 6 hours, As needed, Temp greater or equal to 38C (100.4F), Mild Pain (1 - 3)  aluminum hydroxide-magnesium hydroxide 200 mg-200 mg/5 mL oral suspension: 30 milliliter(s) orally every 4 hours, As needed, Dyspepsia  budesonide-formoterol 80 mcg-4.5 mcg/inh inhalation aerosol: 2  inhaled 2 times a day   lactobacillus acidophilus oral capsule: 1 tab(s) orally 2 times a day   Lovenox 80 mg/0.8 mL injectable solution: 80 milligram(s) injection subcutaneously every 12 hours MDD:Hold for Platelet count under 50  Multiple Vitamins oral tablet: 1 tab(s) orally once a day  pantoprazole 40 mg oral delayed release tablet: 1 tab(s) orally 2 times a day  Zofran 4 mg oral tablet: 1 tab(s) orally every 8 hours, As Needed   acetaminophen 325 mg oral tablet: 2 tab(s) orally every 6 hours, As needed, Temp greater or equal to 38C (100.4F), Mild Pain (1 - 3)  aluminum hydroxide-magnesium hydroxide 200 mg-200 mg/5 mL oral suspension: 30 milliliter(s) orally every 4 hours, As needed, Dyspepsia  budesonide-formoterol 80 mcg-4.5 mcg/inh inhalation aerosol: 2  inhaled 2 times a day   cyanocobalamin 1000 mcg oral tablet: 1 tab(s) orally once a day  ferrous sulfate 325 mg (65 mg elemental iron) oral tablet: 1 tab(s) orally once a day  folic acid 1 mg oral tablet: 1 tab(s) orally once a day  lactobacillus acidophilus oral capsule: 1 tab(s) orally 2 times a day   Lovenox 80 mg/0.8 mL injectable solution: 80 milligram(s) injection subcutaneously every 12 hours MDD:Hold for Platelet count under 50  Multiple Vitamins oral tablet: 1 tab(s) orally once a day  pantoprazole 40 mg oral delayed release tablet: 1 tab(s) orally 2 times a day  Zofran 4 mg oral tablet: 1 tab(s) orally every 8 hours, As Needed   acetaminophen 325 mg oral tablet: 2 tab(s) orally every 6 hours, As needed, Temp greater or equal to 38C (100.4F), Mild Pain (1 - 3)  budesonide-formoterol 80 mcg-4.5 mcg/inh inhalation aerosol: 2  inhaled 2 times a day   cyanocobalamin 1000 mcg oral tablet: 1 tab(s) orally once a day  ferrous sulfate 325 mg (65 mg elemental iron) oral tablet: 1 tab(s) orally once a day  folic acid 1 mg oral tablet: 1 tab(s) orally once a day  guaiFENesin 100 mg/5 mL oral liquid: 10 milliliter(s) orally every 6 hours, As needed, Cough  lactobacillus acidophilus oral capsule: 1 tab(s) orally 2 times a day   Multiple Vitamins oral tablet: 1 tab(s) orally once a day  pantoprazole 40 mg oral delayed release tablet: 1 tab(s) orally 2 times a day  Zofran 4 mg oral tablet: 1 tab(s) orally every 8 hours, As Needed

## 2022-04-29 NOTE — DISCHARGE NOTE PROVIDER - CARE PROVIDERS DIRECT ADDRESSES
,DirectAddress_Unknown ,DirectAddress_Unknown,[email or web address]

## 2022-04-29 NOTE — DISCHARGE NOTE PROVIDER - NSDCCPCAREPLAN_GEN_ALL_CORE_FT
PRINCIPAL DISCHARGE DIAGNOSIS  Diagnosis: Anemia in neoplastic disease  Assessment and Plan of Treatment: You presented with low hemoglobin and low platelet levels. You were evaluated by oncology. You were started on supplements. You recieved both blood and platelet transfusion. You were evaluated by gastroenterology: your stool did not have blood in it; no acute GI workup was warranted. Out patient follow up with PCP or oncologist for repeat lab work in 1 week.      SECONDARY DISCHARGE DIAGNOSES  Diagnosis: Liposarcoma  Assessment and Plan of Treatment: Out patient follow up with [facility] for 2nd opinion.    Diagnosis: Pulmonary embolism  Assessment and Plan of Treatment: You have a blood clot in your lungs. Your anticoagulation was held during hospitalization given low platelets. Upon discharge _______.    Diagnosis: Malignant ascites  Assessment and Plan of Treatment: You had a paracentsis, 2.2 L of fluid were removed from your abdomen. Out patient follow up with your oncologist for further monitoring and arrangement of paracentesis as needed.     PRINCIPAL DISCHARGE DIAGNOSIS  Diagnosis: Anemia in neoplastic disease  Assessment and Plan of Treatment: You presented with low hemoglobin and low platelet levels. You were evaluated by oncology. You were started on supplements. You recieved both blood and platelet transfusion. You were evaluated by gastroenterology: your stool did not have blood in it; no acute GI workup was warranted. Outpatient follow up with PCP or oncologist for repeat lab work in 1 week.      SECONDARY DISCHARGE DIAGNOSES  Diagnosis: Pulmonary embolism  Assessment and Plan of Treatment: You have a blood clot in your lungs. Your anticoagulation was held during hospitalization given low platelets. Upon discharge _______.    Diagnosis: Liposarcoma  Assessment and Plan of Treatment: Outpatient follow up with [facility] for 2nd opinion.    Diagnosis: Type 2 diabetes mellitus  Assessment and Plan of Treatment: Your hemoglobin A1C is 5.6%. Target goal for hemoglobin A1C is <7%. Monitor blood glucose levels throughout the day, before meals and at bedtime. Record blood sugars and bring to outpatient provider appointments in order to be reviewed by your doctor for management modifications. If your sugars are more than 400 or less than 70 you should contact your primary care provider immediately. Monitor for signs/symptoms of low blood glucose, such as dizziness, altered mental status, or cool/clammy skin. In addition, monitor for signs/symptoms of high blood glucose, such as feeling hot, dry, fatigued, or with increased thirst/urination. Make regular podiatry appointments in order to have feet checked for wounds and uncontrolled toe nail growth to prevent infections. Make regular ophthalmology appointments to monitor your vision.    Diagnosis: Lower extremity edema  Assessment and Plan of Treatment: You had an ultrasound of your lower extremities earlier this month that was negative for deep venous thrombosis. Continue to elevate your extremities as able.    Diagnosis: Hyperlipidemia  Assessment and Plan of Treatment: Follow a low fat diet. Follow up with your primary care provider/cardiologist for further management.    Diagnosis: Malignant ascites  Assessment and Plan of Treatment: You had a paracentesis during this hospitalization. 2.2 L of fluid was removed from your abdomen. Outpatient follow up with your oncologist for further monitoring and arrangement of paracentesis as needed.     PRINCIPAL DISCHARGE DIAGNOSIS  Diagnosis: Anemia in neoplastic disease  Assessment and Plan of Treatment: You presented with low hemoglobin and low platelet levels. You were evaluated by oncology. You were started on supplements. You recieved both blood and platelet transfusions. You were evaluated by gastroenterology: your stool did not have blood in it; no acute GI workup was warranted. Outpatient follow up with PCP or oncologist for repeat lab work in 1 week.      SECONDARY DISCHARGE DIAGNOSES  Diagnosis: Liposarcoma  Assessment and Plan of Treatment: Outpatient follow up with oncologist for further evaluation.    Diagnosis: Pulmonary embolism  Assessment and Plan of Treatment: You have a blood clot in your lungs. Your anticoagulation was held during hospitalization given low platelets. Upon discharge _______.    Diagnosis: Malignant ascites  Assessment and Plan of Treatment: You had a paracentesis during this hospitalization. 2.2 L of fluid was removed from your abdomen by procedure team. You were also evaluated by Interventional Radiology for additional paracentesis, however there was no safe window. Outpatient follow up with your oncologist for further monitoring and arrangement of paracentesis as needed.    Diagnosis: Hyperlipidemia  Assessment and Plan of Treatment: Follow a low fat diet. Follow up with your primary care provider/cardiologist for further management.    Diagnosis: Type 2 diabetes mellitus  Assessment and Plan of Treatment: Your hemoglobin A1C is 5.6%. Target goal for hemoglobin A1C is <7%. Monitor blood glucose levels throughout the day, before meals and at bedtime. Record blood sugars and bring to outpatient provider appointments in order to be reviewed by your doctor for management modifications. If your sugars are more than 400 or less than 70 you should contact your primary care provider immediately. Monitor for signs/symptoms of low blood glucose, such as dizziness, altered mental status, or cool/clammy skin. In addition, monitor for signs/symptoms of high blood glucose, such as feeling hot, dry, fatigued, or with increased thirst/urination. Make regular podiatry appointments in order to have feet checked for wounds and uncontrolled toe nail growth to prevent infections. Make regular ophthalmology appointments to monitor your vision.    Diagnosis: Lower extremity edema  Assessment and Plan of Treatment: You had an ultrasound of your lower extremities earlier this month that was negative for deep venous thrombosis. Continue to elevate your extremities as able.     PRINCIPAL DISCHARGE DIAGNOSIS  Diagnosis: Anemia in neoplastic disease  Assessment and Plan of Treatment: You presented with low hemoglobin and low platelet levels. You were evaluated by oncology. You were started on supplements. You recieved both blood and platelet transfusions. You were evaluated by gastroenterology: your stool did not have blood in it; no acute GI workup was warranted. Outpatient follow up with PCP and oncologist.      SECONDARY DISCHARGE DIAGNOSES  Diagnosis: Liposarcoma  Assessment and Plan of Treatment: Outpatient follow up with oncologist for further evaluation.    Diagnosis: Pulmonary embolism  Assessment and Plan of Treatment: You have a blood clot in your lungs. Your anticoagulation was held during hospitalization given low platelets. Upon discharge follow up with Dr. Fairbanks on when to resume,    Diagnosis: Malignant ascites  Assessment and Plan of Treatment: You had a paracentesis during this hospitalization. 2.2 L of fluid was removed from your abdomen by procedure team. You were also evaluated by Interventional Radiology for additional paracentesis, however there was no safe window. Outpatient follow up with your oncologist for further monitoring and arrangement of paracentesis as needed.    Diagnosis: Hyperlipidemia  Assessment and Plan of Treatment: Follow a low fat diet. Follow up with your primary care provider/cardiologist for further management.    Diagnosis: Type 2 diabetes mellitus  Assessment and Plan of Treatment: Your hemoglobin A1C is 5.6%. Target goal for hemoglobin A1C is <7%. Monitor blood glucose levels throughout the day, before meals and at bedtime. Record blood sugars and bring to outpatient provider appointments in order to be reviewed by your doctor for management modifications. If your sugars are more than 400 or less than 70 you should contact your primary care provider immediately. Monitor for signs/symptoms of low blood glucose, such as dizziness, altered mental status, or cool/clammy skin. In addition, monitor for signs/symptoms of high blood glucose, such as feeling hot, dry, fatigued, or with increased thirst/urination. Make regular podiatry appointments in order to have feet checked for wounds and uncontrolled toe nail growth to prevent infections. Make regular ophthalmology appointments to monitor your vision.    Diagnosis: Lower extremity edema  Assessment and Plan of Treatment: You had an ultrasound of your lower extremities earlier this month that was negative for deep venous thrombosis. Continue to elevate your extremities as able.    Diagnosis: Thrombocytopenia  Assessment and Plan of Treatment: You were transfused 3 units of plateletes. Your Platelet today was 56  if Hb <7 and plt <30 please transfuse PRBC and platelet respectively.  You will need your labs to be trended 2 times a week.

## 2022-04-29 NOTE — PRE-OP CHECKLIST - BP NONINVASIVE SYSTOLIC (MM HG)
50y Male with history of ESRD on HD presents with L knee pain. Nephrology consulted for ESRD status. 99

## 2022-05-03 NOTE — PROGRESS NOTE ADULT - PROBLEM SELECTOR PLAN 1
Hgb 5.8, unclear baseline as patient required several transfusions during previous admission, likely 7-8  -no clear source of bleeding noted  -transfuse and f/u hb closely
Hgb 5.8, unclear baseline as patient required several transfusions during previous admission, likely 7-8  -no clear source of bleeding noted  -transfuse and f/u hb closely    ascites - tap today
s/p prbc ,, f/u hb closely       ascites - s/p cece
s/p prbc ,, f/u hb closely       ascites - s/p cece
s/p prbc ,, f/u hb closely       ascites - s/p tap- repeat ultrasound with large volume ascites, poss tap
Hgb 5.8, unclear baseline as patient required several transfusions during previous admission, likely 7-8  -no clear source of bleeding noted  -transfuse and f/u hb closely
s/p prbc ,, f/u hb closely       ascites - s/p tap- repeat ultrasound with large volume ascites,   as per heme onc, pt to be dced  with out pt onc f//u at [place]

## 2022-05-03 NOTE — CHART NOTE - NSCHARTNOTEFT_GEN_A_CORE
ACP NIGHT MEDICINE COVERAGE.    AM Labs reviewed, Hgb improved without PRBC transfusion. PLT count stable at 40, however will transfuse additional unit of platelets to optimize for paracentesis. As per IR, would like PLT count to be 50 or higher. RN aware of plan.    Starr Ch PA  Medicine [id number]
Patient seen this morning while getting paracentesis. Wife not at bedside. Case discussed ACP Holly regarding wife bringing in copy of patient's living will. Medicine team will review Living will and continue to discuss advance directives for possible completion of MOLST form. Patient is treatment oriented and was looking for 2nd opinion at [facility]. If patient's goals change, can re-consult palliative care team. Symptoms are managed.     Thank you for allowing us to participate in your patient's care. Please page [phone] for any q's or c's.     Alondra Storey D.O.   Palliative Medicine
Plan of care discussed with Oncology Dr. Ramirez: Onc team to further discuss plan of care at bedside with patient.  Discussed with Dr. Toure: IR consult placed for possible repeat paracentesis. Awaiting recommendations.  Pt made aware of above.
Plan of care discussed with procedure team @ #[id number]. Pt on the schedule tomorrow for bedside paracentesis. Per procedure team: plts must be >20k.
I was consulted for another repeat paracentesis on this patient. A paracentesis was performed 3 days ago. If a patient reaccumulates in such a short time frame there is no role for repeat paracentesis as it only engenders high infectious risk by constantly piercing the abdomen. Consider an abdominal pleurex if appropriate.
Oncology chart note         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 Jama Roberts at [facility]) who presented with low platelet count, poor PO intake and worsening abdominal distension.  After admission, s/p paracentesis x 1 by IR and intermittent transfusion of RBC and platlelet. Onc team consulted for liposarcoma eval/treatment.    -pt reported that he started an experimental treatment Milademetan in 2/2022 by his oncologist Jama Long at [facility] (main office [phone] or [facility] [phone]): Pt was treated with Milademetan 260mg day 1-3 started on 2/16/22 and then day 15-17. on 3/31 dose reduced to 200mg (unsure if pt ever finished). Milademetan was reported for causing pancytopenia. Onco team communicated with Dr. Roberts's group, and they reported that this med has delayed effect on blood counts.    -CT chest angio 4/5 showed Acute right lower lobar pulmonary embolus extending into the segmental branches.   During the last admission (April 2022) he was eval by [facility] but not a good candidate for peritoneal drain cath placement; s/p paracentesis by IR.      -Received one unit platelet yesterday and one unit PRBC this morning with post-transfusion Hb8.4 and plt 54 today.     #Thrombocytopenia  and anemia worsening; etiology including tumor infiltrating to BM vs med side effects   #Liposarcoma with malignant ascites  -initial the plan is to f[place] at [facility] [place] the 2nd opinion of liposarcoma treatment (Pt missed the initial appointment at [facility] during this admission). Today Onc team communicated with Dr. Thorpe, and rec pt should f/u at an institute with clinical trials of liposarcoma in view of pt's extensive chemo treatment history.   --IR aborted tunneled drain cath placement for ascites during last admission; currently IR sign off and would defer the intervetion procedure per note.   -f/u CBC tomorrow morning; if Hb and plt stable, may consider discharge if no contraindications per primary team; if Hb <7 and plt <30 please transfuse PRBC and platelet respectively. The rest of care per primary team   -Discussed with pt and his wife Karina [phone] (On the phone): pt need follow up his med oncologist Dr. Roberts (Pt will have an appointment with Dr. Roberts's office on Thursday 5/5/22); pt and wife agree the plan. His wife also reported that they have an appointment with [facility] for the 2nd opinion of liposarcoma treatment.   -wife also reported that phlebotomy visit at home was set up since last discharge, and we rec pt need monitor CBC closely (at least twice per week) and result need to be f/u by his med oncology or PCP; if necessary transfusion PRBC/platelet. pt and his wife understand and agree with the plan   -please contact with onc team if have more questions    Discussed with attending Dr.Raptis Neel Ramirez PGY4
Patient with platelet count of 37 today. Per discussion with oncology and medicine attending, ok to give 1U platelets today.    Patient also with increased abdominal distention today. Per Dr. Toure, procedure team contacted for repeat paracentesis. Per procedure team, repeat paracentesis would be unsafe at this time as patient had recent paracentesis on 4/29 and repeat paracentesis would increase risk of infection. Procedure team recommending pleurX drain placement if possible. Per Dr. Toure, abdominal ultrasound ordered showing large volume ascites. On prior admission, IR consulted for pleurX, procedure aborted as no safe window for placement. Provider discussed this with Dr. Toure, per discussion, no need for repeat IR consult at this time.
Platelets noted to be 13 this AM. Discussed with Dr. Toure, recommends to transfuse 1 unit of platelets.

## 2022-05-03 NOTE — PROGRESS NOTE ADULT - PROBLEM SELECTOR PROBLEM 4
Lower extremity edema

## 2022-05-03 NOTE — PROGRESS NOTE ADULT - PROBLEM SELECTOR PLAN 5
A1c 5.6%, diet controlled  iss

## 2022-05-03 NOTE — PROGRESS NOTE ADULT - PROBLEM SELECTOR PLAN 6
Not on any home meds

## 2022-05-03 NOTE — PROGRESS NOTE ADULT - PROBLEM SELECTOR PLAN 2
Acute PE recently diagnosed during previous admission earlier this month  -hold Lovenox for now given worsening anemia and thrombocytopenia  -currently saturating 100% on room air  no ac sec to thrombocytopenia, restart after heme clearance
Acute PE recently diagnosed during previous admission earlier this month  -hold Lovenox for now given worsening anemia and thrombocytopenia  -currently saturating 100% on room air  no ac secto thrombocytopenia
Acute PE recently diagnosed during previous admission earlier this month  -hold Lovenox for now given worsening anemia and thrombocytopenia  -currently saturating 100% on room air  no ac secto thrombocytopenia
Acute PE recently diagnosed during previous admission earlier this month  -hold Lovenox for now given worsening anemia and thrombocytopenia  -currently saturating 100% on room air  no ac sec to thrombocytopenia, restart after heme clearance

## 2022-05-03 NOTE — PROGRESS NOTE ADULT - PROBLEM SELECTOR PLAN 3
Previously on experimental chemotherapy outpatient; 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
Previously on experimental chemotherapy outpatient; was due to see Dr. 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
Previously on experimental chemotherapy outpatient; was 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
Previously on experimental chemotherapy outpatient; was [place] 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
Previously on experimental chemotherapy outpatient; 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
Previously on experimental chemotherapy outpatient; was due to see Dr. 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
Previously on experimental chemotherapy outpatient; was due to see Dr. 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

## 2022-05-03 NOTE — PROGRESS NOTE ADULT - PROBLEM SELECTOR PLAN 7
DVT ppx: Chemical DVT ppx on hold 2/2 anemia and thrombocytopenia  Diet: Consistent carb with Glucerna

## 2022-05-03 NOTE — PROVIDER CONTACT NOTE (CRITICAL VALUE NOTIFICATION) - ACTION/TREATMENT ORDERED:
Administer one unit of PRBCs and monitor post transfusion CBC.
ACP notified. Awaiting orders.
ACP made aware. Awaiting further orders

## 2022-05-03 NOTE — PROVIDER CONTACT NOTE (CRITICAL VALUE NOTIFICATION) - BACKGROUND
pt with malignant ascites. s/p 1 u platelets yesterday 5/2.
PMh of abdominal lipsarcoma. Admitted for low hemoglobin.
Pt admitted for low hemoglobin. Pt post 2 units PRBCs, most recent labs as follows:  Hemoglobin = 6.9 Hematocrit = 21.1 Platelets = 16

## 2022-05-03 NOTE — PROGRESS NOTE ADULT - PROBLEM/PLAN-5
DISPLAY PLAN FREE TEXT
Arm band on

## 2022-05-03 NOTE — PROGRESS NOTE ADULT - PROBLEM SELECTOR PROBLEM 1
Anemia in neoplastic disease

## 2022-05-03 NOTE — CONSULT NOTE ADULT - SUBJECTIVE AND OBJECTIVE BOX
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 Patient recently discharged on 4/21 on Lovenox for acute PE, which he was taking up until yesterday morning. He denies having melena, blood in his stools, hematuria or hematemesis. He has noticed that his abdomen has been slowly increasing in size again and this causes him to have early satiety and some abdominal discomfort, but not pain. He also states that he had 3 episodes of loose stool this morning. Lower leg swelling has been slowly worsening as well. (26 Apr 2022 17:04)      Vital Signs: Vital Signs Last 24 Hrs  T(C): 36.7 (03 May 2022 13:22), Max: 37.2 (02 May 2022 22:04)  T(F): 98 (03 May 2022 13:22), Max: 99 (02 May 2022 22:04)  HR: 98 (03 May 2022 13:22) (94 - 109)  BP: 102/57 (03 May 2022 13:22) (98/49 - 114/46)  BP(mean): --  RR: 16 (03 May 2022 05:45) (16 - 18)  SpO2: 97% (03 May 2022 13:22) (97% - 100%)    Past Medical/ Surgical History: PAST MEDICAL & SURGICAL HISTORY:  Hypertension  Now with low BPs    Diabetes mellitus  Fasting FS range from - per patient, off meds    Hypercholesteremia  off meds    CAD (coronary artery disease)  Off aspirin    Sleep apnea    Liposarcoma    Pulmonary embolism    History of cardiac cath  Pt reports 1 cardiac stent placed 1999    H/O sleep apnea  Per patient had Sleep Apnea surgery  in 1996- at [facility]- Was not retested for Sleep Apnea post surgery    History of colon resection  Dec 2019        Allergies: Allergies    oxycodone (Vomiting)    Intolerances        Medications: MEDICATIONS  (STANDING):  budesonide  80 MICROgram(s)/formoterol 4.5 MICROgram(s) Inhaler 2 Puff(s) Inhalation two times a day  chlorhexidine 2% Cloths 1 Application(s) Topical daily  cyanocobalamin 1000 MICROGram(s) Oral daily  ferrous    sulfate 325 milliGRAM(s) Oral daily  folic acid 1 milliGRAM(s) Oral daily  lactobacillus acidophilus 1 Tablet(s) Oral daily  lidocaine 1% Injectable 5 milliLiter(s) Local Injection once  multivitamin 1 Tablet(s) Oral daily  pantoprazole    Tablet 40 milliGRAM(s) Oral before breakfast    MEDICATIONS  (PRN):  acetaminophen     Tablet .. 650 milliGRAM(s) Oral every 6 hours PRN Temp greater or equal to 38C (100.4F), Mild Pain (1 - 3)  benzonatate 100 milliGRAM(s) Oral every 8 hours PRN Cough  HYDROmorphone   Tablet 1 milliGRAM(s) Oral every 4 hours PRN Moderate Pain (4 - 6)  HYDROmorphone  Injectable 0.2 milliGRAM(s) IV Push every 4 hours PRN Severe Pain (7 - 10)  melatonin 3 milliGRAM(s) Oral at bedtime PRN Insomnia  ondansetron Injectable 4 milliGRAM(s) IV Push every 8 hours PRN Nausea and/or Vomiting      SOCIAL HISTORY:    FAMILY HISTORY:  FH: heart disease  Mother          PHYSICAL EXAM:    General:   Well-groomed, well-nourished, in no distress  Lungs:  CTA bilaterally  Cardiovascular:   S1, S2,   Abdomen:  Soft, non-tender, non-distended,   Extremities:  no calf tenderness/swelling bilaterally  Musculoskeletal:  Full ROM in all joints w/o swelling/tenderness/effusion  Neuro/Psych:  A &O x 3    LABS:                        8.4    1.49  )-----------( 54       ( 03 May 2022 12:43 )             26.5     05-03    132<L>  |  102  |  15  ----------------------------<  126<H>  4.1   |  18<L>  |  0.75    Ca    7.9<L>      03 May 2022 04:24  Phos  2.7     05-03  Mg     1.60     05-03            RADIOLOGY & ADDITIONAL STUDIES:  < from: IR Procedure (04.20.22 @ 11:42) >    ACC: [id number] EXAM:  IR PROCEDURE                          PROCEDURE DATE:  04/20/2022          INTERPRETATION:  CLINICAL INFORMATION: Abdominal liposarcoma. Preop   evaluation for tunneled paracentesis catheter insertion    COMPARISON: Ultrasound of 4/18/2022 and CT of 4/4/2022    TECHNIQUE: Limited ultrasound of the abdomen to evaluate for ascites.    FINDINGS:    Multiple, noncontiguous loculations of ascites, primarily in the right   lateral abdomen and pelvis.  The remainder of the visualized abdomen and pelvis has been replaced with   known abdominal liposarcoma.    IMPRESSION:  Loculated ascites.  No safe percutaneous window for drainage which would not violate tumor.  Given the distribution of ascites, patient not a candidate for a tunneled   paracentesis catheter.  Large known abdominal liposarcoma distributed throughout the abdomen    Findings were discussed with NP. ANEL BEAULIEU [phone]   4/20/2022 11:45 AM by Dr. Gayle with read back confirmation.    --- End of Report ---            LUCERO GAYLE MD; Attending Interventional Radiologist  This document has been electronically signed. Apr 20 2022  5:07PM    < end of copied text >  < from: US Abdomen Limited (05.02.22 @ 13:11) >    ACC: [id number] EXAM:  US ABDOMEN LIMITED                          PROCEDURE DATE:  05/02/2022          INTERPRETATION:  CLINICAL HISTORY: Metastatic liposarcoma. Ascites.   Follow-up    COMPARISON: CT abdomen and pelvis 4/4/2022. Limited abdominal ultrasound   4/27/2022.    TECHNIQUE: Grayscale sonographic images of the 4 quadrants were obtained.    FINDINGS:  Redemonstrated large volume complicated ascites with septations and   debris throughout, as well as partially imaged soft tissue masses,   compatible with known malignant ascites.    IMPRESSION:    Large volume complicated ascites with peritoneal masses, compatible with   known peritoneal carcinomatosis/malignant ascites.    --- End of Report ---            RUDOLPH GARNETT MD; Attending Radiologist  This document has been electronically signed. 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 Patient recently discharged on 4/21 on Lovenox for acute PE, which he was taking up until yesterday morning. He denies having melena, blood in his stools, hematuria or hematemesis. He has noticed that his abdomen has been slowly increasing in size again and this causes him to have early satiety and some abdominal discomfort, but not pain. He also states that he had 3 episodes of loose stool this morning. Lower leg swelling has been slowly worsening as well. (26 Apr 2022 17:04)    Case was d/w IR attending MD Martins.     The pt has profound tumor burden with extensive peritoneal implants on imaging. Most recent US demonstrates "large volume" ascites, however the majority of the visualized volume as seen on personal imaging review and review in conjunction w/IR attending MD Martins is tumor burden, loculated regions w/questionable soft tissue component, or void of a percutaneous window to safely access the ascites.    Additionally, IR has already aborted a procedure for placement of an indwelling ascites drain due to the same imaging features/circumstances.    As such, IR will defer procedural intervention at this time. There is no safely accessible fluid collection with in abdomen amenable to drainage as significant tumor would need to be traversed.     IR to sign off at this time; IR could be reconsulted if pt continues to accumulate ascites and subsequent imaging demonstrates large superficial collection w/o tumor directly anterior.

## 2022-05-03 NOTE — PROVIDER CONTACT NOTE (CRITICAL VALUE NOTIFICATION) - SITUATION
hemoglobin 6.5, hematocrit 20.8, plts 53
Hematology lab called regarding platelet count of 13.
Hemoglobin = 6.9 Hematocrit = 21.1 Platelets = 16

## 2022-05-03 NOTE — CHART NOTE - NSCHARTNOTESELECT_GEN_ALL_CORE
Event Note
Oncology chart note
ACP/Event Note
ACP/Event Note
Event Note
Palliative/Event Note

## 2022-05-04 NOTE — PROGRESS NOTE ADULT - SUBJECTIVE AND OBJECTIVE BOX
SUBJECTIVE / OVERNIGHT EVENTS:  04-27-22 @ 22:25    MEDICATIONS  (STANDING):  budesonide  80 MICROgram(s)/formoterol 4.5 MICROgram(s) Inhaler 2 Puff(s) Inhalation two times a day  chlorhexidine 2% Cloths 1 Application(s) Topical daily  ferrous    sulfate 325 milliGRAM(s) Oral daily  folic acid 1 milliGRAM(s) Oral daily  lactobacillus acidophilus 1 Tablet(s) Oral daily  multivitamin 1 Tablet(s) Oral daily  pantoprazole    Tablet 40 milliGRAM(s) Oral before breakfast    MEDICATIONS  (PRN):  acetaminophen     Tablet .. 650 milliGRAM(s) Oral every 6 hours PRN Temp greater or equal to 38C (100.4F), Mild Pain (1 - 3)  benzonatate 100 milliGRAM(s) Oral every 8 hours PRN Cough  HYDROmorphone   Tablet 1 milliGRAM(s) Oral every 4 hours PRN Moderate Pain (4 - 6)  HYDROmorphone  Injectable 0.2 milliGRAM(s) IV Push every 4 hours PRN Severe Pain (7 - 10)  melatonin 3 milliGRAM(s) Oral at bedtime PRN Insomnia  ondansetron Injectable 4 milliGRAM(s) IV Push every 8 hours PRN Nausea and/or Vomiting    T(C): 36.9 (04-27-22 @ 21:36), Max: 36.9 (04-27-22 @ 11:07)  HR: 92 (04-27-22 @ 21:36) (79 - 92)  BP: 105/65 (04-27-22 @ 21:36) (93/58 - 105/65)  RR: 18 (04-27-22 @ 21:36) (18 - 20)  SpO2: 97% (04-27-22 @ 21:36) (97% - 99%)    CAPILLARY BLOOD GLUCOSE        I&O's Summary      Constitutional: No fever, fatigue  Skin: No rash.  Eyes: No recent vision problems or eye pain.  ENT: No congestion, ear pain, or sore throat.  Cardiovascular: No chest pain or palpation.  Respiratory: No cough, shortness of breath, congestion, or wheezing.  Gastrointestinal: No abdominal pain, nausea, vomiting, or diarrhea.  Genitourinary: No dysuria.  Musculoskeletal: No joint swelling.  Neurologic: No headache.    PHYSICAL EXAM:  GENERAL: NAD  EYES: EOMI, PERRLA  NECK: Supple, No JVD  CHEST/LUNG: dec breath sounds at bases  HEART:  S1 , S2 +  ABDOMEN: soft , bs+, distension+  EXTREMITIES:  edema+  NEUROLOGY:alert awake      LABS:                        7.4    2.36  )-----------( 42       ( 27 Apr 2022 18:15 )             22.7     04-27    136  |  105  |  15  ----------------------------<  114<H>  3.9   |  19<L>  |  0.66    Ca    7.8<L>      27 Apr 2022 07:14    TPro  4.6<L>  /  Alb  2.2<L>  /  TBili  0.9  /  DBili  x   /  AST  9   /  ALT  <5  /  AlkPhos  57  04-27    PT/INR - ( 26 Apr 2022 13:10 )   PT: 12.1 sec;   INR: 1.04 ratio         PTT - ( 26 Apr 2022 13:10 )  PTT:27.4 sec          RADIOLOGY & ADDITIONAL TESTS:    Imaging Personally Reviewed:    Consultant(s) Notes Reviewed:      Care Discussed with Consultants/Other Providers:  
    SUBJECTIVE / OVERNIGHT EVENTS:pt seen and examined  05-03-22    MEDICATIONS  (STANDING):  budesonide  80 MICROgram(s)/formoterol 4.5 MICROgram(s) Inhaler 2 Puff(s) Inhalation two times a day  chlorhexidine 2% Cloths 1 Application(s) Topical daily  cyanocobalamin 1000 MICROGram(s) Oral daily  ferrous    sulfate 325 milliGRAM(s) Oral daily  folic acid 1 milliGRAM(s) Oral daily  lactobacillus acidophilus 1 Tablet(s) Oral daily  lidocaine 1% Injectable 5 milliLiter(s) Local Injection once  multivitamin 1 Tablet(s) Oral daily  pantoprazole    Tablet 40 milliGRAM(s) Oral before breakfast    MEDICATIONS  (PRN):  acetaminophen     Tablet .. 650 milliGRAM(s) Oral every 6 hours PRN Temp greater or equal to 38C (100.4F), Mild Pain (1 - 3)  benzonatate 100 milliGRAM(s) Oral every 8 hours PRN Cough  guaiFENesin Oral Liquid (Sugar-Free) 200 milliGRAM(s) Oral every 6 hours PRN Cough  HYDROmorphone   Tablet 1 milliGRAM(s) Oral every 4 hours PRN Moderate Pain (4 - 6)  HYDROmorphone  Injectable 0.2 milliGRAM(s) IV Push every 4 hours PRN Severe Pain (7 - 10)  melatonin 3 milliGRAM(s) Oral at bedtime PRN Insomnia  ondansetron Injectable 4 milliGRAM(s) IV Push every 8 hours PRN Nausea and/or Vomiting    Vital Signs Last 24 Hrs  T(C): 36.6 (05-03-22 @ 21:31), Max: 36.7 (05-03-22 @ 13:22)  T(F): 97.9 (05-03-22 @ 21:31), Max: 98 (05-03-22 @ 13:22)  HR: 98 (05-03-22 @ 21:31) (94 - 98)  BP: 98/62 (05-03-22 @ 21:31) (98/62 - 114/46)  BP(mean): --  RR: 17 (05-03-22 @ 21:31) (16 - 17)  SpO2: 98% (05-03-22 @ 21:31) (97% - 100%)          Constitutional: No fever, fatigue  Skin: No rash.  Eyes: No recent vision problems or eye pain.  ENT: No congestion, ear pain, or sore throat.  Cardiovascular: No chest pain or palpation.  Respiratory: No cough, shortness of breath, congestion, or wheezing.  Gastrointestinal: No abdominal pain, nausea, vomiting, or diarrhea.  Genitourinary: No dysuria.  Musculoskeletal: No joint swelling.  Neurologic: No headache.    PHYSICAL EXAM:  GENERAL: NAD  EYES: EOMI, PERRLA  NECK: Supple, No JVD  CHEST/LUNG: dec breath sounds at bases  HEART:  S1 , S2 +  ABDOMEN: soft , bs+, distension+  EXTREMITIES:  edema+  NEUROLOGY:alert awake    LABS:  05-03    132<L>  |  102  |  15  ----------------------------<  126<H>  4.1   |  18<L>  |  0.75    Ca    7.9<L>      03 May 2022 04:24  Phos  2.7     05-03  Mg     1.60     05-03      Creatinine Trend: 0.75 <--, 0.81 <--, 0.80 <--, 0.74 <--, 0.76 <--, 0.69 <--, 0.66 <--                        8.4    1.49  )-----------( 54       ( 03 May 2022 12:43 )             26.5     Urine Studies:                
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 Patient recently discharged on 4/21 on Lovenox for acute PE, which he was taking up until yesterday morning. He denies having melena, blood in his stools, hematuria or hematemesis. He has noticed that his abdomen has been slowly increasing in size again and this causes him to have early satiety and some abdominal discomfort, but not pain. He also states that he had 3 episodes of loose stool this morning. Lower leg swelling has been slowly worsening as well. (26 Apr 2022 17:04)      PAST MEDICAL & SURGICAL HISTORY:  Hypertension  Now with low BPs    Diabetes mellitus  Fasting FS range from - per patient, off meds    Hypercholesteremia  off meds    CAD (coronary artery disease)  Off aspirin    Sleep apnea    Liposarcoma    Pulmonary embolism    History of cardiac cath  Pt reports 1 cardiac stent placed 1999    H/O sleep apnea  Per patient had Sleep Apnea surgery  in 1996- at [facility]- Was not retested for Sleep Apnea post surgery    History of colon resection  Dec 2019        MEDICATIONS  (STANDING):  budesonide  80 MICROgram(s)/formoterol 4.5 MICROgram(s) Inhaler 2 Puff(s) Inhalation two times a day  chlorhexidine 2% Cloths 1 Application(s) Topical daily  cyanocobalamin 1000 MICROGram(s) Oral daily  ferrous    sulfate 325 milliGRAM(s) Oral daily  folic acid 1 milliGRAM(s) Oral daily  lactobacillus acidophilus 1 Tablet(s) Oral daily  lidocaine 1% Injectable 5 milliLiter(s) Local Injection once  multivitamin 1 Tablet(s) Oral daily  pantoprazole    Tablet 40 milliGRAM(s) Oral before breakfast      Allergies    oxycodone (Vomiting)    Intolerances        SOCIAL HISTORY:  Denies ETOh,Smoking,     FAMILY HISTORY:  FH: heart disease  Mother        REVIEW OF SYSTEMS:    CONSTITUTIONAL: No weakness, fevers or chills  EYES/ENT: No visual changes;  No vertigo or throat pain   NECK: No pain or stiffness  RESPIRATORY: No cough, wheezing, hemoptysis; No shortness of breath  CARDIOVASCULAR: No chest pain or palpitations  GASTROINTESTINAL: No abdominal or epigastric pain. No nausea, vomiting, or hematemesis; No diarrhea or constipation. No melena or hematochezia.  GENITOURINARY: No dysuria, frequency or hematuria  NEUROLOGICAL: No numbness or weakness  SKIN: No itching, burning, rashes, or lesions   All other review of systems is negative unless indicated above.    VITAL:  T(C): , Max: 37.2 (05-02-22 @ 22:04)  T(F): , Max: 99 (05-02-22 @ 22:04)  HR: 95 (05-03-22 @ 05:45)  BP: 114/46 (05-03-22 @ 05:45)  BP(mean): --  RR: 16 (05-03-22 @ 05:45)  SpO2: 100% (05-03-22 @ 05:45)  Wt(kg): --    I and O's:        PHYSICAL EXAM:    Constitutional: NAD  HEENT: PERRLA,   Neck: No JVD  Respiratory: CTA B/L  Cardiovascular: S1 and S2  Gastrointestinal: BS+, soft, NT/ND  Extremities: No peripheral edema  Neurological: A/O x 3, no focal deficits  Psychiatric: Normal mood, normal affect  : No Clark  Skin: No rashes  Access: Not applicable  Back: No CVA tenderness    LABS:                        6.5    1.33  )-----------( 56       ( 03 May 2022 04:24 )             20.8     05-03    132<L>  |  102  |  15  ----------------------------<  126<H>  4.1   |  18<L>  |  0.75    Ca    7.9<L>      03 May 2022 04:24  Phos  2.7     05-03  Mg     1.60     05-03            RADIOLOGY & ADDITIONAL STUDIES:                          
    SUBJECTIVE / OVERNIGHT EVENTS:pt seen and examined  04-28-22    MEDICATIONS  (STANDING):  budesonide  80 MICROgram(s)/formoterol 4.5 MICROgram(s) Inhaler 2 Puff(s) Inhalation two times a day  chlorhexidine 2% Cloths 1 Application(s) Topical daily  ferrous    sulfate 325 milliGRAM(s) Oral daily  folic acid 1 milliGRAM(s) Oral daily  lactobacillus acidophilus 1 Tablet(s) Oral daily  multivitamin 1 Tablet(s) Oral daily  pantoprazole    Tablet 40 milliGRAM(s) Oral before breakfast    MEDICATIONS  (PRN):  acetaminophen     Tablet .. 650 milliGRAM(s) Oral every 6 hours PRN Temp greater or equal to 38C (100.4F), Mild Pain (1 - 3)  benzonatate 100 milliGRAM(s) Oral every 8 hours PRN Cough  HYDROmorphone   Tablet 1 milliGRAM(s) Oral every 4 hours PRN Moderate Pain (4 - 6)  HYDROmorphone  Injectable 0.2 milliGRAM(s) IV Push every 4 hours PRN Severe Pain (7 - 10)  melatonin 3 milliGRAM(s) Oral at bedtime PRN Insomnia  ondansetron Injectable 4 milliGRAM(s) IV Push every 8 hours PRN Nausea and/or Vomiting    Vital Signs Last 24 Hrs  T(C): 37.2 (04-28-22 @ 21:46), Max: 37.2 (04-28-22 @ 21:46)  T(F): 98.9 (04-28-22 @ 21:46), Max: 98.9 (04-28-22 @ 21:46)  HR: 57 (04-28-22 @ 21:46) (57 - 94)  BP: 95/51 (04-28-22 @ 21:46) (94/56 - 100/60)  BP(mean): --  RR: 18 (04-28-22 @ 21:46) (16 - 18)  SpO2: 97% (04-28-22 @ 21:46) (97% - 99%)        Constitutional: No fever, fatigue  Skin: No rash.  Eyes: No recent vision problems or eye pain.  ENT: No congestion, ear pain, or sore throat.  Cardiovascular: No chest pain or palpation.  Respiratory: No cough, shortness of breath, congestion, or wheezing.  Gastrointestinal: No abdominal pain, nausea, vomiting, or diarrhea.  Genitourinary: No dysuria.  Musculoskeletal: No joint swelling.  Neurologic: No headache.    PHYSICAL EXAM:  GENERAL: NAD  EYES: EOMI, PERRLA  NECK: Supple, No JVD  CHEST/LUNG: dec breath sounds at bases  HEART:  S1 , S2 +  ABDOMEN: soft , bs+, distension+  EXTREMITIES:  edema+  NEUROLOGY:alert awake      LABS:  04-28    133<L>  |  101  |  16  ----------------------------<  126<H>  3.7   |  22  |  0.69    Ca    8.3<L>      28 Apr 2022 02:40  Phos  2.9     04-28  Mg     1.60     04-28    TPro  5.5<L>  /  Alb  2.7<L>  /  TBili  0.6  /  DBili      /  AST  9   /  ALT  6   /  AlkPhos  69  04-28    Creatinine Trend: 0.69 <--, 0.66 <--, 0.85 <--                        8.4    2.24  )-----------( 84       ( 28 Apr 2022 12:12 )             26.1     Urine Studies:            LIVER FUNCTIONS - ( 28 Apr 2022 02:40 )  Alb: 2.7 g/dL / Pro: 5.5 g/dL / ALK PHOS: 69 U/L / ALT: 6 U/L / AST: 9 U/L / GGT: x           PT/INR - ( 28 Apr 2022 02:40 )   PT: 11.5 sec;   INR: 0.99 ratio         PTT - ( 28 Apr 2022 02:40 )  PTT:29.2 sec  Consultant(s) Notes Reviewed:      Care Discussed with Consultants/Other Providers:  
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 Patient recently discharged on 4/21 on Lovenox for acute PE, which he was taking up until yesterday morning. He denies having melena, blood in his stools, hematuria or hematemesis. He has noticed that his abdomen has been slowly increasing in size again and this causes him to have early satiety and some abdominal discomfort, but not pain. He also states that he had 3 episodes of loose stool this morning. Lower leg swelling has been slowly worsening as well. (26 Apr 2022 17:04)      PAST MEDICAL & SURGICAL HISTORY:  Hypertension  Now with low BPs    Diabetes mellitus  Fasting FS range from - per patient, off meds    Hypercholesteremia  off meds    CAD (coronary artery disease)  Off aspirin    Sleep apnea    Liposarcoma    Pulmonary embolism    History of cardiac cath  Pt reports 1 cardiac stent placed 1999    H/O sleep apnea  Per patient had Sleep Apnea surgery  in 1996- at [facility]- Was not retested for Sleep Apnea post surgery    History of colon resection  Dec 2019        MEDICATIONS  (STANDING):  budesonide  80 MICROgram(s)/formoterol 4.5 MICROgram(s) Inhaler 2 Puff(s) Inhalation two times a day  chlorhexidine 2% Cloths 1 Application(s) Topical daily  cyanocobalamin 1000 MICROGram(s) Oral daily  ferrous    sulfate 325 milliGRAM(s) Oral daily  folic acid 1 milliGRAM(s) Oral daily  lactobacillus acidophilus 1 Tablet(s) Oral daily  lidocaine 1% Injectable 5 milliLiter(s) Local Injection once  multivitamin 1 Tablet(s) Oral daily  pantoprazole    Tablet 40 milliGRAM(s) Oral before breakfast      Allergies    oxycodone (Vomiting)    Intolerances        SOCIAL HISTORY:  Denies ETOh,Smoking,     FAMILY HISTORY:  FH: heart disease  Mother        REVIEW OF SYSTEMS:    CONSTITUTIONAL: No weakness, fevers or chills  EYES/ENT: No visual changes;  No vertigo or throat pain   NECK: No pain or stiffness  RESPIRATORY: No cough, wheezing, hemoptysis; No shortness of breath  CARDIOVASCULAR: No chest pain or palpitations  GASTROINTESTINAL: No abdominal or epigastric pain. No nausea, vomiting, or hematemesis; No diarrhea or constipation. No melena or hematochezia.  GENITOURINARY: No dysuria, frequency or hematuria  NEUROLOGICAL: No numbness or weakness  SKIN: No itching, burning, rashes, or lesions   All other review of systems is negative unless indicated above.    VITAL:  T(C): , Max: 36.7 (05-03-22 @ 13:22)  T(F): , Max: 98 (05-03-22 @ 13:22)  HR: 96 (05-04-22 @ 06:32)  BP: 104/68 (05-04-22 @ 06:32)  BP(mean): --  RR: 17 (05-04-22 @ 06:32)  SpO2: 95% (05-04-22 @ 06:32)  Wt(kg): --    I and O's:        PHYSICAL EXAM:    Constitutional: NAD  HEENT: PERRLA,   Neck: No JVD  Respiratory: CTA B/L  Cardiovascular: S1 and S2  Gastrointestinal: BS+, soft, NT/ND  Extremities: No peripheral edema  Neurological: A/O x 3, no focal deficits  Psychiatric: Normal mood, normal affect  : No Clark  Skin: No rashes  Access: Not applicable  Back: No CVA tenderness    LABS:                        8.0    1.60  )-----------( 56       ( 04 May 2022 07:58 )             25.2     05-04    135  |  105  |  18  ----------------------------<  126<H>  4.5   |  17<L>  |  0.81    Ca    8.2<L>      04 May 2022 07:58  Phos  2.6     05-04  Mg     1.70     05-04            RADIOLOGY & ADDITIONAL STUDIES:                          
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 Patient recently discharged on 4/21 on Lovenox for acute PE, which he was taking up until yesterday morning. He denies having melena, blood in his stools, hematuria or hematemesis. He has noticed that his abdomen has been slowly increasing in size again and this causes him to have early satiety and some abdominal discomfort, but not pain. He also states that he had 3 episodes of loose stool this morning. Lower leg swelling has been slowly worsening as well. (26 Apr 2022 17:04)      PAST MEDICAL & SURGICAL HISTORY:  Hypertension  Now with low BPs    Diabetes mellitus  Fasting FS range from - per patient, off meds    Hypercholesteremia  off meds    CAD (coronary artery disease)  Off aspirin    Sleep apnea    Liposarcoma    Pulmonary embolism    History of cardiac cath  Pt reports 1 cardiac stent placed 1999    H/O sleep apnea  Per patient had Sleep Apnea surgery  in 1996- at [facility]- Was not retested for Sleep Apnea post surgery    History of colon resection  Dec 2019        MEDICATIONS  (STANDING):  budesonide  80 MICROgram(s)/formoterol 4.5 MICROgram(s) Inhaler 2 Puff(s) Inhalation two times a day  chlorhexidine 2% Cloths 1 Application(s) Topical daily  cyanocobalamin 1000 MICROGram(s) Oral daily  ferrous    sulfate 325 milliGRAM(s) Oral daily  folic acid 1 milliGRAM(s) Oral daily  lactobacillus acidophilus 1 Tablet(s) Oral daily  lidocaine 1% Injectable 5 milliLiter(s) Local Injection once  multivitamin 1 Tablet(s) Oral daily  pantoprazole    Tablet 40 milliGRAM(s) Oral before breakfast      Allergies    oxycodone (Vomiting)    Intolerances        SOCIAL HISTORY:  Denies ETOh,Smoking,     FAMILY HISTORY:  FH: heart disease  Mother        REVIEW OF SYSTEMS:    CONSTITUTIONAL: No weakness, fevers or chills  EYES/ENT: No visual changes;  No vertigo or throat pain   NECK: No pain or stiffness  RESPIRATORY: No cough, wheezing, hemoptysis; No shortness of breath  CARDIOVASCULAR: No chest pain or palpitations  GASTROINTESTINAL: No abdominal or epigastric pain. No nausea, vomiting, or hematemesis; No diarrhea or constipation. No melena or hematochezia.  GENITOURINARY: No dysuria, frequency or hematuria  NEUROLOGICAL: No numbness or weakness  SKIN: No itching, burning, rashes, or lesions   All other review of systems is negative unless indicated above.    VITAL:  T(C): , Max: 36.9 (05-01-22 @ 21:26)  T(F): , Max: 98.5 (05-01-22 @ 21:26)  HR: 88 (05-02-22 @ 06:44)  BP: 101/62 (05-02-22 @ 06:44)  BP(mean): --  RR: 16 (05-02-22 @ 06:44)  SpO2: 99% (05-02-22 @ 06:44)  Wt(kg): --    I and O's:        PHYSICAL EXAM:    Constitutional: NAD  HEENT: PERRLA,   Neck: No JVD  Respiratory: CTA B/L  Cardiovascular: S1 and S2  Gastrointestinal: BS+, soft, NT/ND  Extremities: No peripheral edema  Neurological: A/O x 3, no focal deficits  Psychiatric: Normal mood, normal affect  : No Clark  Skin: No rashes  Access: Not applicable  Back: No CVA tenderness    LABS:                        7.7    1.34  )-----------( 40       ( 01 May 2022 06:58 )             23.8     05-01    134<L>  |  103  |  17  ----------------------------<  133<H>  4.4   |  22  |  0.80    Ca    7.8<L>      01 May 2022 09:23  Phos  2.7     05-01  Mg     1.60     05-01            RADIOLOGY & ADDITIONAL STUDIES:                          
chart reviewed, full consult to follow
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 Patient recently discharged on 4/21 on Lovenox for acute PE, which he was taking up until yesterday morning. He denies having melena, blood in his stools, hematuria or hematemesis. He has noticed that his abdomen has been slowly increasing in size again and this causes him to have early satiety and some abdominal discomfort, but not pain. He also states that he had 3 episodes of loose stool this morning. Lower leg swelling has been slowly worsening as well. (26 Apr 2022 17:04)      PAST MEDICAL & SURGICAL HISTORY:  Hypertension  Now with low BPs    Diabetes mellitus  Fasting FS range from - per patient, off meds    Hypercholesteremia  off meds    CAD (coronary artery disease)  Off aspirin    Sleep apnea    Liposarcoma    Pulmonary embolism    History of cardiac cath  Pt reports 1 cardiac stent placed 1999    H/O sleep apnea  Per patient had Sleep Apnea surgery  in 1996- at [facility]- Was not retested for Sleep Apnea post surgery    History of colon resection  Dec 2019        MEDICATIONS  (STANDING):  budesonide  80 MICROgram(s)/formoterol 4.5 MICROgram(s) Inhaler 2 Puff(s) Inhalation two times a day  chlorhexidine 2% Cloths 1 Application(s) Topical daily  ferrous    sulfate 325 milliGRAM(s) Oral daily  folic acid 1 milliGRAM(s) Oral daily  lactobacillus acidophilus 1 Tablet(s) Oral daily  multivitamin 1 Tablet(s) Oral daily  pantoprazole    Tablet 40 milliGRAM(s) Oral before breakfast      Allergies    oxycodone (Vomiting)    Intolerances        SOCIAL HISTORY:  Denies ETOh,Smoking,     FAMILY HISTORY:  FH: heart disease  Mother        REVIEW OF SYSTEMS:    CONSTITUTIONAL: No weakness, fevers or chills  EYES/ENT: No visual changes;  No vertigo or throat pain   NECK: No pain or stiffness  RESPIRATORY: No cough, wheezing, hemoptysis; No shortness of breath  CARDIOVASCULAR: No chest pain or palpitations  GASTROINTESTINAL: No abdominal or epigastric pain. No nausea, vomiting, or hematemesis; No diarrhea or constipation. No melena or hematochezia.  GENITOURINARY: No dysuria, frequency or hematuria  NEUROLOGICAL: No numbness or weakness  SKIN: No itching, burning, rashes, or lesions   All other review of systems is negative unless indicated above.    VITAL:  T(C): , Max: 37.2 (04-28-22 @ 21:46)  T(F): , Max: 98.9 (04-28-22 @ 21:46)  HR: 80 (04-29-22 @ 06:18)  BP: 110/66 (04-29-22 @ 06:18)  BP(mean): --  RR: 18 (04-29-22 @ 06:18)  SpO2: 97% (04-29-22 @ 06:18)  Wt(kg): --    I and O's:    04-28 @ 07:01  -  04-29 @ 07:00  --------------------------------------------------------  IN: 975 mL / OUT: 0 mL / NET: 975 mL          PHYSICAL EXAM:    Constitutional: NAD  HEENT: PERRLA,   Neck: No JVD  Respiratory: CTA B/L  Cardiovascular: S1 and S2  Gastrointestinal: BS+, soft, NT/ND  Extremities: No peripheral edema  Neurological: A/O x 3, no focal deficits  Psychiatric: Normal mood, normal affect  : No Clark  Skin: No rashes  Access: Not applicable  Back: No CVA tenderness    LABS:                        7.2    1.87  )-----------( 68       ( 29 Apr 2022 07:32 )             22.4     04-28    133<L>  |  101  |  16  ----------------------------<  126<H>  3.7   |  22  |  0.69    Ca    8.3<L>      28 Apr 2022 02:40  Phos  2.9     04-28  Mg     1.60     04-28    TPro  5.5<L>  /  Alb  2.7<L>  /  TBili  0.6  /  DBili  x   /  AST  9   /  ALT  6   /  AlkPhos  69  04-28          RADIOLOGY & ADDITIONAL STUDIES:                          
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 Patient recently discharged on 4/21 on Lovenox for acute PE, which he was taking up until yesterday morning. He denies having melena, blood in his stools, hematuria or hematemesis. He has noticed that his abdomen has been slowly increasing in size again and this causes him to have early satiety and some abdominal discomfort, but not pain. He also states that he had 3 episodes of loose stool this morning. Lower leg swelling has been slowly worsening as well. (26 Apr 2022 17:04)      PAST MEDICAL & SURGICAL HISTORY:  Hypertension  Now with low BPs    Diabetes mellitus  Fasting FS range from - per patient, off meds    Hypercholesteremia  off meds    CAD (coronary artery disease)  Off aspirin    Sleep apnea    Liposarcoma    Pulmonary embolism    History of cardiac cath  Pt reports 1 cardiac stent placed 1999    H/O sleep apnea  Per patient had Sleep Apnea surgery  in 1996- at [facility] Was not retested for Sleep Apnea post surgery    History of colon resection  Dec 2019        MEDICATIONS  (STANDING):  budesonide  80 MICROgram(s)/formoterol 4.5 MICROgram(s) Inhaler 2 Puff(s) Inhalation two times a day  chlorhexidine 2% Cloths 1 Application(s) Topical daily  cyanocobalamin 1000 MICROGram(s) Oral daily  ferrous    sulfate 325 milliGRAM(s) Oral daily  folic acid 1 milliGRAM(s) Oral daily  lactobacillus acidophilus 1 Tablet(s) Oral daily  lidocaine 1% Injectable 5 milliLiter(s) Local Injection once  multivitamin 1 Tablet(s) Oral daily  pantoprazole    Tablet 40 milliGRAM(s) Oral before breakfast      Allergies    oxycodone (Vomiting)    Intolerances        SOCIAL HISTORY:  Denies ETOh,Smoking,     FAMILY HISTORY:  FH: heart disease  Mother        REVIEW OF SYSTEMS:    CONSTITUTIONAL: No weakness, fevers or chills  EYES/ENT: No visual changes;  No vertigo or throat pain   NECK: No pain or stiffness  RESPIRATORY: No cough, wheezing, hemoptysis; No shortness of breath  CARDIOVASCULAR: No chest pain or palpitations  GASTROINTESTINAL: No abdominal or epigastric pain. No nausea, vomiting, or hematemesis; No diarrhea or constipation. No melena or hematochezia.  GENITOURINARY: No dysuria, frequency or hematuria  NEUROLOGICAL: No numbness or weakness  SKIN: No itching, burning, rashes, or lesions   All other review of systems is negative unless indicated above.    VITAL:  T(C): , Max: 36.9 (04-29-22 @ 21:33)  T(F): , Max: 98.4 (04-29-22 @ 21:33)  HR: 78 (04-30-22 @ 12:56)  BP: 96/60 (04-30-22 @ 12:56)  BP(mean): --  RR: 18 (04-30-22 @ 12:56)  SpO2: 98% (04-30-22 @ 12:56)  Wt(kg): --    I and O's:    04-28 @ 07:01  -  04-29 @ 07:00  --------------------------------------------------------  IN: 975 mL / OUT: 0 mL / NET: 975 mL    04-29 @ 07:01  -  04-30 @ 07:00  --------------------------------------------------------  IN: 1025 mL / OUT: 0 mL / NET: 1025 mL          PHYSICAL EXAM:    Constitutional: NAD  HEENT: PERRLA,   Neck: No JVD  Respiratory: CTA B/L  Cardiovascular: S1 and S2  Gastrointestinal: BS+, soft, NT/ND  Extremities: No peripheral edema  Neurological: A/O x 3, no focal deficits  Psychiatric: Normal mood, normal affect  : No Clark  Skin: No rashes  Access: Not applicable  Back: No CVA tenderness    LABS:                        7.2    1.48  )-----------( 50       ( 30 Apr 2022 07:53 )             22.4     04-30    135  |  103  |  17  ----------------------------<  116<H>  4.0   |  22  |  0.74    Ca    7.7<L>      30 Apr 2022 07:53  Phos  2.7     04-30  Mg     1.60     04-30            RADIOLOGY & ADDITIONAL STUDIES:                          
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 Patient recently discharged on 4/21 on Lovenox for acute PE, which he was taking up until yesterday morning. He denies having melena, blood in his stools, hematuria or hematemesis. He has noticed that his abdomen has been slowly increasing in size again and this causes him to have early satiety and some abdominal discomfort, but not pain. He also states that he had 3 episodes of loose stool this morning. Lower leg swelling has been slowly worsening as well. (26 Apr 2022 17:04)      PAST MEDICAL & SURGICAL HISTORY:  Hypertension  Now with low BPs    Diabetes mellitus  Fasting FS range from - per patient, off meds    Hypercholesteremia  off meds    CAD (coronary artery disease)  Off aspirin    Sleep apnea    Liposarcoma    Pulmonary embolism    History of cardiac cath  Pt reports 1 cardiac stent placed 1999    H/O sleep apnea  Per patient had Sleep Apnea surgery  in 1996- at [facility]- Was not retested for Sleep Apnea post surgery    History of colon resection  Dec 2019        MEDICATIONS  (STANDING):  budesonide  80 MICROgram(s)/formoterol 4.5 MICROgram(s) Inhaler 2 Puff(s) Inhalation two times a day  chlorhexidine 2% Cloths 1 Application(s) Topical daily  cyanocobalamin 1000 MICROGram(s) Oral daily  ferrous    sulfate 325 milliGRAM(s) Oral daily  folic acid 1 milliGRAM(s) Oral daily  lactobacillus acidophilus 1 Tablet(s) Oral daily  lidocaine 1% Injectable 5 milliLiter(s) Local Injection once  multivitamin 1 Tablet(s) Oral daily  pantoprazole    Tablet 40 milliGRAM(s) Oral before breakfast      Allergies    oxycodone (Vomiting)    Intolerances        SOCIAL HISTORY:  Denies ETOh,Smoking,     FAMILY HISTORY:  FH: heart disease  Mother        REVIEW OF SYSTEMS:    CONSTITUTIONAL: No weakness, fevers or chills  EYES/ENT: No visual changes;  No vertigo or throat pain   NECK: No pain or stiffness  RESPIRATORY: No cough, wheezing, hemoptysis; No shortness of breath  CARDIOVASCULAR: No chest pain or palpitations  GASTROINTESTINAL: No abdominal or epigastric pain. No nausea, vomiting, or hematemesis; No diarrhea or constipation. No melena or hematochezia.  GENITOURINARY: No dysuria, frequency or hematuria  NEUROLOGICAL: No numbness or weakness  SKIN: No itching, burning, rashes, or lesions   All other review of systems is negative unless indicated above.    VITAL:  T(C): , Max: 36.9 (05-01-22 @ 06:08)  T(F): , Max: 98.5 (05-01-22 @ 06:08)  HR: 89 (05-01-22 @ 06:08)  BP: 96/61 (05-01-22 @ 06:08)  BP(mean): --  RR: 18 (05-01-22 @ 06:08)  SpO2: 97% (05-01-22 @ 06:08)  Wt(kg): --    I and O's:    04-29 @ 07:01  -  04-30 @ 07:00  --------------------------------------------------------  IN: 1025 mL / OUT: 0 mL / NET: 1025 mL          PHYSICAL EXAM:    Constitutional: NAD  HEENT: PERRLA,   Neck: No JVD  Respiratory: CTA B/L  Cardiovascular: S1 and S2  Gastrointestinal: BS+, soft, NT/ND  Extremities: No peripheral edema  Neurological: A/O x 3, no focal deficits  Psychiatric: Normal mood, normal affect  : No Clark  Skin: No rashes  Access: Not applicable  Back: No CVA tenderness    LABS:                        7.7    1.34  )-----------( 40       ( 01 May 2022 06:58 )             23.8     04-30    135  |  103  |  17  ----------------------------<  116<H>  4.0   |  22  |  0.74    Ca    7.7<L>      30 Apr 2022 07:53  Phos  2.5     05-01  Mg     1.60     04-30            RADIOLOGY & ADDITIONAL STUDIES:                          
    SUBJECTIVE / OVERNIGHT EVENTS:pt seen and examined  04-30-22    MEDICATIONS  (STANDING):  budesonide  80 MICROgram(s)/formoterol 4.5 MICROgram(s) Inhaler 2 Puff(s) Inhalation two times a day  chlorhexidine 2% Cloths 1 Application(s) Topical daily  cyanocobalamin 1000 MICROGram(s) Oral daily  ferrous    sulfate 325 milliGRAM(s) Oral daily  folic acid 1 milliGRAM(s) Oral daily  lactobacillus acidophilus 1 Tablet(s) Oral daily  lidocaine 1% Injectable 5 milliLiter(s) Local Injection once  multivitamin 1 Tablet(s) Oral daily  pantoprazole    Tablet 40 milliGRAM(s) Oral before breakfast    MEDICATIONS  (PRN):  acetaminophen     Tablet .. 650 milliGRAM(s) Oral every 6 hours PRN Temp greater or equal to 38C (100.4F), Mild Pain (1 - 3)  benzonatate 100 milliGRAM(s) Oral every 8 hours PRN Cough  guaiFENesin Oral Liquid (Sugar-Free) 100 milliGRAM(s) Oral once PRN Cough  HYDROmorphone   Tablet 1 milliGRAM(s) Oral every 4 hours PRN Moderate Pain (4 - 6)  HYDROmorphone  Injectable 0.2 milliGRAM(s) IV Push every 4 hours PRN Severe Pain (7 - 10)  melatonin 3 milliGRAM(s) Oral at bedtime PRN Insomnia  ondansetron Injectable 4 milliGRAM(s) IV Push every 8 hours PRN Nausea and/or Vomiting    Vital Signs Last 24 Hrs  T(C): 36.8 (04-30-22 @ 21:18), Max: 36.8 (04-30-22 @ 21:18)  T(F): 98.2 (04-30-22 @ 21:18), Max: 98.2 (04-30-22 @ 21:18)  HR: 93 (04-30-22 @ 21:18) (78 - 93)  BP: 101/61 (04-30-22 @ 21:18) (96/60 - 102/65)  BP(mean): --  RR: 18 (04-30-22 @ 21:18) (16 - 18)  SpO2: 97% (04-30-22 @ 21:18) (97% - 98%)            Constitutional: No fever, fatigue  Skin: No rash.  Eyes: No recent vision problems or eye pain.  ENT: No congestion, ear pain, or sore throat.  Cardiovascular: No chest pain or palpation.  Respiratory: No cough, shortness of breath, congestion, or wheezing.  Gastrointestinal: No abdominal pain, nausea, vomiting, or diarrhea.  Genitourinary: No dysuria.  Musculoskeletal: No joint swelling.  Neurologic: No headache.    PHYSICAL EXAM:  GENERAL: NAD  EYES: EOMI, PERRLA  NECK: Supple, No JVD  CHEST/LUNG: dec breath sounds at bases  HEART:  S1 , S2 +  ABDOMEN: soft , bs+, distension+  EXTREMITIES:  edema+  NEUROLOGY:alert awake      LABS:  04-30    135  |  103  |  17  ----------------------------<  116<H>  4.0   |  22  |  0.74    Ca    7.7<L>      30 Apr 2022 07:53  Phos  2.7     04-30  Mg     1.60     04-30      Creatinine Trend: 0.74 <--, 0.76 <--, 0.69 <--, 0.66 <--, 0.85 <--                        7.2    1.48  )-----------( 50       ( 30 Apr 2022 07:53 )             22.4     Urine Studies:              PT/INR - ( 29 Apr 2022 07:32 )   PT: 11.4 sec;   INR: 0.98 ratio           PT/INR - ( 29 Apr 2022 07:32 )   PT: 11.4 sec;   INR: 0.98 ratio         PTT - ( 28 Apr 2022 02:40 )  PTT:29.2 sec       PTT - ( 28 Apr 2022 02:40 )  PTT:29.2 sec  Consultant(s) Notes Reviewed:      Care Discussed with Consultants/Other Providers:  
    SUBJECTIVE / OVERNIGHT EVENTS:pt seen and examined  04-29-22    MEDICATIONS  (STANDING):  budesonide  80 MICROgram(s)/formoterol 4.5 MICROgram(s) Inhaler 2 Puff(s) Inhalation two times a day  chlorhexidine 2% Cloths 1 Application(s) Topical daily  cyanocobalamin 1000 MICROGram(s) Oral daily  ferrous    sulfate 325 milliGRAM(s) Oral daily  folic acid 1 milliGRAM(s) Oral daily  lactobacillus acidophilus 1 Tablet(s) Oral daily  lidocaine 1% Injectable 5 milliLiter(s) Local Injection once  multivitamin 1 Tablet(s) Oral daily  pantoprazole    Tablet 40 milliGRAM(s) Oral before breakfast    MEDICATIONS  (PRN):  acetaminophen     Tablet .. 650 milliGRAM(s) Oral every 6 hours PRN Temp greater or equal to 38C (100.4F), Mild Pain (1 - 3)  benzonatate 100 milliGRAM(s) Oral every 8 hours PRN Cough  HYDROmorphone   Tablet 1 milliGRAM(s) Oral every 4 hours PRN Moderate Pain (4 - 6)  HYDROmorphone  Injectable 0.2 milliGRAM(s) IV Push every 4 hours PRN Severe Pain (7 - 10)  melatonin 3 milliGRAM(s) Oral at bedtime PRN Insomnia  ondansetron Injectable 4 milliGRAM(s) IV Push every 8 hours PRN Nausea and/or Vomiting    Vital Signs Last 24 Hrs  T(C): 36.4 (04-29-22 @ 12:13), Max: 37.2 (04-28-22 @ 21:46)  T(F): 97.5 (04-29-22 @ 12:13), Max: 98.9 (04-28-22 @ 21:46)  HR: 76 (04-29-22 @ 12:13) (57 - 80)  BP: 97/66 (04-29-22 @ 12:13) (95/51 - 110/66)  BP(mean): --  RR: 16 (04-29-22 @ 12:13) (16 - 18)  SpO2: 99% (04-29-22 @ 12:13) (97% - 100%)            Constitutional: No fever, fatigue  Skin: No rash.  Eyes: No recent vision problems or eye pain.  ENT: No congestion, ear pain, or sore throat.  Cardiovascular: No chest pain or palpation.  Respiratory: No cough, shortness of breath, congestion, or wheezing.  Gastrointestinal: No abdominal pain, nausea, vomiting, or diarrhea.  Genitourinary: No dysuria.  Musculoskeletal: No joint swelling.  Neurologic: No headache.    PHYSICAL EXAM:  GENERAL: NAD  EYES: EOMI, PERRLA  NECK: Supple, No JVD  CHEST/LUNG: dec breath sounds at bases  HEART:  S1 , S2 +  ABDOMEN: soft , bs+, distension+  EXTREMITIES:  edema+  NEUROLOGY:alert awake      LABS:  04-29    135  |  104  |  17  ----------------------------<  124<H>  3.8   |  21<L>  |  0.76    Ca    8.0<L>      29 Apr 2022 07:32  Phos  3.1     04-29  Mg     1.60     04-29    TPro  5.5<L>  /  Alb  2.7<L>  /  TBili  0.6  /  DBili      /  AST  9   /  ALT  6   /  AlkPhos  69  04-28    Creatinine Trend: 0.76 <--, 0.69 <--, 0.66 <--, 0.85 <--                        7.2    1.87  )-----------( 68       ( 29 Apr 2022 07:32 )             22.4     Urine Studies:            LIVER FUNCTIONS - ( 28 Apr 2022 02:40 )  Alb: 2.7 g/dL / Pro: 5.5 g/dL / ALK PHOS: 69 U/L / ALT: 6 U/L / AST: 9 U/L / GGT: x           PT/INR - ( 29 Apr 2022 07:32 )   PT: 11.4 sec;   INR: 0.98 ratio         PTT - ( 28 Apr 2022 02:40 )  PTT:29.2 sec       PTT - ( 28 Apr 2022 02:40 )  PTT:29.2 sec  Consultant(s) Notes Reviewed:      Care Discussed with Consultants/Other Providers:  
    SUBJECTIVE / OVERNIGHT EVENTS:pt seen and examined  05-01-22    MEDICATIONS  (STANDING):  budesonide  80 MICROgram(s)/formoterol 4.5 MICROgram(s) Inhaler 2 Puff(s) Inhalation two times a day  chlorhexidine 2% Cloths 1 Application(s) Topical daily  cyanocobalamin 1000 MICROGram(s) Oral daily  ferrous    sulfate 325 milliGRAM(s) Oral daily  folic acid 1 milliGRAM(s) Oral daily  lactobacillus acidophilus 1 Tablet(s) Oral daily  lidocaine 1% Injectable 5 milliLiter(s) Local Injection once  multivitamin 1 Tablet(s) Oral daily  pantoprazole    Tablet 40 milliGRAM(s) Oral before breakfast    MEDICATIONS  (PRN):  acetaminophen     Tablet .. 650 milliGRAM(s) Oral every 6 hours PRN Temp greater or equal to 38C (100.4F), Mild Pain (1 - 3)  benzonatate 100 milliGRAM(s) Oral every 8 hours PRN Cough  HYDROmorphone   Tablet 1 milliGRAM(s) Oral every 4 hours PRN Moderate Pain (4 - 6)  HYDROmorphone  Injectable 0.2 milliGRAM(s) IV Push every 4 hours PRN Severe Pain (7 - 10)  melatonin 3 milliGRAM(s) Oral at bedtime PRN Insomnia  ondansetron Injectable 4 milliGRAM(s) IV Push every 8 hours PRN Nausea and/or Vomiting    Vital Signs Last 24 Hrs  T(C): 36.9 (05-01-22 @ 21:26), Max: 36.9 (05-01-22 @ 06:08)  T(F): 98.5 (05-01-22 @ 21:26), Max: 98.5 (05-01-22 @ 06:08)  HR: 91 (05-01-22 @ 21:26) (88 - 91)  BP: 103/65 (05-01-22 @ 21:26) (96/61 - 103/65)  BP(mean): --  RR: 17 (05-01-22 @ 21:26) (17 - 18)  SpO2: 100% (05-01-22 @ 21:26) (97% - 100%)            Constitutional: No fever, fatigue  Skin: No rash.  Eyes: No recent vision problems or eye pain.  ENT: No congestion, ear pain, or sore throat.  Cardiovascular: No chest pain or palpation.  Respiratory: No cough, shortness of breath, congestion, or wheezing.  Gastrointestinal: No abdominal pain, nausea, vomiting, or diarrhea.  Genitourinary: No dysuria.  Musculoskeletal: No joint swelling.  Neurologic: No headache.    PHYSICAL EXAM:  GENERAL: NAD  EYES: EOMI, PERRLA  NECK: Supple, No JVD  CHEST/LUNG: dec breath sounds at bases  HEART:  S1 , S2 +  ABDOMEN: soft , bs+, distension+  EXTREMITIES:  edema+  NEUROLOGY:alert awake    LABS:  05-01    134<L>  |  103  |  17  ----------------------------<  133<H>  4.4   |  22  |  0.80    Ca    7.8<L>      01 May 2022 09:23  Phos  2.7     05-01  Mg     1.60     05-01      Creatinine Trend: 0.80 <--, 0.74 <--, 0.76 <--, 0.69 <--, 0.66 <--, 0.85 <--                        7.7    1.34  )-----------( 40       ( 01 May 2022 06:58 )             23.8     Urine Studies:                     PT/INR - ( 29 Apr 2022 07:32 )   PT: 11.4 sec;   INR: 0.98 ratio         PTT - ( 28 Apr 2022 02:40 )  PTT:29.2 sec       PTT - ( 28 Apr 2022 02:40 )  PTT:29.2 sec  Consultant(s) Notes Reviewed:      Care Discussed with Consultants/Other Providers:  
    SUBJECTIVE / OVERNIGHT EVENTS:pt seen and examined  05-02-22    MEDICATIONS  (STANDING):  budesonide  80 MICROgram(s)/formoterol 4.5 MICROgram(s) Inhaler 2 Puff(s) Inhalation two times a day  chlorhexidine 2% Cloths 1 Application(s) Topical daily  cyanocobalamin 1000 MICROGram(s) Oral daily  ferrous    sulfate 325 milliGRAM(s) Oral daily  folic acid 1 milliGRAM(s) Oral daily  lactobacillus acidophilus 1 Tablet(s) Oral daily  lidocaine 1% Injectable 5 milliLiter(s) Local Injection once  multivitamin 1 Tablet(s) Oral daily  pantoprazole    Tablet 40 milliGRAM(s) Oral before breakfast    MEDICATIONS  (PRN):  acetaminophen     Tablet .. 650 milliGRAM(s) Oral every 6 hours PRN Temp greater or equal to 38C (100.4F), Mild Pain (1 - 3)  benzonatate 100 milliGRAM(s) Oral every 8 hours PRN Cough  HYDROmorphone   Tablet 1 milliGRAM(s) Oral every 4 hours PRN Moderate Pain (4 - 6)  HYDROmorphone  Injectable 0.2 milliGRAM(s) IV Push every 4 hours PRN Severe Pain (7 - 10)  melatonin 3 milliGRAM(s) Oral at bedtime PRN Insomnia  ondansetron Injectable 4 milliGRAM(s) IV Push every 8 hours PRN Nausea and/or Vomiting    Vital Signs Last 24 Hrs  T(C): 36.9 (05-02-22 @ 15:50), Max: 37.1 (05-02-22 @ 15:00)  T(F): 98.4 (05-02-22 @ 15:50), Max: 98.7 (05-02-22 @ 15:00)  HR: 88 (05-02-22 @ 15:50) (87 - 94)  BP: 97/66 (05-02-22 @ 15:50) (90/55 - 103/65)  BP(mean): --  RR: 18 (05-02-22 @ 15:50) (16 - 18)  SpO2: 100% (05-02-22 @ 15:50) (99% - 100%)              Constitutional: No fever, fatigue  Skin: No rash.  Eyes: No recent vision problems or eye pain.  ENT: No congestion, ear pain, or sore throat.  Cardiovascular: No chest pain or palpation.  Respiratory: No cough, shortness of breath, congestion, or wheezing.  Gastrointestinal: No abdominal pain, nausea, vomiting, or diarrhea.  Genitourinary: No dysuria.  Musculoskeletal: No joint swelling.  Neurologic: No headache.    PHYSICAL EXAM:  GENERAL: NAD  EYES: EOMI, PERRLA  NECK: Supple, No JVD  CHEST/LUNG: dec breath sounds at bases  HEART:  S1 , S2 +  ABDOMEN: soft , bs+, distension+  EXTREMITIES:  edema+  NEUROLOGY:alert awake    LABS:  05-02    131<L>  |  99  |  18  ----------------------------<  149<H>  4.0   |  21<L>  |  0.81    Ca    8.1<L>      02 May 2022 10:27  Phos  2.6     05-02  Mg     1.60     05-02      Creatinine Trend: 0.81 <--, 0.80 <--, 0.74 <--, 0.76 <--, 0.69 <--, 0.66 <--, 0.85 <--                        7.1    1.48  )-----------( 73       ( 02 May 2022 20:04 )             22.0     Urine Studies:

## 2022-05-04 NOTE — PROGRESS NOTE ADULT - REASON FOR ADMISSION
Low hemoglobin

## 2022-05-04 NOTE — PROGRESS NOTE ADULT - PROVIDER SPECIALTY LIST ADULT
Gastroenterology
Gastroenterology
Internal Medicine
Gastroenterology
Internal Medicine
Internal Medicine
Gastroenterology
Gastroenterology
Internal Medicine

## 2022-05-04 NOTE — PROGRESS NOTE ADULT - ASSESSMENT
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
conservative gi magnemnt  appreciate alls reevices  lvp
conservative gi magnemnt  s/p paracetnsis  op follow up  palliatve care is appropriate
no sign of gib  conservative magnemnt  goc
conservative gi magnet  no acute complaints  paracentieis, ? plalitve
conservative gi managment  no acute compaints  goc
post paracentesis.   conservative gi magnemnt  
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

## 2022-05-05 PROBLEM — I26.99 OTHER PULMONARY EMBOLISM WITHOUT ACUTE COR PULMONALE: Chronic | Status: ACTIVE | Noted: 2022-01-01

## 2022-05-05 PROBLEM — C49.9 MALIGNANT NEOPLASM OF CONNECTIVE AND SOFT TISSUE, UNSPECIFIED: Chronic | Status: ACTIVE | Noted: 2022-01-01

## 2022-05-05 NOTE — ED PROVIDER NOTE - ATTENDING CONTRIBUTION TO CARE
72 yo male with PMH abdominal liposarcoma prior on experimental chemo, HLD, DM2, FADI not on cpap, CAD s/p stent, PE, malignant ascites for evaluation of shortness of breath, acutley worsene today. Pt was discharged from hospital yesterday, now with severe SOB. Sats 80s on NRB en route. AC had been held in setting of known thrombocytopenia on discharge. PE: resp distress, tachypneic, not speaking in full sentences, abd distention noted A/P Labs, imaging, medicate, admit

## 2022-05-05 NOTE — CONSULT NOTE ADULT - SUBJECTIVE AND OBJECTIVE BOX
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 Jama Roberts at [facility]) with recent hospitalization from - initially for low platelet count, poor PO intake and worsening abdominal distension.  After admission, s/p paracentesis x 1 by IR and intermittent transfusion of RBC and platlelet. Onc team onboard for liposarcoma eval/treatment, treatment not offered.     Presenting again with SOB.       PAST MEDICAL & SURGICAL HISTORY:  Hypertension  Now with low BPs    Diabetes mellitus  Fasting FS range from - per patient, off meds    Hypercholesteremia  off meds    CAD (coronary artery disease)  Off aspirin    Sleep apnea    Liposarcoma    Pulmonary embolism    History of cardiac cath  Pt reports 1 cardiac stent placed     H/O sleep apnea  Per patient had Sleep Apnea surgery  in - at [facility]- Was not retested for Sleep Apnea post surgery    History of colon resection  Dec 2019        FAMILY HISTORY:  FH: heart disease  Mother        SOCIAL HISTORY:  Smoking: __ packs x ___ years  EtOH Use:  Marital Status:  Occupation:  Recent Travel:  Country of Birth:  Advance Directives:    Allergies    oxycodone (Vomiting)    Intolerances        HOME MEDICATIONS:    REVIEW OF SYSTEMS:  Constitutional:   Eyes:  ENT:  CV:  Resp:  GI:  :  MSK:  Integumentary:  Neurological:  Psychiatric:  Endocrine:  Hematologic/Lymphatic:  Allergic/Immunologic:  [ ] All other systems negative  [ ] Unable to assess ROS because ________    OBJECTIVE:  ICU Vital Signs Last 24 Hrs  T(C): 38.4 (05 May 2022 15:13), Max: 38.4 (05 May 2022 15:13)  T(F): 101.1 (05 May 2022 15:13), Max: 101.1 (05 May 2022 15:13)  HR: 109 (05 May 2022 17:29) (109 - 148)  BP: 92/60 (05 May 2022 17:29) (92/60 - 147/112)  BP(mean): --  ABP: --  ABP(mean): --  RR: 29 (05 May 2022 17:29) (29 - 100)  SpO2: 100% (05 May 2022 17:29) (100% - 100%)        CAPILLARY BLOOD GLUCOSE      POCT Blood Glucose.: 184 mg/dL (05 May 2022 14:47)      PHYSICAL EXAM:  General:   HEENT:   Lymph Nodes:  Neck:   Respiratory:   Cardiovascular:   Abdomen:   Extremities:   Skin:   Neurological:  Psychiatry:    HOSPITAL MEDICATIONS:  MEDICATIONS  (STANDING):    MEDICATIONS  (PRN):      LABS:                        9.1    0.79  )-----------( 50       ( 05 May 2022 15:35 )             28.3     05-05    130<L>  |  96<L>  |  22  ----------------------------<  178<H>  5.1   |  16<L>  |  1.04    Ca    8.6      05 May 2022 15:38  Phos  3.1     05-05  Mg     1.60     05-    TPro  6.6  /  Alb  2.9<L>  /  TBili  0.7  /  DBili  x   /  AST  15  /  ALT  5   /  AlkPhos  78  05-05    PT/INR - ( 05 May 2022 15:35 )   PT: 11.7 sec;   INR: 1.01 ratio         PTT - ( 05 May 2022 15:35 )  PTT:21.7 sec  Urinalysis Basic - ( 05 May 2022 17:05 )    Color: Yellow / Appearance: Clear / S.025 / pH: x  Gluc: x / Ketone: Small  / Bili: Small / Urobili: <2 mg/dL   Blood: x / Protein: 30 mg/dL / Nitrite: Negative   Leuk Esterase: Negative / RBC: 0-2 /HPF / WBC 0-2 /HPF   Sq Epi: x / Non Sq Epi: x / Bacteria: Few        Venous Blood Gas:   @ 15:32  7.30/35/<20/17/21.2  VBG Lactate: 4.1      MICROBIOLOGY:     RADIOLOGY:  [ ] Reviewed and interpreted by me    EKG: CHIEF COMPLAINT:    HPI:  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 Jama Roberts at [facility]) with recent hospitalization from - initially for low platelet count, poor PO intake and worsening abdominal distension.  After admission, s/p paracentesis x 1 by IR and intermittent transfusion of RBC and platlelet. Onc team onboard for liposarcoma eval/treatment, treatment not offered.       Today, patient developed sudden onset SOB and wheezing, also with cough productive of clear sputum. Patient also has been having watery diarrhea for around one week. Patient denies hemoptysis, hematuria, melena. Patient also had temperature of 100.2.     Patient's wife spoke to oncologist from [facility], advised that no treatment is offered and that hospice would be recommended. She is interested in speaking with palliative team.     Patient's WOB improved with BiPAP.     PAST MEDICAL & SURGICAL HISTORY:  Hypertension  Now with low BPs    Diabetes mellitus  Fasting FS range from - per patient, off meds    Hypercholesteremia  off meds    CAD (coronary artery disease)  Off aspirin    Sleep apnea    Liposarcoma    Pulmonary embolism    History of cardiac cath  Pt reports 1 cardiac stent placed     H/O sleep apnea  Per patient had Sleep Apnea surgery  in - at [facility]- Was not retested for Sleep Apnea post surgery    History of colon resection  Dec 2019        FAMILY HISTORY:  FH: heart disease  Mother        SOCIAL HISTORY:  Lives with wife  Ambulates with walker  No current tobacco or illicit substance use  Social EtOH but none recently    Allergies    oxycodone (Vomiting)    Intolerances        HOME MEDICATIONS:    REVIEW OF SYSTEMS:  CONSTITUTIONAL: +gen weakness, +fevers  no chills  EYES/ENT: No visual changes;  No vertigo or throat pain   NECK: No pain or stiffness  RESPIRATORY: +cough, wheezing, hemoptysis; No shortness of breath  CARDIOVASCULAR: No chest pain or palpitations  GASTROINTESTINAL: +chronic abdominal pain. No nausea, vomiting, or hematemesis; +watery diarrhea. No melena or hematochezia.  BACK: No CVA tenderness  GENITOURINARY: No dysuria, frequency or hematuria  NEUROLOGICAL: No numbness or weakness  SKIN: No itching, rashes      OBJECTIVE:  ICU Vital Signs Last 24 Hrs  T(C): 38.4 (05 May 2022 15:13), Max: 38.4 (05 May 2022 15:13)  T(F): 101.1 (05 May 2022 15:13), Max: 101.1 (05 May 2022 15:13)  HR: 109 (05 May 2022 17:29) (109 - 148)  BP: 92/60 (05 May 2022 17:29) (92/60 - 147/112)  BP(mean): --  ABP: --  ABP(mean): --  RR: 29 (05 May 2022 17:29) (29 - 100)  SpO2: 100% (05 May 2022 17:29) (100% - 100%)        CAPILLARY BLOOD GLUCOSE      POCT Blood Glucose.: 184 mg/dL (05 May 2022 14:47)      PHYSICAL EXAM:  Gen: cachectic   HEENT: EOMI, no nasal discharge, mucous membranes dry  CV: RRR, +S1/S2, no M/R/G  Resp: tachypneic on NRB sats 91%  GI: Abdomen distended, firm, NTTP  MSK: No open wounds, + bruising, + b/l  LE edema  Neuro: A&Ox4, following commands, moving all four extremities spontaneously      HOSPITAL MEDICATIONS:  MEDICATIONS  (STANDING):    MEDICATIONS  (PRN):      LABS:                        9.1    0.79  )-----------( 50       ( 05 May 2022 15:35 )             28.3     05-05    130<L>  |  96<L>  |  22  ----------------------------<  178<H>  5.1   |  16<L>  |  1.04    Ca    8.6      05 May 2022 15:38  Phos  3.1     05-05  Mg     1.60     05-05    TPro  6.6  /  Alb  2.9<L>  /  TBili  0.7  /  DBili  x   /  AST  15  /  ALT  5   /  AlkPhos  78  05-05    PT/INR - ( 05 May 2022 15:35 )   PT: 11.7 sec;   INR: 1.01 ratio         PTT - ( 05 May 2022 15:35 )  PTT:21.7 sec  Urinalysis Basic - ( 05 May 2022 17:05 )    Color: Yellow / Appearance: Clear / S.025 / pH: x  Gluc: x / Ketone: Small  / Bili: Small / Urobili: <2 mg/dL   Blood: x / Protein: 30 mg/dL / Nitrite: Negative   Leuk Esterase: Negative / RBC: 0-2 /HPF / WBC 0-2 /HPF   Sq Epi: x / Non Sq Epi: x / Bacteria: Few        Venous Blood Gas:   @ 15:32  7.30/35/<20/17/21.2  VBG Lactate: 4.1      MICROBIOLOGY:     RADIOLOGY:  [ ] Reviewed and interpreted by me    EKG:

## 2022-05-05 NOTE — ED ADULT NURSE REASSESSMENT NOTE - NS ED NURSE REASSESS COMMENT FT1
pt A&Ox4, respirations are even and unlabored, placed in reverse Trendelenburg and MD notified of low BP readings, MAP above 65 at this time. pt A&Ox4, respirations are even and unlabored, placed in reverse Trendelenburg and MD notified of low BP readings, MAP above 65 at this time. Attending notified and ordered 500 mL bolus of NS.

## 2022-05-05 NOTE — ED ADULT NURSE NOTE - OBJECTIVE STATEMENT
70 y/o male presenting to room tr-b. Patient AAOx4, nonambulatory at baseline. Patient coming to ER complaining of fever, chills and difficulty breathing /SOB. Patient states he was discharged from  [facility] yesterday and was feeling fine last night. Upon waking up this morning, patient noted to have fever, and SOB. Patient medical history of PE, lipocarcoma, paracentesis x2 (last week), DMII, CAD, sleep apnea. Patient noted to have rigid distended abdomen. Patient breathing labored and noted to have accessory muscle use awt this time. Patient noted to  be saturating 100% on 10L of oxygen. Patient noted to have fever rectally and documented. Patient placed on cardiac monitor, noted to be tachycardia on the monitor. #20g placed to RAC. Labs drawn  and sent as per MD order. Report given to primary RN.

## 2022-05-05 NOTE — ED PROVIDER NOTE - NSICDXPASTSURGICALHX_GEN_ALL_CORE_FT
PAST SURGICAL HISTORY:  H/O sleep apnea Per patient had Sleep Apnea surgery  in 1996- at [facility]- Was not retested for Sleep Apnea post surgery    History of cardiac cath Pt reports 1 cardiac stent placed 1999    History of colon resection Dec 2019

## 2022-05-05 NOTE — ED PROVIDER NOTE - OBJECTIVE STATEMENT
71M hx abdominal liposarcoma prior on experimental chemo, HLD, DM2, FADI not on cpap, CAD s/p stent, PE, malignant ascites BIBEMS for SOB. Per ems patient discharged yesterday, called today for severe SOB. Sats 80s on NRB en route. AC had been held in setting of known thrombocytopenia on discharge. Pt endorses symptoms worsened this morning, no associated CP but worsening abd distention and inability to take deep breaths.

## 2022-05-05 NOTE — CONSULT NOTE ADULT - ASSESSMENT
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 Jama Roberts at [facility]) with recent hospitalization from 4/4-5/4 initially for low platelet count, poor PO intake and worsening abdominal distension 2/2 malignant ascites, now presenting with SOB possibly 2/2 PNA vs PE vs atelectasis.     BiPAP  -c/w current BiPAP settings, f/u repeat VBG this evening     Sepsis  -patient neutropenic, c/w cefepime, f/u blood cultures, IVF resuscitation    GOC  -patient's wife interested in palliative care referral and discussion about hospice, per outpatient oncologist not recommending any intervention, recommended hospice

## 2022-05-05 NOTE — PATIENT PROFILE ADULT - FALL HARM RISK - HARM RISK INTERVENTIONS
Assistance with ambulation/Assistance OOB with selected safe patient handling equipment/Communicate Risk of Fall with Harm to all staff/Reinforce activity limits and safety measures with patient and family/Tailored Fall Risk Interventions/Use of alarms - bed, chair and/or voice tab/Visual Cue: Yellow wristband and red socks/Bed in lowest position, wheels locked, appropriate side rails in place/Call bell, personal items and telephone in reach/Instruct patient to call for assistance before getting out of bed or chair/Non-slip footwear when patient is out of bed/[place] to call system/Physically safe environment - no spills, clutter or unnecessary equipment/Purposeful Proactive Rounding/Room/bathroom lighting operational, light cord in reach

## 2022-05-05 NOTE — ED PROVIDER NOTE - PROGRESS NOTE DETAILS
Cristobal, PGY-3: urinary bladder transducer 13, MICU called for 1st time bipap consult. Given abd pressure wnl (although not entirely accurate with non-paralyzed patients), consider alternative causes for SOB such as pulmonary embolism (known hx, not on AC's), CTA ordered. bedside paracentesis aborted because of windows and not that much fluid at this time.

## 2022-05-05 NOTE — ED PROVIDER NOTE - CLINICAL SUMMARY MEDICAL DECISION MAKING FREE TEXT BOX
Jacquie PGY3: medically complex pt p/w hypoxia, SOB. Abdomen distended, firm, nontender. Exam w/ decreased b/s b/l. febrile. Concern for viral illness vs poss SBP vs abd compartment. Will plan for abd pressure measurement, labs, CTA, broad spectrum abx, w/u.

## 2022-05-05 NOTE — ED PROCEDURE NOTE - PROCEDURE ADDITIONAL DETAILS
Peripheral IV access in the Emergency Department obtained under dynamic ultrasound guidance with dark nonpulsatile blood return.  Catheter was flushed afterwards without any resistance or resulting extravasation.  IV catheter confirmed in compressible vein after insertion.    18g L AC

## 2022-05-05 NOTE — ED PROVIDER NOTE - PHYSICAL EXAMINATION
Gen: cachectic   HEENT: EOMI, no nasal discharge, mucous membranes dry  CV: RRR, +S1/S2, no M/R/G  Resp: tachypneic on NRB sats 91%  GI: Abdomen distended, firm, NTTP  MSK: No open wounds, + bruising, + b/l  LE edema  Neuro: A&Ox4, following commands, moving all four extremities spontaneously

## 2022-05-06 NOTE — CONSULT NOTE ADULT - SUBJECTIVE AND OBJECTIVE BOX
Max Coffey MD  Interventional Cardiology / Endovascular Specialist  [place] Office : [address] [place] [address] [place] [address]  Tel:   [place] Office : [address] [place] [address]  Tel: [phone]    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, CAD s/p stent (1999, now off aspirin), recent PE on lovenox (currently hold off due to thrombocytopenia), and 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 Hx limited iso pt on BiPAP, however reports in interim of d/c he has experienced weakness, fever, chills, SOB w/ chest discomfort, dry cough, diarrhea and persistent abd pain and LE edema. Otherwise denies sx.     ED Course  VS: febrile Tmax 38.4C, tachycardic HR 140s, hypoTN BP 80s-140s/50s-110s, tachypneic RR 22, SpO2% 100 on BiPAP 10/5 FiO2% 30  Labs:  leukopenia (neutropenia) 0.79, normocytic (MCV 94) anemia H/H 9.1/28.3 and elevated RDW 20.8, thrombocytopenia 50; hypoNa 130, hypoCl 96, low bicarb 16; HST elevated 51; VBG hypocarbic 35, lactate 4.1  > 1.3  Micro: UA not c/f infxn, RVP/bordetella parapertussis/COV2 not det  Imaging: CXR low lung volumes w/ bibasilar atelectasis  Received: tylenol 1g IV, cefepime 2g IV, NS 500c IV    Admit to medicine for further eval/mgt  	  MEDICATIONS:    cefepime   IVPB 2000 milliGRAM(s) IV Intermittent every 8 hours    budesonide  80 MICROgram(s)/formoterol 4.5 MICROgram(s) Inhaler 2 Puff(s) Inhalation two times a day    acetaminophen     Tablet .. 650 milliGRAM(s) Oral every 6 hours PRN  HYDROmorphone   Tablet 2 milliGRAM(s) Oral every 4 hours PRN  HYDROmorphone   Tablet 1 milliGRAM(s) Oral every 4 hours PRN    pantoprazole    Tablet 40 milliGRAM(s) Oral two times a day    dextrose 50% Injectable 25 Gram(s) IV Push once  dextrose 50% Injectable 12.5 Gram(s) IV Push once  dextrose 50% Injectable 25 Gram(s) IV Push once  dextrose Oral Gel 15 Gram(s) Oral once PRN  glucagon  Injectable 1 milliGRAM(s) IntraMuscular once  insulin lispro (ADMELOG) corrective regimen sliding scale   SubCutaneous every 6 hours    cyanocobalamin 1000 MICROGram(s) Oral daily  dextrose 5%. 1000 milliLiter(s) IV Continuous <Continuous>  dextrose 5%. 1000 milliLiter(s) IV Continuous <Continuous>  ferrous    sulfate 325 milliGRAM(s) Oral daily  folic acid 1 milliGRAM(s) Oral daily  multivitamin 1 Tablet(s) Oral daily  sodium chloride 0.9%. 1000 milliLiter(s) IV Continuous <Continuous>      PAST MEDICAL/SURGICAL HISTORY  PAST MEDICAL & SURGICAL HISTORY:  Hypertension  Now with low BPs    Diabetes mellitus  Fasting FS range from - per patient, off meds    Hypercholesteremia  off meds    CAD (coronary artery disease)  Off aspirin    Sleep apnea    Liposarcoma    Pulmonary embolism    Transfusion history    History of cardiac cath  Pt reports 1 cardiac stent placed 1999    H/O sleep apnea  Per patient had Sleep Apnea surgery  in 1996- at [facility]- Was not retested for Sleep Apnea post surgery    History of colon resection  Dec 2019        SOCIAL HISTORY: Substance Use (street drugs): ( x ) never used  (  ) other:    FAMILY HISTORY:  FH: heart disease  Mother        REVIEW OF SYSTEMS:  CONSTITUTIONAL: No fever, weight loss, or fatigue  EYES: No eye pain, visual disturbances, or discharge  ENMT:  No difficulty hearing, tinnitus, vertigo; No sinus or throat pain  BREASTS: No pain, masses, or nipple discharge  GASTROINTESTINAL: No abdominal or epigastric pain. No nausea, vomiting, or hematemesis; No diarrhea or constipation. No melena or hematochezia.  GENITOURINARY: No dysuria, frequency, hematuria, or incontinence  NEUROLOGICAL: No headaches, memory loss, loss of strength, numbness, or tremors  ENDOCRINE: No heat or cold intolerance; No hair loss  MUSCULOSKELETAL: No joint pain or swelling; No muscle, back, or extremity pain  PSYCHIATRIC: No depression, anxiety, mood swings, or difficulty sleeping  HEME/LYMPH: No easy bruising, or bleeding gums  All others negative    PHYSICAL EXAM:  T(C): 36.4 (05-06-22 @ 10:18), Max: 38.4 (05-05-22 @ 15:13)  HR: 106 (05-06-22 @ 10:54) (66 - 148)  BP: 95/59 (05-06-22 @ 10:18) (80/50 - 147/112)  RR: 18 (05-06-22 @ 10:18) (18 - 100)  SpO2: 99% (05-06-22 @ 10:54) (97% - 100%)  Wt(kg): --  I&O's Summary    05 May 2022 07:01  -  06 May 2022 07:00  --------------------------------------------------------  IN: 225 mL / OUT: 0 mL / NET: 225 mL      Height (cm): 175.3 (05-05 @ 14:51)        EYES:   PERRLA   ENMT:   Moist mucous membranes, Good dentition, No lesions  Cardiovascular: Normal S1 S2, No JVD, No murmurs, No edema  Respiratory: Lungs clear to auscultation	  Gastrointestinal:  + ascites s/p paracentesis  Extremities: no edema                            7.3    1.42  )-----------( 29       ( 06 May 2022 06:57 )             22.8     05-06    130<L>  |  100  |  25<H>  ----------------------------<  126<H>  3.9   |  18<L>  |  1.13    Ca    8.0<L>      06 May 2022 06:57  Phos  3.5     05-06  Mg     1.60     05-06    TPro  6.6  /  Alb  2.9<L>  /  TBili  0.7  /  DBili  x   /  AST  15  /  ALT  5   /  AlkPhos  78  05-05    proBNP: Serum Pro-Brain Natriuretic Peptide: 788 pg/mL (05-06 @ 07:06)    Lipid Profile:   HgA1c:   TSH: Thyroid Stimulating Hormone, Serum: 2.88 uIU/mL (05-06 @ 06:57)      Consultant(s) Notes Reviewed:  [x ] YES  [ ] NO    Care Discussed with Consultants/Other Providers [ x] YES  [ ] NO    Imaging Personally Reviewed independently:  [x] YES  [ ] NO    All labs, radiologic studies, vitals, orders and medications list reviewed. Patient is seen and examined at bedside. Case discussed with medical team.

## 2022-05-06 NOTE — H&P ADULT - NSICDXPASTMEDICALHX_GEN_ALL_CORE_FT
PAST MEDICAL HISTORY:  CAD (coronary artery disease) Off aspirin    Diabetes mellitus Fasting FS range from - per patient, off meds    Hypercholesteremia off meds    Hypertension Now with low BPs    Liposarcoma     Pulmonary embolism     Sleep apnea      PAST MEDICAL HISTORY:  CAD (coronary artery disease) Off aspirin    Diabetes mellitus Fasting FS range from - per patient, off meds    Hypercholesteremia off meds    Hypertension Now with low BPs    Liposarcoma     Pulmonary embolism     Sleep apnea     Transfusion history

## 2022-05-06 NOTE — CONSULT NOTE ADULT - ASSESSMENT
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 Palliative consulted for goc and sx management in setting of advanced malignancy.

## 2022-05-06 NOTE — H&P ADULT - PROBLEM SELECTOR PLAN 1
Acute hypoxic respiratory failure  iso neutropenia, possibly 2/2 PNA vs PE vs atelectasis  MICU c/s, not candidate at this time  CT Angio Chest ordered by ED  SpO2% adequate on BiPAP  CXR w/ atelectasis w/o consolidation  - hold abx given XR findings  - monitor SpO2%, alter BiPAP settings prn  - f/u CTA chest, if consolidations can start abx  - rpt VBG in am  - incentive spirometery  - pulm c/s in am Acute hypoxic respiratory failure  possibly 2/2 PNA vs PE vs atelectasis iso neutropenia w/ infxn and increased demand  MICU c/s, not candidate at this time  CT Angio Chest ordered by ED  SpO2% adequate on BiPAP  CXR w/ atelectasis w/o consolidation  - hold abx given XR findings  - monitor SpO2%, alter BiPAP settings prn  - f/u CTA chest to r/o PE if consolidations can start abx  - rpt VBG in am  - incentive spirometery  - pulm c/s in am Acute hypoxic respiratory failure  possibly 2/2 PNA vs PE vs atelectasis iso neutropenia w/ infxn and increased demand  MICU c/s, not candidate at this time  CT Angio Chest ordered by ED  SpO2% adequate on BiPAP  CXR w/ atelectasis w/o consolidation  - c/w cefepime  - monitor SpO2%, alter BiPAP settings prn  - f/u CTA chest to r/o PE  - rpt VBG in am  - incentive spirometery  - pulm c/s in am Acute hypoxic respiratory failure  possibly 2/2 PNA vs PE vs atelectasis iso neutropenia w/ infxn and increased demand  MICU c/s, not candidate at this time  CT Angio Chest ordered by ED  SpO2% adequate on BiPAP  CXR w/ atelectasis w/o consolidation  - c/w cefepime  - monitor SpO2%, alter BiPAP settings prn (BiPAP 10/5 FiO2% 30)  - f/u CTA chest to r/o PE  - rpt VBG in am  - incentive spirometery  - pulm c/s in am Acute hypoxic respiratory failure  Lab-work suggestive of mixed acid base balance d/o; metabolic acidosis, metabolic alkalosis & respiratory alkalosis  possibly 2/2 PNA vs PE vs atelectasis iso neutropenia w/ infxn and increased demand  MICU c/s, not candidate at this time  CT Angio Chest ordered by ED  SpO2% adequate on BiPAP  CXR w/ atelectasis w/o consolidation  - c/w cefepime  - monitor SpO2%, alter BiPAP settings prn (BiPAP 10/5 FiO2% 30)  - f/u CTA chest to r/o PE  - rpt VBG in am  - incentive spirometery  - pulm c/s in am

## 2022-05-06 NOTE — H&P ADULT - NSHPREVIEWOFSYSTEMS_GEN_ALL_CORE
REVIEW OF SYSTEMS:  CONSTITUTIONAL: +fever, chills, weakness as per HPI  EYES: No visual disturbances or eye pain  ENMT: No sinus or throat pain  RESPIRATORY: +SOB, dry cough w/ chest discomfort as per HPI  CARDIOVASCULAR: No palpitations, or dizziness +persistent LE edema  GASTROINTESTINAL: No melena or hematochezia. No emesis.. +diarrhea, persistent abd pain  GENITOURINARY: No dysuria or hematuria  NEUROLOGICAL: No headaches or numbness  MUSCULOSKELETAL: No joint pain or swelling; No muscle, back, or extremity pain

## 2022-05-06 NOTE — H&P ADULT - ASSESSMENT
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 [ x ]  Lab studies reviewed  [ x ]  Radiology reviewed  [ x ]  Old records reviewed    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

## 2022-05-06 NOTE — H&P ADULT - ATTENDING COMMENTS
71 year old male, with past history as noted above, being managed for Acute respiratory failure with hypoxia after presenting with shortness of breath.  O2 Sat = 80's while on NRM while en-route to the Hospital.  Consideration for PE in patient with history of same and not on AC due to thrombocytopenia.  CTA ordered. Noted w/ mixed acid base balance d/o; metabolic acidosis, metabolic alkalosis & respiratory alkalosis.  On BPAP.  Hypotension noted; responsive to IVF challenge; continuing w. hydration.  Evaluated by MICU team; not a candidate for the Unit at present.  On antibiotic for suspicion of infection  Hyponatremia is likely due to dehydration.  Will benefit from Pulmonology consult in the AM.  Would also benefit from Hem/Onc consult (liposarcoma, thrombocytopenia, need for AC).  F/up AM lab-work.      All other management as prescribed.

## 2022-05-06 NOTE — GOALS OF CARE CONVERSATION - ADVANCED CARE PLANNING - CONVERSATION DETAILS
Referral for complex decision making regarding goc in setting of advanced malignancy. Patient is know to this palliative provider from previous admissions. Patient shared that he spoke with oncologist, Dr. Farley, at [facility] who is not able to provide further DMT to the patient. Patient shared that he left the appointment very depressed but states that he "is not giving up". He shared that he has providers at [facility] ([place]. Hernandez) and appointment at [facility] for alternate opinions. Discussed and introduced hospice services if no further DMT is being offered. Explained philosophy of hospice and services included (ie. medical equipment, sx meds, etc). Patient's wife asked about bloodwork and transfusions on hospice, however explained that occasional bloodwork is done with transfusions if needed for improved symptom management, but routine bloodwork and scheduled transfusions are not in line with hospice philosophy.    Discussed advance directives including cpr and mechanical ventilation. He shared that he would want all life supporting measures to extend his life. He would like to remain Full Code. Explained and encouraged him to continue to discuss goc with his wife.     Emotional support provided.

## 2022-05-06 NOTE — CONSULT NOTE ADULT - SUBJECTIVE AND OBJECTIVE BOX
[facility] Geriatrics and Palliative Care  Alondra Storey, Palliative Care Attending  Contact Info: Page [phone] (including Nights/Weekends), message on Microsoft Teams (Alondra Storey), or leave VM at Palliative Office [phone] (non-urgent)     HPI:  72 yo M with PMHx of low BPs HLD, DM2 (currently off meds), FADI not on CPAP, CAD s/p stent (, now off aspirin), recent PE on lovenox (currently hold off due to thrombocytopenia), and abd liposarcoma diagnosed about 1yr ago and s/p chemo; lately on experimental chemo with recent hospitalization from - initially for low platelet count, poor PO intake and worsening abdominal distension 2/2 malignant ascites, now BIBEMS iso SOB. Hx limited iso pt on BiPAP, however reports in interim of d/c he has experienced weakness, fever, chills, SOB w/ chest discomfort, dry cough, diarrhea and persistent abd pain and LE edema. Otherwise denies sx.     ED Course  VS: febrile Tmax 38.4C, tachycardic HR 140s, hypoTN BP 80s-140s/50s-110s, tachypneic RR 22, SpO2% 100 on BiPAP 10/5 FiO2% 30  Labs:  leukopenia (neutropenia) 0.79, normocytic (MCV 94) anemia H/H 9.1/28.3 and elevated RDW 20.8, thrombocytopenia 50; hypoNa 130, hypoCl 96, low bicarb 16; HST elevated 51; VBG hypocarbic 35, lactate 4.1  > 1.3  Micro: UA not c/f infxn, RVP/bordetella parapertussis/COV2 not det  Imaging: CXR low lung volumes w/ bibasilar atelectasis  Received: tylenol 1g IV, cefepime 2g IV, NS 500c IV    Admit to medicine for further eval/mgt (06 May 2022 01:56)    PERTINENT PM/SXH:   Intra-abdominal and pelvic swelling, mass and lump, unspecified site    Hypertension    Diabetes mellitus    Hypercholesteremia    CAD (coronary artery disease)    Sleep apnea    Liposarcoma    Pulmonary embolism    Transfusion history      History of cardiac cath    H/O sleep apnea    History of colon resection      FAMILY HISTORY:  FH: heart disease  Mother      ITEMS NOT CHECKED ARE NOT PRESENT    SOCIAL HISTORY:   Significant other/partner[x ]  Children[ x]  Worship/Spirituality: Mosque   Substance hx:  [ ]   Tobacco hx:  [ ]   Alcohol hx: [ ]   Home Opioid hx:  [ ] I-Stop Reference No:  This report was requested by: Alondra Storey | Reference #: [id number]    Others' Prescriptions  Patient Name: Mckay LujanBirth Date: 1950  Address: [address] [place] [address]  [place], [place] [address]Sex: Male  Rx Written	Rx Dispensed	Drug	Quantity	Days Supply	Prescriber Name	Prescriber Yeimi #	Payment Method	Dispenser  2022	hydromorphone 2 mg tablet	28	7	[facility]	[id number]	Medicare	[id number]  2022	tramadol hcl 50 mg tablet	21	7	Jailene Engel [id number]	Medicare	[id number]  2022	hydromorphone 2 mg tablet	28	4	[facility]	[id number]	Medicare	[facility] #[id number]  Living Situation: [ ]Home  [ ]Long term care  [ ]Rehab [ ]Other    ADVANCE DIRECTIVES:    MOLST  [ ]  Living Will  [ ]   DECISION MAKER(s): Patient would like to remain a FULL CODE   [ x] Health Care Proxy(s)  [ ] Surrogate(s)  [ ] Guardian           Name(s): Karina Lujan (wife) Phone Number(s): [phone]    BASELINE (I)ADL(s) (prior to admission):   [place]: [ ]Total  [ x] Moderate [ ]Dependent    Allergies    oxycodone (Vomiting)    Intolerances    MEDICATIONS  (STANDING):  budesonide  80 MICROgram(s)/formoterol 4.5 MICROgram(s) Inhaler 2 Puff(s) Inhalation two times a day  cefepime   IVPB 2000 milliGRAM(s) IV Intermittent every 8 hours  cyanocobalamin 1000 MICROGram(s) Oral daily  dextrose 5%. 1000 milliLiter(s) (100 mL/Hr) IV Continuous <Continuous>  dextrose 5%. 1000 milliLiter(s) (50 mL/Hr) IV Continuous <Continuous>  dextrose 50% Injectable 25 Gram(s) IV Push once  dextrose 50% Injectable 12.5 Gram(s) IV Push once  dextrose 50% Injectable 25 Gram(s) IV Push once  ferrous    sulfate 325 milliGRAM(s) Oral daily  folic acid 1 milliGRAM(s) Oral daily  glucagon  Injectable 1 milliGRAM(s) IntraMuscular once  insulin lispro (ADMELOG) corrective regimen sliding scale   SubCutaneous every 6 hours  lactobacillus acidophilus 1 Tablet(s) Oral two times a day  multivitamin 1 Tablet(s) Oral daily  pantoprazole    Tablet 40 milliGRAM(s) Oral two times a day  sodium chloride 0.9%. 1000 milliLiter(s) (75 mL/Hr) IV Continuous <Continuous>    MEDICATIONS  (PRN):  acetaminophen     Tablet .. 650 milliGRAM(s) Oral every 6 hours PRN Temp greater or equal to 38C (100.4F), Mild Pain (1 - 3)  dextrose Oral Gel 15 Gram(s) Oral once PRN Blood Glucose LESS THAN 70 milliGRAM(s)/deciliter    PRESENT SYMPTOMS: [ ]Unable to obtain due to poor mentation   Source if other than patient:  [ ]Family   [ ]Team     Pain: [ ]yes [ x]no  QOL impact -   Location -                    Aggravating factors -  Quality -  Radiation -  Timing-  Severity (0-10 scale):  Minimal acceptable level (0-10 scale):     PAIN AD Score:     [email or web address] (press ctrl +  left click to view)    Dyspnea:                           [ ]Mild [ ]Moderate [ ]Severe  Anxiety:                             [ ]Mild [ ]Moderate [ ]Severe  Fatigue:                             [ ]Mild [ ]Moderate [x ]Severe  Nausea:                             [ ]Mild [ ]Moderate [ ]Severe  Loss of appetite:              [ ]Mild [ ]Moderate [ ]Severe  Constipation:                    [ ]Mild [ ]Moderate [ ]Severe    Other Symptoms:  [x ]All other review of systems negative     Palliative Performance Status Version 2:    50     %    http://npcrc.org/files/news/palliative_performance_scale_ppsv2.pdf  PHYSICAL EXAM:  Vital Signs Last 24 Hrs  T(C): 36.4 (06 May 2022 10:18), Max: 38.4 (05 May 2022 15:13)  T(F): 97.6 (06 May 2022 10:18), Max: 101.1 (05 May 2022 15:13)  HR: 106 (06 May 2022 10:54) (66 - 148)  BP: 95/59 (06 May 2022 10:18) (80/50 - 147/112)  BP(mean): 69 (05 May 2022 19:13) (66 - 69)  RR: 18 (06 May 2022 10:18) (18 - 100)  SpO2: 99% (06 May 2022 10:54) (97% - 100%) I&O's Summary    05 May 2022 07:01  -  06 May 2022 07:00  --------------------------------------------------------  IN: 225 mL / OUT: 0 mL / NET: 225 mL      GENERAL:  [X]Alert  [X]Oriented x3   [ ]Lethargic  [x ]Cachexia  [ ]Unarousable  [x ]Verbal  [ ]Non-Verbal  Behavioral:   [ ] Anxiety  [ ] Delirium [ ] Agitation [ ] Other  HEENT:  [X]Normal   [ ]Dry mouth   [ ]ET Tube/Trach  [ ]Oral lesions  PULMONARY:   [X]Clear [ ]Tachypnea  [ ]Audible excessive secretions   [ ]Rhonchi        [ ]Right [ ]Left [ ]Bilateral  [ ]Crackles        [ ]Right [ ]Left [ ]Bilateral  [ ]Wheezing     [ ]Right [ ]Left [ ]Bilateral  [ ]Diminished breath sounds [ ]right [ ]left [ ]bilateral  CARDIOVASCULAR:    [X]Regular [ ]Irregular [ ]Tachy  [ ]Gui [ ]Murmur [ ]Other  GASTROINTESTINAL: hernia noted and abdominal distention   [ ]Soft  [X]Distended   [ ]+BS  [ ]Non tender [ ]Tender  [ ]PEG [ ]OGT/ NGT  Last BM:   GENITOURINARY:  [X]Normal [ ] Incontinent   [ ]Oliguria/Anuria   [ ]Clark  MUSCULOSKELETAL:   [ ]Normal   [X]Weakness  [ ]Bed/Wheelchair bound [ ]Edema  NEUROLOGIC:  [ ]No focal deficits  [ ]Cognitive impairment  [ ]Dysphagia [ ]Dysarthria [ ]Paresis [ ]Other   SKIN:   [ ]Normal    [ ]Rash  [ ]Pressure ulcer(s)       Present on admission [ ]y [ ]n    CRITICAL CARE:  [ ] Shock Present  [ ]Septic [ ]Cardiogenic [ ]Neurologic [ ]Hypovolemic  [ ]  Vasopressors [ ]  Inotropes   [ ]Respiratory failure present [ ]Mechanical ventilation [ ]Non-invasive ventilatory support [ ]High flow     [ ]Acute  [ ]Chronic [ ]Hypoxic  [ ]Hypercarbic [ ]Other  [ ]Other organ failure   LABS:                        7.3    1.42  )-----------( 29       ( 06 May 2022 06:57 )             22.8   -    130<L>  |  100  |  25<H>  ----------------------------<  126<H>  3.9   |  18<L>  |  1.13    Ca    8.0<L>      06 May 2022 06:57  Phos  3.5       Mg     1.60         TPro  6.6  /  Alb  2.9<L>  /  TBili  0.7  /  DBili  x   /  AST  15  /  ALT  5   /  AlkPhos  78    PT/INR - ( 05 May 2022 15:35 )   PT: 11.7 sec;   INR: 1.01 ratio         PTT - ( 05 May 2022 15:35 )  PTT:21.7 sec    Urinalysis Basic - ( 05 May 2022 17:05 )    Color: Yellow / Appearance: Clear / S.025 / pH: x  Gluc: x / Ketone: Small  / Bili: Small / Urobili: <2 mg/dL   Blood: x / Protein: 30 mg/dL / Nitrite: Negative   Leuk Esterase: Negative / RBC: 0-2 /HPF / WBC 0-2 /HPF   Sq Epi: x / Non Sq Epi: x / Bacteria: Few      RADIOLOGY & ADDITIONAL STUDIES: < from: Xray Chest 1 View AP/PA (22 @ 15:06) >  IMPRESSION:  Low lung volumes with bibasilar atelectasis. No focal consolidations.    < end of copied text >      PROTEIN CALORIE MALNUTRITION PRESENT: [ ]mild [ ]moderate [ ]severe [ ]underweight [ ]morbid obesity  https://www.andeal.org/vault/8790/web/files/ONC/Table_Clinical%20Characteristics%20to%20Document%20Malnutrition-White%20JV%20et%20al%2020.pdf    Height (cm): 175.3 (22 @ 14:51), 175.3 (22 @ 01:10), 172.7 (22 @ 13:44)  Weight (kg): 81.9 (22 @ 01:10), 83.4 (22 @ 21:37), 78.2 (22 @ 01:55)  BMI (kg/m2): 26.7 (22 @ 14:51), 26.7 (22 @ 01:10), 28 (22 @ 21:37)    [ ]PPSV2 < or = to 30% [ ]significant weight loss  [ ]poor nutritional intake  [ ]anasarca      [ ]Artificial Nutrition      REFERRALS:   [ ]Chaplaincy  [ ]Hospice  [ ]Child Life  [ ]Social Work  [ ]Case management [ ]Holistic Therapy

## 2022-05-06 NOTE — CONSULT NOTE ADULT - ASSESSMENT
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 Jama Roberts at [facility]) who presents to the hospital with poor PO intake and worsening abdominal swelling    EKG SR low voltage similar to april Tele: NSR 70-90s    1) SOB   - PE Vs PNA euvoleimc on exam   - echo TDS grossly normal RV function  - on abx     2) CAD s/p stent  -s/p stent 20 years ago as per patient  -denies CP    3) Anemia/thrombocytopenia   -hemoglobin 7.3  -monitor H/H     4)  DVT PPX  -recommend SCD's

## 2022-05-06 NOTE — CONSULT NOTE ADULT - SUBJECTIVE AND OBJECTIVE BOX
Pulmonary Consult Note    ADRIAN FUNES  MRN-[id number]    Chief Complaint: Patient is a 71y old  Male who presents with a chief complaint of AHRF (06 May 2022 01:56)      HPI:  Per chart review:  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, CAD s/p stent (1999, now off aspirin), recent PE on lovenox (currently hold off due to thrombocytopenia), and 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 Hx limited iso pt on BiPAP, however reports in interim of d/c he has experienced weakness, fever, chills, SOB w/ chest discomfort, dry cough, diarrhea and persistent abd pain and LE edema. Otherwise denies sx.     ED Course  VS: febrile Tmax 38.4C, tachycardic HR 140s, hypoTN BP 80s-140s/50s-110s, tachypneic RR 22, SpO2% 100 on BiPAP 10/5 FiO2% 30  Labs:  leukopenia (neutropenia) 0.79, normocytic (MCV 94) anemia H/H 9.1/28.3 and elevated RDW 20.8, thrombocytopenia 50; hypoNa 130, hypoCl 96, low bicarb 16; HST elevated 51; VBG hypocarbic 35, lactate 4.1  > 1.3  Micro: UA not c/f infxn, RVP/bordetella parapertussis/COV2 not det  Imaging: CXR low lung volumes w/ bibasilar atelectasis  Received: tylenol 1g IV, cefepime 2g IV, NS 500c IV    Admit to medicine for further eval/mgt    -  -on my exam:  -pt just taken off bipap, feels much better, speaking in full sentences, on 6 L nc.  Says his biggest problem is that his abdomen has swollen up quickly.    ROS:  -  -  All other systems reviewed and negative    PAST MEDICAL HISTORY: HEALTH ISSUES - PROBLEM Dx:  Acute respiratory failure with hypoxia    Neutropenia    Anemia    SIRS (systemic inflammatory response syndrome)    History of pulmonary embolism    Liposarcoma    Lower extremity edema    Hyponatremia    Type 2 diabetes mellitus    Need for prophylactic measure    Suspected pulmonary embolism            SOCIAL HISTORY:     ACTIVE MEDICATION LIST:  MEDICATIONS  (STANDING):  budesonide  80 MICROgram(s)/formoterol 4.5 MICROgram(s) Inhaler 2 Puff(s) Inhalation two times a day  cefepime   IVPB 2000 milliGRAM(s) IV Intermittent every 8 hours  cyanocobalamin 1000 MICROGram(s) Oral daily  dextrose 5%. 1000 milliLiter(s) (100 mL/Hr) IV Continuous <Continuous>  dextrose 5%. 1000 milliLiter(s) (50 mL/Hr) IV Continuous <Continuous>  dextrose 50% Injectable 25 Gram(s) IV Push once  dextrose 50% Injectable 12.5 Gram(s) IV Push once  dextrose 50% Injectable 25 Gram(s) IV Push once  ferrous    sulfate 325 milliGRAM(s) Oral daily  folic acid 1 milliGRAM(s) Oral daily  glucagon  Injectable 1 milliGRAM(s) IntraMuscular once  insulin lispro (ADMELOG) corrective regimen sliding scale   SubCutaneous every 6 hours  lactobacillus acidophilus 1 Tablet(s) Oral two times a day  multivitamin 1 Tablet(s) Oral daily  pantoprazole    Tablet 40 milliGRAM(s) Oral two times a day  sodium chloride 0.9%. 1000 milliLiter(s) (75 mL/Hr) IV Continuous <Continuous>    MEDICATIONS  (PRN):  acetaminophen     Tablet .. 650 milliGRAM(s) Oral every 6 hours PRN Temp greater or equal to 38C (100.4F), Mild Pain (1 - 3)  dextrose Oral Gel 15 Gram(s) Oral once PRN Blood Glucose LESS THAN 70 milliGRAM(s)/deciliter      EXAM:  Vital Signs Last 24 Hrs  T(C): 36.4 (06 May 2022 10:18), Max: 38.4 (05 May 2022 15:13)  T(F): 97.6 (06 May 2022 10:18), Max: 101.1 (05 May 2022 15:13)  HR: 106 (06 May 2022 10:54) (66 - 148)  BP: 95/59 (06 May 2022 10:18) (80/50 - 147/112)  BP(mean): 69 (05 May 2022 19:13) (66 - 69)  RR: 18 (06 May 2022 10:18) (18 - 100)  SpO2: 99% (06 May 2022 10:54) (97% - 100%)  GENERAL: No acute distress  NEURO: Alert and oriented x 3  LUNGS: Clear to auscultation bilaterally, no rales or wheezes  CV: S1/S2  ABDOMEN: distended, tense, tender      LABS/IMAGING: reviewed                        7.3    1.42  )-----------( 29       ( 06 May 2022 06:57 )             22.8   05-06    130<L>  |  100  |  25<H>  ----------------------------<  126<H>  3.9   |  18<L>  |  1.13    Ca    8.0<L>      06 May 2022 06:57  Phos  3.5     05-06  Mg     1.60     05-06    TPro  6.6  /  Alb  2.9<L>  /  TBili  0.7  /  DBili  x   /  AST  15  /  ALT  5   /  AlkPhos  78  05-05  < from: Xray Chest 1 View AP/PA (05.05.22 @ 15:06) >    ACC: [id number] EXAM:  XR CHEST AP OR PA 1V                          PROCEDURE DATE:  05/05/2022          INTERPRETATION:  EXAMINATION: XR CHEST    CLINICAL INDICATION: Shortness of breath    TECHNIQUE: Single frontal, portable view of the chest was obtained.    COMPARISON: Chest x-ray 4/14/2022    FINDINGS:  The heart is normal in size.  Low lung volumes with bibasilar atelectasis. No focal consolidations.  There is no pneumothorax or pleural effusion.    IMPRESSION:  Low lung volumes with bibasilar atelectasis. No focal consolidations.    --- End of Report ---          DARIEN PINEDA MD; Resident Radiologist  This document has been electronically signed.  NIYA XIAO MD; Attending Radiologist  This document has been electronically signed. May  5 2022  3:37PM    < end of copied text >    < from: CT Angio Chest PE Protocol w/ IV Cont (04.05.22 @ 01:16) >  ACC: [id number] EXAM:  CT ANGIO CHEST PULM [facility]                          PROCEDURE DATE:  04/05/2022          INTERPRETATION:  CLINICAL INFORMATION: Initially presented with abdominal   pain, failure to thrive, nausea and vomiting, history of metastatic   liposarcoma, pulmonary embolus seen on CT abdomen    COMPARISON: CT chest 1/12/2022, CT abdomen pelvis 4/4/2022    CONTRAST/COMPLICATIONS:  IV Contrast: Omnipaque 350  85 cc administered   15 cc discarded  Oral Contrast: NONE  Complications: None reported at time of study completion    PROCEDURE:  CT Angiography of the Chest.  Sagittal and coronal reformats were performed as well as 3D (MIP)   reconstructions.    FINDINGS:    PULMONARY ARTERIES: Acute pulmonary embolus extending from the right   lower lobar pulmonary artery extending into some of the segmental   branches.    LUNGS, PLEURA, AND AIRWAYS: No endobronchial lesion  Small bilateral   right greater than left pleural effusions with adjacent subsegmental   passive atelectasis.    MEDIASTINUM AND GARRY: No lymphadenopathy. Hiatal hernia.  VESSELS: Calcifications of the coronary arteries and the aorta.  HEART: Heart is normal in size. No pericardial effusion.  CHEST WALL AND LOWER NECK: Within normal limits.  VISUALIZED UPPER ABDOMEN: Moderate to large volume ascites. Cholelithiasis  BONES: Degenerative changes of the spine.    IMPRESSION:  Acute right lower lobar pulmonary embolus extending into the segmental   branches.  Small bilateral right greater than left pleuraleffusions.    --- End of Report ---          JEFF LÓPEZ MD; Resident Radiology  This document has been electronically signed.  BALJINDER WELLER MD; Attending Radiologist  This document has been electronically signed. Apr 5 2022 10:34    < end of copied text >      PROBLEM LIST:  71yMale with HEALTH ISSUES - PROBLEM Dx:  Acute respiratory failure with hypoxia    Neutropenia    Anemia    SIRS (systemic inflammatory response syndrome)    History of pulmonary embolism    Liposarcoma    Lower extremity edema    Hyponatremia    Type 2 diabetes mellitus    Need for prophylactic measure    Suspected pulmonary embolism      RECS:  -dyspnea likely related to distended abdomen and untreated PE  -paracentesis?  -plts low so ac on hold, would not rush to obtain repeat cta as it wont  given that plts still low.    Thank you for this consultation, please feel free to call with any questions [phone]  Bibiana Bains MD

## 2022-05-06 NOTE — H&P ADULT - PROBLEM SELECTOR PLAN 2
meeting SIRS criteria as below  s/p cefepime in ED  - c/w cefepime  - ID c/s in am  - heme/onc c/s in am meeting SIRS criteria as below  s/p cefepime in ED  - c/w cefepime  - ID c/s in am  - heme/onc c/s in am  - monitor WBC, fever curve

## 2022-05-06 NOTE — H&P ADULT - PROBLEM SELECTOR PLAN 6
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

## 2022-05-06 NOTE — H&P ADULT - PROBLEM SELECTOR PLAN 3
tachycardic HR 140s, hypoTN SBP as low as 80s, tachypnea to 22  iso neutropenia and lactate intially elevated now downtrending  s/p cefepime and IVF in ED  - c/w cefepime as above  - c/w IVF, can consider albumin given hypoalbuminemia   - HST elevated on admit 51 likely iso demand, will rpt in am tachycardic HR 140s, hypoTN SBP as low as 80s, tachypnea to 22  iso neutropenia and lactate intially elevated now downtrending  s/p cefepime and IVF in ED  - c/w cefepime as above  - c/w IVF, can consider albumin given hypoalbuminemia   - HST elevated on admit 51 likely iso demand, will rpt in am  - monitor WBC, fever curve tachycardic HR 140s, hypoTN SBP as low as 80s, tachypnea to 22  iso neutropenia and lactate intially elevated now downtrending  s/p cefepime and IVF in ED  - c/w cefepime as above  - e/o dehydration per elevated SCr from bl, will give NS 500cc bolus followed by mIVF 75cc/hr x 1L can consider albumin given hypoalbuminemia   - HST elevated on admit 51 likely iso demand, will rpt in am  - monitor WBC, fever curve

## 2022-05-06 NOTE — H&P ADULT - NSHPLABSRESULTS_GEN_ALL_CORE
LABS:                        9.1    0.79  )-----------( 50       ( 05 May 2022 15:35 )             28.3     05-05    130<L>  |  96<L>  |  22  ----------------------------<  178<H>  5.1   |  16<L>  |  1.04    Ca    8.6      05 May 2022 15:38  Phos  3.1     05-  Mg     1.60     05-    TPro  6.6  /  Alb  2.9<L>  /  TBili  0.7  /  DBili  x   /  AST  15  /  ALT  5   /  AlkPhos  78  05-05    PT/INR - ( 05 May 2022 15:35 )   PT: 11.7 sec;   INR: 1.01 ratio         PTT - ( 05 May 2022 15:35 )  PTT:21.7 sec  Urinalysis Basic - ( 05 May 2022 17:05 )    Color: Yellow / Appearance: Clear / S.025 / pH: x  Gluc: x / Ketone: Small  / Bili: Small / Urobili: <2 mg/dL   Blood: x / Protein: 30 mg/dL / Nitrite: Negative   Leuk Esterase: Negative / RBC: 0-2 /HPF / WBC 0-2 /HPF   Sq Epi: x / Non Sq Epi: x / Bacteria: Few          RADIOLOGY & ADDITIONAL TESTS:    < from: Xray Chest 1 View AP/PA (22 @ 15:06) >    IMPRESSION:  Low lung volumes with bibasilar atelectasis. No focal consolidations.    --- End of Report ---          DARIEN PINEDA MD; Resident Radiologist  This document has been electronically signed.  NIYA XIAO MD; Attending Radiologist  This document has been electronically signed. May  5 2022  3:37PM    < end of copied text > LABS:                        9.1    0.79  )-----------( 50       ( 05 May 2022 15:35 )             28.3     05-05    130<L>  |  96<L>  |  22  ----------------------------<  178<H>  5.1   |  16<L>  |  1.04    Ca    8.6      05 May 2022 15:38  Phos  3.1     05-  Mg     1.60     05-    TPro  6.6  /  Alb  2.9<L>  /  TBili  0.7  /  DBili  x   /  AST  15  /  ALT  5   /  AlkPhos  78  05-05    PT/INR - ( 05 May 2022 15:35 )   PT: 11.7 sec;   INR: 1.01 ratio         PTT - ( 05 May 2022 15:35 )  PTT:21.7 sec  Urinalysis Basic - ( 05 May 2022 17:05 )    Color: Yellow / Appearance: Clear / S.025 / pH: x  Gluc: x / Ketone: Small  / Bili: Small / Urobili: <2 mg/dL   Blood: x / Protein: 30 mg/dL / Nitrite: Negative   Leuk Esterase: Negative / RBC: 0-2 /HPF / WBC 0-2 /HPF   Sq Epi: x / Non Sq Epi: x / Bacteria: Few          RADIOLOGY & ADDITIONAL TESTS:    < from: Xray Chest 1 View AP/PA (22 @ 15:06) >    IMPRESSION:  Low lung volumes with bibasilar atelectasis. No focal consolidations.    --- End of Report ---          DARIEN PINEDA MD; Resident Radiologist  This document has been electronically signed.  NIYA XIAO MD; Attending Radiologist  This document has been electronically signed. May  5 2022  3:37PM    < end of copied text >    EKG not in chart

## 2022-05-06 NOTE — H&P ADULT - PROBLEM SELECTOR PLAN 5
acute PE dx previous admit   AC held iso bicytopenia  CTA chest ordered by ED iso SOB  - f/u CTA chest acute PE dx previous admit   AC held iso bicytopenia  CTA chest ordered by ED iso SOB  - f/u CTA chest  - cont to hold AC pending result

## 2022-05-06 NOTE — H&P ADULT - NSHPPHYSICALEXAM_GEN_ALL_CORE
PHYSICAL EXAM:  GENERAL: Elderly, thin appearing man, in NAD on BiPAP, well-groomed, calm and pleasant to interview  HEAD: Atraumatic, Normocephalic  EYES: Pupils small, equal, round, reactive to light b/l; conjunctiva and sclera clear  ENMT: difficult to assess iso BiPAP mask  NECK: Supple  NERVOUS SYSTEM: Alert & Oriented X3, Good concentration; Motor Strength 5/5 B/L upper extremities.   CHEST/LUNG: Clear to auscultation bilaterally; No rales, rhonchi, wheezing, or rubs +decreased air movement  HEART: Regular rate and rhythm; No murmurs, rubs, or gallops  ABDOMEN: Soft, Nontender; Bowel sounds present. No guarding, rebound tenderness, or rigidity. +moderately distended w/ reducible umbilical hernia and surgical scar  EXTREMITIES: 2+ Peripheral Pulses, 2-3+ pitting edema to distal thigh b/l w/o tenderness to palpation, warmth, or erythema

## 2022-05-06 NOTE — H&P ADULT - HISTORY OF PRESENT ILLNESS
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; lately on experimental Chemo with recent hospitalization from 4/4-5/4 initially for low platelet count, poor PO intake and worsening abdominal distension 2/2 malignant ascites, now presenting with SOB    ED Course  VS: febrile Tmax 38.4C, tachycardic HR 140s, hypoTN BP 80s-140s/50s-110s, tachypneic RR 22, SpO2% 100 on supplemental O2  Labs:  leukopenia (neutropenia) 0.79, normocytic (MCV 94) anemia H/H 9.1/28.3 and elevated RDW 20.8, thrombocytopenia 50; hypoNa 130, hypoCl 96, low bicarb 16; HST elevated 51; VBG hypocarbic 35, lactate 4.1  > 1.3  Micro: UA not c/f infxn, RVP/bordetella parapertussis/COV2 not det  Imaging: CXR low lung volumes w/ bibasilar atelectasis  Received: tylenol 1g IV, cefepime 2g IV, NS 500c IV    Admit to medicine for further eval/mgt 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, CAD s/p stent (1999, now off aspirin), recent PE on lovenox (currently hold off due to thrombocytopenia), and 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 Hx limited iso pt on BiPAP, however reports in interim of d/c he has experienced weakness, fever, chills, SOB w/ chest discomfort, dry cough, diarrhea and persistent abd pain and LE edema. Otherwise denies sx.     ED Course  VS: febrile Tmax 38.4C, tachycardic HR 140s, hypoTN BP 80s-140s/50s-110s, tachypneic RR 22, SpO2% 100 on supplemental O2  Labs:  leukopenia (neutropenia) 0.79, normocytic (MCV 94) anemia H/H 9.1/28.3 and elevated RDW 20.8, thrombocytopenia 50; hypoNa 130, hypoCl 96, low bicarb 16; HST elevated 51; VBG hypocarbic 35, lactate 4.1  > 1.3  Micro: UA not c/f infxn, RVP/bordetella parapertussis/COV2 not det  Imaging: CXR low lung volumes w/ bibasilar atelectasis  Received: tylenol 1g IV, cefepime 2g IV, NS 500c IV    Admit to medicine for further eval/mgt 72 yo M with PMHx of low BPs HLD, DM2 (currently off meds), FADI not on CPAP, CAD s/p stent (1999, now off aspirin), recent PE on lovenox (currently hold off due to thrombocytopenia), and 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 Hx limited iso pt on BiPAP, however reports in interim of d/c he has experienced weakness, fever, chills, SOB w/ chest discomfort, dry cough, diarrhea and persistent abd pain and LE edema. Otherwise denies sx.     ED Course  VS: febrile Tmax 38.4C, tachycardic HR 140s, hypoTN BP 80s-140s/50s-110s, tachypneic RR 22, SpO2% 100 on BiPAP 10/5 FiO2% 30  Labs:  leukopenia (neutropenia) 0.79, normocytic (MCV 94) anemia H/H 9.1/28.3 and elevated RDW 20.8, thrombocytopenia 50; hypoNa 130, hypoCl 96, low bicarb 16; HST elevated 51; VBG hypocarbic 35, lactate 4.1  > 1.3  Micro: UA not c/f infxn, RVP/bordetella parapertussis/COV2 not det  Imaging: CXR low lung volumes w/ bibasilar atelectasis  Received: tylenol 1g IV, cefepime 2g IV, NS 500c IV    Admit to medicine for further eval/mgt

## 2022-05-06 NOTE — H&P ADULT - NSHPSOCIALHISTORY_GEN_ALL_CORE
hx of occasional cigarette use 30 yrs ago, social EtOH hx of occasional cigarette use 30 yrs ago, social EtOH, denies illicit drug use. Lives w/ wife and is pending HHA, ambulates w/ rolling walker. [place]. Is a .

## 2022-05-06 NOTE — CONSULT NOTE ADULT - PROBLEM SELECTOR RECOMMENDATION 4
Pt remains hopeful and optimistic for further DMT. Please see separate GOC note.   In summary, patient remains a full code. Introduced hospice philosophy of care, however wife was asking about continued blood work and platelet transfusions, which is not in line with Hospice philosophy.

## 2022-05-06 NOTE — PROGRESS NOTE ADULT - ASSESSMENT
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

## 2022-05-06 NOTE — H&P ADULT - PROBLEM SELECTOR PLAN 4
presumed 2/2 malignancy on previous admit   previous admit w/ 4u PRBC and 3u PLT  on folate/b12 supplementation  - holding AC iso combination w/ thrombocytopenia  - monitor CBC, transfuse prn presumed 2/2 malignancy on previous admit , at bl per previous d/c  previous admit w/ 4u PRBC and 3u PLT  on folate/b12 supplementation  - holding AC iso combination w/ thrombocytopenia  - monitor CBC, transfuse prn    #thrombocytopenia  plt 50 at bl per recent d/c  - monitor plt, transfuse prn

## 2022-05-06 NOTE — H&P ADULT - PROBLEM SELECTOR PLAN 7
thought 2/2 extrinsic compressions 2/2 ascites and malignancy progression as well as hypoalbuminemia and resultant oncotic gradient alteration  - conservative mgt w/ elevation of legs thought 2/2 extrinsic compressions 2/2 ascites and malignancy progression as well as hypoalbuminemia and resultant oncotic gradient alteration  - conservative mgt w/ elevation of legs  - add on proBNP thought 2/2 extrinsic compressions 2/2 ascites and malignancy progression as well as hypoalbuminemia and resultant oncotic gradient alteration  - conservative mgt w/ elevation of legs  - add on pro-BNP

## 2022-05-06 NOTE — CONSULT NOTE ADULT - PROBLEM SELECTOR RECOMMENDATION 5
Thank you for allowing us to participate in your patient's care. We will continue to follow with you. Please page [phone] for any q's or c's.     Alondra Storey D.O.   Palliative Medicine Thank you for allowing us to participate in your patient's care. Goals are clear. Signing off. Please page [phone] for any q's or c's.     Alondra Storey D.O.   Palliative Medicine

## 2022-05-06 NOTE — PROGRESS NOTE ADULT - SUBJECTIVE AND OBJECTIVE BOX
SUBJECTIVE / OVERNIGHT EVENTS:pt seen and examined  22     MEDICATIONS  (STANDING):  budesonide  80 MICROgram(s)/formoterol 4.5 MICROgram(s) Inhaler 2 Puff(s) Inhalation two times a day  cefepime   IVPB 2000 milliGRAM(s) IV Intermittent every 8 hours  cyanocobalamin 1000 MICROGram(s) Oral daily  dextrose 5%. 1000 milliLiter(s) (100 mL/Hr) IV Continuous <Continuous>  dextrose 5%. 1000 milliLiter(s) (50 mL/Hr) IV Continuous <Continuous>  dextrose 50% Injectable 25 Gram(s) IV Push once  dextrose 50% Injectable 12.5 Gram(s) IV Push once  dextrose 50% Injectable 25 Gram(s) IV Push once  ferrous    sulfate 325 milliGRAM(s) Oral daily  folic acid 1 milliGRAM(s) Oral daily  glucagon  Injectable 1 milliGRAM(s) IntraMuscular once  insulin lispro (ADMELOG) corrective regimen sliding scale   SubCutaneous every 6 hours  lactobacillus acidophilus 1 Tablet(s) Oral two times a day  multivitamin 1 Tablet(s) Oral daily  pantoprazole    Tablet 40 milliGRAM(s) Oral two times a day  sodium chloride 0.9%. 1000 milliLiter(s) (75 mL/Hr) IV Continuous <Continuous>    MEDICATIONS  (PRN):  acetaminophen     Tablet .. 650 milliGRAM(s) Oral every 6 hours PRN Temp greater or equal to 38C (100.4F), Mild Pain (1 - 3)  dextrose Oral Gel 15 Gram(s) Oral once PRN Blood Glucose LESS THAN 70 milliGRAM(s)/deciliter  HYDROmorphone   Tablet 2 milliGRAM(s) Oral every 4 hours PRN Severe Pain (7 - 10)  HYDROmorphone   Tablet 1 milliGRAM(s) Oral every 4 hours PRN Moderate Pain (4 - 6)    T(C): 36.4 (22 @ 22:17), Max: 37.2 (22 @ 14:00)  HR: 95 (22 @ 22:17) (66 - 106)  BP: 97/61 (22 @ 22:17) (92/61 - 102/62)  RR: 17 (22 @ 22:17) (17 - 20)  SpO2: 100% (22 @ 22:17) (96% - 100%)    CAPILLARY BLOOD GLUCOSE      POCT Blood Glucose.: 120 mg/dL (06 May 2022 22:04)  POCT Blood Glucose.: 121 mg/dL (06 May 2022 17:36)  POCT Blood Glucose.: 116 mg/dL (06 May 2022 11:57)  POCT Blood Glucose.: 153 mg/dL (06 May 2022 08:00)    I&O's Summary    05 May 2022 07:01  -  06 May 2022 07:00  --------------------------------------------------------  IN: 225 mL / OUT: 0 mL / NET: 225 mL    06 May 2022 07:01  -  06 May 2022 23:29  --------------------------------------------------------  IN: 600 mL / OUT: 300 mL / NET: 300 mL        Constitutional: No fever, fatigue  Skin: No rash.  Eyes: No recent vision problems or eye pain.  ENT: No congestion, ear pain, or sore throat.  Cardiovascular: No chest pain or palpation.  Respiratory: No cough, shortness of breath, congestion, or wheezing.  Gastrointestinal: No abdominal pain, nausea, vomiting, or diarrhea.  Genitourinary: No dysuria.  Musculoskeletal: No joint swelling.  Neurologic: No headache.    PHYSICAL EXAM:  GENERAL: NAD  EYES: EOMI, PERRLA  NECK: Supple, No JVD  CHEST/LUNG: crackles at bases  HEART:  S1 , S2 +  ABDOMEN: soft, bs+,, distension+ mld tenderness+  EXTREMITIES:  edema+  NEUROLOGY:alert awake      LABS:                        7.3    1.42  )-----------( 29       ( 06 May 2022 06:57 )             22.8         130<L>  |  100  |  25<H>  ----------------------------<  126<H>  3.9   |  18<L>  |  1.13    Ca    8.0<L>      06 May 2022 06:57  Phos  3.5     05-  Mg     1.60     -    TPro  6.6  /  Alb  2.9<L>  /  TBili  0.7  /  DBili  x   /  AST  15  /  ALT  5   /  AlkPhos  78  -    PT/INR - ( 05 May 2022 15:35 )   PT: 11.7 sec;   INR: 1.01 ratio         PTT - ( 05 May 2022 15:35 )  PTT:21.7 sec  CARDIAC MARKERS ( 05 May 2022 15:38 )  x     / x     / x     / x     / 4.5 ng/mL      Urinalysis Basic - ( 06 May 2022 21:05 )    Color: Yellow / Appearance: Slightly Turbid / S.025 / pH: x  Gluc: x / Ketone: Small  / Bili: Negative / Urobili: <2 mg/dL   Blood: x / Protein: 100 mg/dL / Nitrite: Negative   Leuk Esterase: Negative / RBC: 59 /HPF / WBC 6 /HPF   Sq Epi: x / Non Sq Epi: 3 /HPF / Bacteria: Negative        RADIOLOGY & ADDITIONAL TESTS:    Imaging Personally Reviewed:    Consultant(s) Notes Reviewed:      Care Discussed with Consultants/Other Providers:

## 2022-05-07 NOTE — PROGRESS NOTE ADULT - ASSESSMENT
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, CAD s/p stent (1999, now off aspirin), recent PE on lovenox (currently hold off due to thrombocytopenia), and 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       EKG SR low voltage similar to april     1) SOB   - PE Vs PNA euvolemic on exam   - echo TDS grossly normal RV function  - on abx     2) CAD s/p stent  -s/p stent 20 years ago as per patient  -denies CP    3) Anemia/thrombocytopenia   -hemoglobin 7.3  -monitor H/H     4)  DVT PPX  -recommend SCD's  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, CAD s/p stent (1999, now off aspirin), recent PE on lovenox (currently hold off due to thrombocytopenia), and 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       EKG SR low voltage similar to april     1) SOB   - PE Vs PNA euvolemic on exam   - echo TDS grossly normal RV function  - on abx     2) CAD s/p stent  -s/p stent 20 years ago as per patient  -denies CP    3) Anemia/thrombocytopenia   -hemoglobin 7.3  -monitor H/H   - [facility] -dominique for HIT last admission   - john consider a/c if thrombocytopenia improves     4)  DVT PPX  -recommend SCD's

## 2022-05-07 NOTE — PROVIDER CONTACT NOTE (OTHER) - SITUATION
MD Pitts informed that on 2 occasion respiratory therapist came to put patient on cpap however patient refused on both occasions

## 2022-05-07 NOTE — CHART NOTE - NSCHARTNOTEFT_GEN_A_CORE
Emailed oncology for consult. Spoke with oncology fellow on call who stated patient is s/p multiple chemo cycles and our oncology team has nothing to offer patient in terms of treatment. Fellow recommended platelet transfusion for levels under 15 or 50 if actively bleeding. Transfuse for HgB less than 7. He recommended palliative care consult - which was already done yesterday. Patient wishes to pursue second opinion and active treatment.

## 2022-05-07 NOTE — PROGRESS NOTE ADULT - SUBJECTIVE AND OBJECTIVE BOX
Max Coffey MD  Interventional Cardiology / Endovascular Specialist  [place] Office : [phone] [place] [address] [place] [address]  Tel:   [place] Office : [phone] [facility] [place] [address]  Tel: [phone]      Subjective/Overnight events: Patient lying in bed. No acute distress.   	  MEDICATIONS:    cefepime   IVPB 2000 milliGRAM(s) IV Intermittent every 8 hours    budesonide  80 MICROgram(s)/formoterol 4.5 MICROgram(s) Inhaler 2 Puff(s) Inhalation two times a day    acetaminophen     Tablet .. 650 milliGRAM(s) Oral every 6 hours PRN  HYDROmorphone   Tablet 2 milliGRAM(s) Oral every 4 hours PRN  HYDROmorphone   Tablet 1 milliGRAM(s) Oral every 4 hours PRN    pantoprazole    Tablet 40 milliGRAM(s) Oral two times a day    dextrose 50% Injectable 25 Gram(s) IV Push once  dextrose 50% Injectable 12.5 Gram(s) IV Push once  dextrose 50% Injectable 25 Gram(s) IV Push once  dextrose Oral Gel 15 Gram(s) Oral once PRN  glucagon  Injectable 1 milliGRAM(s) IntraMuscular once  insulin lispro (ADMELOG) corrective regimen sliding scale   SubCutaneous every 6 hours    cyanocobalamin 1000 MICROGram(s) Oral daily  dextrose 5%. 1000 milliLiter(s) IV Continuous <Continuous>  dextrose 5%. 1000 milliLiter(s) IV Continuous <Continuous>  ferrous    sulfate 325 milliGRAM(s) Oral daily  folic acid 1 milliGRAM(s) Oral daily  multivitamin 1 Tablet(s) Oral daily  sodium chloride 0.9%. 1000 milliLiter(s) IV Continuous <Continuous>      PAST MEDICAL/SURGICAL HISTORY  PAST MEDICAL & SURGICAL HISTORY:  Hypertension  Now with low BPs    Diabetes mellitus  Fasting FS range from - per patient, off meds    Hypercholesteremia  off meds    CAD (coronary artery disease)  Off aspirin    Sleep apnea    Liposarcoma    Pulmonary embolism    Transfusion history    History of cardiac cath  Pt reports 1 cardiac stent placed 1999    H/O sleep apnea  Per patient had Sleep Apnea surgery  in 1996- at [facility]- Was not retested for Sleep Apnea post surgery    History of colon resection  Dec 2019        SOCIAL HISTORY: Substance Use (street drugs): ( x ) never used  (  ) other:    FAMILY HISTORY:  FH: heart disease  Mother            PHYSICAL EXAM:  T(C): 36.3 (05-07-22 @ 10:30), Max: 37.2 (05-06-22 @ 14:00)  HR: 85 (05-07-22 @ 10:30) (66 - 100)  BP: 99/64 (05-07-22 @ 10:30) (97/61 - 102/62)  RR: 18 (05-07-22 @ 10:30) (17 - 18)  SpO2: 100% (05-07-22 @ 10:30) (96% - 100%)  Wt(kg): --  I&O's Summary    06 May 2022 07:01  -  07 May 2022 07:00  --------------------------------------------------------  IN: 650 mL / OUT: 1100 mL / NET: -450 mL        Weight (kg): 84.8 (05-07 @ 10:30)          EYES:   PERRLA   ENMT:   Moist mucous membranes, Good dentition, No lesions  Cardiovascular: Normal S1 S2, No JVD, No murmurs, No edema  Respiratory: Lungs clear to auscultation	  Gastrointestinal:  + ascites s/p paracentesis  Extremities: no edema                                    7.3    1.93  )-----------( 54       ( 07 May 2022 07:12 )             22.5     05-07    132<L>  |  100  |  27<H>  ----------------------------<  111<H>  4.1   |  16<L>  |  1.12    Ca    8.3<L>      07 May 2022 07:12  Phos  3.2     05-07  Mg     1.70     05-07    TPro  6.6  /  Alb  2.9<L>  /  TBili  0.7  /  DBili  x   /  AST  15  /  ALT  5   /  AlkPhos  78  05-05    proBNP:   Lipid Profile:   HgA1c:   TSH:     Consultant(s) Notes Reviewed:  [x ] YES  [ ] NO    Care Discussed with Consultants/Other Providers [ x] YES  [ ] NO    Imaging Personally Reviewed independently:  [x] YES  [ ] NO    All labs, radiologic studies, vitals, orders and medications list reviewed. Patient is seen and examined at bedside. Case discussed with medical team.

## 2022-05-07 NOTE — PROGRESS NOTE ADULT - SUBJECTIVE AND OBJECTIVE BOX
SUBJECTIVE / OVERNIGHT EVENTS:pt seen and examined  22     MEDICATIONS  (STANDING):  budesonide  80 MICROgram(s)/formoterol 4.5 MICROgram(s) Inhaler 2 Puff(s) Inhalation two times a day  cefepime   IVPB 2000 milliGRAM(s) IV Intermittent every 8 hours  cyanocobalamin 1000 MICROGram(s) Oral daily  dextrose 5%. 1000 milliLiter(s) (100 mL/Hr) IV Continuous <Continuous>  dextrose 5%. 1000 milliLiter(s) (50 mL/Hr) IV Continuous <Continuous>  dextrose 50% Injectable 25 Gram(s) IV Push once  dextrose 50% Injectable 12.5 Gram(s) IV Push once  dextrose 50% Injectable 25 Gram(s) IV Push once  ferrous    sulfate 325 milliGRAM(s) Oral daily  folic acid 1 milliGRAM(s) Oral daily  glucagon  Injectable 1 milliGRAM(s) IntraMuscular once  insulin lispro (ADMELOG) corrective regimen sliding scale   SubCutaneous Before meals and at bedtime  lactobacillus acidophilus 1 Tablet(s) Oral two times a day  multivitamin 1 Tablet(s) Oral daily  pantoprazole    Tablet 40 milliGRAM(s) Oral two times a day  sodium chloride 0.9%. 1000 milliLiter(s) (75 mL/Hr) IV Continuous <Continuous>    MEDICATIONS  (PRN):  acetaminophen     Tablet .. 650 milliGRAM(s) Oral every 6 hours PRN Temp greater or equal to 38C (100.4F), Mild Pain (1 - 3)  dextrose Oral Gel 15 Gram(s) Oral once PRN Blood Glucose LESS THAN 70 milliGRAM(s)/deciliter  HYDROmorphone   Tablet 2 milliGRAM(s) Oral every 4 hours PRN Severe Pain (7 - 10)  HYDROmorphone   Tablet 1 milliGRAM(s) Oral every 4 hours PRN Moderate Pain (4 - 6)    Vital Signs Last 24 Hrs  T(C): 36.3 (22 @ 21:56), Max: 36.7 (22 @ 05:56)  T(F): 97.4 (22 @ 21:56), Max: 98 (22 @ 05:56)  HR: 84 (22 @ 21:56) (80 - 100)  BP: 91/62 (22 @ 21:56) (91/62 - 105/62)  BP(mean): --  RR: 18 (22 @ 21:56) (17 - 18)  SpO2: 97% (22 @ 21:56) (95% - 100%)        Constitutional: No fever, fatigue  Skin: No rash.  Eyes: No recent vision problems or eye pain.  ENT: No congestion, ear pain, or sore throat.  Cardiovascular: No chest pain or palpation.  Respiratory: No cough, shortness of breath, congestion, or wheezing.  Gastrointestinal: No abdominal pain, nausea, vomiting, or diarrhea.  Genitourinary: No dysuria.  Musculoskeletal: No joint swelling.  Neurologic: No headache.    PHYSICAL EXAM:  GENERAL: NAD  EYES: EOMI, PERRLA  NECK: Supple, No JVD  CHEST/LUNG: crackles at bases  HEART:  S1 , S2 +  ABDOMEN: soft, bs+,, distension+ mld tenderness+  EXTREMITIES:  edema+  NEUROLOGY:alert awake      LABS:      132<L>  |  100  |  27<H>  ----------------------------<  111<H>  4.1   |  16<L>  |  1.12    Ca    8.3<L>      07 May 2022 07:12  Phos  3.2       Mg     1.70           Creatinine Trend: 1.12 <--, 1.13 <--, 1.04 <--, 0.81 <--, 0.75 <--, 0.81 <--, 0.80 <--                        7.3    1.93  )-----------( 54       ( 07 May 2022 07:12 )             22.5     Urine Studies:  Urinalysis Basic - ( 06 May 2022 21:05 )    Color: Yellow / Appearance: Slightly Turbid / S.025 / pH:   Gluc:  / Ketone: Small  / Bili: Negative / Urobili: <2 mg/dL   Blood:  / Protein: 100 mg/dL / Nitrite: Negative   Leuk Esterase: Negative / RBC: 59 /HPF / WBC 6 /HPF   Sq Epi:  / Non Sq Epi: 3 /HPF / Bacteria: Negative      Osmolality, Random Urine: 564 mosm/kg ( @ 16:17)  Sodium, Random Urine: <20 mmol/L ( @ 16:17)                  Urinalysis Basic - ( 06 May 2022 21:05 )    Color: Yellow / Appearance: Slightly Turbid / S.025 / pH: x  Gluc: x / Ketone: Small  / Bili: Negative / Urobili: <2 mg/dL   Blood: x / Protein: 100 mg/dL / Nitrite: Negative   Leuk Esterase: Negative / RBC: 59 /HPF / WBC 6 /HPF   Sq Epi: x / Non Sq Epi: 3 /HPF / Bacteria: Negative        RADIOLOGY & ADDITIONAL TESTS:    Imaging Personally Reviewed:    Consultant(s) Notes Reviewed:      Care Discussed with Consultants/Other Providers:

## 2022-05-08 NOTE — PROGRESS NOTE ADULT - ASSESSMENT
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       EKG SR low voltage similar to april     1) SOB   - PE Vs PNA euvolemic on exam   - echo TDS grossly normal RV function  - on abx     2) CAD s/p stent  -s/p stent 20 years ago as per patient  -denies CP    3) Anemia/thrombocytopenia   -hemoglobin 7.3  -monitor H/H   - NARCISA -dominique for HIT last admission   - john consider a/c if thrombocytopenia improves     4)  DVT PPX  -recommend SCD's

## 2022-05-08 NOTE — PROVIDER CONTACT NOTE (OTHER) - BACKGROUND
Patient axox4. Patient admitted for SOB. PMH of sleep apnea, CAD, CHF, DM and kidney disease
patient with malignant ascites

## 2022-05-08 NOTE — PROGRESS NOTE ADULT - ASSESSMENT
[ x ]  Lab studies reviewed  [ x ]  Radiology reviewed  [ x ]  Old records reviewed    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

## 2022-05-08 NOTE — PROGRESS NOTE ADULT - SUBJECTIVE AND OBJECTIVE BOX
PULMONARY PROGRESS NOTE    ADRIAN FUNES  MRN-[id number]    Patient is a 71y old  Male who presents with a chief complaint of AHRF (08 May 2022 13:11)      HPI:  -some cough  on room air  some dyspnea    -    ROS:   -    ACTIVE MEDICATION LIST:  MEDICATIONS  (STANDING):  budesonide  80 MICROgram(s)/formoterol 4.5 MICROgram(s) Inhaler 2 Puff(s) Inhalation two times a day  cefepime   IVPB 2000 milliGRAM(s) IV Intermittent every 8 hours  cyanocobalamin 1000 MICROGram(s) Oral daily  dextrose 5%. 1000 milliLiter(s) (50 mL/Hr) IV Continuous <Continuous>  dextrose 5%. 1000 milliLiter(s) (100 mL/Hr) IV Continuous <Continuous>  dextrose 50% Injectable 25 Gram(s) IV Push once  dextrose 50% Injectable 12.5 Gram(s) IV Push once  dextrose 50% Injectable 25 Gram(s) IV Push once  ferrous    sulfate 325 milliGRAM(s) Oral daily  folic acid 1 milliGRAM(s) Oral daily  glucagon  Injectable 1 milliGRAM(s) IntraMuscular once  insulin lispro (ADMELOG) corrective regimen sliding scale   SubCutaneous Before meals and at bedtime  lactobacillus acidophilus 1 Tablet(s) Oral two times a day  multivitamin 1 Tablet(s) Oral daily  pantoprazole    Tablet 40 milliGRAM(s) Oral two times a day  sodium chloride 0.9%. 1000 milliLiter(s) (75 mL/Hr) IV Continuous <Continuous>    MEDICATIONS  (PRN):  acetaminophen     Tablet .. 650 milliGRAM(s) Oral every 6 hours PRN Temp greater or equal to 38C (100.4F), Mild Pain (1 - 3)  benzonatate 200 milliGRAM(s) Oral every 8 hours PRN Cough  dextrose Oral Gel 15 Gram(s) Oral once PRN Blood Glucose LESS THAN 70 milliGRAM(s)/deciliter  HYDROmorphone   Tablet 2 milliGRAM(s) Oral every 4 hours PRN Severe Pain (7 - 10)  HYDROmorphone   Tablet 1 milliGRAM(s) Oral every 4 hours PRN Moderate Pain (4 - 6)      EXAM:  Vital Signs Last 24 Hrs  T(C): 36.5 (08 May 2022 10:00), Max: 36.6 (08 May 2022 05:02)  T(F): 97.7 (08 May 2022 10:00), Max: 97.8 (08 May 2022 05:02)  HR: 99 (08 May 2022 10:00) (84 - 103)  BP: 93/66 (08 May 2022 10:00) (90/60 - 96/65)  BP(mean): --  RR: 20 (08 May 2022 10:00) (17 - 20)  SpO2: 99% (08 May 2022 10:00) (97% - 99%)    GENERAL: The patient is awake and alert in no apparent distress.     LUNGS: Clear to auscultation without wheezing, rales or rhonchi; respirations unlabored                             7.0    1.89  )-----------( 49       ( 08 May 2022 06:38 )             22.3       05-08    129<L>  |  99  |  28<H>  ----------------------------<  132<H>  4.0   |  16<L>  |  1.13    Ca    8.2<L>      08 May 2022 06:38  Phos  2.7     05-08  Mg     1.70     05-08       rad< from: CT Angio Chest PE Protocol w/ IV Cont (05.07.22 @ 11:26) >    ACC: [id number] EXAM:  CT ANGIO CHEST PULM [facility]                          PROCEDURE DATE:  05/07/2022          INTERPRETATION:  CLINICAL INFORMATION: Known pulmonary embolism.   Worsening shortness of breath.    COMPARISON: CT angiography of the chest 4/5/2022    CONTRAST/COMPLICATIONS:  IV Contrast: Omnipaque 350  54 cc administered   46 cc discarded  Oral Contrast: NONE  Complications: None reported at time of study completion    PROCEDURE:  CT Angiography of the Chest.  Sagittal and coronal reformats were performed as well as 3D (MIP)   reconstructions.    FINDINGS:    LUNGS AND AIRWAYS: New patchy groundglass opacities within right upper   lobe. Increased atelectasis/consolidation of basilar left lower lobe.   Atelectasis/consolidation of basilar right lower lobe is unchanged. Small   pleural effusions.  PLEURA: Small bilateral pleural effusions with adjacent subsegmental   passive atelectasis.  MEDIASTINUM AND GARRY: No lymphadenopathy. Hiatal hernia.  VESSELS: Redemonstrated right lower lobar pulmonary embolus with   extension into the segmental branches, similar to prior CT angiography of   the chest. No additional pulmonary emboli visualized. Calculations of the   coronary arteries and aorta.  HEART: Heart size is normal. No pericardial effusion.  CHEST WALL AND LOWER NECK: Within normal limits.  VISUALIZED UPPER ABDOMEN: Moderate to large volume ascites. Large hiatal   hernia containing gastric fundus.  BONES: Degenerative changes of the spine.    IMPRESSION:  *  Redemonstration of a right lower lobar pulmonary embolus with   extension into the segmental branches. No new pulmonary embolism   visualized.  *  In comparison with 4/5/2022, New patchy groundglass opacities within   right upper lobe. Increased atelectasis/consolidation of basilar left   lower lobe. Atelectasis/consolidation of basilar right lower lobe is   unchanged. Small pleural effusions.    --- End of Report ---          DARIEN PINEDA MD; Resident Radiologist  This document has been electronically signed.  HERNAN DELGADO MD; Attending Radiologist  This document has been electronically signed. May  7 2022 12:04PM    < end of copied text >      PROBLEM LIST:  71y Male with HEALTH ISSUES - PROBLEM Dx:  Acute respiratory failure with hypoxia    Neutropenia    Anemia    SIRS (systemic inflammatory response syndrome)    History of pulmonary embolism    Liposarcoma    Lower extremity edema    Hyponatremia    Type 2 diabetes mellitus    Need for prophylactic measure    Suspected pulmonary embolism    Acute pain    Diarrhea    Advanced care planning/counseling discussion    Encounter for palliative care              RECS:  -dyspnea likely related to distended abdomen and untreated PE  -symbicort BID  -       Please call with any questions.    Leidy Gonzalez DO  [facility] Pulmonary/Sleep Medicine  [phone]

## 2022-05-08 NOTE — PROVIDER CONTACT NOTE (OTHER) - ACTION/TREATMENT ORDERED:
repeat BladderScan at 9 pm , if result is over 300ml straight cath. if patient able to urinate by self in between this time notify the provider
Will continue to educate patient on the benefits of cpap

## 2022-05-08 NOTE — PROGRESS NOTE ADULT - SUBJECTIVE AND OBJECTIVE BOX
SUBJECTIVE / OVERNIGHT EVENTS:pt seen and examined  22     MEDICATIONS  (STANDING):  budesonide  80 MICROgram(s)/formoterol 4.5 MICROgram(s) Inhaler 2 Puff(s) Inhalation two times a day  cefepime   IVPB 2000 milliGRAM(s) IV Intermittent every 8 hours  cyanocobalamin 1000 MICROGram(s) Oral daily  dextrose 5%. 1000 milliLiter(s) (50 mL/Hr) IV Continuous <Continuous>  dextrose 5%. 1000 milliLiter(s) (100 mL/Hr) IV Continuous <Continuous>  dextrose 50% Injectable 25 Gram(s) IV Push once  dextrose 50% Injectable 12.5 Gram(s) IV Push once  dextrose 50% Injectable 25 Gram(s) IV Push once  ferrous    sulfate 325 milliGRAM(s) Oral daily  folic acid 1 milliGRAM(s) Oral daily  glucagon  Injectable 1 milliGRAM(s) IntraMuscular once  insulin lispro (ADMELOG) corrective regimen sliding scale   SubCutaneous Before meals and at bedtime  lactobacillus acidophilus 1 Tablet(s) Oral two times a day  multivitamin 1 Tablet(s) Oral daily  pantoprazole    Tablet 40 milliGRAM(s) Oral two times a day  sodium chloride 0.9%. 1000 milliLiter(s) (75 mL/Hr) IV Continuous <Continuous>    MEDICATIONS  (PRN):  acetaminophen     Tablet .. 650 milliGRAM(s) Oral every 6 hours PRN Temp greater or equal to 38C (100.4F), Mild Pain (1 - 3)  benzonatate 200 milliGRAM(s) Oral every 8 hours PRN Cough  dextrose Oral Gel 15 Gram(s) Oral once PRN Blood Glucose LESS THAN 70 milliGRAM(s)/deciliter  HYDROmorphone   Tablet 2 milliGRAM(s) Oral every 4 hours PRN Severe Pain (7 - 10)  HYDROmorphone   Tablet 1 milliGRAM(s) Oral every 4 hours PRN Moderate Pain (4 - 6)    Vital Signs Last 24 Hrs  T(C): 36.4 (22 @ 18:02), Max: 36.6 (22 @ 05:02)  T(F): 97.6 (22 @ 18:02), Max: 97.8 (22 @ 05:02)  HR: 97 (22 @ 18:02) (84 - 103)  BP: 108/62 (22 @ 18:02) (81/63 - 108/62)  BP(mean): --  RR: 18 (22 @ 18:02) (17 - 20)  SpO2: 99% (22 @ 18:02) (96% - 99%)          Constitutional: No fever, fatigue  Skin: No rash.  Eyes: No recent vision problems or eye pain.  ENT: No congestion, ear pain, or sore throat.  Cardiovascular: No chest pain or palpation.  Respiratory: No cough, shortness of breath, congestion, or wheezing.  Gastrointestinal: No abdominal pain, nausea, vomiting, or diarrhea.  Genitourinary: No dysuria.  Musculoskeletal: No joint swelling.  Neurologic: No headache.    PHYSICAL EXAM:  GENERAL: NAD  EYES: EOMI, PERRLA  NECK: Supple, No JVD  CHEST/LUNG: crackles at bases  HEART:  S1 , S2 +  ABDOMEN: soft, bs+,, distension+ mld tenderness+  EXTREMITIES:  edema+  NEUROLOGY:alert awake    LABS:      129<L>  |  99  |  28<H>  ----------------------------<  132<H>  4.0   |  16<L>  |  1.13    Ca    8.2<L>      08 May 2022 06:38  Phos  2.7       Mg     1.70           Creatinine Trend: 1.13 <--, 1.12 <--, 1.13 <--, 1.04 <--, 0.81 <--, 0.75 <--, 0.81 <--                        7.0    1.89  )-----------( 49       ( 08 May 2022 06:38 )             22.3     Urine Studies:  Urinalysis Basic - ( 06 May 2022 21:05 )    Color: Yellow / Appearance: Slightly Turbid / S.025 / pH:   Gluc:  / Ketone: Small  / Bili: Negative / Urobili: <2 mg/dL   Blood:  / Protein: 100 mg/dL / Nitrite: Negative   Leuk Esterase: Negative / RBC: 59 /HPF / WBC 6 /HPF   Sq Epi:  / Non Sq Epi: 3 /HPF / Bacteria: Negative      Osmolality, Random Urine: 564 mosm/kg ( @ 16:17)  Sodium, Random Urine: <20 mmol/L ( @ 16:17)              Consultant(s) Notes Reviewed:      Care Discussed with Consultants/Other Providers:

## 2022-05-08 NOTE — PROGRESS NOTE ADULT - SUBJECTIVE AND OBJECTIVE BOX
Max Coffey MD  Interventional Cardiology / Endovascular Specialist  [place] Office : [phone] [place] [address] [place] [address]  Tel:   [place] Office : [phone] [facility] [place] [address]  Tel: [phone]      Subjective/Overnight events: Patient lying in bed comfortably. No acute distress.   	  MEDICATIONS:    cefepime   IVPB 2000 milliGRAM(s) IV Intermittent every 8 hours    benzonatate 200 milliGRAM(s) Oral every 8 hours PRN  budesonide  80 MICROgram(s)/formoterol 4.5 MICROgram(s) Inhaler 2 Puff(s) Inhalation two times a day    acetaminophen     Tablet .. 650 milliGRAM(s) Oral every 6 hours PRN  HYDROmorphone   Tablet 2 milliGRAM(s) Oral every 4 hours PRN  HYDROmorphone   Tablet 1 milliGRAM(s) Oral every 4 hours PRN    pantoprazole    Tablet 40 milliGRAM(s) Oral two times a day    dextrose 50% Injectable 25 Gram(s) IV Push once  dextrose 50% Injectable 12.5 Gram(s) IV Push once  dextrose 50% Injectable 25 Gram(s) IV Push once  dextrose Oral Gel 15 Gram(s) Oral once PRN  glucagon  Injectable 1 milliGRAM(s) IntraMuscular once  insulin lispro (ADMELOG) corrective regimen sliding scale   SubCutaneous Before meals and at bedtime    cyanocobalamin 1000 MICROGram(s) Oral daily  dextrose 5%. 1000 milliLiter(s) IV Continuous <Continuous>  dextrose 5%. 1000 milliLiter(s) IV Continuous <Continuous>  ferrous    sulfate 325 milliGRAM(s) Oral daily  folic acid 1 milliGRAM(s) Oral daily  multivitamin 1 Tablet(s) Oral daily  sodium chloride 0.9%. 1000 milliLiter(s) IV Continuous <Continuous>      PAST MEDICAL/SURGICAL HISTORY  PAST MEDICAL & SURGICAL HISTORY:  Hypertension  Now with low BPs    Diabetes mellitus  Fasting FS range from - per patient, off meds    Hypercholesteremia  off meds    CAD (coronary artery disease)  Off aspirin    Sleep apnea    Liposarcoma    Pulmonary embolism    Transfusion history    History of cardiac cath  Pt reports 1 cardiac stent placed 1999    H/O sleep apnea  Per patient had Sleep Apnea surgery  in 1996- at [facility]- Was not retested for Sleep Apnea post surgery    History of colon resection  Dec 2019        SOCIAL HISTORY: Substance Use (street drugs): ( x ) never used  (  ) other:    FAMILY HISTORY:  FH: heart disease  Mother          PHYSICAL EXAM:  T(C): 36.5 (05-08-22 @ 10:00), Max: 36.6 (05-07-22 @ 14:00)  HR: 99 (05-08-22 @ 10:00) (80 - 103)  BP: 93/66 (05-08-22 @ 10:00) (90/60 - 105/62)  RR: 20 (05-08-22 @ 10:00) (17 - 20)  SpO2: 99% (05-08-22 @ 10:00) (95% - 99%)  Wt(kg): --  I&O's Summary    07 May 2022 07:01  -  08 May 2022 07:00  --------------------------------------------------------  IN: 800 mL / OUT: 700 mL / NET: 100 mL        EYES:   PERRLA   ENMT:   Moist mucous membranes, Good dentition, No lesions  Cardiovascular: Normal S1 S2, No JVD, No murmurs, No edema  Respiratory: Lungs clear to auscultation	  Gastrointestinal:  + ascites   Extremities: +2 edema                                    7.0    1.89  )-----------( 49       ( 08 May 2022 06:38 )             22.3     05-08    129<L>  |  99  |  28<H>  ----------------------------<  132<H>  4.0   |  16<L>  |  1.13    Ca    8.2<L>      08 May 2022 06:38  Phos  2.7     05-08  Mg     1.70     05-08      proBNP:   Lipid Profile:   HgA1c:   TSH:     Consultant(s) Notes Reviewed:  [x ] YES  [ ] NO    Care Discussed with Consultants/Other Providers [ x] YES  [ ] NO    Imaging Personally Reviewed independently:  [x] YES  [ ] NO    All labs, radiologic studies, vitals, orders and medications list reviewed. Patient is seen and examined at bedside. Case discussed with medical team.

## 2022-05-08 NOTE — PROVIDER CONTACT NOTE (OTHER) - SITUATION
patient voids twice about 250 ml total. see flowsheets . Bladder scan at 6pm showed 315 ml but unable to get urine by straight cath

## 2022-05-08 NOTE — PROVIDER CONTACT NOTE (OTHER) - RECOMMENDATIONS
[facility]  [address]  [address] [address]  Dept: [phone]  Dept Fax: [phone]    Alfonso Ludwig is a 49 y.o. female who presents today for her medical conditions/complaintsas noted below. Alfonso Mom is c/o of   Chief Complaint   Patient presents with    Hypothyroidism     discuss labs         HPI:     HPI     Patient doing phone visit today for follow up on recent labs. She is starting next week with [facility] weight loss clinic. She states she feels ready for this and is hoping to have good success. She would like to review the labs she had done recently for starting the program. She reports feeling well.  Occasionally gets joint aches and pains but feels these are manageable    Hemoglobin A1C (%)   Date Value   2021 5.0             ( goal A1Cis < 7)   No results found for: LABMICR  LDL Cholesterol (mg/dL)   Date Value   2021 113   2020 135 (H)   2018 112       (goal LDL is <100)   AST (U/L)   Date Value   2021 34 (H)     ALT (U/L)   Date Value   2021 43 (H)     BUN (mg/dL)   Date Value   2021 13     BP Readings from Last 3 Encounters:   21 130/80   21 128/86   09/15/20 130/82          (goal 120/80)    Past Medical History:   Diagnosis Date    Acid reflux     Roblero esophagus     Basal cell carcinoma     chest wall 2019    Breast cancer (HCC)     Depression     Fibromyalgia     High cholesterol     HTN (hypertension)     Hyperthyroidism     Hypothyroid     Lymphedema of left arm     Restless leg syndrome       Past Surgical History:   Procedure Laterality Date    BREAST BIOPSY  2009    left breast     SECTION, LOW TRANSVERSE      HYSTERECTOMY      HYSTERECTOMY, VAGINAL      LAPAROSCOPY      LIPOSUCTION      MASTECTOMY, BILATERAL      SKIN CANCER EXCISION      BCC 2019    TONSILLECTOMY      TUBAL LIGATION         Family History   Problem hydrochlorothiazide (HYDRODIURIL) 25 MG tablet TAKE 1 TABLET BY MOUTH EVERY DAY 90 tablet 3    Multiple Vitamins-Minerals (THERAPEUTIC MULTIVITAMIN-MINERALS) tablet Take 1 tablet by mouth daily. No current facility-administered medications for this visit. No Known Allergies    Health Maintenance   Topic Date Due    COVID-19 Vaccine (1) Never done    HIV screen  Never done    Shingles Vaccine (1 of 2) Never done    Flu vaccine (1) 09/01/2021    Lipid screen  07/07/2022    TSH testing  07/07/2022    Potassium monitoring  07/07/2022    Creatinine monitoring  07/07/2022    DTaP/Tdap/Td vaccine (2 - Td or Tdap) 11/29/2026    Colon cancer screen colonoscopy  10/16/2027    Hepatitis C screen  Completed    Hepatitis A vaccine  Aged Out    Hepatitis B vaccine  Aged Out    Hib vaccine  Aged Out    Meningococcal (ACWY) vaccine  Aged Out    Pneumococcal 0-64 years Vaccine  Aged Out       Subjective:     Review of Systems   Constitutional: Negative for activity change, appetite change, fatigue, fever and unexpected weight change. There were no vitals taken for this visit. HENT: Negative for trouble swallowing. Respiratory: Negative for cough, shortness of breath and wheezing. Cardiovascular: Negative for chest pain and palpitations. Neurological: Negative for weakness. Psychiatric/Behavioral: The patient is not nervous/anxious. Objective:     Physical Exam  Nursing note reviewed. Pulmonary:      Comments: Talkative, pleasant  Neurological:      Mental Status: She is alert and oriented to person, place, and time. Psychiatric:         Mood and Affect: Mood normal.         Behavior: Behavior normal.         Thought Content: Thought content normal.         Judgment: Judgment normal.       There were no vitals taken for this visit. Assessment:       Diagnosis Orders   1.  Hypothyroidism, unspecified type              Plan:      Return if symptoms worsen or fail to improve. Reviewed recent labs  Encouraged patient to continue working on improving diet and exercise. Work on lowering triglycerids. Discussed low purine and low cholesterol diet. Cont present medication  Cont plan to move forward with assistance from the [address] weight program  Patient agreed with treatment plan  Lab Results   Component Value Date    WBC 4.8 07/07/2021    HGB 12.5 07/07/2021    HCT 38.3 07/07/2021    MCV 83.8 07/07/2021     07/07/2021     Lab Results   Component Value Date     07/07/2021    K 4.2 07/07/2021     07/07/2021    CO2 22 07/07/2021    BUN 13 07/07/2021    CREATININE 0.84 07/07/2021    GLUCOSE 95 07/07/2021    CALCIUM 9.2 07/07/2021    PROT 7.3 07/07/2021    LABALBU 4.8 07/07/2021    BILITOT 0.31 07/07/2021    ALKPHOS 109 (H) 07/07/2021    AST 34 (H) 07/07/2021    ALT 43 (H) 07/07/2021    LABGLOM >60 07/07/2021    GFRAA >60 07/07/2021       Lab Results   Component Value Date    CHOL 235 (H) 07/07/2021    CHOL 213 (H) 03/13/2020    CHOL 196 07/16/2018     Lab Results   Component Value Date    TRIG 352 (H) 07/07/2021    TRIG 131 03/13/2020    TRIG 210 (H) 07/16/2018     Lab Results   Component Value Date    HDL 52 07/07/2021    HDL 52 03/13/2020    HDL 42 07/16/2018     Lab Results   Component Value Date    LDLCHOLESTEROL 113 07/07/2021    LDLCHOLESTEROL 135 (H) 03/13/2020    LDLCHOLESTEROL 112 07/16/2018     Lab Results   Component Value Date    VLDL NOT REPORTED (H) 07/07/2021    VLDL NOT REPORTED 03/13/2020    VLDL NOT REPORTED 07/16/2018     Lab Results   Component Value Date    CHOLHDLRATIO 4.5 07/07/2021    CHOLHDLRATIO 4.1 03/13/2020    CHOLHDLRATIO 4.7 07/16/2018     Lab Results   Component Value Date    URICACID 6.2 (H) 07/07/2021     Lab Results   Component Value Date    LABA1C 5.0 07/07/2021     Lab Results   Component Value Date    EAG 97 07/07/2021          Lula Banks is a 49 y.o. female evaluated via telephone on 7/16/2021.       Consent:  She and/or health care decision maker is aware that that she may receive a bill for this telephone service, depending on her insurance coverage, and has provided verbal consent to proceed: Yes      Documentation:  I communicated with the patient and/or health care decision maker about above. Details of this discussion including any medical advice provided: above      I affirm this is a Patient Initiated Episode with a Patient who has not had a related appointment within my department in the past 7 days or scheduled within the next 24 hours. Patient identification was verified at the start of the visit: Yes    Total Time: minutes: 11-20 minutes    The visit was conducted pursuant to the emergency declaration under the [facility], [facility] authority and the [employer] Act. Patient identification was verified, and a caregiver was present when appropriate. The patient was located in a state where the provider was credentialed to provide care. Note: not billable if this call serves to triage the patient into an appointment for the relevant concern      Tosin Alanis PA-C        Patient given educational materials - see patient instructions. Discussed use, benefit, and side effects of prescribed medications. All patientquestions answered. Pt voiced understanding. Reviewed health maintenance. Instructedto continue current medications, diet and exercise. Patient agreed with treatmentplan. Follow up as directed.      Electronically signed by Tosin Alanis PA-C on 7/16/2021 at 10:48 AM need to f/u

## 2022-05-09 NOTE — CONSULT NOTE ADULT - ASSESSMENT
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

## 2022-05-09 NOTE — CHART NOTE - NSCHARTNOTEFT_GEN_A_CORE
Procedure team was consulted for possible therapeutic paracentesis for this patient. Taking a look at the ultrasound; there is only complicated ascites (and not much ascites anyway); there is likely little benefit from doing a paracentesis and high likelihood of harm. Would not attempt a paracentesis at this time.

## 2022-05-09 NOTE — CHART NOTE - NSCHARTNOTEFT_GEN_A_CORE
IR c/s for paracentesis; last admission procedure was deferred due to carcinomatosis and no accessible pocket w/o traversing tumor.    Please obtain US of the abdomen.    Please contact IR when US performed for further consideration of paracentesis.

## 2022-05-09 NOTE — PATIENT PROFILE ADULT - FUNCTIONAL ASSESSMENT - BASIC MOBILITY 6.
Advance Care Planning     General Advance Care Planning (ACP) Conversation    Date of Conversation: 5/9/2022  Conducted with: Patient with Decision Making Capacity    Healthcare Decision Maker:    Primary Decision Maker: Analisa Caceres - [facility] - [phone]  Click here to complete [address] including selection of the Healthcare Decision Maker Relationship (ie \"Primary\"). Today we documented desired Decision Maker(s), who is (are) NOT Legal Next of Kin. ACP documents are required for decision maker authority.     Content/Action Overview:  Has NO ACP documents/care preferences - information provided, considering goals and options    Length of Voluntary ACP Conversation in minutes:  <16 minutes (Non-Billable)    BREE Jim
3 = A little assistance

## 2022-05-09 NOTE — PROGRESS NOTE ADULT - SUBJECTIVE AND OBJECTIVE BOX
SUBJECTIVE / OVERNIGHT EVENTS:pt seen and examined  22     MEDICATIONS  (STANDING):  budesonide  80 MICROgram(s)/formoterol 4.5 MICROgram(s) Inhaler 2 Puff(s) Inhalation two times a day  cefepime   IVPB 2000 milliGRAM(s) IV Intermittent every 8 hours  cyanocobalamin 1000 MICROGram(s) Oral daily  dextrose 5%. 1000 milliLiter(s) (100 mL/Hr) IV Continuous <Continuous>  dextrose 5%. 1000 milliLiter(s) (50 mL/Hr) IV Continuous <Continuous>  dextrose 50% Injectable 25 Gram(s) IV Push once  dextrose 50% Injectable 12.5 Gram(s) IV Push once  dextrose 50% Injectable 25 Gram(s) IV Push once  ferrous    sulfate 325 milliGRAM(s) Oral daily  folic acid 1 milliGRAM(s) Oral daily  glucagon  Injectable 1 milliGRAM(s) IntraMuscular once  insulin lispro (ADMELOG) corrective regimen sliding scale   SubCutaneous Before meals and at bedtime  lactobacillus acidophilus 1 Tablet(s) Oral two times a day  lidocaine 2% Jelly 2 milliLiter(s) IntraUrethral once  multivitamin 1 Tablet(s) Oral daily  pantoprazole    Tablet 40 milliGRAM(s) Oral two times a day  sodium chloride 0.9%. 1000 milliLiter(s) (75 mL/Hr) IV Continuous <Continuous>    MEDICATIONS  (PRN):  acetaminophen     Tablet .. 650 milliGRAM(s) Oral every 6 hours PRN Temp greater or equal to 38C (100.4F), Mild Pain (1 - 3)  benzonatate 200 milliGRAM(s) Oral every 8 hours PRN Cough  dextrose Oral Gel 15 Gram(s) Oral once PRN Blood Glucose LESS THAN 70 milliGRAM(s)/deciliter  HYDROmorphone   Tablet 2 milliGRAM(s) Oral every 4 hours PRN Severe Pain (7 - 10)  HYDROmorphone   Tablet 1 milliGRAM(s) Oral every 4 hours PRN Moderate Pain (4 - 6)    Vital Signs Last 24 Hrs  T(C): 36.7 (22 @ 22:22), Max: 36.7 (22 @ 22:22)  T(F): 98.1 (22 @ 22:22), Max: 98.1 (05-09-22 @ 22:22)  HR: 80 (22 @ 22:22) (80 - 106)  BP: 91/70 (22 @ 22:22) (86/57 - 91/70)  BP(mean): --  RR: 17 (22 @ 22:22) (17 - 18)  SpO2: 98% (22 @ 22:22) (95% - 98%)    )          Constitutional: No fever, fatigue  Skin: No rash.  Eyes: No recent vision problems or eye pain.  ENT: No congestion, ear pain, or sore throat.  Cardiovascular: No chest pain or palpation.  Respiratory: No cough, shortness of breath, congestion, or wheezing.  Gastrointestinal: No abdominal pain, nausea, vomiting, or diarrhea.  Genitourinary: No dysuria.  Musculoskeletal: No joint swelling.  Neurologic: No headache.    PHYSICAL EXAM:  GENERAL: NAD  EYES: EOMI, PERRLA  NECK: Supple, No JVD  CHEST/LUNG: crackles at bases  HEART:  S1 , S2 +  ABDOMEN: soft, bs+,, distension+ mld tenderness+  EXTREMITIES:  edema+  NEUROLOGY:alert awake    LABS:      130<L>  |  99  |  30<H>  ----------------------------<  132<H>  4.1   |  18<L>  |  1.18    Ca    8.3<L>      09 May 2022 06:53  Phos  2.5       Mg     1.70           Creatinine Trend: 1.18 <--, 1.13 <--, 1.12 <--, 1.13 <--, 1.04 <--, 0.81 <--, 0.75 <--                        7.5    1.18  )-----------( 35       ( 09 May 2022 06:53 )             22.7     Urine Studies:  Urinalysis Basic - ( 06 May 2022 21:05 )    Color: Yellow / Appearance: Slightly Turbid / S.025 / pH:   Gluc:  / Ketone: Small  / Bili: Negative / Urobili: <2 mg/dL   Blood:  / Protein: 100 mg/dL / Nitrite: Negative   Leuk Esterase: Negative / RBC: 59 /HPF / WBC 6 /HPF   Sq Epi:  / Non Sq Epi: 3 /HPF / Bacteria: Negative      Osmolality, Random Urine: 564 mosm/kg ( @ 16:17)  Sodium, Random Urine: <20 mmol/L ( @ 16:17)

## 2022-05-09 NOTE — PROGRESS NOTE ADULT - SUBJECTIVE AND OBJECTIVE BOX
Max Coffey MD  Interventional Cardiology / Endovascular Specialist  [place] Office : [address] [place] [address] [place] [address]  Tel:   [place] Office : [address] [facility] [place] [address]  Tel: [phone]    Subjective/Overnight events: Patient lying in bed comfortably. No acute distress.   	  MEDICATIONS:    cefepime   IVPB 2000 milliGRAM(s) IV Intermittent every 8 hours    benzonatate 200 milliGRAM(s) Oral every 8 hours PRN  budesonide  80 MICROgram(s)/formoterol 4.5 MICROgram(s) Inhaler 2 Puff(s) Inhalation two times a day    acetaminophen     Tablet .. 650 milliGRAM(s) Oral every 6 hours PRN  HYDROmorphone   Tablet 2 milliGRAM(s) Oral every 4 hours PRN  HYDROmorphone   Tablet 1 milliGRAM(s) Oral every 4 hours PRN    pantoprazole    Tablet 40 milliGRAM(s) Oral two times a day    dextrose 50% Injectable 25 Gram(s) IV Push once  dextrose 50% Injectable 25 Gram(s) IV Push once  dextrose 50% Injectable 12.5 Gram(s) IV Push once  dextrose Oral Gel 15 Gram(s) Oral once PRN  glucagon  Injectable 1 milliGRAM(s) IntraMuscular once  insulin lispro (ADMELOG) corrective regimen sliding scale   SubCutaneous Before meals and at bedtime    cyanocobalamin 1000 MICROGram(s) Oral daily  dextrose 5%. 1000 milliLiter(s) IV Continuous <Continuous>  dextrose 5%. 1000 milliLiter(s) IV Continuous <Continuous>  ferrous    sulfate 325 milliGRAM(s) Oral daily  folic acid 1 milliGRAM(s) Oral daily  multivitamin 1 Tablet(s) Oral daily  sodium chloride 0.9%. 1000 milliLiter(s) IV Continuous <Continuous>      PAST MEDICAL/SURGICAL HISTORY  PAST MEDICAL & SURGICAL HISTORY:  Hypertension  Now with low BPs    Diabetes mellitus  Fasting FS range from - per patient, off meds    Hypercholesteremia  off meds    CAD (coronary artery disease)  Off aspirin    Sleep apnea    Liposarcoma    Pulmonary embolism    Transfusion history    History of cardiac cath  Pt reports 1 cardiac stent placed 1999    H/O sleep apnea  Per patient had Sleep Apnea surgery  in 1996- at [facility]- Was not retested for Sleep Apnea post surgery    History of colon resection  Dec 2019        SOCIAL HISTORY: Substance Use (street drugs): ( x ) never used  (  ) other:    FAMILY HISTORY:  FH: heart disease  Mother        REVIEW OF SYSTEMS:  CONSTITUTIONAL: No fever, weight loss, or fatigue  EYES: No eye pain, visual disturbances, or discharge  ENMT:  No difficulty hearing, tinnitus, vertigo; No sinus or throat pain  BREASTS: No pain, masses, or nipple discharge  GASTROINTESTINAL: No abdominal or epigastric pain. No nausea, vomiting, or hematemesis; No diarrhea or constipation. No melena or hematochezia.  GENITOURINARY: No dysuria, frequency, hematuria, or incontinence  NEUROLOGICAL: No headaches, memory loss, loss of strength, numbness, or tremors  ENDOCRINE: No heat or cold intolerance; No hair loss  MUSCULOSKELETAL: No joint pain or swelling; No muscle, back, or extremity pain  PSYCHIATRIC: No depression, anxiety, mood swings, or difficulty sleeping  HEME/LYMPH: No easy bruising, or bleeding gums  All others negative    PHYSICAL EXAM:  T(C): 36.3 (05-09-22 @ 05:25), Max: 36.8 (05-08-22 @ 21:40)  HR: 95 (05-09-22 @ 05:25) (95 - 106)  BP: 90/54 (05-09-22 @ 05:25) (81/63 - 108/62)  RR: 18 (05-09-22 @ 05:25) (18 - 18)  SpO2: 96% (05-09-22 @ 05:25) (95% - 100%)  Wt(kg): --  I&O's Summary    08 May 2022 07:01  -  09 May 2022 07:00  --------------------------------------------------------  IN: 590 mL / OUT: 500 mL / NET: 90 mL      EYES:   PERRLA   ENMT:   Moist mucous membranes, Good dentition, No lesions  Cardiovascular: Normal S1 S2, No JVD, No murmurs, No edema  Respiratory: Lungs clear to auscultation	  Gastrointestinal:  + ascites   Extremities: +2 edema                                7.5    1.18  )-----------( 35       ( 09 May 2022 06:53 )             22.7     05-09    130<L>  |  99  |  30<H>  ----------------------------<  132<H>  4.1   |  18<L>  |  1.18    Ca    8.3<L>      09 May 2022 06:53  Phos  2.5     05-09  Mg     1.70     05-09      proBNP:   Lipid Profile:   HgA1c:   TSH:     Consultant(s) Notes Reviewed:  [x ] YES  [ ] NO    Care Discussed with Consultants/Other Providers [ x] YES  [ ] NO    Imaging Personally Reviewed independently:  [x] YES  [ ] NO    All labs, radiologic studies, vitals, orders and medications list reviewed. Patient is seen and examined at bedside. Case discussed with medical team.

## 2022-05-09 NOTE — CHART NOTE - NSCHARTNOTEFT_GEN_A_CORE
IR Pre-Procedure Note    Patient Age: 71y    Patient Gender: Male    Procedure (including site / side if known): Paracentesis     Diagnosis / Indication: Patient is a 71y old  Male who presents with a chief complaint of AHRF (09 May 2022 17:52)      Interventional Radiology Attending Physician: Dr. Martins     Ordering Attending Physician: Dr. Toure     PAST MEDICAL & SURGICAL HISTORY:  Hypertension  Now with low BPs    Diabetes mellitus  Fasting FS range from - per patient, off meds    Hypercholesteremia  off meds    CAD (coronary artery disease)  Off aspirin    Sleep apnea    Liposarcoma    Pulmonary embolism    Transfusion history    History of cardiac cath  Pt reports 1 cardiac stent placed 1999    H/O sleep apnea  Per patient had Sleep Apnea surgery  in 1996- at [facility]- Was not retested for Sleep Apnea post surgery    History of colon resection  Dec 2019         Pertinent Labs:   CBC Full  -  ( 09 May 2022 06:53 )  WBC Count : 1.18 K/uL  RBC Count : 2.43 M/uL  Hemoglobin : 7.5 g/dL  Hematocrit : 22.7 %  Platelet Count - Automated : 35 K/uL  Mean Cell Volume : 93.4 fL  Mean Cell Hemoglobin : 30.9 pg  Mean Cell Hemoglobin Concentration : 33.0 gm/dL  Auto Neutrophil # : 0.79 K/uL  Auto Lymphocyte # : 0.28 K/uL  Auto Monocyte # : 0.07 K/uL  Auto Eosinophil # : 0.01 K/uL  Auto Basophil # : 0.01 K/uL  Auto Neutrophil % : 65.0 %  Auto Lymphocyte % : 24.0 %  Auto Monocyte % : 6.0 %  Auto Eosinophil % : 1.0 %  Auto Basophil % : 1.0 %    05-09    130<L>  |  99  |  30<H>  ----------------------------<  132<H>  4.1   |  18<L>  |  1.18    Ca    8.3<L>      09 May 2022 06:53  Phos  2.5     05-09  Mg     1.70     05-09          Patient / Family aware of procedure:   [ X] AMERICA Browning PA-C  Department of Medicine   In-house pager #[phone]

## 2022-05-09 NOTE — CONSULT NOTE ADULT - PROBLEM SELECTOR RECOMMENDATION 9
I-stop reviewed.   1mg PO dilaudid q4h prn moderate pain   2mg PO dilaudid q4h prn severe pain  Pt complaining of diarrhea, would hold off on bowel regimen until diarrhea resolves.
-likely from cancer > PNA  -on empiric cefepime 2 gm iv q8  -monitor temps/ANC  -cx negative  -temps better

## 2022-05-09 NOTE — PROGRESS NOTE ADULT - ASSESSMENT
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       EKG SR low voltage similar to april     1) SOB   - PE Vs PNA euvolemic on exam   - echo TDS grossly normal RV function  - on abx     2) CAD s/p stent  -s/p stent 20 years ago as per patient  -denies CP    3) Anemia/thrombocytopenia   -hemoglobin 7.3  -monitor H/H   - NARCISA -dominique for HIT last admission       4)  DVT PPX  -recommend SCD's

## 2022-05-09 NOTE — CONSULT NOTE ADULT - PROBLEM SELECTOR RECOMMENDATION 3
Patient was on clinical trial with Dr. Farley at [facility] (last dose on 3/31). Per patient, Dr. Farley explained to him and wife yesterday that no further DMT was being offered.   Patient explained that he found an oncologist at [facility] (Dr. Hernandez) who agreed to review his imaging to see if he was eligible for further clinical trials at [facility]. Wife provided fax number [phone] for primary team to send [facility] records. Pt also shared that he has appointment at [facility] on 5/16 that he is hoping to make.
-has h/o PE and ascites also  -better

## 2022-05-09 NOTE — CONSULT NOTE ADULT - SUBJECTIVE AND OBJECTIVE BOX
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 Hx limited iso pt on BiPAP, however reports in interim of d/c he has experienced weakness, fever, chills, SOB w/ chest discomfort, dry cough, diarrhea and persistent abd pain and LE edema. Otherwise denies sx.     ED Course  VS: febrile Tmax 38.4C, tachycardic HR 140s, hypoTN BP 80s-140s/50s-110s, tachypneic RR 22, SpO2% 100 on BiPAP 10/5 FiO2% 30  Labs:  leukopenia (neutropenia) 0.79, normocytic (MCV 94) anemia H/H 9.1/28.3 and elevated RDW 20.8, thrombocytopenia 50; hypoNa 130, hypoCl 96, low bicarb 16; HST elevated 51; VBG hypocarbic 35, lactate 4.1  > 1.3  Micro: UA not c/f infxn, RVP/bordetella parapertussis/COV2 not det  Imaging: CXR low lung volumes w/ bibasilar atelectasis  Received: tylenol 1g IV, cefepime 2g IV, NS 500c IV    Admit to medicine for further eval/mgt (06 May 2022 01:56)      PAST MEDICAL & SURGICAL HISTORY:  Hypertension  Now with low BPs    Diabetes mellitus  Fasting FS range from - per patient, off meds    Hypercholesteremia  off meds    CAD (coronary artery disease)  Off aspirin    Sleep apnea    Liposarcoma    Pulmonary embolism    Transfusion history    History of cardiac cath  Pt reports 1 cardiac stent placed 1999    H/O sleep apnea  Per patient had Sleep Apnea surgery  in 1996- at [facility]- Was not retested for Sleep Apnea post surgery    History of colon resection  Dec 2019        Allergies    oxycodone (Vomiting)    Intolerances        ANTIMICROBIALS:  cefepime   IVPB 2000 every 8 hours      MEDICATIONS  (STANDING):  budesonide  80 MICROgram(s)/formoterol 4.5 MICROgram(s) Inhaler 2 Puff(s) Inhalation two times a day  cefepime   IVPB 2000 milliGRAM(s) IV Intermittent every 8 hours  cyanocobalamin 1000 MICROGram(s) Oral daily  dextrose 5%. 1000 milliLiter(s) (50 mL/Hr) IV Continuous <Continuous>  dextrose 5%. 1000 milliLiter(s) (100 mL/Hr) IV Continuous <Continuous>  dextrose 50% Injectable 25 Gram(s) IV Push once  dextrose 50% Injectable 25 Gram(s) IV Push once  dextrose 50% Injectable 12.5 Gram(s) IV Push once  ferrous    sulfate 325 milliGRAM(s) Oral daily  folic acid 1 milliGRAM(s) Oral daily  glucagon  Injectable 1 milliGRAM(s) IntraMuscular once  insulin lispro (ADMELOG) corrective regimen sliding scale   SubCutaneous Before meals and at bedtime  lactobacillus acidophilus 1 Tablet(s) Oral two times a day  lidocaine 2% Jelly 2 milliLiter(s) IntraUrethral once  multivitamin 1 Tablet(s) Oral daily  pantoprazole    Tablet 40 milliGRAM(s) Oral two times a day  sodium chloride 0.9%. 1000 milliLiter(s) (75 mL/Hr) IV Continuous <Continuous>      Social History  Smoking:  Etoh:  Drug use:      FAMILY HISTORY:  FH: heart disease  Mother        Vital Signs Last 24 Hrs  T(C): 36.3 (09 May 2022 05:25), Max: 36.8 (08 May 2022 21:40)  T(F): 97.4 (09 May 2022 05:25), Max: 98.2 (08 May 2022 21:40)  HR: 95 (09 May 2022 05:25) (95 - 106)  BP: 90/54 (09 May 2022 05:25) (81/63 - 108/62)  BP(mean): --  RR: 18 (09 May 2022 05:25) (18 - 18)  SpO2: 96% (09 May 2022 05:25) (95% - 100%)    CBC Full  -  ( 09 May 2022 06:53 )  WBC Count : 1.18 K/uL  RBC Count : 2.43 M/uL  Hemoglobin : 7.5 g/dL  Hematocrit : 22.7 %  Platelet Count - Automated : 35 K/uL  Mean Cell Volume : 93.4 fL  Mean Cell Hemoglobin : 30.9 pg  Mean Cell Hemoglobin Concentration : 33.0 gm/dL  Auto Neutrophil # : 0.79 K/uL  Auto Lymphocyte # : 0.28 K/uL  Auto Monocyte # : 0.07 K/uL  Auto Eosinophil # : 0.01 K/uL  Auto Basophil # : 0.01 K/uL  Auto Neutrophil % : 65.0 %  Auto Lymphocyte % : 24.0 %  Auto Monocyte % : 6.0 %  Auto Eosinophil % : 1.0 %  Auto Basophil % : 1.0 %    05-09    130<L>  |  99  |  30<H>  ----------------------------<  132<H>  4.1   |  18<L>  |  1.18    Ca    8.3<L>      09 May 2022 06:53  Phos  2.5     05-09  Mg     1.70     05-09            MICROBIOLOGY:  .Blood Blood-Peripheral  05-05-22   No growth to date.  --  --      Clean Catch Clean Catch (Midstream)  05-05-22   No growth  --  --      Abdominal Fl DRAINAGE  04-14-22   No growth  --  --      Abdominal Fl ABDOMEN DRAINAGE  04-14-22   No growth at 5 days  --    No polymorphonuclear leukocytes per low power field  No organisms seen per oil power field      .Blood Blood  04-14-22   No Growth Final  --  --      .Blood Blood  04-14-22   No Growth Final  --  --      Rapid RVP Result: NotDetec (05-05 @ 16:15)      RADIOLOGY  < from: CT Angio Chest PE Protocol w/ IV Cont (05.07.22 @ 11:26) >  *  Redemonstration of a right lower lobar pulmonary embolus with   extension into the segmental branches. No new pulmonary embolism   visualized.  *  In comparison with 4/5/2022, New patchy groundglass opacities within   right upper lobe. Increased atelectasis/consolidation of basilar left   lower lobe. Atelectasis/consolidation of basilar right lower lobe is   unchanged. Small pleural effusions.    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 Hx limited iso pt on BiPAP, however reports in interim of d/c he has experienced weakness, fever, chills, SOB w/ chest discomfort, dry cough, diarrhea and persistent abd pain and LE edema. Otherwise denies sx.     ED Course  VS: febrile Tmax 38.4C, tachycardic HR 140s, hypoTN BP 80s-140s/50s-110s, tachypneic RR 22, SpO2% 100 on BiPAP 10/5 FiO2% 30  Labs:  leukopenia (neutropenia) 0.79, normocytic (MCV 94) anemia H/H 9.1/28.3 and elevated RDW 20.8, thrombocytopenia 50; hypoNa 130, hypoCl 96, low bicarb 16; HST elevated 51; VBG hypocarbic 35, lactate 4.1  > 1.3  Micro: UA not c/f infxn, RVP/bordetella parapertussis/COV2 not det  Imaging: CXR low lung volumes w/ bibasilar atelectasis  Received: tylenol 1g IV, cefepime 2g IV, NS 500c IV    Admit to medicine for further eval/mgt (06 May 2022 01:56)    ID called for fever    PAST MEDICAL & SURGICAL HISTORY:  Hypertension  Now with low BPs    Diabetes mellitus  Fasting FS range from - per patient, off meds    Hypercholesteremia  off meds    CAD (coronary artery disease)  Off aspirin    Sleep apnea    Liposarcoma    Pulmonary embolism    Transfusion history    History of cardiac cath  Pt reports 1 cardiac stent placed 1999    H/O sleep apnea  Per patient had Sleep Apnea surgery  in 1996- at [facility]- Was not retested for Sleep Apnea post surgery    History of colon resection  Dec 2019        Allergies    oxycodone (Vomiting)    Intolerances        ANTIMICROBIALS:  cefepime   IVPB 2000 every 8 hours      MEDICATIONS  (STANDING):  budesonide  80 MICROgram(s)/formoterol 4.5 MICROgram(s) Inhaler 2 Puff(s) Inhalation two times a day  cefepime   IVPB 2000 milliGRAM(s) IV Intermittent every 8 hours  cyanocobalamin 1000 MICROGram(s) Oral daily  dextrose 5%. 1000 milliLiter(s) (50 mL/Hr) IV Continuous <Continuous>  dextrose 5%. 1000 milliLiter(s) (100 mL/Hr) IV Continuous <Continuous>  dextrose 50% Injectable 25 Gram(s) IV Push once  dextrose 50% Injectable 25 Gram(s) IV Push once  dextrose 50% Injectable 12.5 Gram(s) IV Push once  ferrous    sulfate 325 milliGRAM(s) Oral daily  folic acid 1 milliGRAM(s) Oral daily  glucagon  Injectable 1 milliGRAM(s) IntraMuscular once  insulin lispro (ADMELOG) corrective regimen sliding scale   SubCutaneous Before meals and at bedtime  lactobacillus acidophilus 1 Tablet(s) Oral two times a day  lidocaine 2% Jelly 2 milliLiter(s) IntraUrethral once  multivitamin 1 Tablet(s) Oral daily  pantoprazole    Tablet 40 milliGRAM(s) Oral two times a day  sodium chloride 0.9%. 1000 milliLiter(s) (75 mL/Hr) IV Continuous <Continuous>      Social History  Smoking: former smoker  Etoh: occasional  Drug use: none       FAMILY HISTORY:  FH: heart disease  Mother        Vital Signs Last 24 Hrs  T(C): 36.3 (09 May 2022 05:25), Max: 36.8 (08 May 2022 21:40)  T(F): 97.4 (09 May 2022 05:25), Max: 98.2 (08 May 2022 21:40)  HR: 95 (09 May 2022 05:25) (95 - 106)  BP: 90/54 (09 May 2022 05:25) (81/63 - 108/62)  BP(mean): --  RR: 18 (09 May 2022 05:25) (18 - 18)  SpO2: 96% (09 May 2022 05:25) (95% - 100%)    CBC Full  -  ( 09 May 2022 06:53 )  WBC Count : 1.18 K/uL  RBC Count : 2.43 M/uL  Hemoglobin : 7.5 g/dL  Hematocrit : 22.7 %  Platelet Count - Automated : 35 K/uL  Mean Cell Volume : 93.4 fL  Mean Cell Hemoglobin : 30.9 pg  Mean Cell Hemoglobin Concentration : 33.0 gm/dL  Auto Neutrophil # : 0.79 K/uL  Auto Lymphocyte # : 0.28 K/uL  Auto Monocyte # : 0.07 K/uL  Auto Eosinophil # : 0.01 K/uL  Auto Basophil # : 0.01 K/uL  Auto Neutrophil % : 65.0 %  Auto Lymphocyte % : 24.0 %  Auto Monocyte % : 6.0 %  Auto Eosinophil % : 1.0 %  Auto Basophil % : 1.0 %    05-09    130<L>  |  99  |  30<H>  ----------------------------<  132<H>  4.1   |  18<L>  |  1.18    Ca    8.3<L>      09 May 2022 06:53  Phos  2.5     05-09  Mg     1.70     05-09            MICROBIOLOGY:  .Blood Blood-Peripheral  05-05-22   No growth to date.  --  --      Clean Catch Clean Catch (Midstream)  05-05-22   No growth  --  --      Abdominal Fl DRAINAGE  04-14-22   No growth  --  --      Abdominal Fl ABDOMEN DRAINAGE  04-14-22   No growth at 5 days  --    No polymorphonuclear leukocytes per low power field  No organisms seen per oil power field      .Blood Blood  04-14-22   No Growth Final  --  --      .Blood Blood  04-14-22   No Growth Final  --  --      Rapid RVP Result: NotDete (05-05 @ 16:15)      RADIOLOGY  < from: CT Angio Chest PE Protocol w/ IV Cont (05.07.22 @ 11:26) >  *  Redemonstration of a right lower lobar pulmonary embolus with   extension into the segmental branches. No new pulmonary embolism   visualized.  *  In comparison with 4/5/2022, New patchy groundglass opacities within   right upper lobe. Increased atelectasis/consolidation of basilar left   lower lobe. Atelectasis/consolidation of basilar right lower lobe is   unchanged. Small pleural effusions.    < end of copied text >

## 2022-05-09 NOTE — CONSULT NOTE ADULT - SUBJECTIVE AND OBJECTIVE BOX
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 Hx limited iso pt on BiPAP, however reports in interim of d/c he has experienced weakness, fever, chills, SOB w/ chest discomfort, dry cough, diarrhea and persistent abd pain and LE edema. Otherwise denies sx.     ED Course  VS: febrile Tmax 38.4C, tachycardic HR 140s, hypoTN BP 80s-140s/50s-110s, tachypneic RR 22, SpO2% 100 on BiPAP 10/5 FiO2% 30  Labs:  leukopenia (neutropenia) 0.79, normocytic (MCV 94) anemia H/H 9.1/28.3 and elevated RDW 20.8, thrombocytopenia 50; hypoNa 130, hypoCl 96, low bicarb 16; HST elevated 51; VBG hypocarbic 35, lactate 4.1  > 1.3  Micro: UA not c/f infxn, RVP/bordetella parapertussis/COV2 not det  Imaging: CXR low lung volumes w/ bibasilar atelectasis  Received: tylenol 1g IV, cefepime 2g IV, NS 500c IV    Admit to medicine for further eval/mgt (06 May 2022 01:56)      Vital Signs: Vital Signs Last 24 Hrs  T(C): 36.3 (09 May 2022 15:15), Max: 36.8 (08 May 2022 21:40)  T(F): 97.4 (09 May 2022 15:15), Max: 98.2 (08 May 2022 21:40)  HR: 86 (09 May 2022 15:15) (86 - 106)  BP: 86/57 (09 May 2022 15:15) (86/57 - 108/62)  BP(mean): --  RR: 17 (09 May 2022 15:15) (17 - 18)  SpO2: 98% (09 May 2022 15:15) (95% - 100%)    Past Medical/ Surgical History: PAST MEDICAL & SURGICAL HISTORY:  Hypertension  Now with low BPs    Diabetes mellitus  Fasting FS range from - per patient, off meds    Hypercholesteremia  off meds    CAD (coronary artery disease)  Off aspirin    Sleep apnea    Liposarcoma    Pulmonary embolism    Transfusion history    History of cardiac cath  Pt reports 1 cardiac stent placed 1999    H/O sleep apnea  Per patient had Sleep Apnea surgery  in 1996- at [facility]- Was not retested for Sleep Apnea post surgery    History of colon resection  Dec 2019        Allergies: Allergies    oxycodone (Vomiting)    Intolerances        Medications: MEDICATIONS  (STANDING):  budesonide  80 MICROgram(s)/formoterol 4.5 MICROgram(s) Inhaler 2 Puff(s) Inhalation two times a day  cefepime   IVPB 2000 milliGRAM(s) IV Intermittent every 8 hours  cyanocobalamin 1000 MICROGram(s) Oral daily  dextrose 5%. 1000 milliLiter(s) (100 mL/Hr) IV Continuous <Continuous>  dextrose 5%. 1000 milliLiter(s) (50 mL/Hr) IV Continuous <Continuous>  dextrose 50% Injectable 25 Gram(s) IV Push once  dextrose 50% Injectable 12.5 Gram(s) IV Push once  dextrose 50% Injectable 25 Gram(s) IV Push once  ferrous    sulfate 325 milliGRAM(s) Oral daily  folic acid 1 milliGRAM(s) Oral daily  glucagon  Injectable 1 milliGRAM(s) IntraMuscular once  insulin lispro (ADMELOG) corrective regimen sliding scale   SubCutaneous Before meals and at bedtime  lactobacillus acidophilus 1 Tablet(s) Oral two times a day  lidocaine 2% Jelly 2 milliLiter(s) IntraUrethral once  multivitamin 1 Tablet(s) Oral daily  pantoprazole    Tablet 40 milliGRAM(s) Oral two times a day  sodium chloride 0.9%. 1000 milliLiter(s) (75 mL/Hr) IV Continuous <Continuous>    MEDICATIONS  (PRN):  acetaminophen     Tablet .. 650 milliGRAM(s) Oral every 6 hours PRN Temp greater or equal to 38C (100.4F), Mild Pain (1 - 3)  benzonatate 200 milliGRAM(s) Oral every 8 hours PRN Cough  dextrose Oral Gel 15 Gram(s) Oral once PRN Blood Glucose LESS THAN 70 milliGRAM(s)/deciliter  HYDROmorphone   Tablet 2 milliGRAM(s) Oral every 4 hours PRN Severe Pain (7 - 10)  HYDROmorphone   Tablet 1 milliGRAM(s) Oral every 4 hours PRN Moderate Pain (4 - 6)      SOCIAL HISTORY:    FAMILY HISTORY:  FH: heart disease  Mother          PHYSICAL EXAM:    General:   Well-groomed, well-nourished, in no distress  Lungs:  CTA bilaterally  Cardiovascular:   S1, S2,   Abdomen:  Soft, non-tender, non-distended,   Extremities:  no calf tenderness/swelling bilaterally  Musculoskeletal:  Full ROM in all joints w/o swelling/tenderness/effusion  Neuro/Psych:  A &O x 3    LABS:                        7.5    1.18  )-----------( 35       ( 09 May 2022 06:53 )             22.7     05-09    130<L>  |  99  |  30<H>  ----------------------------<  132<H>  4.1   |  18<L>  |  1.18    Ca    8.3<L>      09 May 2022 06:53  Phos  2.5     05-09  Mg     1.70     05-09            RADIOLOGY & ADDITIONAL STUDIES:      A/P: 71M with PMHx of low BPs HLD, DM2 (currently off meds), FADI not on CPAP, CAD s/p stent (1999, now off aspirin), recent PE on lovenox (currently hold off due to thrombocytopenia), and 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     IR c/s for paracentesis; procedure team deferred.     Imaging requested and obtained; the vast majority of the ascites on US is loculated and there are extensive peritoneal implants. There are, however, components on the scan demonstrating accessible pockets by IR technique. The therapeutic result may be limited and this was d/w the consulting primary team. Expectations were set with the pt by the primary team with a subsequent request for an attempt; the pt reports extreme abdominal discomfort and dyspnea and prior paracentesis have been beneficial. As such, the procedure is approved. IR will attempt therapeutic paracentesis on this patient with the established understanding that the benefit may be limited.     PLAN:  - Please place order for IR Procedure "Paracentesis", approving attending Dr. Martins  - No need for NPO  - hold therapeutic and prophylactic anticoagulants  - maintain active type and screen x 2  - Please draw AM labs @4AM tomorrow - CBC/INR/aPTT/BMP  - COVID PCR Result within 5d of the procedure   - Complete IR pre-procedure note

## 2022-05-10 NOTE — PROGRESS NOTE ADULT - PROBLEM SELECTOR PLAN 7
thought 2/2 extrinsic compressions 2/2 ascites and malignancy progression as well as hypoalbuminemia and resultant oncotic gradient alteration  - conservative mgt w/ elevation of legs  -

## 2022-05-10 NOTE — PROGRESS NOTE ADULT - SUBJECTIVE AND OBJECTIVE BOX
ADRIAN FUNES 71y [id number]    Patient is a 71y old  Male who presents with a chief complaint of AHRF (10 May 2022 11:18)      Follow Up/CC:  ID following for neutropenia    Interval History/ROS: no fever, possible IR paracentesis today     Allergies    oxycodone (Vomiting)    Intolerances        ANTIMICROBIALS:  cefepime   IVPB 2000 every 8 hours      MEDICATIONS  (STANDING):  budesonide  80 MICROgram(s)/formoterol 4.5 MICROgram(s) Inhaler 2 Puff(s) Inhalation two times a day  cefepime   IVPB 2000 milliGRAM(s) IV Intermittent every 8 hours  cyanocobalamin 1000 MICROGram(s) Oral daily  dextrose 5%. 1000 milliLiter(s) (100 mL/Hr) IV Continuous <Continuous>  dextrose 5%. 1000 milliLiter(s) (50 mL/Hr) IV Continuous <Continuous>  dextrose 50% Injectable 25 Gram(s) IV Push once  dextrose 50% Injectable 12.5 Gram(s) IV Push once  dextrose 50% Injectable 25 Gram(s) IV Push once  ferrous    sulfate 325 milliGRAM(s) Oral daily  folic acid 1 milliGRAM(s) Oral daily  glucagon  Injectable 1 milliGRAM(s) IntraMuscular once  insulin lispro (ADMELOG) corrective regimen sliding scale   SubCutaneous Before meals and at bedtime  lactobacillus acidophilus 1 Tablet(s) Oral two times a day  lidocaine 2% Jelly 2 milliLiter(s) IntraUrethral once  multivitamin 1 Tablet(s) Oral daily  pantoprazole    Tablet 40 milliGRAM(s) Oral two times a day  sodium chloride 0.9%. 1000 milliLiter(s) (75 mL/Hr) IV Continuous <Continuous>    MEDICATIONS  (PRN):  acetaminophen     Tablet .. 650 milliGRAM(s) Oral every 6 hours PRN Temp greater or equal to 38C (100.4F), Mild Pain (1 - 3)  benzonatate 200 milliGRAM(s) Oral every 8 hours PRN Cough  dextrose Oral Gel 15 Gram(s) Oral once PRN Blood Glucose LESS THAN 70 milliGRAM(s)/deciliter  HYDROmorphone   Tablet 2 milliGRAM(s) Oral every 4 hours PRN Severe Pain (7 - 10)  HYDROmorphone   Tablet 1 milliGRAM(s) Oral every 4 hours PRN Moderate Pain (4 - 6)        Vital Signs Last 24 Hrs  T(C): 36.3 (10 May 2022 14:12), Max: 36.7 (09 May 2022 22:22)  T(F): 97.4 (10 May 2022 14:12), Max: 98.1 (09 May 2022 22:22)  HR: 87 (10 May 2022 14:12) (80 - 95)  BP: 94/58 (10 May 2022 14:12) (89/63 - 96/63)  BP(mean): --  RR: 17 (10 May 2022 14:12) (17 - 18)  SpO2: 100% (10 May 2022 14:12) (97% - 100%)    CBC Full  -  ( 10 May 2022 09:20 )  WBC Count : 1.58 K/uL  RBC Count : 2.43 M/uL  Hemoglobin : 7.4 g/dL  Hematocrit : 22.3 %  Platelet Count - Automated : 25 K/uL  Mean Cell Volume : 91.8 fL  Mean Cell Hemoglobin : 30.5 pg  Mean Cell Hemoglobin Concentration : 33.2 gm/dL  Auto Neutrophil # : x  Auto Lymphocyte # : x  Auto Monocyte # : x  Auto Eosinophil # : x  Auto Basophil # : x  Auto Neutrophil % : x  Auto Lymphocyte % : x  Auto Monocyte % : x  Auto Eosinophil % : x  Auto Basophil % : x    05-10    128<L>  |  101  |  33<H>  ----------------------------<  129<H>  4.0   |  17<L>  |  1.22    Ca    8.2<L>      10 May 2022 05:45  Phos  2.5     05-10  Mg     1.80     05-10            MICROBIOLOGY:  .Blood Blood-Peripheral  05-05-22   No growth to date.  --  --      Clean Catch Clean Catch (Midstream)  05-05-22   No growth  --  --      Abdominal Fl DRAINAGE  04-14-22   No growth  --  --      Abdominal Fl ABDOMEN DRAINAGE  04-14-22   No growth at 5 days  --    No polymorphonuclear leukocytes per low power field  No organisms seen per oil power field      .Blood Blood  04-14-22   No Growth Final  --  --      .Blood Blood  04-14-22   No Growth Final  --  --              v    Rapid RVP Result: Jaquantec (05-05 @ 16:15)          RADIOLOGY

## 2022-05-10 NOTE — PROGRESS NOTE ADULT - PROBLEM SELECTOR PLAN 5
acute PE dx previous admit   AC held thrombocytopenia
acute PE dx previous admit   AC held thrombocytopenia
acute PE dx previous admit   AC held iso bicytopenia  CTA chest ordered by ED iso SOB  - f/u CTA chest  - cont to hold AC pending result

## 2022-05-10 NOTE — SWALLOW BEDSIDE ASSESSMENT ADULT - COMMENTS
As per Provider Handoff "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"     Order received, chart contents noted. Upon entering room, patient endorsed dizziness 5/10 when laying in supine position. Patient states dizziness worsens when elevated HOB. Discussed with RN who requested evaluation to be deferred at this time. Call placed to ACP. Re-consult this service when medically optimized. As per Provider Handoff "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"     Order received, chart contents noted. Upon entering room, patient endorsed dizziness 5/10 when laying in supine position. Patient states dizziness worsens when elevated HOB. Discussed with RN who requested evaluation to be deferred at this time. Discussed with ACP, swallow evaluation to be deferred to 5/11.

## 2022-05-10 NOTE — PROGRESS NOTE ADULT - PROBLEM SELECTOR PLAN 9
A1c 5.6  - JUSTEN  -

## 2022-05-10 NOTE — PROGRESS NOTE ADULT - SUBJECTIVE AND OBJECTIVE BOX
SUBJECTIVE / OVERNIGHT EVENTS:pt seen and examined  05-10-22     MEDICATIONS  (STANDING):  budesonide  80 MICROgram(s)/formoterol 4.5 MICROgram(s) Inhaler 2 Puff(s) Inhalation two times a day  cefepime   IVPB 2000 milliGRAM(s) IV Intermittent every 8 hours  cyanocobalamin 1000 MICROGram(s) Oral daily  dextrose 5%. 1000 milliLiter(s) (100 mL/Hr) IV Continuous <Continuous>  dextrose 5%. 1000 milliLiter(s) (50 mL/Hr) IV Continuous <Continuous>  dextrose 50% Injectable 25 Gram(s) IV Push once  dextrose 50% Injectable 12.5 Gram(s) IV Push once  dextrose 50% Injectable 25 Gram(s) IV Push once  ferrous    sulfate 325 milliGRAM(s) Oral daily  folic acid 1 milliGRAM(s) Oral daily  glucagon  Injectable 1 milliGRAM(s) IntraMuscular once  insulin lispro (ADMELOG) corrective regimen sliding scale   SubCutaneous Before meals and at bedtime  lactobacillus acidophilus 1 Tablet(s) Oral two times a day  lidocaine 2% Jelly 2 milliLiter(s) IntraUrethral once  multivitamin 1 Tablet(s) Oral daily  pantoprazole    Tablet 40 milliGRAM(s) Oral two times a day  sodium chloride 0.9%. 1000 milliLiter(s) (75 mL/Hr) IV Continuous <Continuous>    MEDICATIONS  (PRN):  acetaminophen     Tablet .. 650 milliGRAM(s) Oral every 6 hours PRN Temp greater or equal to 38C (100.4F), Mild Pain (1 - 3)  benzonatate 200 milliGRAM(s) Oral every 8 hours PRN Cough  dextrose Oral Gel 15 Gram(s) Oral once PRN Blood Glucose LESS THAN 70 milliGRAM(s)/deciliter  HYDROmorphone   Tablet 2 milliGRAM(s) Oral every 4 hours PRN Severe Pain (7 - 10)  HYDROmorphone   Tablet 1 milliGRAM(s) Oral every 4 hours PRN Moderate Pain (4 - 6)    Vital Signs Last 24 Hrs  T(C): 36.6 (05-10-22 @ 22:00), Max: 36.6 (05-10-22 @ 05:43)  T(F): 97.8 (05-10-22 @ 22:00), Max: 97.9 (05-10-22 @ 05:43)  HR: 90 (05-10-22 @ 22:00) (80 - 95)  BP: 95/53 (05-10-22 @ 22:00) (89/63 - 96/63)  BP(mean): --  RR: 17 (05-10-22 @ 22:00) (16 - 18)  SpO2: 100% (05-10-22 @ 22:00) (97% - 100%)      )          Constitutional: No fever, fatigue  Skin: No rash.  Eyes: No recent vision problems or eye pain.  ENT: No congestion, ear pain, or sore throat.  Cardiovascular: No chest pain or palpation.  Respiratory: No cough, shortness of breath, congestion, or wheezing.  Gastrointestinal: No abdominal pain, nausea, vomiting, or diarrhea.  Genitourinary: No dysuria.  Musculoskeletal: No joint swelling.  Neurologic: No headache.    PHYSICAL EXAM:  GENERAL: NAD  EYES: EOMI, PERRLA  NECK: Supple, No JVD  CHEST/LUNG: crackles at bases  HEART:  S1 , S2 +  ABDOMEN: soft, bs+,, distension+ mld tenderness+  EXTREMITIES:  edema+  NEUROLOGY:alert awake    LABS:  05-10    128<L>  |  101  |  33<H>  ----------------------------<  129<H>  4.0   |  17<L>  |  1.22    Ca    8.2<L>      10 May 2022 05:45  Phos  2.5     10  Mg     1.80     10      Creatinine Trend: 1.22 <--, 1.18 <--, 1.13 <--, 1.12 <--, 1.13 <--, 1.04 <--, 0.81 <--                        7.4    1.58  )-----------( 25       ( 10 May 2022 09:20 )             22.3     Urine Studies:  Urinalysis Basic - ( 06 May 2022 21:05 )    Color: Yellow / Appearance: Slightly Turbid / S.025 / pH:   Gluc:  / Ketone: Small  / Bili: Negative / Urobili: <2 mg/dL   Blood:  / Protein: 100 mg/dL / Nitrite: Negative   Leuk Esterase: Negative / RBC: 59 /HPF / WBC 6 /HPF   Sq Epi:  / Non Sq Epi: 3 /HPF / Bacteria: Negative      Osmolality, Random Urine: 564 mosm/kg ( @ 16:17)  Sodium, Random Urine: <20 mmol/L ( @ 16:17)            PT/INR - ( 10 May 2022 05:45 )   PT: 12.3 sec;   INR: 1.06 ratio         PTT - ( 10 May 2022 05:45 )  PTT:26.5 sec

## 2022-05-10 NOTE — PROGRESS NOTE ADULT - ASSESSMENT
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

## 2022-05-10 NOTE — PROGRESS NOTE ADULT - PROBLEM SELECTOR PLAN 10
Chemical DVT ppx: holding iso bicytopenia as above  Mechanical DVT ppx: SCDs  Diet: CC
Chemical DVT ppx: holding iso bicytopenia as above  Mechanical DVT ppx: SCDs  Diet: CC    poss dc with home hospice
Chemical DVT ppx: holding iso bicytopenia as above  Mechanical DVT ppx: SCDs  Diet: CC

## 2022-05-10 NOTE — PROGRESS NOTE ADULT - PROBLEM SELECTOR PLAN 4
presumed 2/2 malignancy on previous admit , at bl per previous d/c  previous admit w/ 4u PRBC and 3u PLT  on folate/b12 supplementation  - holding AC iso combination w/ thrombocytopenia  - monitor CBC, transfuse prn    #thrombocytopenia  - monitor closely  - monitor plt, transfuse prn

## 2022-05-10 NOTE — PROGRESS NOTE ADULT - PROBLEM SELECTOR PLAN 6
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

## 2022-05-10 NOTE — PROGRESS NOTE ADULT - PROBLEM SELECTOR PLAN 8
Na 130   likely iso hypovolemia   - urine studies  - monitor BMP

## 2022-05-10 NOTE — PROGRESS NOTE ADULT - SUBJECTIVE AND OBJECTIVE BOX
Max Coffey MD  Interventional Cardiology / Endovascular Specialist  [place] Office : [phone] [place] [address] [place] [address]  Tel:   [place] Office : [address] [facility] [place] [address]  Tel: [phone]      Subjective/Overnight events: Patient lying in bed. No acute distress.  	  MEDICATIONS:    cefepime   IVPB 2000 milliGRAM(s) IV Intermittent every 8 hours    benzonatate 200 milliGRAM(s) Oral every 8 hours PRN  budesonide  80 MICROgram(s)/formoterol 4.5 MICROgram(s) Inhaler 2 Puff(s) Inhalation two times a day    acetaminophen     Tablet .. 650 milliGRAM(s) Oral every 6 hours PRN  HYDROmorphone   Tablet 2 milliGRAM(s) Oral every 4 hours PRN  HYDROmorphone   Tablet 1 milliGRAM(s) Oral every 4 hours PRN    pantoprazole    Tablet 40 milliGRAM(s) Oral two times a day    dextrose 50% Injectable 25 Gram(s) IV Push once  dextrose 50% Injectable 12.5 Gram(s) IV Push once  dextrose 50% Injectable 25 Gram(s) IV Push once  dextrose Oral Gel 15 Gram(s) Oral once PRN  glucagon  Injectable 1 milliGRAM(s) IntraMuscular once  insulin lispro (ADMELOG) corrective regimen sliding scale   SubCutaneous Before meals and at bedtime    cyanocobalamin 1000 MICROGram(s) Oral daily  dextrose 5%. 1000 milliLiter(s) IV Continuous <Continuous>  dextrose 5%. 1000 milliLiter(s) IV Continuous <Continuous>  ferrous    sulfate 325 milliGRAM(s) Oral daily  folic acid 1 milliGRAM(s) Oral daily  multivitamin 1 Tablet(s) Oral daily  sodium chloride 0.9%. 1000 milliLiter(s) IV Continuous <Continuous>      PAST MEDICAL/SURGICAL HISTORY  PAST MEDICAL & SURGICAL HISTORY:  Hypertension  Now with low BPs    Diabetes mellitus  Fasting FS range from - per patient, off meds    Hypercholesteremia  off meds    CAD (coronary artery disease)  Off aspirin    Sleep apnea    Liposarcoma    Pulmonary embolism    Transfusion history    History of cardiac cath  Pt reports 1 cardiac stent placed 1999    H/O sleep apnea  Per patient had Sleep Apnea surgery  in 1996- at [facility]- Was not retested for Sleep Apnea post surgery    History of colon resection  Dec 2019        SOCIAL HISTORY: Substance Use (street drugs): ( x ) never used  (  ) other:    FAMILY HISTORY:  FH: heart disease  Mother          PHYSICAL EXAM:  T(C): 36.4 (05-10-22 @ 09:53), Max: 36.7 (05-09-22 @ 22:22)  HR: 80 (05-10-22 @ 09:53) (80 - 95)  BP: 96/63 (05-10-22 @ 09:53) (86/57 - 96/63)  RR: 18 (05-10-22 @ 09:53) (17 - 18)  SpO2: 100% (05-10-22 @ 09:53) (97% - 100%)  Wt(kg): --  I&O's Summary    09 May 2022 07:01  -  10 May 2022 07:00  --------------------------------------------------------  IN: 50 mL / OUT: 300 mL / NET: -250 mL          EYES:   PERRLA   ENMT:   Moist mucous membranes, Good dentition, No lesions  Cardiovascular: Normal S1 S2, No JVD, No murmurs, No edema  Respiratory: Lungs clear to auscultation	  Gastrointestinal:  + ascites   Extremities: +2 edema                              7.4    1.58  )-----------( 25       ( 10 May 2022 09:20 )             22.3     05-10    128<L>  |  101  |  33<H>  ----------------------------<  129<H>  4.0   |  17<L>  |  1.22    Ca    8.2<L>      10 May 2022 05:45  Phos  2.5     05-10  Mg     1.80     05-10      proBNP:   Lipid Profile:   HgA1c:   TSH:     Consultant(s) Notes Reviewed:  [x ] YES  [ ] NO    Care Discussed with Consultants/Other Providers [ x] YES  [ ] NO    Imaging Personally Reviewed independently:  [x] YES  [ ] NO    All labs, radiologic studies, vitals, orders and medications list reviewed. Patient is seen and examined at bedside. Case discussed with medical team.                 Max Coffey MD  Interventional Cardiology / Endovascular Specialist  [place] Office : [phone] [place] [address] [place] [address]  Tel:   [place] Office : [address] [facility] [place] [address]  Tel: [phone]      Subjective/Overnight events: Patient lying in bed. No acute distress.  	  MEDICATIONS:    cefepime   IVPB 2000 milliGRAM(s) IV Intermittent every 8 hours  benzonatate 200 milliGRAM(s) Oral every 8 hours PRN  budesonide  80 MICROgram(s)/formoterol 4.5 MICROgram(s) Inhaler 2 Puff(s) Inhalation two times a day  acetaminophen     Tablet .. 650 milliGRAM(s) Oral every 6 hours PRN  HYDROmorphone   Tablet 2 milliGRAM(s) Oral every 4 hours PRN  HYDROmorphone   Tablet 1 milliGRAM(s) Oral every 4 hours PRN  pantoprazole    Tablet 40 milliGRAM(s) Oral two times a day  dextrose 50% Injectable 25 Gram(s) IV Push once  dextrose 50% Injectable 12.5 Gram(s) IV Push once  dextrose 50% Injectable 25 Gram(s) IV Push once  dextrose Oral Gel 15 Gram(s) Oral once PRN  glucagon  Injectable 1 milliGRAM(s) IntraMuscular once  insulin lispro (ADMELOG) corrective regimen sliding scale   SubCutaneous Before meals and at bedtime  cyanocobalamin 1000 MICROGram(s) Oral daily  dextrose 5%. 1000 milliLiter(s) IV Continuous <Continuous>  dextrose 5%. 1000 milliLiter(s) IV Continuous <Continuous>  ferrous    sulfate 325 milliGRAM(s) Oral daily  folic acid 1 milliGRAM(s) Oral daily  multivitamin 1 Tablet(s) Oral daily  sodium chloride 0.9%. 1000 milliLiter(s) IV Continuous <Continuous>      PAST MEDICAL/SURGICAL HISTORY  PAST MEDICAL & SURGICAL HISTORY:  Hypertension  Now with low BPs    Diabetes mellitus  Fasting FS range from - per patient, off meds    Hypercholesteremia  off meds    CAD (coronary artery disease)  Off aspirin    Sleep apnea    Liposarcoma    Pulmonary embolism    Transfusion history    History of cardiac cath  Pt reports 1 cardiac stent placed 1999    H/O sleep apnea  Per patient had Sleep Apnea surgery  in 1996- at [facility]- Was not retested for Sleep Apnea post surgery    History of colon resection  Dec 2019        SOCIAL HISTORY: Substance Use (street drugs): ( x ) never used  (  ) other:    FAMILY HISTORY:  FH: heart disease  Mother          PHYSICAL EXAM:  T(C): 36.4 (05-10-22 @ 09:53), Max: 36.7 (05-09-22 @ 22:22)  HR: 80 (05-10-22 @ 09:53) (80 - 95)  BP: 96/63 (05-10-22 @ 09:53) (86/57 - 96/63)  RR: 18 (05-10-22 @ 09:53) (17 - 18)  SpO2: 100% (05-10-22 @ 09:53) (97% - 100%)  Wt(kg): --  I&O's Summary    09 May 2022 07:01  -  10 May 2022 07:00  --------------------------------------------------------  IN: 50 mL / OUT: 300 mL / NET: -250 mL          EYES:   PERRLA   ENMT:   Moist mucous membranes, Good dentition, No lesions  Cardiovascular: Normal S1 S2, No JVD, No murmurs, No edema  Respiratory: Lungs clear to auscultation	  Gastrointestinal:  + ascites   Extremities: +2 edema                              7.4    1.58  )-----------( 25       ( 10 May 2022 09:20 )             22.3     05-10    128<L>  |  101  |  33<H>  ----------------------------<  129<H>  4.0   |  17<L>  |  1.22    Ca    8.2<L>      10 May 2022 05:45  Phos  2.5     05-10  Mg     1.80     05-10      proBNP:   Lipid Profile:   HgA1c:   TSH:     Consultant(s) Notes Reviewed:  [x ] YES  [ ] NO    Care Discussed with Consultants/Other Providers [ x] YES  [ ] NO    Imaging Personally Reviewed independently:  [x] YES  [ ] NO    All labs, radiologic studies, vitals, orders and medications list reviewed. Patient is seen and examined at bedside. Case discussed with medical team.

## 2022-05-10 NOTE — PROGRESS NOTE ADULT - ASSESSMENT
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       EKG SR low voltage similar to april     1) SOB   - PE Vs PNA euvolemic on exam   - echo TDS grossly normal RV function  - on abx     2) CAD s/p stent  -s/p stent 20 years ago as per patient  -denies CP    3) Anemia/thrombocytopenia   -hemoglobin 7.3  -monitor H/H   - NARCISA -dominique for HIT last admission       4)  Hypotension  -BP soft  -give dose of midodrine 10mg and re-assess    5) DVT PPX  -recommend SCD's  72 yo M with PMHx of low BPs HLD, DM2 (currently off meds), FADI not on CPAP, CAD s/p stent (1999, now off aspirin), recent PE on lovenox (currently hold off due to thrombocytopenia), and 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       EKG SR low voltage similar to april     1) SOB   - PE Vs PNA euvolemic on exam   - echo TDS grossly normal RV function  - on abx     2) CAD s/p stent  -s/p stent 20 years ago as per patient  -denies CP    3) Anemia/thrombocytopenia   -hemoglobin 7.4  -monitor H/H   - NARCISA -dominique for HIT last admission       4)  Hypotension  -BP soft  -give dose of midodrine 10mg and re-assess    5) DVT PPX  -recommend SCD's

## 2022-05-10 NOTE — PROVIDER CONTACT NOTE (CRITICAL VALUE NOTIFICATION) - SITUATION
present w sob. current hgb 7.0.
pt Mckay Evans has a hemoglobin of 6.6
Critical Lab Value reported of Hematocrit 20 and Venous hemoglobin 66

## 2022-05-11 NOTE — PROGRESS NOTE ADULT - PROBLEM SELECTOR PLAN 3
tachycardic HR 140s, hypoTN SBP as low as 80s, tachypnea to 22  iso neutropenia and lactate intially elevated now downtrending  s/p cefepime and IVF in ED  - c/w cefepime as above
tachycardic HR 140s, hypoTN SBP as low as 80s, tachypnea to 22  iso neutropenia and lactate intially elevated now downtrending  s/p cefepime and IVF in ED  - c/w cefepime as above  - e/o dehydration per elevated SCr from bl, will give NS 500cc bolus followed by mIVF 75cc/hr x 1L can consider albumin given hypoalbuminemia   - HST elevated on admit 51 likely iso demand, will rpt in am  - monitor WBC, fever curve
tachycardic HR 140s, hypoTN SBP as low as 80s, tachypnea to 22  iso neutropenia and lactate intially elevated now downtrending  s/p cefepime and IVF in ED  - c/w cefepime as above  - e/o dehydration per elevated SCr from bl, will give NS 500cc bolus followed by mIVF 75cc/hr x 1L can consider albumin given hypoalbuminemia   - HST elevated on admit 51 likely iso demand, will rpt in am  - monitor WBC, fever curve
tachycardic HR 140s, hypoTN SBP as low as 80s, tachypnea to 22  iso neutropenia and lactate intially elevated now downtrending  s/p cefepime and IVF in ED  - c/w cefepime as above
-likely from abd ascites
tachycardic HR 140s, hypoTN SBP as low as 80s, tachypnea to 22  iso neutropenia and lactate intially elevated now downtrending  s/p cefepime and IVF in ED  - c/w cefepime as above  - e/o dehydration per elevated SCr from bl, will give NS 500cc bolus followed by mIVF 75cc/hr x 1L can consider albumin given hypoalbuminemia   - HST elevated on admit 51 likely iso demand, will rpt in am  - monitor WBC, fever curve
-likely from abd ascites

## 2022-05-11 NOTE — PROGRESS NOTE ADULT - PROBLEM SELECTOR PROBLEM 3
SIRS (systemic inflammatory response syndrome)
SOB (shortness of breath)
SOB (shortness of breath)

## 2022-05-11 NOTE — CONSULT NOTE ADULT - ATTENDING COMMENTS
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; lately on experimental Chemo with recent hospitalization from 4/4-5/4 initially for low platelet count, poor PO intake and worsening abdominal distension 2/2 malignant ascites, now presenting with SOB possibly 2/2 PNA vs PE vs atelectasis in the setting of neutropenic fever.  tolerating bipap 10/5 30%  fever control, pancx, Abx would rescusitate wit IVF (currently getting 500cc bolus x1)  would continue IVF, consider 5% 250 albumin  check cortisol, consider stress dose steroids (unclear of adrenal insuff)  would get CTPA in ED considering pt with known PE not on AC  pallitave care eval  GOC discussion   not a micu candidate reconsult as needed  discussed with ED
As above. 71 year old male with FADI, CAD, PE, and liposarcoma with malignant ascites admitted with sepsis, acute hypoxemic respiratory failure, possible pneumonia. ICU called today for hypotension. Following discussion with patient decision made to pursue comfort measures. No need for ICU at this time.

## 2022-05-11 NOTE — PROGRESS NOTE ADULT - PROBLEM SELECTOR PROBLEM 1
Acute respiratory failure with hypoxia
SIRS (systemic inflammatory response syndrome)
SIRS (systemic inflammatory response syndrome)

## 2022-05-11 NOTE — SWALLOW BEDSIDE ASSESSMENT ADULT - SWALLOW EVAL: DIAGNOSIS
Unable to assess oral and pharyngeal stage of swallow given clinical presentation/refusal as described above.

## 2022-05-11 NOTE — PROGRESS NOTE ADULT - PROVIDER SPECIALTY LIST ADULT
Cardiology
Pulmonology
Cardiology
Infectious Disease
Internal Medicine
Infectious Disease
Internal Medicine
Internal Medicine

## 2022-05-11 NOTE — DISCHARGE NOTE FOR THE EXPIRED PATIENT - NS PATIENT DEATH CRITERIA
Call your insurance and check with them about a list of doctors who take your plan.  
Patient is dead based on Cardiopulmonary criteria including absent breath sounds, pulselessness and/or asystole

## 2022-05-11 NOTE — PROGRESS NOTE ADULT - ASSESSMENT
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

## 2022-05-11 NOTE — SWALLOW BEDSIDE ASSESSMENT ADULT - COMMENTS
Per Internal Medicine Note 5/10/22: "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"     Patient is familiar to this department as the patient was attempted to be seen for an initial swallow evaluation on 5/10/22. Refer to consult for further details.     Patient received supine in bed, awake, alert and communicative. Patient denied head of bed to be adjusted to ~90 degrees and denied to participate in swallow evaluation despite encouragement from SLP and RN.

## 2022-05-11 NOTE — PROGRESS NOTE ADULT - ASSESSMENT
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       EKG SR low voltage similar to april     1) SOB   - PE Vs PNA euvolemic on exam   - echo TDS grossly normal RV function  - on abx     2) CAD s/p stent  -s/p stent 20 years ago as per patient  -denies CP    3) Anemia/thrombocytopenia   -hemoglobin 6.4  - now confort care       4)  Hypotension  comfort care

## 2022-05-11 NOTE — CONSULT NOTE ADULT - ASSESSMENT
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, course complicated by GIB requiring transfusion as well as hypotension.    >f/u post transfusion CBC, transfuse as needed  >c/w midodrine 10mg q8 hours, albumin   >Patient DNR/DNI, pursuing hospice, does not want to prolong pain and suffering. Patient with stage IV liposarcoma, not offered any futher treatment including chemotherapy. Recommend to continue GOC.  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, course complicated by GIB requiring transfusion as well as hypotension.    >GI blood loss may contribute to hypoension, f/u post transfusion CBC, transfuse as needed  >c/w midodrine 10mg q8 hours, albumin   >Patient also received hydromorphone for pain which may also contributing to hypotension. Desire for pain control and comfort, DNR/DNI at odds with pursuing pressor support.  Patient DNR/DNI, pursuing hospice, does not want to prolong pain and suffering. Patient with stage IV liposarcoma, not offered any futher treatment including chemotherapy. Recommend to continue GOC.  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, course complicated by GIB requiring transfusion as well as hypotension.   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, course complicated by GIB requiring transfusion as well as hypotension.      MICU consulted for hypotension, multifactorial including possible GIB, opioid induced.   Patient states "I am ready to die," and requests a  (). He does not want aggressive life sustaining measures, including pressor medications. MICU admission not compatible with patient wishes. Primary team to discuss if patient wants to proceed with other intervention, including blood transfusions, blood draws, etc.    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, course complicated by GIB requiring transfusion as well as hypotension.      MICU consulted for hypotension, multifactorial including possible GIB, opioid induced.   Patient AOx3, states "I am ready to die," and requests to see a  () today. He does not want aggressive life sustaining measures, including pressor medications. MICU admission for pressor support not compatible with patient wishes. Primary team to discuss if patient wants to proceed with other interventions, including blood transfusions, blood draws, etc or if patient would want to pursue full comfort care.

## 2022-05-11 NOTE — RAPID RESPONSE TEAM SUMMARY - NSSITUATIONBACKGROUNDRRT_GEN_ALL_CORE
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    RRT called for hypotension. BP 80s/40s on initial evaluation. Afebrile. No tachycardia, dyspnea, or orthopnea. He was receiving NS bolus already upon evaluation. Pt at baseline mental status A&O x4, baseline BP runs 90s/50s. IVF switched from NS to albumin 25% 50cc. Also given midodrine 10mg at RRT. Hgb noted to be at 6.9 this AM and pt had bloody BM last night, T&S sent overnight and ordered for 1u pRBC at RRT. LUE 18G PIV no blood return but flushing. After multiple attempts to place another PIV, RRT team called IV RN who placed a RUE 20G Arrow. pRBC run wide open via RUE Arrow and BP improved to baseline 90s/50s. Continue with midodrine 10mg TID. Labs were sent; if hypoalbuminemic can give albumin 25% 50cc q8h for 3 doses.    Patient makes medical decisions for himself but wanted to discuss with his wife, who arrived at bedside with patient's brother as well. Pt had changed his mind about GOC at least twice during RRT but is now confidently confirming that he would not want to be resuscitated and intubated because he would be in significantly more pain and his goal is to not to have more pain. He had been referred to hospice but there were disagreements between his wife and palliative team about whether home hospice sufficed or if he needed inpatient hospice. Patient DOES want pressors if needed. Feeding tube discussion deferred for now. MOLST completed regarding DNR/DNI and placed into paper chart. Primary team to consult MICU.

## 2022-05-11 NOTE — CHART NOTE - NSCHARTNOTESELECT_GEN_ALL_CORE
Event Note
IR Imaging Request/Event Note
IR Pre-procedure note/Event Note
ONCOLOGY CONSULT/Event Note

## 2022-05-11 NOTE — PROCEDURE NOTE - ADDITIONAL PROCEDURE DETAILS
660mL of malignant appearing ascites was removed. Procedure was stopped due to systolic blood pressure dropping to 85mmhg  Ascites is multiloculated and there is extensive peritoneal carcinoma

## 2022-05-11 NOTE — DISCHARGE NOTE FOR THE EXPIRED PATIENT - HOSPITAL COURSE
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     Problem/Plan - 1:  ·  Problem: Acute respiratory failure with hypoxia.   ·  Plan: Acute hypoxic respiratory failure  Lab-work suggestive of mixed acid base balance d/o; metabolic acidosis, metabolic alkalosis & respiratory alkalosis  possibly 2/2 PNA vs PE vs atelectasis iso neutropenia w/ infxn and increased demand  MICU c/s, not candidate at this time  - pulm f/u.     Problem/Plan - 2:  ·  Problem: Neutropenia.   ·  Plan: meeting SIRS criteria as below    - c/w cefepime.     Problem/Plan - 3:  ·  Problem: SIRS (systemic inflammatory response syndrome).   ·  Plan: tachycardic HR 140s, hypoTN SBP as low as 80s, tachypnea to 22  iso neutropenia and lactate intially elevated now downtrending  s/p cefepime and IVF in ED  - c/w cefepime as above.     Problem/Plan - 4:  ·  Problem: Anemia.   ·  Plan: presumed 2/2 malignancy on previous admit , at bl per previous d/c  previous admit w/ 4u PRBC and 3u PLT  on folate/b12 supplementation  - holding AC iso combination w/ thrombocytopenia  - monitor CBC, transfuse prn    #thrombocytopenia  - monitor closely  - monitor plt, transfuse prn.     Problem/Plan - 5:  ·  Problem: History of pulmonary embolism.   ·  Plan: acute PE dx previous admit   AC held thrombocytopenia.     Problem/Plan - 6:  ·  Problem: Liposarcoma.   ·  Plan: previous experimental tx  c/b malignant ascites 2/2 peritoneal carcinomatosis, s/p bedside para 4/29 w/ 2.2L removal  followed by heme/onc previous admit, pending 2nd opinion given told ltd options available for tx  per family interested in pall options, c/s placed  -palliative F/U.     Problem/Plan - 7:  ·  Problem: Lower extremity edema.   ·  Plan: thought 2/2 extrinsic compressions 2/2 ascites and malignancy progression as well as hypoalbuminemia and resultant oncotic gradient alteration  - conservative mgt w/ elevation of legs  -.     Problem/Plan - 8:  ·  Problem: Hyponatremia.   ·  Plan: Na 130   likely iso hypovolemia   - urine studies  - monitor BMP.     Problem/Plan - 9:  ·  Problem: Type 2 diabetes mellitus.   ·  Plan: A1c 5.6  - JUSTEN  -.     Problem/Plan - 10:  ·  Problem: Need for prophylactic measure.   ·  Plan; Chemical DVT ppx: holding iso bicytopenia as above  Mechanical DVT ppx: SCDs  Diet: CC    5/11 - s/p RRT for hypotension earlier in the day. Was given PRBC and albumin. MICU was consulted however Pt was A&Ox3 at the time and made himself DNR/DNI/comfort measures. MOLST filled and in chart.   Called to bedside by RN at approx 21:45 as pt noted to not have a pulse and not breathing. Pt seen at bedside. Pt unresponsive, no response to verbal or tactile stimuli. Pupils fixed and dilated, no spontaneous respirations, no heart or lung sounds, no carotid or femoral pulse. Pt is DNR/DNI/comfort measures - MOLST reviewed in chart. Pt pronounced dead at 21:50. Wife Karina [phone] notified. Medical attending Dr. nicole Made aware.            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     Problem/Plan - 1:  ·  Problem: Acute respiratory failure with hypoxia.   ·  Plan: Acute hypoxic respiratory failure  Lab-work suggestive of mixed acid base balance d/o; metabolic acidosis, metabolic alkalosis & respiratory alkalosis  possibly 2/2 PNA vs PE vs atelectasis iso neutropenia w/ infxn and increased demand  MICU c/s, not candidate at this time  - pulm f/u.     Problem/Plan - 2:  ·  Problem: Neutropenia.   ·  Plan: meeting SIRS criteria as below    - c/w cefepime.     Problem/Plan - 3:  ·  Problem: SIRS (systemic inflammatory response syndrome).   ·  Plan: tachycardic HR 140s, hypoTN SBP as low as 80s, tachypnea to 22  iso neutropenia and lactate intially elevated now downtrending  s/p cefepime and IVF in ED  - c/w cefepime as above.     Problem/Plan - 4:  ·  Problem: Anemia.   ·  Plan: presumed 2/2 malignancy on previous admit , at bl per previous d/c  previous admit w/ 4u PRBC and 3u PLT  on folate/b12 supplementation  - holding AC iso combination w/ thrombocytopenia  - monitor CBC, transfuse prn    #thrombocytopenia  - monitor closely  - monitor plt, transfuse prn.     Problem/Plan - 5:  ·  Problem: History of pulmonary embolism.   ·  Plan: acute PE dx previous admit   AC held thrombocytopenia.     Problem/Plan - 6:  ·  Problem: Liposarcoma.   ·  Plan: previous experimental tx  c/b malignant ascites 2/2 peritoneal carcinomatosis, s/p bedside para 4/29 w/ 2.2L removal  followed by heme/onc previous admit, pending 2nd opinion given told ltd options available for tx  per family interested in pall options, c/s placed  -palliative F/U.     Problem/Plan - 7:  ·  Problem: Lower extremity edema.   ·  Plan: thought 2/2 extrinsic compressions 2/2 ascites and malignancy progression as well as hypoalbuminemia and resultant oncotic gradient alteration  - conservative mgt w/ elevation of legs  -.     Problem/Plan - 8:  ·  Problem: Hyponatremia.   ·  Plan: Na 130   likely iso hypovolemia   - urine studies  - monitor BMP.     Problem/Plan - 9:  ·  Problem: Type 2 diabetes mellitus.   ·  Plan: A1c 5.6  - JUSTEN  -.     Problem/Plan - 10:  ·  Problem: Need for prophylactic measure.   ·  Plan; Chemical DVT ppx: holding iso bicytopenia as above  Mechanical DVT ppx: SCDs  Diet: CC    5/11 - s/p RRT for hypotension earlier in the day. Was given PRBC and albumin. MICU was consulted however Pt was A&Ox3 at the time and made himself DNR/DNI/comfort measures. MOLST filled and in chart.   Called to bedside by RN at approx 21:45 as pt noted to not have a pulse and not breathing. Pt seen at bedside. Pt unresponsive, no response to verbal or tactile stimuli. Pupils fixed and dilated, no spontaneous respirations, no heart or lung sounds, no carotid or femoral pulse. Pt is DNR/DNI/comfort measures - MOLST reviewed in chart. Pt pronounced dead at 21:50. Wife Karina [phone] notified. Per Spouse's preferences, would like body to remain as is without any lines removed per Mormon preferences. Medical attending Dr. Toure made aware.            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    Acute respiratory failure with hypoxia.   Lab-work suggestive of mixed acid base balance d/o; metabolic acidosis, metabolic alkalosis & respiratory alkalosis  possibly 2/2 PNA vs PE vs atelectasis iso neutropenia w/ infxn and increased demand  MICU c/s, not candidate at this time. Pulm was following.     Pt was also Neutropenic meeting SIRS criteria. was on cefepime.    Anemia presumed 2/2 malignancy on previous admit, on previous admission had recieved 4u PRBC and 3u PLT. AC was held 2/2 combination w/ thrombocytopenia    History of pulmonary embolism.   AC was held thrombocytopenia.     Liposarcoma.   : previous experimental tx  c/b malignant ascites 2/2 peritoneal carcinomatosis, s/p bedside para 4/29 w/ 2.2L removal  followed by heme/onc previous admit, pending 2nd opinion given told ltd options available for tx  family was interested in palliative/hospice.    Lower extremity edema.    thought 2/2 extrinsic compressions 2/2 ascites and malignancy progression as well as hypoalbuminemia and resultant oncotic gradient alteration  - conservative mgt w/ elevation of legs  -.  Pt was hyponatremic likely in the setting of hypovolemia.    Pt was Type 2 diabetic, A1c 5.6, was on JUSTEN.    5/11 - s/p RRT for hypotension earlier in the day. Was given PRBC and albumin. MICU was consulted however Pt was A&Ox3 at the time and made himself DNR/DNI/comfort measures. MOLST filled and in chart.   Called to bedside by RN at approx 21:45 as pt noted to not have a pulse and not breathing. Pt seen at bedside. Pt unresponsive, no response to verbal or tactile stimuli. Pupils fixed and dilated, no spontaneous respirations, no heart or lung sounds, no carotid or femoral pulse. Pt is DNR/DNI/comfort measures - MOLST reviewed in chart. Pt pronounced dead at 21:50. Wife Karina [phone] notified. Per Spouse's preferences, would like body to remain as is without any lines removed per Muslim preferences. Medical attending Dr. Toure made aware.

## 2022-05-11 NOTE — CONSULT NOTE ADULT - SUBJECTIVE AND OBJECTIVE BOX
CHIEF COMPLAINT: Hypotension    HPI:  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.     Today, RRT called for hypotension of 80s/40s. Afebrile. No tachycardia, dyspnea, or orthopnea. He was receiving NS bolus already upon evaluation. Pt at baseline mental status A&O x4, baseline BP runs 90s/50s. IVF switched from NS to albumin 25% 50cc. Also given midodrine 10mg at RRT. Hgb noted to be at 6.9 this AM and pt had bloody BM last night, T&S sent overnight and ordered for 1u pRBC at RRT. pRBC run wide open via RUE Arrow and BP improved to baseline 90s/50s.       Patient DNR/DNI, was planning for d/c to hospice.       PAST MEDICAL & SURGICAL HISTORY:  Hypertension  Now with low BPs      Diabetes mellitus  Fasting FS range from - per patient, off meds      Hypercholesteremia  off meds      CAD (coronary artery disease)  Off aspirin      Sleep apnea      Liposarcoma      Pulmonary embolism      Transfusion history      History of cardiac cath  Pt reports 1 cardiac stent placed 1999      H/O sleep apnea  Per patient had Sleep Apnea surgery  in 1996- at [facility]- Was not retested for Sleep Apnea post surgery      History of colon resection  Dec 2019          FAMILY HISTORY:  FH: heart disease  Mother        SOCIAL HISTORY:  hx of occasional cigarette use 30 yrs ago, social EtOH, denies illicit drug use. Lives w/ wife and is pending HHA, ambulates w/ rolling walker. Born in [place]. Is a .    Allergies    oxycodone (Vomiting)    Intolerances        HOME MEDICATIONS:    REVIEW OF SYSTEMS:  Constitutional: No fever, fatigue  Skin: No rash.  Eyes: No recent vision problems or eye pain.  ENT: No congestion, ear pain, or sore throat.  Cardiovascular: No chest pain or palpation.  Respiratory: No cough, shortness of breath, congestion, or wheezing.  Gastrointestinal: No abdominal pain, nausea, vomiting, or diarrhea.  Genitourinary: No dysuria.  Musculoskeletal: No joint swelling.  Neurologic: No headache.    OBJECTIVE:  ICU Vital Signs Last 24 Hrs  T(C): 37.5 (11 May 2022 10:30), Max: 37.5 (11 May 2022 10:30)  T(F): 99.5 (11 May 2022 10:30), Max: 99.5 (11 May 2022 10:30)  HR: 92 (11 May 2022 10:30) (87 - 95)  BP: 86/50 (11 May 2022 10:30) (86/50 - 95/53)  BP(mean): --  ABP: --  ABP(mean): --  RR: 17 (11 May 2022 10:30) (16 - 18)  SpO2: 100% (11 May 2022 10:30) (99% - 100%)        05-10 @ 07:01  -  05-11 @ 07:00  --------------------------------------------------------  IN: 180 mL / OUT: 0 mL / NET: 180 mL      CAPILLARY BLOOD GLUCOSE      POCT Blood Glucose.: 177 mg/dL (11 May 2022 11:51)      PHYSICAL EXAM:  GENERAL: NAD  EYES: EOMI, PERRLA  NECK: Supple, No JVD  CHEST/LUNG: crackles at bases  HEART:  S1 , S2 +  ABDOMEN: soft, bs+,, distension+ mld tenderness+  EXTREMITIES:  edema+  NEUROLOGY: alert awake        HOSPITAL MEDICATIONS:  MEDICATIONS  (STANDING):  budesonide  80 MICROgram(s)/formoterol 4.5 MICROgram(s) Inhaler 2 Puff(s) Inhalation two times a day  cefepime   IVPB 2000 milliGRAM(s) IV Intermittent every 8 hours  cyanocobalamin 1000 MICROGram(s) Oral daily  dextrose 5%. 1000 milliLiter(s) (100 mL/Hr) IV Continuous <Continuous>  dextrose 5%. 1000 milliLiter(s) (50 mL/Hr) IV Continuous <Continuous>  dextrose 50% Injectable 25 Gram(s) IV Push once  dextrose 50% Injectable 12.5 Gram(s) IV Push once  dextrose 50% Injectable 25 Gram(s) IV Push once  ferrous    sulfate 325 milliGRAM(s) Oral daily  folic acid 1 milliGRAM(s) Oral daily  glucagon  Injectable 1 milliGRAM(s) IntraMuscular once  insulin lispro (ADMELOG) corrective regimen sliding scale   SubCutaneous Before meals and at bedtime  lactobacillus acidophilus 1 Tablet(s) Oral two times a day  lidocaine 2% Jelly 2 milliLiter(s) IntraUrethral once  multivitamin 1 Tablet(s) Oral daily  pantoprazole    Tablet 40 milliGRAM(s) Oral two times a day  sodium chloride 0.9%. 1000 milliLiter(s) (75 mL/Hr) IV Continuous <Continuous>    MEDICATIONS  (PRN):  acetaminophen     Tablet .. 650 milliGRAM(s) Oral every 6 hours PRN Temp greater or equal to 38C (100.4F), Mild Pain (1 - 3)  benzonatate 200 milliGRAM(s) Oral every 8 hours PRN Cough  dextrose Oral Gel 15 Gram(s) Oral once PRN Blood Glucose LESS THAN 70 milliGRAM(s)/deciliter  HYDROmorphone   Tablet 2 milliGRAM(s) Oral every 4 hours PRN Severe Pain (7 - 10)  HYDROmorphone   Tablet 1 milliGRAM(s) Oral every 4 hours PRN Moderate Pain (4 - 6)      LABS:                        6.4    1.97  )-----------( 25       ( 11 May 2022 11:15 )             19.2     05-11    127<L>  |  99  |  41<H>  ----------------------------<  157<H>  4.0   |  17<L>  |  1.58<H>    Ca    8.3<L>      11 May 2022 11:15  Phos  2.7     05-11  Mg     1.70     05-11    TPro  x   /  Alb  2.4<L>  /  TBili  x   /  DBili  x   /  AST  x   /  ALT  x   /  AlkPhos  x   05-11    PT/INR - ( 11 May 2022 11:15 )   PT: 12.3 sec;   INR: 1.06 ratio         PTT - ( 11 May 2022 11:15 )  PTT:26.6 sec          MICROBIOLOGY:     RADIOLOGY:  [ ] Reviewed and interpreted by me    EKG:

## 2022-05-11 NOTE — PROGRESS NOTE ADULT - SUBJECTIVE AND OBJECTIVE BOX
Max Coffey MD  Interventional Cardiology / Endovascular Specialist  [place] Office : [phone] [place] [address] [place] [address]  Tel:   [place] Office : [address] [facility] [place] [address]  Tel: [phone]    Pt lying in bed s/p RRT for hypotension , c/o abd and generalized pain requesting comfort care   	  MEDICATIONS:    cefepime   IVPB 2000 milliGRAM(s) IV Intermittent every 8 hours    benzonatate 200 milliGRAM(s) Oral every 8 hours PRN  budesonide  80 MICROgram(s)/formoterol 4.5 MICROgram(s) Inhaler 2 Puff(s) Inhalation two times a day    acetaminophen     Tablet .. 650 milliGRAM(s) Oral every 6 hours PRN  HYDROmorphone   Tablet 2 milliGRAM(s) Oral every 4 hours PRN  HYDROmorphone   Tablet 1 milliGRAM(s) Oral every 4 hours PRN  HYDROmorphone  Injectable 0.5 milliGRAM(s) IV Push once    pantoprazole    Tablet 40 milliGRAM(s) Oral two times a day    dextrose 50% Injectable 25 Gram(s) IV Push once  dextrose 50% Injectable 12.5 Gram(s) IV Push once  dextrose 50% Injectable 25 Gram(s) IV Push once  dextrose Oral Gel 15 Gram(s) Oral once PRN  glucagon  Injectable 1 milliGRAM(s) IntraMuscular once  insulin lispro (ADMELOG) corrective regimen sliding scale   SubCutaneous Before meals and at bedtime    cyanocobalamin 1000 MICROGram(s) Oral daily  dextrose 5%. 1000 milliLiter(s) IV Continuous <Continuous>  dextrose 5%. 1000 milliLiter(s) IV Continuous <Continuous>  ferrous    sulfate 325 milliGRAM(s) Oral daily  folic acid 1 milliGRAM(s) Oral daily  multivitamin 1 Tablet(s) Oral daily  sodium chloride 0.9%. 1000 milliLiter(s) IV Continuous <Continuous>      PAST MEDICAL/SURGICAL HISTORY  PAST MEDICAL & SURGICAL HISTORY:  Hypertension  Now with low BPs      Diabetes mellitus  Fasting FS range from - per patient, off meds      Hypercholesteremia  off meds      CAD (coronary artery disease)  Off aspirin      Sleep apnea      Liposarcoma      Pulmonary embolism      Transfusion history      History of cardiac cath  Pt reports 1 cardiac stent placed 1999      H/O sleep apnea  Per patient had Sleep Apnea surgery  in 1996- at [facility]- Was not retested for Sleep Apnea post surgery      History of colon resection  Dec 2019          SOCIAL HISTORY: Substance Use (street drugs): ( x ) never used  (  ) other:    FAMILY HISTORY:  FH: heart disease  Mother      PHYSICAL EXAM:  T(C): 37.5 (05-11-22 @ 10:30), Max: 37.5 (05-11-22 @ 10:30)  HR: 95 (05-11-22 @ 15:00) (88 - 95)  BP: 82/52 (05-11-22 @ 15:00) (82/52 - 95/53)  RR: 17 (05-11-22 @ 15:00) (16 - 18)  SpO2: 100% (05-11-22 @ 15:00) (99% - 100%)  Wt(kg): --  I&O's Summary    10 May 2022 07:01  -  11 May 2022 07:00  --------------------------------------------------------  IN: 180 mL / OUT: 0 mL / NET: 180 mL      EYES:   PERRLA   ENMT:   Moist mucous membranes, Good dentition, No lesions  Cardiovascular: Normal S1 S2, No JVD, No murmurs, No edema  Respiratory: Lungs clear to auscultation	  Gastrointestinal:  + ascites   Extremities: +2 edema                                6.4    1.97  )-----------( 25       ( 11 May 2022 11:15 )             19.2     05-11    127<L>  |  99  |  41<H>  ----------------------------<  157<H>  4.0   |  17<L>  |  1.58<H>    Ca    8.3<L>      11 May 2022 11:15  Phos  2.7     05-11  Mg     1.70     05-11    TPro  x   /  Alb  2.4<L>  /  TBili  x   /  DBili  x   /  AST  x   /  ALT  x   /  AlkPhos  x   05-11    proBNP:   Lipid Profile:   HgA1c:   TSH:     Consultant(s) Notes Reviewed:  [x ] YES  [ ] NO    Care Discussed with Consultants/Other Providers [ x] YES  [ ] NO    Imaging Personally Reviewed independently:  [x] YES  [ ] NO    All labs, radiologic studies, vitals, orders and medications list reviewed. Patient is seen and examined at bedside. Case discussed with medical team.

## 2022-05-11 NOTE — PROGRESS NOTE ADULT - SUBJECTIVE AND OBJECTIVE BOX
ADRIAN FUNES 71y [id number]    Patient is a 71y old  Male who presents with a chief complaint of AHRF (10 May 2022 11:23)      Follow Up/CC:  ID following for pna    Interval History/ROS: no fever    Allergies    oxycodone (Vomiting)    Intolerances        ANTIMICROBIALS:  cefepime   IVPB 2000 every 8 hours      MEDICATIONS  (STANDING):  budesonide  80 MICROgram(s)/formoterol 4.5 MICROgram(s) Inhaler 2 Puff(s) Inhalation two times a day  cefepime   IVPB 2000 milliGRAM(s) IV Intermittent every 8 hours  cyanocobalamin 1000 MICROGram(s) Oral daily  dextrose 5%. 1000 milliLiter(s) (100 mL/Hr) IV Continuous <Continuous>  dextrose 5%. 1000 milliLiter(s) (50 mL/Hr) IV Continuous <Continuous>  dextrose 50% Injectable 25 Gram(s) IV Push once  dextrose 50% Injectable 12.5 Gram(s) IV Push once  dextrose 50% Injectable 25 Gram(s) IV Push once  ferrous    sulfate 325 milliGRAM(s) Oral daily  folic acid 1 milliGRAM(s) Oral daily  glucagon  Injectable 1 milliGRAM(s) IntraMuscular once  insulin lispro (ADMELOG) corrective regimen sliding scale   SubCutaneous Before meals and at bedtime  lactobacillus acidophilus 1 Tablet(s) Oral two times a day  lidocaine 2% Jelly 2 milliLiter(s) IntraUrethral once  multivitamin 1 Tablet(s) Oral daily  pantoprazole    Tablet 40 milliGRAM(s) Oral two times a day  sodium chloride 0.9%. 1000 milliLiter(s) (75 mL/Hr) IV Continuous <Continuous>    MEDICATIONS  (PRN):  acetaminophen     Tablet .. 650 milliGRAM(s) Oral every 6 hours PRN Temp greater or equal to 38C (100.4F), Mild Pain (1 - 3)  benzonatate 200 milliGRAM(s) Oral every 8 hours PRN Cough  dextrose Oral Gel 15 Gram(s) Oral once PRN Blood Glucose LESS THAN 70 milliGRAM(s)/deciliter  HYDROmorphone   Tablet 2 milliGRAM(s) Oral every 4 hours PRN Severe Pain (7 - 10)  HYDROmorphone   Tablet 1 milliGRAM(s) Oral every 4 hours PRN Moderate Pain (4 - 6)        Vital Signs Last 24 Hrs  T(C): 36.4 (11 May 2022 06:18), Max: 36.6 (10 May 2022 22:00)  T(F): 97.6 (11 May 2022 06:18), Max: 97.8 (10 May 2022 22:00)  HR: 95 (11 May 2022 06:18) (87 - 95)  BP: 90/52 (11 May 2022 06:18) (90/52 - 95/53)  BP(mean): --  RR: 18 (11 May 2022 06:18) (16 - 18)  SpO2: 99% (11 May 2022 06:18) (99% - 100%)    CBC Full  -  ( 11 May 2022 06:40 )  WBC Count : 1.94 K/uL  RBC Count : 2.29 M/uL  Hemoglobin : 6.9 g/dL  Hematocrit : 21.3 %  Platelet Count - Automated : 27 K/uL  Mean Cell Volume : 93.0 fL  Mean Cell Hemoglobin : 30.1 pg  Mean Cell Hemoglobin Concentration : 32.4 gm/dL  Auto Neutrophil # : x  Auto Lymphocyte # : x  Auto Monocyte # : x  Auto Eosinophil # : x  Auto Basophil # : x  Auto Neutrophil % : x  Auto Lymphocyte % : x  Auto Monocyte % : x  Auto Eosinophil % : x  Auto Basophil % : x    05-11    129<L>  |  99  |  38<H>  ----------------------------<  132<H>  4.1   |  17<L>  |  1.51<H>    Ca    8.2<L>      11 May 2022 06:40  Phos  2.5     05-11  Mg     1.70     05-11            MICROBIOLOGY:  Clean Catch Clean Catch (Midstream)  05-05-22   No growth  --  --      .Blood Blood-Venous  05-05-22   No Growth Final  --  --      .Blood Blood-Peripheral  05-05-22   No Growth Final  --  --      Abdominal Fl DRAINAGE  04-14-22   No growth  --  --      Abdominal Fl ABDOMEN DRAINAGE  04-14-22   No growth at 5 days  --    No polymorphonuclear leukocytes per low power field  No organisms seen per oil power field      .Blood Blood  04-14-22   No Growth Final  --  --      .Blood Blood  04-14-22   No Growth Final  --  --              v    Rapid RVP Result: NotDetec (05-05 @ 16:15)          RADIOLOGY

## 2022-05-11 NOTE — DISCHARGE NOTE FOR THE EXPIRED PATIENT - ORGAN DONOR #
Quality 337: Tuberculosis Prevention For Psoriasis And Psoriatic Arthritis Patients On A Biological Immune Response Modifier: Patient has a documented negative annual TB screening. Detail Level: Zone Quality 410: Psoriasis Clinical Response To Oral Systemic Or Biologic Mediations: Patient has been on biologic or system therapy for less than 6 months [id number]

## 2022-05-11 NOTE — CHART NOTE - NSCHARTNOTEFT_GEN_A_CORE
Earlier this morning pt's blood pressure was 78/40 upon returning from paracentesis with removal of 630ml ascitic fluid . Patient says he feels extremely weak, and feels he is going to  die. Upon questioning if he wants to be resuscitated, pt says that he does want to be resuscitated and intubated if needed, despite knowing that his condition is terminal. RRT was called. Further care provided by RRT team.

## 2022-05-11 NOTE — PROGRESS NOTE ADULT - PROBLEM SELECTOR PLAN 2
meeting SIRS criteria as below    - c/w cefepime
-still low  -monitor ANC
-still low  -monitor ANC  -seems chronic

## 2022-05-11 NOTE — CHART NOTE - NSCHARTNOTEFT_GEN_A_CORE
Spoke to patient and his wife at bedside who stated he wants his pain controlled and to die in peace. Pt is agreeable to comfort measures-no  labs, and vital signs once a day without resuscitating with IVF or PRBCs. Will call palliative to start pt on Dilaudid gtt ATC for better pain control. D/w Dr. Toure. Spoke to patient and his wife at bedside who stated he wants his pain controlled and to die in peace. Pt is agreeable to comfort measures-no  labs, and vital signs once a day without resuscitating with IVF or PRBCs. Will call palliative to start pt on Dilaudid gtt ATC for better pain control. D/w Dr. Toure.  Addendum: Spoke to palliative care: will continue Dilaudid 2mg every 4 hours as needed for pain, and will see how much patient is getting and if pain is controlled once he gets the Dilaudid (without looking at blood pressure)

## 2022-05-11 NOTE — CONSULT NOTE ADULT - CONSULT REQUESTED DATE/TIME
09-May-2022 12:09
06-May-2022 14:09
09-May-2022 17:52
05-May-2022 17:55
06-May-2022
11-May-2022 13:30
06-May-2022 12:47

## 2022-05-13 LAB — NON-GYNECOLOGICAL CYTOLOGY STUDY: SIGNIFICANT CHANGE UP

## 2022-05-14 LAB
CULTURE RESULTS: SIGNIFICANT CHANGE UP
SPECIMEN SOURCE: SIGNIFICANT CHANGE UP

## 2022-05-16 LAB
CULTURE RESULTS: SIGNIFICANT CHANGE UP
SPECIMEN SOURCE: SIGNIFICANT CHANGE UP

## 2022-05-27 LAB — NON-GYNECOLOGICAL CYTOLOGY STUDY: SIGNIFICANT CHANGE UP

## 2022-06-08 LAB
CULTURE RESULTS: SIGNIFICANT CHANGE UP
SPECIMEN SOURCE: SIGNIFICANT CHANGE UP

## 2022-08-25 NOTE — H&P PST ADULT - NEGATIVE ENDOCRINE SYMPTOMS
Ndc# For Kenalog Only: [id number] Ndc# For Kenalog Only: [id number] no cold intolerance/no heat intolerance/no voice change

## 2022-12-02 NOTE — PATIENT PROFILE ADULT - DO YOU FEEL UNSAFE AT WORK?
Discharge Instructions       PATIENT ID: Fer Quintero  MRN: [id number]   YOB: 1961    DATE OF ADMISSION: [unfilled]    DATE OF DISCHARGE: 12/2/2022    PRIMARY CARE PROVIDER: @PCP@     ATTENDING PHYSICIAN: [unfilled]  DISCHARGING PROVIDER: Colin Wilburn MD    To contact this individual call [phone] and ask the  to page. If unavailable ask to be transferred the Adult Hospitalist Department. DISCHARGE DIAGNOSES exacerbation of COPD    CONSULTATIONS: @LUIS MANUEL@    PROCEDURES/SURGERIES: * No surgery found *    PENDING TEST RESULTS:   At the time of discharge the following test results are still pending: None    FOLLOW UP APPOINTMENTS:   [unfilled]     ADDITIONAL CARE RECOMMENDATIONS: Need pulmonary rehab    DIET: Diabetic Diet      ACTIVITY: Activity as tolerated    Wound care: Wound Care Order: submitted to [facility] Please view [email or web address]    EQUIPMENT needed: ***      DISCHARGE MEDICATIONS:   See Medication Reconciliation Form    It is important that you take the medication exactly as they are prescribed. Keep your medication in the bottles provided by the pharmacist and keep a list of the medication names, dosages, and times to be taken in your wallet. Do not take other medications without consulting your doctor. NOTIFY YOUR PHYSICIAN FOR ANY OF THE FOLLOWING:   Fever over 101 degrees for 24 hours. Chest pain, shortness of breath, fever, chills, nausea, vomiting, diarrhea, change in mentation, falling, weakness, bleeding. Severe pain or pain not relieved by medications. Or, any other signs or symptoms that you may have questions about.       DISPOSITION:    Home With:   OT  PT  HH  RN       SNF/Inpatient Rehab/LTAC    Independent/assisted living    Hospice    Other:         PROBLEM LIST Updated:  Yes ***       Signed:   Colin Wilburn MD  12/2/2022  11:18 AM no

## 2023-01-23 NOTE — ED PROVIDER NOTE - CPE EDP NEURO NORM
Adequate: hears normal conversation without difficulty
Attending Attestation (For Attendings USE Only)...
normal...

## 2023-02-28 NOTE — PROGRESS NOTE ADULT - NSPROGADDITIONALINFOA_GEN_ALL_CORE
-- DO NOT REPLY / DO NOT REPLY ALL --  -- Message is from Engagement Center Operations (ECO) --    ONLY TO BE USED WITHIN A REFILL MEDICATION ENCOUNTER    Med Refill  Is the patient currently having any symptoms?: No/Non-Emergent symptoms    Name of medication requested: See pended med    Has patient contacted the pharmacy? Yes    Is this the first request for the medication in the last 48 hours?: Yes    Patient is requesting a medication refill - medication is on active medication list    Patient is currently OUT of the requested medication - sent as HIGH priority      Full name of the provider who ordered the medication:   Brien Marie    [facility] site name / Account # for provider: [place]    Preferred Pharmacy: Pharmacy  [id number] [address] [facility] [address]    Patient confirmed the above pharmacy as correct?  Yes      Caller Information       Type Contact Phone/Fax    02/28/2023 10:15 AM CST Phone (Incoming) Duyen Ospina (Self) [phone] (M)          Alternative phone number: None    Can a detailed message be left?: Yes    Patient is completely out of medication: Verify if patient is currently experiencing symptoms. If patient is symptomatic, proceed with front end triage instead of medication refill. If patient is not symptomatic but is completely out of medication, iza as High priority when routing. Inform patient: “Please call back with any questions or concerns and if your condition becomes life threatening, you should seek immediate medical assistance by calling 911 or going to the Emergency Department for evaluation.”    Inform all patients: \"If the clinical team needs to contact you regarding this refill, please be aware the return phone call may come from an unidentified or out of state phone number and your refill request will be addressed as soon as the clinical team reviews your message.\"   I reviewed the overnight course of events on the unit, re-confirming the patient history. I discussed the care with the patient and their family. The plan of care was discussed with the ACP team and modifications were made to the notation where appropriate. Differential diagnosis and plan of care discussed with patient after the evaluation. Advanced care planning was discussed with patient and family.  Advanced care planning forms were reviewed and discussed.  Risks, benefits and alternatives of cardiac procedures were discussed in detail and all questions were answered. 35 minutes spent on total encounter of which more than fifty percent of the encounter was spent counseling and/or coordinating care by the attending physician.

## 2023-02-28 NOTE — PHYSICAL THERAPY INITIAL EVALUATION ADULT - AMBULATION SKILLS, REHAB EVAL
Comments: [place] message sent to rocío    Last Office Visit (last PCP visit):   6/30/2022    Next Visit Date:  No future appointments. **If hasn't been seen in over a year OR hasn't followed up according to last diabetes/ADHD visit, make appointment for patient before sending refill to provider.     Rx requested:  Requested Prescriptions     Pending Prescriptions Disp Refills    desvenlafaxine succinate (PRISTIQ) 100 MG TB24 extended release tablet [Pharmacy Med Name: DESVENLAFAXINE SUCCINATE ER TABS 100MG] 90 tablet 3     Sig: TAKE 1 TABLET DAILY
needed assist

## 2023-03-21 NOTE — PROGRESS NOTE ADULT - SUBJECTIVE AND OBJECTIVE BOX
ADRIAN FUNES:[id number],   69yMale followed for:  No Known Allergies    PAST MEDICAL & SURGICAL HISTORY:  Sleep apnea  CAD (coronary artery disease)  Hypercholesteremia  Diabetes mellitus: Fasting FS range from - per patient  Hypertension  Intra-abdominal and pelvic swelling, mass and lump, unspecified site  H/O sleep apnea: Per patient had Sleep Apnea surgery  in 1996- at [facility]- Was not retested for Sleep Apnea post surgery  History of cardiac cath: Pt reports 1 cardiac stent placed 1999    FAMILY HISTORY:  FH: heart disease: Mother    MEDICATIONS  (STANDING):  dextrose 5% + sodium chloride 0.9% 1000 milliLiter(s) (75 mL/Hr) IV Continuous <Continuous>  dextrose 5%. 1000 milliLiter(s) (50 mL/Hr) IV Continuous <Continuous>  dextrose 50% Injectable 12.5 Gram(s) IV Push once  dextrose 50% Injectable 25 Gram(s) IV Push once  dextrose 50% Injectable 25 Gram(s) IV Push once  enoxaparin Injectable 40 milliGRAM(s) SubCutaneous daily  insulin lispro (HumaLOG) corrective regimen sliding scale   SubCutaneous Before meals and at bedtime    MEDICATIONS  (PRN):  dextrose 40% Gel 15 Gram(s) Oral once PRN Blood Glucose LESS THAN 70 milliGRAM(s)/deciliter  glucagon  Injectable 1 milliGRAM(s) IntraMuscular once PRN Glucose LESS THAN 70 milligrams/deciliter  oxyCODONE    IR 5 milliGRAM(s) Oral every 3 hours PRN Moderate Pain (4 - 6)  oxyCODONE    IR 10 milliGRAM(s) Oral every 3 hours PRN Severe Pain (7 - 10)      Vital Signs Last 24 Hrs  T(C): 36.7 (17 Dec 2019 05:18), Max: 36.7 (17 Dec 2019 00:17)  T(F): 98 (17 Dec 2019 05:18), Max: 98 (17 Dec 2019 00:17)  HR: 70 (17 Dec 2019 05:18) (69 - 79)  BP: 106/60 (17 Dec 2019 05:18) (105/62 - 118/69)  BP(mean): --  RR: 18 (17 Dec 2019 05:18) (16 - 18)  SpO2: 95% (17 Dec 2019 05:18) (95% - 100%)  nc/at  s1s2  cta  soft, nt, nd no guarding or rebound  no c/c/e    CBC Full  -  ( 16 Dec 2019 05:00 )  WBC Count : 7.42 K/uL  RBC Count : 3.37 M/uL  Hemoglobin : 8.7 g/dL  Hematocrit : 27.5 %  Platelet Count - Automated : 343 K/uL  Mean Cell Volume : 81.6 fL  Mean Cell Hemoglobin : 25.8 pg  Mean Cell Hemoglobin Concentration : 31.6 %  Auto Neutrophil # : x  Auto Lymphocyte # : x  Auto Monocyte # : x  Auto Eosinophil # : x  Auto Basophil # : x  Auto Neutrophil % : x  Auto Lymphocyte % : x  Auto Monocyte % : x  Auto Eosinophil % : x  Auto Basophil % : x    12-16    134<L>  |  101  |  5<L>  ----------------------------<  123<H>  3.7   |  23  |  0.90    Ca    7.9<L>      16 Dec 2019 05:00  Phos  2.7     12-16  Mg     1.5     12-16      PT/INR - ( 16 Dec 2019 05:00 )   PT: 13.2 SEC;   INR: 1.18          PTT - ( 16 Dec 2019 05:00 )  PTT:31.5 SEC Hemigard Intro: Due to skin fragility and wound tension, it was decided to use HEMIGARD adhesive retention suture devices to permit a linear closure. The skin was cleaned and dried for a 6cm distance away from the wound. Excessive hair, if present, was removed to allow for adhesion.

## 2023-03-30 NOTE — H&P ADULT - NSHPPHYSICALEXAM_GEN_ALL_CORE
General Sunscreen Counseling: I recommended a broad spectrum sunscreen with a SPF of 30 or higher.  I explained that SPF 30 sunscreens block approximately 97 percent of the sun's harmful rays.  Sunscreens should be applied at least 15 minutes prior to expected sun exposure and then every 2 hours after that as long as sun exposure continues. If swimming or exercising sunscreen should be reapplied every 45 minutes to an hour after getting wet or sweating.  One ounce, or the equivalent of a shot glass full of sunscreen, is adequate to protect the skin not covered by a bathing suit. I also recommended a lip balm with a sunscreen as well. Sun protective clothing can be used in lieu of sunscreen but must be worn the entire time you are exposed to the sun's rays. Products Recommended: Elta MD\\nMineral Based Sunscreen Detail Level: Zone Physical Exam:  Gen: NAD  LS: nml respiratory effort  Card: pulse regularly present  GI: abd soft, nontender, distended  Ext: warm

## 2023-04-12 NOTE — PHYSICAL THERAPY INITIAL EVALUATION ADULT - CRITERIA FOR SKILLED THERAPEUTIC INTERVENTIONS
[FreeTextEntry1] :  discussed option of repeating the cortisone in today she is ready to do that for the back will start some physical therapy provided some pain medication I will see her in a few months if the pain does not get better in the back will might get an MRI before pain management re-evaluation\par  the option of a  cortisone injection in the  left  shoulder was discussed with the patient today.  risks and benefits were reviewed and  consent was given.  with sterile technique  through a  posterior approach 5 cc dexamethasone (20 mg) and 5 cc 1%  lidocaine was infiltrated with good effect and a  Band-Aid applied ice was  recommended\par  \par \par 
impairments found/functional limitations in following categories/risk reduction/prevention/rehab potential

## 2024-02-12 NOTE — PATIENT PROFILE ADULT - DO YOU FEEL THREATENED BY OTHERS?
Refill per VO of Dr. German  Last appt: 10/12/2023    Future Appointments   Date Time Provider Department Center   4/15/2024  4:00 PM Shayna German MD [facility]       Requested Prescriptions     Signed Prescriptions Disp Refills    carvedilol (COREG) 12.5 MG tablet 180 tablet 3     Sig: Take 1 tablet by mouth 2 times daily     Authorizing Provider: SHAYNA GERMAN     Ordering User: SURINDER MARIANO     
no

## 2024-08-21 NOTE — ED ADULT TRIAGE NOTE - TEMPERATURE IN FAHRENHEIT (DEGREES F)
Dr Oliver with [facility] calls today. He wanted to know if Dr Galloway is aware of the elevated liver labs and if any additional testing was considered.       Dr Oliver can be reached at [phone]   97.5

## 2024-11-21 NOTE — DIETITIAN INITIAL EVALUATION ADULT - PROBLEM SELECTOR PROBLEM 3
A1c goal (<65y; <7%), (65-75y; <7.5%), (>75y; <8%): controlled  Glycemic regimen: metfromin 500 mg BID with meal  On ACE/ARB/SGLT2i: No, BP controled - monitor urine microalbumin   On statin: Yes atorvastatin 20 mg daily  Diabetes; cont regular monitoring labs, continue annual eye and foot exam.  -cont f/up DM ed        Adult failure to thrive

## 2024-11-22 NOTE — PRE-OP CHECKLIST - TAMPON REMOVED
Trichiasis of left lower eyelid  Patient voiced consent. 1 drop of proparacaine instilled. Jeweler's forceps used to epilate lash(es). Patient tolerated procedure well. No complications.     RTC with Dr. Hightower as planned with last exam 2/2024   n/a

## 2024-12-16 NOTE — PROGRESS NOTE ADULT - NEUROLOGICAL
Hospitalist Progress Note    Patient:  Ranjeet Wagoner     YOB: 1951    MRN: [id number]   Admit date: 12/14/2024     Acct: [id number]     PCP: Jacek Taylor MD    CC--Interval History:     COPD exacerbation--improved----home----12.16.2024    CHF---acute-on-chronic diastolic---improved    DM2---uncontrolled --> insulin gtt----complications including all toes amputated on the left and toe on the right--increased the Troujeo  80--80 ----> 90-90 and added Amaryl while on steroids.    Lactic acid elevated at admission 2.5 --> 1.0----sepsis ruled out    Urine---0-2 WBCs--no bacteria---culture with Strep agalactiae--unlikely to represent a true infection--given CKD,  alternative medications not desireable and an allergy to PCN, hence, no antibiotics.    Factor V Leiden mutation with a prior history of PE and DVT, fortunately, no PE on CTA chest----and no DVT--12.16.2024 on DUS.    Prior cigarette smoker---2 PPD x 43 years----quit---2010.    See note below    All other ROS negative except noted in HPI    Diet:  ADULT DIET; Regular; 5 carb choices (75 gm/meal)    Medications:  Scheduled Meds:   predniSONE  20 mg Oral Daily    insulin glargine  90 Units SubCUTAneous BID    apixaban  2.5 mg Oral BID    atorvastatin  40 mg Oral Daily    buPROPion  100 mg Oral BID    carvedilol  6.25 mg Oral BID    cilostazol  100 mg Oral BID    clopidogrel  75 mg Oral Daily    empagliflozin  10 mg Oral Daily    finasteride  5 mg Oral Daily    gabapentin  100 mg Oral BID    [Held by provider] lisinopril  20 mg Oral Daily    rifAXIMin  600 mg Oral TID    tamsulosin  0.4 mg Oral BID    sodium chloride flush  5-40 mL IntraVENous 2 times per day    budesonide-formoterol  2 puff Inhalation BID RT    [Held by provider] insulin lispro  0-4 Units SubCUTAneous 4x Daily AC & HS    [Held by provider] insulin lispro  20 Units SubCUTAneous TID WC    Trulicity  1.5 mg SubCUTAneous Weekly    insulin regular         Continuous Infusions:    detailed exam details…

## 2025-02-06 NOTE — H&P PST ADULT - FUNCTIONAL LEVEL PRIOR: EATING
Duration Of Freeze Thaw-Cycle (Seconds): 3
Show Aperture Variable?: Yes
Application Tool (Optional): Cry-AC
Consent: The patient's consent was obtained including but not limited to risks of crusting, scabbing, blistering, scarring, darker or lighter pigmentary change, recurrence, incomplete removal and infection.
Post-Care Instructions: I reviewed with the patient in detail post-care instructions. Patient is to wear sunprotection, and avoid picking at any of the treated lesions. Pt may apply Vaseline to crusted or scabbing areas.
Number Of Freeze-Thaw Cycles: 2 freeze-thaw cycles
Render Post-Care Instructions In Note?: no
Detail Level: Detailed
0 = independent

## 2025-06-03 NOTE — PROVIDER CONTACT NOTE (OTHER) - DATE AND TIME:
13-Dec-2019 23:48
14-Dec-2019 05:25
14-Dec-2019 08:05
15-Dec-2019 03:00
18-Dec-2019 01:15
As certified below, I, or a nurse practitioner or physician assistant working with me, had a face-to-face encounter that meets the physician face-to-face encounter requirements.